# Patient Record
Sex: FEMALE | Race: WHITE | NOT HISPANIC OR LATINO | Employment: OTHER | URBAN - METROPOLITAN AREA
[De-identification: names, ages, dates, MRNs, and addresses within clinical notes are randomized per-mention and may not be internally consistent; named-entity substitution may affect disease eponyms.]

---

## 2019-01-17 ENCOUNTER — APPOINTMENT (OUTPATIENT)
Dept: RADIOLOGY | Facility: MEDICAL CENTER | Age: 49
DRG: 948 | End: 2019-01-17
Attending: EMERGENCY MEDICINE

## 2019-01-17 ENCOUNTER — HOSPITAL ENCOUNTER (INPATIENT)
Facility: MEDICAL CENTER | Age: 49
LOS: 2 days | DRG: 948 | End: 2019-01-19
Attending: EMERGENCY MEDICINE | Admitting: HOSPITALIST
Payer: OTHER MISCELLANEOUS

## 2019-01-17 ENCOUNTER — APPOINTMENT (OUTPATIENT)
Dept: RADIOLOGY | Facility: MEDICAL CENTER | Age: 49
DRG: 948 | End: 2019-01-17
Attending: HOSPITALIST

## 2019-01-17 DIAGNOSIS — K75.9 HEPATITIS: ICD-10-CM

## 2019-01-17 DIAGNOSIS — N30.00 ACUTE CYSTITIS WITHOUT HEMATURIA: ICD-10-CM

## 2019-01-17 DIAGNOSIS — R07.1 CHEST PAIN ON BREATHING: ICD-10-CM

## 2019-01-17 PROBLEM — Z72.0 TOBACCO ABUSE: Status: ACTIVE | Noted: 2019-01-17

## 2019-01-17 PROBLEM — E87.1 HYPONATREMIA: Status: ACTIVE | Noted: 2019-01-17

## 2019-01-17 PROBLEM — R10.811 RUQ ABDOMINAL TENDERNESS: Status: ACTIVE | Noted: 2019-01-17

## 2019-01-17 PROBLEM — N39.0 UTI (URINARY TRACT INFECTION): Status: ACTIVE | Noted: 2019-01-17

## 2019-01-17 PROBLEM — E03.9 HYPOTHYROIDISM: Status: ACTIVE | Noted: 2019-01-17

## 2019-01-17 PROBLEM — F10.20 ALCOHOL DEPENDENCE (HCC): Status: ACTIVE | Noted: 2019-01-17

## 2019-01-17 PROBLEM — R74.8 ELEVATED LIVER ENZYMES: Status: ACTIVE | Noted: 2019-01-17

## 2019-01-17 LAB
ALBUMIN SERPL BCP-MCNC: 4.3 G/DL (ref 3.2–4.9)
ALBUMIN/GLOB SERPL: 1.3 G/DL
ALP SERPL-CCNC: 208 U/L (ref 30–99)
ALT SERPL-CCNC: 830 U/L (ref 2–50)
ANION GAP SERPL CALC-SCNC: 13 MMOL/L (ref 0–11.9)
APPEARANCE UR: CLEAR
APTT PPP: 29.2 SEC (ref 24.7–36)
AST SERPL-CCNC: 761 U/L (ref 12–45)
BACTERIA #/AREA URNS HPF: ABNORMAL /HPF
BASOPHILS # BLD AUTO: 0.6 % (ref 0–1.8)
BASOPHILS # BLD: 0.05 K/UL (ref 0–0.12)
BILIRUB SERPL-MCNC: 2.8 MG/DL (ref 0.1–1.5)
BILIRUB UR QL STRIP.AUTO: ABNORMAL
BNP SERPL-MCNC: 15 PG/ML (ref 0–100)
BUN SERPL-MCNC: 20 MG/DL (ref 8–22)
CALCIUM SERPL-MCNC: 8.7 MG/DL (ref 8.4–10.2)
CASTS URNS QL MICRO: ABNORMAL /LPF
CHLORIDE SERPL-SCNC: 94 MMOL/L (ref 96–112)
CO2 SERPL-SCNC: 23 MMOL/L (ref 20–33)
COLOR UR: YELLOW
CREAT SERPL-MCNC: 1.01 MG/DL (ref 0.5–1.4)
D DIMER PPP IA.FEU-MCNC: 1.59 UG/ML (FEU) (ref 0–0.5)
EKG IMPRESSION: NORMAL
EOSINOPHIL # BLD AUTO: 0.05 K/UL (ref 0–0.51)
EOSINOPHIL NFR BLD: 0.6 % (ref 0–6.9)
EPI CELLS #/AREA URNS HPF: ABNORMAL /HPF
ERYTHROCYTE [DISTWIDTH] IN BLOOD BY AUTOMATED COUNT: 48 FL (ref 35.9–50)
ERYTHROCYTE [DISTWIDTH] IN BLOOD BY AUTOMATED COUNT: 48.1 FL (ref 35.9–50)
GLOBULIN SER CALC-MCNC: 3.2 G/DL (ref 1.9–3.5)
GLUCOSE SERPL-MCNC: 109 MG/DL (ref 65–99)
GLUCOSE UR STRIP.AUTO-MCNC: NEGATIVE MG/DL
HCT VFR BLD AUTO: 46.5 % (ref 37–47)
HCT VFR BLD AUTO: 50.4 % (ref 37–47)
HGB BLD-MCNC: 16 G/DL (ref 12–16)
HGB BLD-MCNC: 17.4 G/DL (ref 12–16)
HGB RETIC QN AUTO: 37.6 PG/CELL (ref 29–35)
IMM GRANULOCYTES # BLD AUTO: 0.02 K/UL (ref 0–0.11)
IMM GRANULOCYTES NFR BLD AUTO: 0.2 % (ref 0–0.9)
IMM RETICS NFR: 6.5 % (ref 9.3–17.4)
INR PPP: 1.03 (ref 0.87–1.13)
IRON SATN MFR SERPL: 91 % (ref 15–55)
IRON SERPL-MCNC: 322 UG/DL (ref 40–170)
KETONES UR STRIP.AUTO-MCNC: 40 MG/DL
LEUKOCYTE ESTERASE UR QL STRIP.AUTO: NEGATIVE
LIPASE SERPL-CCNC: 24 U/L (ref 7–58)
LYMPHOCYTES # BLD AUTO: 2.71 K/UL (ref 1–4.8)
LYMPHOCYTES NFR BLD: 32.4 % (ref 22–41)
MAGNESIUM SERPL-MCNC: 1.8 MG/DL (ref 1.5–2.5)
MCH RBC QN AUTO: 32.4 PG (ref 27–33)
MCH RBC QN AUTO: 32.5 PG (ref 27–33)
MCHC RBC AUTO-ENTMCNC: 34.4 G/DL (ref 33.6–35)
MCHC RBC AUTO-ENTMCNC: 34.5 G/DL (ref 33.6–35)
MCV RBC AUTO: 93.9 FL (ref 81.4–97.8)
MCV RBC AUTO: 94.3 FL (ref 81.4–97.8)
MICRO URNS: ABNORMAL
MONOCYTES # BLD AUTO: 0.7 K/UL (ref 0–0.85)
MONOCYTES NFR BLD AUTO: 8.4 % (ref 0–13.4)
NEUTROPHILS # BLD AUTO: 4.83 K/UL (ref 2–7.15)
NEUTROPHILS NFR BLD: 57.8 % (ref 44–72)
NITRITE UR QL STRIP.AUTO: NEGATIVE
NRBC # BLD AUTO: 0 K/UL
NRBC BLD-RTO: 0 /100 WBC
PH UR STRIP.AUTO: 5 [PH]
PLATELET # BLD AUTO: 355 K/UL (ref 164–446)
PLATELET # BLD AUTO: 399 K/UL (ref 164–446)
PMV BLD AUTO: 8.7 FL (ref 9–12.9)
PMV BLD AUTO: 8.9 FL (ref 9–12.9)
POTASSIUM SERPL-SCNC: 3.8 MMOL/L (ref 3.6–5.5)
PROT SERPL-MCNC: 7.5 G/DL (ref 6–8.2)
PROT UR QL STRIP: NEGATIVE MG/DL
PROTHROMBIN TIME: 13.4 SEC (ref 12–14.6)
RBC # BLD AUTO: 4.93 M/UL (ref 4.2–5.4)
RBC # BLD AUTO: 5.37 M/UL (ref 4.2–5.4)
RBC # URNS HPF: ABNORMAL /HPF
RBC UR QL AUTO: ABNORMAL
RETICS # AUTO: 0.06 M/UL (ref 0.04–0.06)
RETICS/RBC NFR: 1.3 % (ref 0.8–2.1)
SODIUM SERPL-SCNC: 130 MMOL/L (ref 135–145)
SP GR UR REFRACTOMETRY: 1.02
TIBC SERPL-MCNC: 353 UG/DL (ref 250–450)
TROPONIN I SERPL-MCNC: <0.02 NG/ML (ref 0–0.04)
TSH SERPL DL<=0.005 MIU/L-ACNC: 2.36 UIU/ML (ref 0.38–5.33)
WBC # BLD AUTO: 8.4 K/UL (ref 4.8–10.8)
WBC # BLD AUTO: 8.5 K/UL (ref 4.8–10.8)
WBC #/AREA URNS HPF: ABNORMAL /HPF

## 2019-01-17 PROCEDURE — 83880 ASSAY OF NATRIURETIC PEPTIDE: CPT

## 2019-01-17 PROCEDURE — 36415 COLL VENOUS BLD VENIPUNCTURE: CPT

## 2019-01-17 PROCEDURE — 99407 BEHAV CHNG SMOKING > 10 MIN: CPT | Performed by: HOSPITALIST

## 2019-01-17 PROCEDURE — A9270 NON-COVERED ITEM OR SERVICE: HCPCS | Performed by: HOSPITALIST

## 2019-01-17 PROCEDURE — 700105 HCHG RX REV CODE 258: Performed by: HOSPITALIST

## 2019-01-17 PROCEDURE — 71045 X-RAY EXAM CHEST 1 VIEW: CPT

## 2019-01-17 PROCEDURE — 84443 ASSAY THYROID STIM HORMONE: CPT

## 2019-01-17 PROCEDURE — 93005 ELECTROCARDIOGRAM TRACING: CPT | Performed by: EMERGENCY MEDICINE

## 2019-01-17 PROCEDURE — 87086 URINE CULTURE/COLONY COUNT: CPT

## 2019-01-17 PROCEDURE — 85046 RETICYTE/HGB CONCENTRATE: CPT

## 2019-01-17 PROCEDURE — 96372 THER/PROPH/DIAG INJ SC/IM: CPT

## 2019-01-17 PROCEDURE — 700117 HCHG RX CONTRAST REV CODE 255: Performed by: EMERGENCY MEDICINE

## 2019-01-17 PROCEDURE — 83550 IRON BINDING TEST: CPT

## 2019-01-17 PROCEDURE — 85379 FIBRIN DEGRADATION QUANT: CPT

## 2019-01-17 PROCEDURE — 71275 CT ANGIOGRAPHY CHEST: CPT

## 2019-01-17 PROCEDURE — 700105 HCHG RX REV CODE 258: Performed by: EMERGENCY MEDICINE

## 2019-01-17 PROCEDURE — 87077 CULTURE AEROBIC IDENTIFY: CPT

## 2019-01-17 PROCEDURE — 85027 COMPLETE CBC AUTOMATED: CPT

## 2019-01-17 PROCEDURE — 99232 SBSQ HOSP IP/OBS MODERATE 35: CPT | Mod: 25 | Performed by: HOSPITALIST

## 2019-01-17 PROCEDURE — 85610 PROTHROMBIN TIME: CPT

## 2019-01-17 PROCEDURE — 74181 MRI ABDOMEN W/O CONTRAST: CPT

## 2019-01-17 PROCEDURE — 700111 HCHG RX REV CODE 636 W/ 250 OVERRIDE (IP)

## 2019-01-17 PROCEDURE — 81001 URINALYSIS AUTO W/SCOPE: CPT

## 2019-01-17 PROCEDURE — 700111 HCHG RX REV CODE 636 W/ 250 OVERRIDE (IP): Performed by: HOSPITALIST

## 2019-01-17 PROCEDURE — 85730 THROMBOPLASTIN TIME PARTIAL: CPT

## 2019-01-17 PROCEDURE — 700102 HCHG RX REV CODE 250 W/ 637 OVERRIDE(OP): Performed by: HOSPITALIST

## 2019-01-17 PROCEDURE — 770006 HCHG ROOM/CARE - MED/SURG/GYN SEMI*

## 2019-01-17 PROCEDURE — 99285 EMERGENCY DEPT VISIT HI MDM: CPT

## 2019-01-17 PROCEDURE — 87186 SC STD MICRODIL/AGAR DIL: CPT

## 2019-01-17 PROCEDURE — 84484 ASSAY OF TROPONIN QUANT: CPT

## 2019-01-17 PROCEDURE — 85025 COMPLETE CBC W/AUTO DIFF WBC: CPT

## 2019-01-17 PROCEDURE — 83540 ASSAY OF IRON: CPT

## 2019-01-17 PROCEDURE — 83690 ASSAY OF LIPASE: CPT

## 2019-01-17 PROCEDURE — 80053 COMPREHEN METABOLIC PANEL: CPT

## 2019-01-17 PROCEDURE — 96374 THER/PROPH/DIAG INJ IV PUSH: CPT

## 2019-01-17 PROCEDURE — 80074 ACUTE HEPATITIS PANEL: CPT

## 2019-01-17 PROCEDURE — 83735 ASSAY OF MAGNESIUM: CPT

## 2019-01-17 RX ORDER — FAMOTIDINE 20 MG/1
20 TABLET, FILM COATED ORAL 2 TIMES DAILY
Status: DISCONTINUED | OUTPATIENT
Start: 2019-01-17 | End: 2019-01-19 | Stop reason: HOSPADM

## 2019-01-17 RX ORDER — DOXEPIN HYDROCHLORIDE 25 MG/1
25 CAPSULE ORAL NIGHTLY
COMMUNITY
End: 2019-01-23 | Stop reason: SDUPTHER

## 2019-01-17 RX ORDER — MORPHINE SULFATE 4 MG/ML
INJECTION, SOLUTION INTRAMUSCULAR; INTRAVENOUS
Status: COMPLETED
Start: 2019-01-17 | End: 2019-01-17

## 2019-01-17 RX ORDER — NICOTINE 21 MG/24HR
21 PATCH, TRANSDERMAL 24 HOURS TRANSDERMAL
Status: DISCONTINUED | OUTPATIENT
Start: 2019-01-17 | End: 2019-01-19 | Stop reason: HOSPADM

## 2019-01-17 RX ORDER — LEVOTHYROXINE SODIUM 88 UG/1
88 TABLET ORAL
COMMUNITY
End: 2019-01-23

## 2019-01-17 RX ORDER — FOLIC ACID 1 MG/1
1 TABLET ORAL DAILY
Status: DISCONTINUED | OUTPATIENT
Start: 2019-01-18 | End: 2019-01-19 | Stop reason: HOSPADM

## 2019-01-17 RX ORDER — ESTRADIOL 0.03 MG/D
1 FILM, EXTENDED RELEASE TRANSDERMAL
COMMUNITY
End: 2019-01-23 | Stop reason: SDUPTHER

## 2019-01-17 RX ORDER — SODIUM CHLORIDE 9 MG/ML
INJECTION, SOLUTION INTRAVENOUS CONTINUOUS
Status: DISCONTINUED | OUTPATIENT
Start: 2019-01-17 | End: 2019-01-17

## 2019-01-17 RX ORDER — CHLORAL HYDRATE 500 MG
1000 CAPSULE ORAL DAILY
Status: ON HOLD | COMMUNITY
End: 2022-07-19

## 2019-01-17 RX ORDER — LEVOTHYROXINE SODIUM 88 UG/1
88 TABLET ORAL
Status: DISCONTINUED | OUTPATIENT
Start: 2019-01-18 | End: 2019-01-19 | Stop reason: HOSPADM

## 2019-01-17 RX ORDER — FLUOXETINE HYDROCHLORIDE 20 MG/1
20 CAPSULE ORAL DAILY
COMMUNITY
End: 2019-01-23 | Stop reason: SDUPTHER

## 2019-01-17 RX ORDER — MORPHINE SULFATE 4 MG/ML
2-4 INJECTION, SOLUTION INTRAMUSCULAR; INTRAVENOUS EVERY 4 HOURS PRN
Status: DISCONTINUED | OUTPATIENT
Start: 2019-01-17 | End: 2019-01-18

## 2019-01-17 RX ORDER — ONDANSETRON 4 MG/1
4 TABLET, ORALLY DISINTEGRATING ORAL EVERY 4 HOURS PRN
Status: DISCONTINUED | OUTPATIENT
Start: 2019-01-17 | End: 2019-01-17

## 2019-01-17 RX ORDER — DOXEPIN HYDROCHLORIDE 25 MG/1
25 CAPSULE ORAL NIGHTLY
Status: DISCONTINUED | OUTPATIENT
Start: 2019-01-17 | End: 2019-01-19 | Stop reason: HOSPADM

## 2019-01-17 RX ORDER — THIAMINE MONONITRATE (VIT B1) 100 MG
100 TABLET ORAL DAILY
Status: DISCONTINUED | OUTPATIENT
Start: 2019-01-18 | End: 2019-01-19 | Stop reason: HOSPADM

## 2019-01-17 RX ORDER — PROMETHAZINE HYDROCHLORIDE 25 MG/1
12.5-25 TABLET ORAL EVERY 4 HOURS PRN
Status: DISCONTINUED | OUTPATIENT
Start: 2019-01-17 | End: 2019-01-19 | Stop reason: HOSPADM

## 2019-01-17 RX ORDER — BISACODYL 10 MG
10 SUPPOSITORY, RECTAL RECTAL
Status: DISCONTINUED | OUTPATIENT
Start: 2019-01-17 | End: 2019-01-19 | Stop reason: HOSPADM

## 2019-01-17 RX ORDER — PROMETHAZINE HYDROCHLORIDE 25 MG/1
12.5-25 SUPPOSITORY RECTAL EVERY 4 HOURS PRN
Status: DISCONTINUED | OUTPATIENT
Start: 2019-01-17 | End: 2019-01-19 | Stop reason: HOSPADM

## 2019-01-17 RX ORDER — ESTRADIOL 0.03 MG/D
1 FILM, EXTENDED RELEASE TRANSDERMAL
Status: DISCONTINUED | OUTPATIENT
Start: 2019-01-18 | End: 2019-01-18

## 2019-01-17 RX ORDER — ALPRAZOLAM 0.25 MG/1
0.25 TABLET ORAL 2 TIMES DAILY
COMMUNITY
End: 2019-01-23 | Stop reason: SDUPTHER

## 2019-01-17 RX ORDER — POLYETHYLENE GLYCOL 3350 17 G/17G
1 POWDER, FOR SOLUTION ORAL
Status: DISCONTINUED | OUTPATIENT
Start: 2019-01-17 | End: 2019-01-19 | Stop reason: HOSPADM

## 2019-01-17 RX ORDER — MORPHINE SULFATE 4 MG/ML
1 INJECTION, SOLUTION INTRAMUSCULAR; INTRAVENOUS EVERY 4 HOURS PRN
Status: DISCONTINUED | OUTPATIENT
Start: 2019-01-17 | End: 2019-01-17

## 2019-01-17 RX ORDER — SODIUM CHLORIDE 9 MG/ML
INJECTION, SOLUTION INTRAVENOUS CONTINUOUS
Status: DISCONTINUED | OUTPATIENT
Start: 2019-01-17 | End: 2019-01-19 | Stop reason: HOSPADM

## 2019-01-17 RX ORDER — VITS A,C,E/LUTEIN/MINERALS 300MCG-200
1 TABLET ORAL DAILY
COMMUNITY

## 2019-01-17 RX ORDER — FLUOXETINE HYDROCHLORIDE 20 MG/1
20 CAPSULE ORAL DAILY
Status: DISCONTINUED | OUTPATIENT
Start: 2019-01-18 | End: 2019-01-19 | Stop reason: HOSPADM

## 2019-01-17 RX ORDER — ONDANSETRON 2 MG/ML
4 INJECTION INTRAMUSCULAR; INTRAVENOUS EVERY 4 HOURS PRN
Status: DISCONTINUED | OUTPATIENT
Start: 2019-01-17 | End: 2019-01-17

## 2019-01-17 RX ORDER — CHLORAL HYDRATE 500 MG
1000 CAPSULE ORAL DAILY
Status: DISCONTINUED | OUTPATIENT
Start: 2019-01-18 | End: 2019-01-19 | Stop reason: HOSPADM

## 2019-01-17 RX ORDER — AMOXICILLIN 250 MG
2 CAPSULE ORAL 2 TIMES DAILY
Status: DISCONTINUED | OUTPATIENT
Start: 2019-01-17 | End: 2019-01-19 | Stop reason: HOSPADM

## 2019-01-17 RX ADMIN — IOHEXOL 55 ML: 350 INJECTION, SOLUTION INTRAVENOUS at 11:50

## 2019-01-17 RX ADMIN — SODIUM CHLORIDE: 9 INJECTION, SOLUTION INTRAVENOUS at 14:16

## 2019-01-17 RX ADMIN — PROCHLORPERAZINE EDISYLATE 10 MG: 5 INJECTION INTRAMUSCULAR; INTRAVENOUS at 21:49

## 2019-01-17 RX ADMIN — NICOTINE 21 MG: 21 PATCH, EXTENDED RELEASE TRANSDERMAL at 19:56

## 2019-01-17 RX ADMIN — DOXEPIN HYDROCHLORIDE 25 MG: 25 CAPSULE ORAL at 21:07

## 2019-01-17 RX ADMIN — MORPHINE SULFATE 4 MG: 4 INJECTION INTRAVENOUS at 19:53

## 2019-01-17 RX ADMIN — MORPHINE SULFATE 1 MG: 4 INJECTION, SOLUTION INTRAMUSCULAR; INTRAVENOUS at 14:15

## 2019-01-17 RX ADMIN — MORPHINE SULFATE 1 MG: 4 INJECTION INTRAVENOUS at 14:15

## 2019-01-17 RX ADMIN — SODIUM CHLORIDE 1000 ML: 9 INJECTION, SOLUTION INTRAVENOUS at 12:09

## 2019-01-17 RX ADMIN — MORPHINE SULFATE 3 MG: 4 INJECTION INTRAVENOUS at 15:26

## 2019-01-17 RX ADMIN — CEFTRIAXONE SODIUM 2 G: 2 INJECTION, POWDER, FOR SOLUTION INTRAMUSCULAR; INTRAVENOUS at 19:55

## 2019-01-17 RX ADMIN — FAMOTIDINE 20 MG: 20 TABLET, FILM COATED ORAL at 23:16

## 2019-01-17 RX ADMIN — ENOXAPARIN SODIUM 40 MG: 100 INJECTION SUBCUTANEOUS at 14:29

## 2019-01-17 ASSESSMENT — COGNITIVE AND FUNCTIONAL STATUS - GENERAL
SUGGESTED CMS G CODE MODIFIER DAILY ACTIVITY: CH
DAILY ACTIVITIY SCORE: 24
MOBILITY SCORE: 24
SUGGESTED CMS G CODE MODIFIER MOBILITY: CH

## 2019-01-17 ASSESSMENT — LIFESTYLE VARIABLES
EVER_SMOKED: YES
TOTAL SCORE: 0
HAVE YOU EVER FELT YOU SHOULD CUT DOWN ON YOUR DRINKING: NO
ALCOHOL_USE: YES
ON A TYPICAL DAY WHEN YOU DRINK ALCOHOL HOW MANY DRINKS DO YOU HAVE: 12
TOTAL SCORE: 0
EVER FELT BAD OR GUILTY ABOUT YOUR DRINKING: NO
CONSUMPTION TOTAL: POSITIVE
TOTAL SCORE: 0
AVERAGE NUMBER OF DAYS PER WEEK YOU HAVE A DRINK CONTAINING ALCOHOL: 4
HOW MANY TIMES IN THE PAST YEAR HAVE YOU HAD 5 OR MORE DRINKS IN A DAY: 100
HAVE PEOPLE ANNOYED YOU BY CRITICIZING YOUR DRINKING: NO
EVER HAD A DRINK FIRST THING IN THE MORNING TO STEADY YOUR NERVES TO GET RID OF A HANGOVER: NO

## 2019-01-17 ASSESSMENT — PAIN SCALES - GENERAL
PAINLEVEL_OUTOF10: 3
PAINLEVEL_OUTOF10: 8
PAINLEVEL_OUTOF10: 8
PAINLEVEL_OUTOF10: 6
PAINLEVEL_OUTOF10: 8

## 2019-01-17 ASSESSMENT — PATIENT HEALTH QUESTIONNAIRE - PHQ9
1. LITTLE INTEREST OR PLEASURE IN DOING THINGS: NOT AT ALL
SUM OF ALL RESPONSES TO PHQ9 QUESTIONS 1 AND 2: 0
2. FEELING DOWN, DEPRESSED, IRRITABLE, OR HOPELESS: NOT AT ALL

## 2019-01-17 ASSESSMENT — PAIN DESCRIPTION - DESCRIPTORS: DESCRIPTORS: ACHING

## 2019-01-17 NOTE — ED NOTES
Pt assessed, c/o right sided pain under ribs, mild right side flank pain and dysuria that started yesterday. States she may have had fever last night as well. Reports increased pain with coughing and deep breathing. Urine and labs sent  Will cont to monitor

## 2019-01-17 NOTE — H&P
Hospital Medicine History & Physical Note    Date of Service  1/17/2019    Primary Care Physician  Pcp Pt States None    Consultants  None    Code Status  Full    Chief Complaint  Right upper quadrant pain    History of Presenting Illness   is a very pleasant 48 y.o. female with a past medical history of hypothyroidism, history of hemochromatosis who presented 1/17/2019 to the ER for evaluation of right-sided flank discomfort/right upper quadrant pain which essentially started last night, describes it as a dull, constant 5-7 out of 10 in severity, positional, otherwise no exacerbating relieving factors.  She denies having any nausea or vomiting.  She also did complain to the ER physician of nonspecific chest discomfort, however she denied that to me.  Patient is on an estrogen patch and also uses tobacco frequently.  She also endorsed drinking 6 shots of fire balls daily, last drink was 3 days ago but usually does drink on a regular basis.  Initial examination in the ER including a CTA pulmonary embolism study was negative for a pulmonary embolism.  However with elevated liver enzymes at this point patient will be admitted for further evaluation.    Review of Systems  ROS    Past Medical History   has a past medical history of Disorder of thyroid; Hemochromatosis; Menopause; and Psychiatric problem.    Surgical History   has a past surgical history that includes other orthopedic surgery (Right, 2017); robotic laparoscopic cholecystectomy (2017); umbilical hernia repair (2017); appendectomy; tonsillectomy and adenoidectomy (1976); mammoplasty augmentation (Bilateral); and inguinal hernia repair bilateral (1999).     Family History  family history includes Diabetes in her son; Heart Disease in her brother and mother.     Social History   reports that she has been smoking Cigarettes.  She has been smoking about 1.00 pack per day. She has never used smokeless tobacco. She reports that she drinks alcohol. She  reports that she does not use drugs. She reports drinking 6 shots of fireball daily, last drink 3 days ago.     Allergies  Allergies   Allergen Reactions   • Norco [Apap-Fd&C Yellow #10 Al Cervantes-Hydrocodone]      itching   • Sulfa Drugs Anaphylaxis       Medications  Prior to Admission Medications   Prescriptions Last Dose Informant Patient Reported? Taking?   ALPRAZolam (XANAX) 0.25 MG Tab 2019 at 2100  Yes Yes   Sig: Take 0.25 mg by mouth at bedtime as needed for Sleep or Anxiety.   Cyanocobalamin (VITAMIN B-12 INJ) 1/15/2019 at am  Yes Yes   Si,000 mcg by Injection route every 48 hours.   FLUoxetine (PROZAC) 20 MG Cap 2019 at AM Patient Yes Yes   Sig: Take 20 mg by mouth every day.   Multiple Vitamins-Minerals (OCUVITE-LUTEIN) Tab 2019 at am  Yes Yes   Sig: Take 1 tablet by mouth every day.   Omega-3 Fatty Acids (FISH OIL) 1000 MG Cap capsule 2019 at AM Patient Yes Yes   Sig: Take 1,000 mg by mouth every day.   doxepin (SINEQUAN) 25 MG Cap 2019 at PM Patient Yes Yes   Sig: Take 25 mg by mouth every evening.   estradiol (CLIMARA) 0.025 MG/24HR PATCH WEEKLY 2019 at AM Patient Yes Yes   Sig: Apply 1 Patch to skin as directed 2X A WEEK. Monday and Friday   levothyroxine (SYNTHROID) 88 MCG Tab 2019 at AM Patient Yes Yes   Sig: Take 88 mcg by mouth Every morning on an empty stomach.   progesterone (PROMETRIUM) 100 MG Cap 2019 at AM Patient Yes Yes   Sig: Take 100 mg by mouth every day.      Facility-Administered Medications: None       Physical Exam  Temp:  [36.1 °C (97 °F)-36.9 °C (98.5 °F)] 36.9 °C (98.5 °F)  Pulse:  [] 63  Resp:  [16-18] 16  BP: (125-134)/(78-99) 127/78  SpO2:  [95 %-96 %] 96 %    Physical Exam    Laboratory:  Recent Labs      19   1130  19   1505   WBC  8.4  8.5   RBC  5.37  4.93   HEMOGLOBIN  17.4*  16.0   HEMATOCRIT  50.4*  46.5   MCV  93.9  94.3   MCH  32.4  32.5   MCHC  34.5  34.4   RDW  48.0  48.1   PLATELETCT  399  355   MPV  8.7*   8.9*     Recent Labs      01/17/19   1130   SODIUM  130*   POTASSIUM  3.8   CHLORIDE  94*   CO2  23   GLUCOSE  109*   BUN  20   CREATININE  1.01   CALCIUM  8.7     Recent Labs      01/17/19   1130   ALTSGPT  830*   ASTSGOT  761*   ALKPHOSPHAT  208*   TBILIRUBIN  2.8*   LIPASE  24   GLUCOSE  109*     Recent Labs      01/17/19   1130   APTT  29.2   INR  1.03     Recent Labs      01/17/19   1130   BNPBTYPENAT  15         Recent Labs      01/17/19   1130   TROPONINI  <0.02       Urinalysis:    Recent Labs      01/17/19   1130   SPECGRAVITY  1.025   GLUCOSEUR  Negative   KETONES  40*   NITRITE  Negative   LEUKESTERAS  Negative   WBCURINE  *   RBCURINE  2-5*   BACTERIA  Many*   EPITHELCELL  Rare        Imaging:  CT-CTA CHEST PULMONARY ARTERY W/ RECONS   Final Result      1.  No evidence of pulmonary embolism.      2.  No focal pulmonary consolidation.            DX-CHEST-PORTABLE (1 VIEW)   Final Result      No evidence of acute cardiopulmonary process.      [id number]    (Results Pending)         Assessment/Plan:  I anticipate this patient will require at least two midnights for appropriate medical management, necessitating inpatient admission.    * Elevated liver enzymes   Assessment & Plan    Unclear, possibly 2/2 to heavy alcohol consumption vs CBD sludge/obstruction, altough CT A/P neg for acute findings.  Hepatitis panel ordered  MRCP ordered and pending  Check TSH and Lipid panel   Repeat cmp in the am.       RUQ abdominal tenderness   Assessment & Plan    MRCP ordered, pending.  Hx of cholecystectomy.      Hypothyroidism   Assessment & Plan    Resume home dose of synthroid       Hyponatremia   Assessment & Plan    2/2 to dehydration  Provide IV fluids  Recheck bmp in the am     Tobacco abuse   Assessment & Plan    Tobacco cessation counseling and education provided for more than 10 minutes. Nicotine replacement options provided including patch, and further medical treatments including Wellbutrin and  chantix.      Alcohol dependence (HCC)   Assessment & Plan    Drinks about 6 shots of fireballs daily, last drink was 3 days ago, usually drinks heavily on and off   No signs of withdrawal  MVI/Thiamine/folate ordered  CTM     UTI (urinary tract infection)   Assessment & Plan    UA+ WBCs and many bacteria  Cultures ordered  IV Ceftriaxone x 3 doses.         VTE prophylaxis: Lovenox.

## 2019-01-17 NOTE — CARE PLAN
Problem: Knowledge Deficit  Goal: Knowledge of disease process/condition, treatment plan, diagnostic tests, and medications will improve    Intervention: Assess knowledge level of disease process/condition, treatment plan, diagnostic tests, and medications  Educated about poc and length of stay.       Problem: Pain Management  Goal: Pain level will decrease to patient's comfort goal    Intervention: Follow pain managment plan developed in collaboration with patient and Interdisciplinary Team  Educated about pain management and testing for elevated Liver enzymes

## 2019-01-17 NOTE — ED PROVIDER NOTES
"CHIEF COMPLAINT  Chief Complaint   Patient presents with   • Chest Pain       HPI  Carie Santiago is a 48 y.o. female who presents to the Emergency Department with a chief complaint of right flank pain.   The pain is right sided and started  Last night.  The pain does no migrate to lower abdomen.   The patient describes the pain as constant..  There is possible associated fever.   There is no associated vomiting .   There is no history of kidney stones.  There is some associated dsypnea at rest.   The patient complaints of mild dysuria.  She denies any fever or vomiting.  Patient is on estrogen patch, smokes and drove from [place] 3 months ago.  She is short of breath going up the stairs.         REVIEW OF SYSTEMS  Positive for chest wall pain, dyspnea shortness of breath fever dysuria, Negative for diarrhea chills.  As above all other systems are negative.    PAST MEDICAL HISTORY  No past medical history on file.    FAMILY HISTORY  History reviewed. No pertinent family history.     SOCIAL HISTORY  Social History     Social History Main Topics   • Smoking status: Current Every Day Smoker     Packs/day: 1.00     Types: Cigarettes   • Smokeless tobacco: Never Used   • Alcohol use Yes      Comment: Weekly x 2   • Drug use: No   • Sexual activity: Not on file       SURGICAL HISTORY  patient denies any surgical history      CURRENT MEDICATIONS  Reviewed.  See Encounter Summary.  Include   No current facility-administered medications on file prior to encounter.      No current outpatient prescriptions on file prior to encounter.       ALLERGIES  Allergies   Allergen Reactions   • Norco [Apap-Fd&C Yellow #10 Al Cervantes-Hydrocodone]      itching   • Sulfa Drugs Anaphylaxis       PHYSICAL EXAM  VITAL SIGNS: /99   Pulse 74   Temp 36.1 °C (97 °F) (Temporal)   Resp 18   Ht 1.778 m (5' 10\")   Wt 91 kg (200 lb 9.9 oz)   SpO2 95%   BMI 28.79 kg/m²   Constitutional: Alert, able to answer questions  HENT: Nose is normal " in appearance, external ears are normal,  moist mucous membranes  Eyes: Anicteric,  pupils are equal round and reactive, there is no conjunctival drainage or pallor   Neck: The trachea is midline, there is no obvious mass or meningeal signs  Cardiovascular: Good perfusion, regular rate and rhythm without murmurs gallops or rubs  Thorax & Lungs: Respiratory rate and effort are normal. There is normal chest excursion with respiration.  No wheezes rhonchi or rales noted.  Abdomen: Abdomen is normal in appearance, TTP RUQ  Musculoskeletal: No deformities noted in all 4 extremities. Actively moves all 4 extremities  Skin: Visualized skin is warm, no erythema, no rash.  Neurologic:  Cranial nerves II through XII are intact there is no focal abnormality noted.  Psychiatric: Normal mood and mentation    RADIOLOGY/PROCEDURES  Imaging Studies:    CT-CTA CHEST PULMONARY ARTERY W/ RECONS   Final Result      1.  No evidence of pulmonary embolism.      2.  No focal pulmonary consolidation.            DX-CHEST-PORTABLE (1 VIEW)   Final Result      No evidence of acute cardiopulmonary process.      [id number]-L/O    (Results Pending)         Pertinent Labs   Results for orders placed or performed during the hospital encounter of 01/17/19   CBC with Differential   Result Value Ref Range    WBC 8.4 4.8 - 10.8 K/uL    RBC 5.37 4.20 - 5.40 M/uL    Hemoglobin 17.4 (H) 12.0 - 16.0 g/dL    Hematocrit 50.4 (H) 37.0 - 47.0 %    MCV 93.9 81.4 - 97.8 fL    MCH 32.4 27.0 - 33.0 pg    MCHC 34.5 33.6 - 35.0 g/dL    RDW 48.0 35.9 - 50.0 fL    Platelet Count 399 164 - 446 K/uL    MPV 8.7 (L) 9.0 - 12.9 fL    Neutrophils-Polys 57.80 44.00 - 72.00 %    Lymphocytes 32.40 22.00 - 41.00 %    Monocytes 8.40 0.00 - 13.40 %    Eosinophils 0.60 0.00 - 6.90 %    Basophils 0.60 0.00 - 1.80 %    Immature Granulocytes 0.20 0.00 - 0.90 %    Nucleated RBC 0.00 /100 WBC    Neutrophils (Absolute) 4.83 2.00 - 7.15 K/uL    Lymphs (Absolute) 2.71 1.00 - 4.80 K/uL     Monos (Absolute) 0.70 0.00 - 0.85 K/uL    Eos (Absolute) 0.05 0.00 - 0.51 K/uL    Baso (Absolute) 0.05 0.00 - 0.12 K/uL    Immature Granulocytes (abs) 0.02 0.00 - 0.11 K/uL    NRBC (Absolute) 0.00 K/uL   Complete Metabolic Panel (CMP)   Result Value Ref Range    Sodium 130 (L) 135 - 145 mmol/L    Potassium 3.8 3.6 - 5.5 mmol/L    Chloride 94 (L) 96 - 112 mmol/L    Co2 23 20 - 33 mmol/L    Anion Gap 13.0 (H) 0.0 - 11.9    Glucose 109 (H) 65 - 99 mg/dL    Bun 20 8 - 22 mg/dL    Creatinine 1.01 0.50 - 1.40 mg/dL    Calcium 8.7 8.4 - 10.2 mg/dL    AST(SGOT) 761 (H) 12 - 45 U/L    ALT(SGPT) 830 (H) 2 - 50 U/L    Alkaline Phosphatase 208 (H) 30 - 99 U/L    Total Bilirubin 2.8 (H) 0.1 - 1.5 mg/dL    Albumin 4.3 3.2 - 4.9 g/dL    Total Protein 7.5 6.0 - 8.2 g/dL    Globulin 3.2 1.9 - 3.5 g/dL    A-G Ratio 1.3 g/dL   Btype Natriuretic Peptide (BNP)   Result Value Ref Range    B Natriuretic Peptide 15 0 - 100 pg/mL   Prothrombin Time (PT/INR)   Result Value Ref Range    PT 13.4 12.0 - 14.6 sec    INR 1.03 0.87 - 1.13   APTT   Result Value Ref Range    APTT 29.2 24.7 - 36.0 sec   Lipase   Result Value Ref Range    Lipase 24 7 - 58 U/L   Troponin STAT   Result Value Ref Range    Troponin I <0.02 0.00 - 0.04 ng/mL   D-DIMER   Result Value Ref Range    D-Dimer Screen 1.59 (H) 0.00 - 0.50 ug/mL (FEU)   URINALYSIS,CULTURE IF INDICATED   Result Value Ref Range    Micro Urine Req Microscopic     Color Yellow     Character Clear     Ph 5.0 5.0 - 8.0    Glucose Negative Negative mg/dL    Ketones 40 (A) Negative mg/dL    Protein Negative Negative mg/dL    Bilirubin Small (A) Negative    Nitrite Negative Negative    Leukocyte Esterase Negative Negative    Occult Blood Small (A) Negative   REFRACTOMETER SG   Result Value Ref Range    Specific Gravity 1.025    URINE MICROSCOPIC (W/UA)   Result Value Ref Range    WBC  (A) /hpf    RBC 2-5 (A) /hpf    Bacteria Many (A) None /hpf    Epithelial Cells Rare Few /hpf    Urine Casts 3-5  Hyaline (A) /lpf   ESTIMATED GFR   Result Value Ref Range    GFR If African American >60 >60 mL/min/1.73 m 2    GFR If Non African American 58 (A) >60 mL/min/1.73 m 2   EKG   Result Value Ref Range    Report       [facility] Emergency Dept.    Test Date:  2019  Pt Name:    KEATON ROBLEDO              Department: [facility]  MRN:        [id number]                      Room:  Gender:     Female                       Technician: [id number]  :        1970                   Requested By:ER TRIAGE PROTOCOL  Order #:    [id number]                    Reading MD:    Measurements  Intervals                                Axis  Rate:       95                           P:          74  RI:         140                          QRS:        15  QRSD:       64                           T:          47  QT:         336  QTc:        423    Interpretive Statements  SINUS RHYTHM  INFERIOR INFARCT, OLD  CONSIDER ANTERIOR INFARCT  No previous ECG available for comparison               COURSE & MEDICAL DECISION MAKING  Nursing notes and vital signs were reviewed. (See chart for details)  The patients prior  records were reviewed, history was obtained from the patient    The patient presents with flank pain, and the differential diagnosis includes but is not limited to ureteral stone, AAA, pyelonephritis, or associated musculoskeletal back pain .       Initial orders in the Emergency Department included CBC CMP urinalysis and CT renal colic and initial treatment in the Emergency Department included [unfilled]   and the patient received IV normal saline hydration secondary to concentrated urine on evaluation and need for IV contrast    ED testing reveals elevated bilirubin and AST ALT, concern for acute hepatitis, retained stones, or alcohol induced liver disease.  She also has  white blood cells and sent symptoms of UTI and I suspect she has acute cystitis as well.  Patient additionally has chest pain, it is  right-sided troponin was negative her EKG does show Q waves in her anterior lead as well as inferior lead possibility for old infarcts present.,  Do not think this is active cardiac disease or ST elevation MI.    Patient will be admitted to observation for evaluation with MR imaging, serial liver enzymes and further inpatient management and care    FINAL IMPRESSION  1. Hepatitis      2. Acute cystitis without hematuria      3. Chest pain on breathing      .        DISPOSITION  Admit    Electronically signed by: Farzaneh Harman, 1/17/2019 11:23 AM

## 2019-01-17 NOTE — PROGRESS NOTES
· 2 RN skin check complete with MARA Ochoa.  · Devices in place NA.  · Skin assessed under devices NA.  · Confirmed pressure ulcers found on NA.  · New potential pressure ulcers noted on NA. Wound consult placed and wound reported.  · The following interventions in place NA

## 2019-01-17 NOTE — ASSESSMENT & PLAN NOTE
Is elevation more consistent with hepatocellular dysfunction likely a combination of hemochromatosis in addition to alcohol abuse, already trending down  We will proceed with MRCP but doubt there is obstruction-her bilirubin is normal  Madd is Mathieu score is 7.7.  If alcohol related should continue to trend down with abstinence-  Continue to monitor  Check ammonia  Check hepatitis panel

## 2019-01-17 NOTE — ED NOTES
1300: Pt requesting pain medication, was not ordered by Dr. ALEXANDER. Will notify Dr. ALEXANDER, he is currently with another patient in the ED.     1310: Dr. ALEXANDER stated he will put in order for pain medication, pt on her way to the floor

## 2019-01-17 NOTE — ED TRIAGE NOTES
"Pt C/O acute onset of right chest pain presenting since last night, with associated dyspnea at rest. She denies any similar past occurrences.     /99   Pulse 100   Temp 36.1 °C (97 °F) (Temporal)   Resp 18   Ht 1.778 m (5' 10\")   Wt 91 kg (200 lb 9.9 oz)   SpO2 96%   BMI 28.79 kg/m²     "

## 2019-01-18 PROBLEM — E83.119 HEMOCHROMATOSIS: Status: ACTIVE | Noted: 2019-01-18

## 2019-01-18 PROBLEM — R82.90 ABNORMAL URINALYSIS: Status: ACTIVE | Noted: 2019-01-17

## 2019-01-18 LAB
ALBUMIN SERPL BCP-MCNC: 3.1 G/DL (ref 3.2–4.9)
ALBUMIN/GLOB SERPL: 1.3 G/DL
ALP SERPL-CCNC: 142 U/L (ref 30–99)
ALT SERPL-CCNC: 473 U/L (ref 2–50)
AMMONIA PLAS-SCNC: 34 UMOL/L (ref 11–45)
ANION GAP SERPL CALC-SCNC: 6 MMOL/L (ref 0–11.9)
AST SERPL-CCNC: 304 U/L (ref 12–45)
BASOPHILS # BLD AUTO: 0.7 % (ref 0–1.8)
BASOPHILS # BLD: 0.05 K/UL (ref 0–0.12)
BILIRUB SERPL-MCNC: 1.3 MG/DL (ref 0.1–1.5)
BUN SERPL-MCNC: 18 MG/DL (ref 8–22)
CALCIUM SERPL-MCNC: 8.1 MG/DL (ref 8.4–10.2)
CHLORIDE SERPL-SCNC: 104 MMOL/L (ref 96–112)
CHOLEST SERPL-MCNC: 133 MG/DL (ref 100–199)
CO2 SERPL-SCNC: 24 MMOL/L (ref 20–33)
CREAT SERPL-MCNC: 0.69 MG/DL (ref 0.5–1.4)
EOSINOPHIL # BLD AUTO: 0.12 K/UL (ref 0–0.51)
EOSINOPHIL NFR BLD: 1.7 % (ref 0–6.9)
ERYTHROCYTE [DISTWIDTH] IN BLOOD BY AUTOMATED COUNT: 49.2 FL (ref 35.9–50)
FERRITIN SERPL-MCNC: 1176.6 NG/ML (ref 10–291)
GLOBULIN SER CALC-MCNC: 2.3 G/DL (ref 1.9–3.5)
GLUCOSE SERPL-MCNC: 117 MG/DL (ref 65–99)
HAV IGM SERPL QL IA: NEGATIVE
HBV CORE IGM SER QL: NEGATIVE
HBV SURFACE AG SER QL: NEGATIVE
HCT VFR BLD AUTO: 40.1 % (ref 37–47)
HCV AB SER QL: NEGATIVE
HDLC SERPL-MCNC: 30 MG/DL
HGB BLD-MCNC: 13.4 G/DL (ref 12–16)
IMM GRANULOCYTES # BLD AUTO: 0.02 K/UL (ref 0–0.11)
IMM GRANULOCYTES NFR BLD AUTO: 0.3 % (ref 0–0.9)
LDLC SERPL CALC-MCNC: 38 MG/DL
LYMPHOCYTES # BLD AUTO: 3.83 K/UL (ref 1–4.8)
LYMPHOCYTES NFR BLD: 53.4 % (ref 22–41)
MCH RBC QN AUTO: 32.4 PG (ref 27–33)
MCHC RBC AUTO-ENTMCNC: 33.4 G/DL (ref 33.6–35)
MCV RBC AUTO: 96.9 FL (ref 81.4–97.8)
MONOCYTES # BLD AUTO: 0.7 K/UL (ref 0–0.85)
MONOCYTES NFR BLD AUTO: 9.8 % (ref 0–13.4)
NEUTROPHILS # BLD AUTO: 2.45 K/UL (ref 2–7.15)
NEUTROPHILS NFR BLD: 34.1 % (ref 44–72)
NRBC # BLD AUTO: 0 K/UL
NRBC BLD-RTO: 0 /100 WBC
PLATELET # BLD AUTO: 295 K/UL (ref 164–446)
PMV BLD AUTO: 9.2 FL (ref 9–12.9)
POTASSIUM SERPL-SCNC: 4.1 MMOL/L (ref 3.6–5.5)
PROT SERPL-MCNC: 5.4 G/DL (ref 6–8.2)
RBC # BLD AUTO: 4.14 M/UL (ref 4.2–5.4)
SODIUM SERPL-SCNC: 134 MMOL/L (ref 135–145)
TRIGL SERPL-MCNC: 325 MG/DL (ref 0–149)
WBC # BLD AUTO: 7.2 K/UL (ref 4.8–10.8)

## 2019-01-18 PROCEDURE — 80061 LIPID PANEL: CPT

## 2019-01-18 PROCEDURE — 99232 SBSQ HOSP IP/OBS MODERATE 35: CPT | Performed by: INTERNAL MEDICINE

## 2019-01-18 PROCEDURE — 700102 HCHG RX REV CODE 250 W/ 637 OVERRIDE(OP): Performed by: HOSPITALIST

## 2019-01-18 PROCEDURE — 700102 HCHG RX REV CODE 250 W/ 637 OVERRIDE(OP): Performed by: INTERNAL MEDICINE

## 2019-01-18 PROCEDURE — A9270 NON-COVERED ITEM OR SERVICE: HCPCS | Performed by: HOSPITALIST

## 2019-01-18 PROCEDURE — 700111 HCHG RX REV CODE 636 W/ 250 OVERRIDE (IP): Performed by: HOSPITALIST

## 2019-01-18 PROCEDURE — 700105 HCHG RX REV CODE 258: Performed by: INTERNAL MEDICINE

## 2019-01-18 PROCEDURE — 82728 ASSAY OF FERRITIN: CPT

## 2019-01-18 PROCEDURE — 85025 COMPLETE CBC W/AUTO DIFF WBC: CPT

## 2019-01-18 PROCEDURE — 770006 HCHG ROOM/CARE - MED/SURG/GYN SEMI*

## 2019-01-18 PROCEDURE — A9270 NON-COVERED ITEM OR SERVICE: HCPCS | Performed by: INTERNAL MEDICINE

## 2019-01-18 PROCEDURE — 80053 COMPREHEN METABOLIC PANEL: CPT

## 2019-01-18 PROCEDURE — 82140 ASSAY OF AMMONIA: CPT

## 2019-01-18 RX ORDER — LORAZEPAM 2 MG/ML
2 INJECTION INTRAMUSCULAR
Status: DISCONTINUED | OUTPATIENT
Start: 2019-01-18 | End: 2019-01-19 | Stop reason: HOSPADM

## 2019-01-18 RX ORDER — LORAZEPAM 1 MG/1
3 TABLET ORAL
Status: DISCONTINUED | OUTPATIENT
Start: 2019-01-18 | End: 2019-01-19 | Stop reason: HOSPADM

## 2019-01-18 RX ORDER — LORAZEPAM 2 MG/ML
0.5 INJECTION INTRAMUSCULAR EVERY 4 HOURS PRN
Status: DISCONTINUED | OUTPATIENT
Start: 2019-01-18 | End: 2019-01-19 | Stop reason: HOSPADM

## 2019-01-18 RX ORDER — LORAZEPAM 2 MG/ML
1.5 INJECTION INTRAMUSCULAR
Status: DISCONTINUED | OUTPATIENT
Start: 2019-01-18 | End: 2019-01-19 | Stop reason: HOSPADM

## 2019-01-18 RX ORDER — LORAZEPAM 2 MG/ML
1 INJECTION INTRAMUSCULAR
Status: DISCONTINUED | OUTPATIENT
Start: 2019-01-18 | End: 2019-01-19 | Stop reason: HOSPADM

## 2019-01-18 RX ORDER — LORAZEPAM 1 MG/1
2 TABLET ORAL
Status: DISCONTINUED | OUTPATIENT
Start: 2019-01-18 | End: 2019-01-19 | Stop reason: HOSPADM

## 2019-01-18 RX ORDER — OXYCODONE HYDROCHLORIDE 10 MG/1
10 TABLET ORAL EVERY 4 HOURS PRN
Status: DISCONTINUED | OUTPATIENT
Start: 2019-01-18 | End: 2019-01-19 | Stop reason: HOSPADM

## 2019-01-18 RX ORDER — LORAZEPAM 0.5 MG/1
0.5 TABLET ORAL EVERY 4 HOURS PRN
Status: DISCONTINUED | OUTPATIENT
Start: 2019-01-18 | End: 2019-01-19 | Stop reason: HOSPADM

## 2019-01-18 RX ORDER — LORAZEPAM 1 MG/1
4 TABLET ORAL
Status: DISCONTINUED | OUTPATIENT
Start: 2019-01-18 | End: 2019-01-19 | Stop reason: HOSPADM

## 2019-01-18 RX ORDER — LORAZEPAM 1 MG/1
1 TABLET ORAL EVERY 4 HOURS PRN
Status: DISCONTINUED | OUTPATIENT
Start: 2019-01-18 | End: 2019-01-19 | Stop reason: HOSPADM

## 2019-01-18 RX ORDER — DIPHENHYDRAMINE HCL 25 MG
25 TABLET ORAL ONCE
Status: COMPLETED | OUTPATIENT
Start: 2019-01-18 | End: 2019-01-18

## 2019-01-18 RX ADMIN — LEVOTHYROXINE SODIUM 88 MCG: 88 TABLET ORAL at 05:53

## 2019-01-18 RX ADMIN — FAMOTIDINE 20 MG: 20 TABLET, FILM COATED ORAL at 05:53

## 2019-01-18 RX ADMIN — DOCUSATE SODIUM AND SENNOSIDES 2 TABLET: 8.6; 5 TABLET, FILM COATED ORAL at 05:52

## 2019-01-18 RX ADMIN — OXYCODONE HYDROCHLORIDE 10 MG: 10 TABLET ORAL at 12:20

## 2019-01-18 RX ADMIN — DIPHENHYDRAMINE HCL 25 MG: 25 TABLET ORAL at 21:26

## 2019-01-18 RX ADMIN — Medication 100 MG: at 05:53

## 2019-01-18 RX ADMIN — MORPHINE SULFATE 4 MG: 4 INJECTION INTRAVENOUS at 08:27

## 2019-01-18 RX ADMIN — ENOXAPARIN SODIUM 40 MG: 100 INJECTION SUBCUTANEOUS at 05:52

## 2019-01-18 RX ADMIN — OXYCODONE HYDROCHLORIDE 10 MG: 10 TABLET ORAL at 17:04

## 2019-01-18 RX ADMIN — FLUOXETINE 20 MG: 20 CAPSULE ORAL at 05:53

## 2019-01-18 RX ADMIN — FAMOTIDINE 20 MG: 20 TABLET, FILM COATED ORAL at 17:04

## 2019-01-18 RX ADMIN — DOCUSATE SODIUM AND SENNOSIDES 2 TABLET: 8.6; 5 TABLET, FILM COATED ORAL at 17:04

## 2019-01-18 RX ADMIN — SODIUM CHLORIDE: 9 INJECTION, SOLUTION INTRAVENOUS at 12:43

## 2019-01-18 RX ADMIN — DOXEPIN HYDROCHLORIDE 25 MG: 25 CAPSULE ORAL at 20:51

## 2019-01-18 RX ADMIN — MORPHINE SULFATE 4 MG: 4 INJECTION INTRAVENOUS at 00:05

## 2019-01-18 RX ADMIN — FOLIC ACID 1 MG: 1 TABLET ORAL at 05:53

## 2019-01-18 RX ADMIN — OMEGA-3 FATTY ACIDS CAP 1000 MG 1000 MG: 1000 CAP at 05:52

## 2019-01-18 RX ADMIN — MORPHINE SULFATE 4 MG: 4 INJECTION INTRAVENOUS at 04:03

## 2019-01-18 RX ADMIN — NICOTINE 21 MG: 21 PATCH, EXTENDED RELEASE TRANSDERMAL at 05:54

## 2019-01-18 RX ADMIN — PROGESTERONE 100 MG: 100 CAPSULE ORAL at 05:53

## 2019-01-18 ASSESSMENT — ENCOUNTER SYMPTOMS
SORE THROAT: 0
NAUSEA: 0
ABDOMINAL PAIN: 1
SHORTNESS OF BREATH: 0
BACK PAIN: 0
NERVOUS/ANXIOUS: 1
HEADACHES: 0
EYE REDNESS: 0
COUGH: 0
VOMITING: 0
WEAKNESS: 0
DIZZINESS: 0
EYE DISCHARGE: 0

## 2019-01-18 ASSESSMENT — PAIN SCALES - GENERAL
PAINLEVEL_OUTOF10: 4
PAINLEVEL_OUTOF10: 9
PAINLEVEL_OUTOF10: 7
PAINLEVEL_OUTOF10: 9

## 2019-01-18 ASSESSMENT — LIFESTYLE VARIABLES
VISUAL DISTURBANCES: NOT PRESENT
AUDITORY DISTURBANCES: NOT PRESENT
ORIENTATION AND CLOUDING OF SENSORIUM: ORIENTED AND CAN DO SERIAL ADDITIONS
NAUSEA AND VOMITING: NO NAUSEA AND NO VOMITING
HEADACHE, FULLNESS IN HEAD: NOT PRESENT
PAROXYSMAL SWEATS: NO SWEAT VISIBLE
PAROXYSMAL SWEATS: NO SWEAT VISIBLE
ORIENTATION AND CLOUDING OF SENSORIUM: ORIENTED AND CAN DO SERIAL ADDITIONS
ORIENTATION AND CLOUDING OF SENSORIUM: ORIENTED AND CAN DO SERIAL ADDITIONS
TREMOR: NO TREMOR
AGITATION: NORMAL ACTIVITY
PAROXYSMAL SWEATS: NO SWEAT VISIBLE
TREMOR: NO TREMOR
HEADACHE, FULLNESS IN HEAD: VERY MILD
PAROXYSMAL SWEATS: NO SWEAT VISIBLE
ANXIETY: NO ANXIETY (AT EASE)
HEADACHE, FULLNESS IN HEAD: NOT PRESENT
NAUSEA AND VOMITING: NO NAUSEA AND NO VOMITING
AGITATION: NORMAL ACTIVITY
SUBSTANCE_ABUSE: 1
VISUAL DISTURBANCES: NOT PRESENT
TOTAL SCORE: 2
AGITATION: NORMAL ACTIVITY
AUDITORY DISTURBANCES: NOT PRESENT
NAUSEA AND VOMITING: MILD NAUSEA WITH NO VOMITING
VISUAL DISTURBANCES: NOT PRESENT
TOTAL SCORE: 0
NAUSEA AND VOMITING: MILD NAUSEA WITH NO VOMITING
AUDITORY DISTURBANCES: NOT PRESENT
TOTAL SCORE: 0
HEADACHE, FULLNESS IN HEAD: NOT PRESENT
ANXIETY: NO ANXIETY (AT EASE)
AGITATION: NORMAL ACTIVITY
VISUAL DISTURBANCES: NOT PRESENT
TOTAL SCORE: 1
ORIENTATION AND CLOUDING OF SENSORIUM: ORIENTED AND CAN DO SERIAL ADDITIONS
TREMOR: NO TREMOR
AUDITORY DISTURBANCES: NOT PRESENT
TREMOR: NO TREMOR
ANXIETY: NO ANXIETY (AT EASE)
ANXIETY: NO ANXIETY (AT EASE)

## 2019-01-18 NOTE — CARE PLAN
Problem: Communication  Goal: The ability to communicate needs accurately and effectively will improve  Outcome: PROGRESSING AS EXPECTED      Problem: Safety  Goal: Will remain free from injury  Outcome: PROGRESSING AS EXPECTED      Problem: Knowledge Deficit  Goal: Knowledge of disease process/condition, treatment plan, diagnostic tests, and medications will improve  Outcome: PROGRESSING AS EXPECTED      Problem: Pain Management  Goal: Pain level will decrease to patient's comfort goal  Outcome: PROGRESSING AS EXPECTED

## 2019-01-18 NOTE — PROGRESS NOTES
Bedside report received. Pt medical status/no tele. Pt sitting up eating dinner at this time with no complaints or s/s of distress. Call light within reach. Bed locked low position

## 2019-01-18 NOTE — PROGRESS NOTES
Received bedside report from Courtney OSORIO RN. Assumed care of pt who is resting in bed, RR even/unlabored. Fall protocol in place. CLIP. Will continue to monitor.

## 2019-01-18 NOTE — CARE PLAN
Problem: Safety  Goal: Will remain free from falls    Intervention: Implement fall precautions   01/18/19 0925   OTHER   Environmental Precautions Treaded Slipper Socks on Patient;Report Given to Other Health Care Providers Regarding Fall Risk;Mobility Assessed & Appropriate Sign Placed;Bed in Low Position;Personal Belongings, Wastebasket, Call Bell etc. in Easy Reach;Communication Sign for Patients & Families;Transferred to Stronger Side   IV Pole on Same Side of Bed as Bathroom Yes   Bedrails Bedrails Closest to Bathroom Down   Chair/Bed Strip Alarm Patient Educated Regarding Fall Risk and Need for Bed Alarm, Understands and Continues to Refuse     Pt up self, steady gait. Educated patient on fall protocol, verbalized understanding.       Problem: Pain Management  Goal: Pain level will decrease to patient's comfort goal   01/18/19 0100 01/18/19 0400   OTHER   Pain Scale 0 - 10  --  9   Non Verbal Scale  Calm;Unlabored Breathing;Sleeping --    Comfort Goal --  Comfort with Movement

## 2019-01-18 NOTE — PROGRESS NOTES
Report received from Lois in ED about 1240 and pt up to the floor about 1310.  Pt ambulated from the rney to the bed as a SBA with IVF.  Admission profile completed, medicated with 1 mg Morphine for pain of 8/10.  Medication list completed and spoke to poc.  Called MRI and they were going to try and get her in at 1600; screening sheet completed and had pt remove her jewelry.  Have kept her to sips of water.  Pt not getting any relief from the morphine. Paged Dr. ALEXANDER for increase in pain medications and pt wanted to have iron levels checked.  Medicated with 3 mg of morphine for total of 4 mg with some relief to where she can lay comfortably in bed.  Pt able to void for the first time at 1645 of 250ml of orange brown urine; pt asking when they were going to start antibiotics as she is having burning and hesitancy with voiding.  Called Mri at 1630 and she is being pushed back until 1900; updated pt about this change.  Holding off on nicotine patch as it has foil and not compatible with MRI.  VSS.

## 2019-01-19 VITALS
HEART RATE: 46 BPM | TEMPERATURE: 98.5 F | SYSTOLIC BLOOD PRESSURE: 108 MMHG | RESPIRATION RATE: 18 BRPM | OXYGEN SATURATION: 92 % | BODY MASS INDEX: 31.25 KG/M2 | HEIGHT: 70 IN | WEIGHT: 218.26 LBS | DIASTOLIC BLOOD PRESSURE: 55 MMHG

## 2019-01-19 LAB
ALBUMIN SERPL BCP-MCNC: 2.8 G/DL (ref 3.2–4.9)
ALBUMIN/GLOB SERPL: 1.2 G/DL
ALP SERPL-CCNC: 111 U/L (ref 30–99)
ALT SERPL-CCNC: 254 U/L (ref 2–50)
ANION GAP SERPL CALC-SCNC: 7 MMOL/L (ref 0–11.9)
AST SERPL-CCNC: 113 U/L (ref 12–45)
BACTERIA UR CULT: ABNORMAL
BACTERIA UR CULT: ABNORMAL
BILIRUB SERPL-MCNC: 0.6 MG/DL (ref 0.1–1.5)
BUN SERPL-MCNC: 15 MG/DL (ref 8–22)
CALCIUM SERPL-MCNC: 8.7 MG/DL (ref 8.4–10.2)
CHLORIDE SERPL-SCNC: 102 MMOL/L (ref 96–112)
CO2 SERPL-SCNC: 26 MMOL/L (ref 20–33)
CREAT SERPL-MCNC: 0.67 MG/DL (ref 0.5–1.4)
GLOBULIN SER CALC-MCNC: 2.3 G/DL (ref 1.9–3.5)
GLUCOSE SERPL-MCNC: 93 MG/DL (ref 65–99)
MAGNESIUM SERPL-MCNC: 1.5 MG/DL (ref 1.5–2.5)
PHOSPHATE SERPL-MCNC: 4.1 MG/DL (ref 2.5–4.5)
POTASSIUM SERPL-SCNC: 4.3 MMOL/L (ref 3.6–5.5)
PROT SERPL-MCNC: 5.1 G/DL (ref 6–8.2)
SIGNIFICANT IND 70042: ABNORMAL
SITE SITE: ABNORMAL
SODIUM SERPL-SCNC: 135 MMOL/L (ref 135–145)
SOURCE SOURCE: ABNORMAL

## 2019-01-19 PROCEDURE — 84100 ASSAY OF PHOSPHORUS: CPT

## 2019-01-19 PROCEDURE — 83735 ASSAY OF MAGNESIUM: CPT

## 2019-01-19 PROCEDURE — 700102 HCHG RX REV CODE 250 W/ 637 OVERRIDE(OP): Performed by: HOSPITALIST

## 2019-01-19 PROCEDURE — 80053 COMPREHEN METABOLIC PANEL: CPT

## 2019-01-19 PROCEDURE — A9270 NON-COVERED ITEM OR SERVICE: HCPCS | Performed by: HOSPITALIST

## 2019-01-19 PROCEDURE — 99239 HOSP IP/OBS DSCHRG MGMT >30: CPT | Performed by: INTERNAL MEDICINE

## 2019-01-19 RX ADMIN — NICOTINE 21 MG: 21 PATCH, EXTENDED RELEASE TRANSDERMAL at 05:38

## 2019-01-19 RX ADMIN — DOCUSATE SODIUM AND SENNOSIDES 1 TABLET: 8.6; 5 TABLET, FILM COATED ORAL at 05:39

## 2019-01-19 RX ADMIN — FOLIC ACID 1 MG: 1 TABLET ORAL at 05:38

## 2019-01-19 RX ADMIN — FLUOXETINE 20 MG: 20 CAPSULE ORAL at 05:38

## 2019-01-19 RX ADMIN — Medication 100 MG: at 05:37

## 2019-01-19 RX ADMIN — OMEGA-3 FATTY ACIDS CAP 1000 MG 1000 MG: 1000 CAP at 05:38

## 2019-01-19 RX ADMIN — LEVOTHYROXINE SODIUM 88 MCG: 88 TABLET ORAL at 05:38

## 2019-01-19 ASSESSMENT — LIFESTYLE VARIABLES
ORIENTATION AND CLOUDING OF SENSORIUM: ORIENTED AND CAN DO SERIAL ADDITIONS
AGITATION: NORMAL ACTIVITY
TREMOR: NO TREMOR
ANXIETY: NO ANXIETY (AT EASE)
PAROXYSMAL SWEATS: NO SWEAT VISIBLE
HEADACHE, FULLNESS IN HEAD: NOT PRESENT
AUDITORY DISTURBANCES: NOT PRESENT
NAUSEA AND VOMITING: NO NAUSEA AND NO VOMITING
VISUAL DISTURBANCES: NOT PRESENT
TOTAL SCORE: 0

## 2019-01-19 ASSESSMENT — PAIN SCALES - GENERAL
PAINLEVEL_OUTOF10: 4
PAINLEVEL_OUTOF10: 2

## 2019-01-19 NOTE — PROGRESS NOTES
Pt discharged to home. Discharge instructions provided to pt. Pt verbalizes understanding. Pt states all questions have been answered. Signed copy in chart. Prescriptions sent to N/A. Pt states that all personal belongings are in possession. Pt off unit via walking, escorted by RN. Patient refused wheealchair. Cab waiting downstairs for patient. Pt refusing final vitals.

## 2019-01-19 NOTE — DISCHARGE INSTRUCTIONS
Discharge Instructions    Discharged to home by car with relative. Discharged via wheelchair, hospital escort: Yes.  Special equipment needed: Not Applicable    Be sure to schedule a follow-up appointment with your primary care doctor or any specialists as instructed.     Discharge Plan: No alcohol, follow up with primary care and hematology.    Influenza Vaccine Indication: Patient Refuses    I understand that a diet low in cholesterol, fat, and sodium is recommended for good health. Unless I have been given specific instructions below for another diet, I accept this instruction as my diet prescription.   Other diet: None    Special Instructions: None    · Is patient discharged on Warfarin / Coumadin?   No     Depression / Suicide Risk    As you are discharged from this [facility] facility, it is important to learn how to keep safe from harming yourself.    Recognize the warning signs:  · Abrupt changes in personality, positive or negative- including increase in energy   · Giving away possessions  · Change in eating patterns- significant weight changes-  positive or negative  · Change in sleeping patterns- unable to sleep or sleeping all the time   · Unwillingness or inability to communicate  · Depression  · Unusual sadness, discouragement and loneliness  · Talk of wanting to die  · Neglect of personal appearance   · Rebelliousness- reckless behavior  · Withdrawal from people/activities they love  · Confusion- inability to concentrate     If you or a loved one observes any of these behaviors or has concerns about self-harm, here's what you can do:  · Talk about it- your feelings and reasons for harming yourself  · Remove any means that you might use to hurt yourself (examples: pills, rope, extension cords, firearm)  · Get professional help from the community (Mental Health, Substance Abuse, psychological counseling)  · Do not be alone:Call your Safe Contact- someone whom you trust who will be there for  you.  · Call your local CRISIS HOTLINE 123-7183 or 005-492-6776  · Call your local Children's Mobile Crisis Response Team [place] (896) 685-0357 or www.Localyte.com  · Call the toll free National Suicide Prevention Hotlines   · National Suicide Prevention Lifeline 583-361-FMUG (2750)  · [employer] Line Network 800-SUICIDE (549-1445)    Hemochromatosis  Introduction  Hemochromatosis, also called iron storage disease, is a condition in which the body stores too much iron. The excess iron builds up in your joints, heart, liver, pancreas, and other organs and damages them.  What are the causes?  Hemochromatosis may be caused by:  · Abnormal genes. These are passed down (inherited) from both of your parents.  · Having blood transfusions.  · Having too much iron in your diet.  What increases the risk?  · Being .  · Inheriting abnormal genes from both your parents.  · Having a severe or long-term loss of red blood cells (anemia).  What are the signs or symptoms?  Signs and symptoms can start at any age, but usually start in middle age. They may include:  · Fatigue.  · Weakness.  · Joint pain.  · Abdominal pain.  · Weight loss.  · Gray or bronze skin coloring.  · Loss of interest in sex.  · Loss of menstrual periods in women.  · Loss of body hair.  · Shortness of breath.  Late signs of hemochromatosis include damage to the liver, heart, or pancreas. This may lead to:  · Liver cancer.  · Abnormal heart rhythms.  · Heart failure.  · Diabetes.  How is this diagnosed?  Your health care provider will perform a physical exam and ask about your symptoms and family history. Tests may be done to confirm the diagnosis. Blood tests may include:  · Transferrin saturation. This test measures how much iron is bound to hemoglobin in your blood. Hemoglobin is a substance in red blood cells that carries oxygen to the tissues of the body.  · Serum ferritin. This test measures a protein that stores iron in your  blood.  · A test to check for the abnormal genes that cause the condition.  You may have a tissue sample taken from your liver with a needle (biopsy) for testing. The results will show if iron is building up in your liver.  How is this treated?  Hemochromatosis is treated by removing iron by taking blood (phlebotomy). Having a phlebotomy is similar to having blood taken for donation. The procedure is simple and effective as long as it is started before organ damage develops.  When you begin treatment, you may have a pint of blood removed once or twice a week. You may have blood tests done to determine when your iron levels return to normal. Once your iron levels are normal, you may only need to have a phlebotomy every few months.  Follow these instructions at home:  · Do not eat a lot of foods that are high in iron. Iron-rich foods include red meats and organ meats.  · Do not take dietary supplements that contain iron.  · Do not take vitamin C supplements. Vitamin C increases iron absorption.  · Do not eat raw shellfish or raw fish. Hemochromatosis may increase your chance of infection from these foods.  · If you have liver damage, do not drink any alcohol.  · If you do not have liver damage, limit alcohol intake to no more than 1 drink per day for nonpregnant women and 2 drinks per day for men. One drink equals 12 ounces of beer, 5 ounces of wine, or 1½ ounces of hard liquor.  · Try to exercise for at least 30 minutes on most days of the week.  · Keep all follow-up visits as directed by your health care provider. This is important.  Contact a health care provider if:  · You have fatigue.  · You have weakness.  · You have joint pain.  · You have abdominal pain.  · You experience weight loss.  · You have shortness of breath.  Get help right away if:  · You have chest pain.  · You have trouble breathing.  This information is not intended to replace advice given to you by your health care provider. Make sure you  discuss any questions you have with your health care provider.  Document Released: 12/15/2001 Document Revised: 05/25/2017 Document Reviewed: 02/11/2015  © 2017 Elsevier    Infection Control in the Home  If you have an infection or you are taking care of someone who has an infection, it is important to know how to keep the infection from spreading. Follow these guidelines to help stop the spread of infection, and talk to your health care provider.  HOW ARE INFECTIONS SPREAD?  In order for an infection to spread, the following must be present:  · A germ. This may be a virus, bacteria, fungus, or parasite.  · A place for the germ to live. This may be:  ¨ On or in a person, animal, plant, or food.  ¨ In soil or water.  ¨ On surfaces, such as a door handle.  · A susceptible host. This is a person or animal who does not have resistance (immunity) to the germ.  · A way for the germ to enter the host. This may occur by:  ¨ Direct contact. This may happen by making contact--such as shaking hands or hugging--with an infected person or animal. Some germs can also travel through the air and spread to you if an infected person coughs or sneezes on you or near you.  ¨ Indirect contact. This is when the germ enters the host through contact with an infected object. Examples include eating contaminated food, drinking contaminated water, or touching a contaminated surface with your hands and then touching your face, nose, or mouth soon after that.  HOW CAN I HELP TO PREVENT INFECTION FROM SPREADING?  There are several things that you can do to help prevent infection from spreading.  Hand Washing  It is very important to wash your hands correctly, following these steps:  2. Wet your hands with clean, running water.  3. Apply soap to your hands. Liquid soap is better than bar soap.  4. Rub your hands together quickly to create lather.  5. Keep rubbing your hands together for at least 20 seconds. Thoroughly scrub all parts of your  hands, including under your fingernails and between your fingers.  6. Rinse your hands with clean, running water until all of the soap is gone.  7. Dry your hands with an air dryer or a clean paper or cloth towel, or let your hands air-dry. Do not use your clothing or a soiled towel to dry your hands.  8. If you are in a public restroom, use your towel to turn off the water faucet and to open the bathroom door.  Make sure to wash your hands:  · Before:  ¨ Visiting a baby or anyone with a weakened or lowered defense (immune) system.  ¨ Putting in and taking out any contact lenses.  · After:  ¨ Working or playing outside.  ¨ Touching an animal or its toys or leash.  ¨ Handling livestock.  ¨ Using the bathroom or helping a child or adult to use the bathroom.  ¨ Using household  or toxic chemicals.  ¨ Touching or taking out the garbage.  ¨ Touching anything dirty around your home.  ¨ Handling soiled clothes or rags.  ¨ Taking care of a sick child. This includes touching used tissues, toys, and clothes.  ¨ Sneezing, coughing, or blowing your nose.  ¨ Using public transportation.  ¨ Shaking hands.  ¨ Using a phone, including your mobile phone.  ¨ Touching money.  · Before and after:  ¨ Preparing food.  ¨ Preparing a bottle for a baby.  ¨ Feeding a baby or a young child.  ¨ Eating.  ¨ Visiting or taking care of someone who is sick.  ¨ Changing a diaper.  ¨ Changing a bandage (dressing) or taking care of an injury or wound.  ¨ Giving or taking medicine.  Taking Care of Your Home  · Make sure that you have enough cleaning supplies at all times. These include:  ¨ Disinfectants.  ¨ Reusable cleaning cloths. Wash these after each use.  ¨ Paper towels.  ¨ Utility gloves. Replace your gloves if they are cracked or torn or if they start to peel.  · Use bleach safely. Never mix it with other cleaning products, especially those that contain ammonia. This mixture can create a dangerous gas that may be deadly.  · Take care of  your cleaning supplies. Toilet brushes, mops, and sponges can breed germs. Soak them in bleach and water for 5 minutes after each use.  · Do not pour used mop water down the sink. Pour it down the toilet instead.  · Maintain proper ventilation in your home.  · If you have a pet, ensure that your pet stays clean. Do not let people with weak immune systems touch bird droppings, fish tank water, or a litter box.  ¨ If you have a cat, be sure to change the litter every day.  · In the bathroom, make sure you:  ¨ Provide liquid soap.  ¨ Change towels and washcloths frequently. Avoid sharing towels and washcloths.  ¨ Change toothbrushes often and store them separately in a clean, dry place.  ¨ Disinfect the toilet.  ¨ Clean the tub, shower, and sink with standard cleaning products.    ¨ Mop the floor with a standard .  ¨ Do not share personal items, such as razors, toothbrushes, drinking glasses, deodorant, yen, brushes, towels, and washcloths.    · In the kitchen, make sure you:  ¨ Store food carefully.  ¨ Refrigerate leftovers promptly in covered containers.  ¨ Throw out stale or spoiled food.  ¨ Clean the inside of your refrigerator each week.  ¨ Keep your refrigerator set at 40°F (4°C) or less, and set your freezer at 0°F (-18°C) or less.  ¨ Thaw foods in the refrigerator or microwave, not at room temperature.  ¨ Serve foods at the proper temperature. Do not eat raw meat. Make sure it is cooked to the appropriate temperature. Cook eggs until they are firm.  ¨ Wash fruits and vegetables under running water.  ¨ Use separate cutting boards, plates, and utensils for raw foods and cooked foods.  ¨ Keep work surfaces clean.  ¨ Use a clean spoon each time you sample food while cooking.  ¨ Wash your dishes in hot, soapy water. Air-dry your dishes or use a .  ¨ Do not share forks, cups, or spoons during meals.  · Wear gloves if laundry is visibly soiled.  · Change linens each week or whenever they are  soiled.  · Do not shake soiled linens. Doing that may send germs into the air. Put dressings, sanitary or incontinence pads, diapers, and gloves in plastic garbage bags for disposal.     This information is not intended to replace advice given to you by your health care provider. Make sure you discuss any questions you have with your health care provider.     Document Released: 09/26/2009 Document Revised: 01/08/2016 Document Reviewed: 08/20/2015  [employer] Interactive Patient Education ©2016 [employer]

## 2019-01-19 NOTE — CARE PLAN
Problem: Safety  Goal: Will remain free from injury  Outcome: PROGRESSING AS EXPECTED  Oriented to room and equipment. Call light instructions given, in reach at all times. Slipper socks in place. Floor dry and uncluttered. Bed locked and in lowest position.     Problem: Knowledge Deficit  Goal: Knowledge of the prescribed therapeutic regimen will improve  Outcome: PROGRESSING AS EXPECTED  Plan of care discussed with patient. Opportunity given for questions. States understanding.

## 2019-01-19 NOTE — PROGRESS NOTES
Spoke with Dr Ashford. Dr Torres will be in to see patient tomorrow. Dr Ashford is aware of [place] request.

## 2019-01-19 NOTE — PROGRESS NOTES
Pt requesting to go home at this time. Educated patient on rounding with the MD. Verbalized understanding.

## 2019-01-19 NOTE — PROGRESS NOTES
Due medications given without issue. No c/o. Snack provided per request. IV site red and swollen, catheter removed, tip intact. Refused IV restart, MD aware. Call light in reach.

## 2019-01-19 NOTE — PROGRESS NOTES
Due medications given per MAR. C/o RUQ pain, cold pack applied with good relief. No other c/o. Ambulate to bathroom to void, gait steady. Call light in reach.

## 2019-01-19 NOTE — PROGRESS NOTES
Resting on and off. Respirations even and unlabored. No c/o. Repositions self. Call light in reach.

## 2019-01-19 NOTE — PROGRESS NOTES
Received bedside report from Sissy TERRY. Assumed care of pt who is resting in bed, RR even/unlabored. Fall protocol in place. CLIP. Will continue to monitor.

## 2019-01-19 NOTE — PROGRESS NOTES
Hospital Medicine Daily Progress Note    Date of Service  1/18/2019    Chief Complaint  RUQ pain    Hospital Course    *48-year-old female with known hemochromatosis who has not had phlebotomy in over 6 months that she recently relocated to the [place] area, was shoveling snow, also drinking more heavily than usual.  Presented with severe right upper quadrant pain.  LFTs significantly abnormal in the emergency room.*      Interval Problem Update  Pain is somewhat better controlled on oral meds  LFTs are improving  Discussed need for outpatient follow-up with hematology to arrange outpatient phlebotomy  Discussed contribution of drinking as well as hemochromatosis to liver inflammation and pain.    Consultants/Specialty  None    Code Status  Full    Disposition  Home    Review of Systems  Review of Systems   Constitutional: Negative for malaise/fatigue.   HENT: Negative for sore throat.    Eyes: Negative for discharge and redness.   Respiratory: Negative for cough and shortness of breath.    Cardiovascular: Positive for chest pain (Right lower ribs).   Gastrointestinal: Positive for abdominal pain. Negative for nausea and vomiting.   Genitourinary: Negative for dysuria.   Musculoskeletal: Negative for back pain.   Skin: Negative for itching and rash.   Neurological: Negative for dizziness, weakness and headaches.   Psychiatric/Behavioral: Positive for substance abuse. The patient is nervous/anxious.         Physical Exam  Temp:  [36.7 °C (98.1 °F)-36.9 °C (98.4 °F)] 36.8 °C (98.3 °F)  Pulse:  [53-78] 57  Resp:  [16-18] 18  BP: (101-122)/(62-77) 105/69  SpO2:  [91 %-97 %] 92 %    Physical Exam   Constitutional: She is oriented to person, place, and time. She appears well-developed and well-nourished. No distress.   HENT:   Head: Normocephalic and atraumatic.   Right Ear: External ear normal.   Left Ear: External ear normal.   Mouth/Throat: Oropharynx is clear and moist.   Eyes: Pupils are equal, round, and reactive to  light. Conjunctivae and EOM are normal. Right eye exhibits no discharge. Left eye exhibits no discharge. No scleral icterus.   Mild scleral injection   Neck: Neck supple.   Cardiovascular: Normal rate and regular rhythm.    Pulmonary/Chest: Effort normal and breath sounds normal.   Abdominal: Soft. Bowel sounds are normal. She exhibits distension (Right upper quadrant fullness and tenderness). She exhibits no mass. There is tenderness. There is no rebound and no guarding.   Musculoskeletal: She exhibits no edema or tenderness.   Neurological: She is alert and oriented to person, place, and time. No cranial nerve deficit.   Skin: Skin is warm and dry. She is not diaphoretic.   Psychiatric: She has a normal mood and affect.   Nursing note and vitals reviewed.      Fluids    Intake/Output Summary (Last 24 hours) at 01/18/19 1648  Last data filed at 01/18/19 0900   Gross per 24 hour   Intake          2641.67 ml   Output             1375 ml   Net          1266.67 ml       Laboratory  Recent Labs      01/17/19   1130  01/17/19   1505  01/18/19   0421   WBC  8.4  8.5  7.2   RBC  5.37  4.93  4.14*   HEMOGLOBIN  17.4*  16.0  13.4   HEMATOCRIT  50.4*  46.5  40.1   MCV  93.9  94.3  96.9   MCH  32.4  32.5  32.4   MCHC  34.5  34.4  33.4*   RDW  48.0  48.1  49.2   PLATELETCT  399  355  295   MPV  8.7*  8.9*  9.2     Recent Labs      01/17/19   1130  01/18/19   0421   SODIUM  130*  134*   POTASSIUM  3.8  4.1   CHLORIDE  94*  104   CO2  23  24   GLUCOSE  109*  117*   BUN  20  18   CREATININE  1.01  0.69   CALCIUM  8.7  8.1*     Recent Labs      01/17/19   1130   APTT  29.2   INR  1.03     Recent Labs      01/17/19   1130   BNPBTYPENAT  15     Recent Labs      01/18/19   0421   TRIGLYCERIDE  325*   HDL  30*   LDL  38       Imaging  [id number]   Final Result         1. Normal CBD size. No choledocholithiasis.   2. Cholecystectomy. No acute abnormality in the abdomen.   3. Colonic diverticulosis.            CT-CTA CHEST PULMONARY  ARTERY W/ RECONS   Final Result      1.  No evidence of pulmonary embolism.      2.  No focal pulmonary consolidation.            DX-CHEST-PORTABLE (1 VIEW)   Final Result      No evidence of acute cardiopulmonary process.           Assessment/Plan  * Elevated liver enzymes   Assessment & Plan    Is elevation more consistent with hepatocellular dysfunction likely a combination of hemochromatosis in addition to alcohol abuse, already trending down  We will proceed with MRCP but doubt there is obstruction-her bilirubin is normal  Madd is Mathieu score is 7.7.  If alcohol related should continue to trend down with abstinence-  Continue to monitor  Check ammonia  Check hepatitis panel       Hemochromatosis   Assessment & Plan    Iron, percent saturation elevated-check ferritin  Outpatient follow-up with hematology     RUQ abdominal tenderness   Assessment & Plan    No CBD issues on MRI  Hx of cholecystectomy.   Avoid IV narcotics-medications changed     Hypothyroidism   Assessment & Plan    TSH okay, continue current dose       Hyponatremia   Assessment & Plan    Improved     Tobacco abuse   Assessment & Plan    Counseled on admission     Alcohol dependence (HCC)   Assessment & Plan    Initiate CIWA protocol  QTC is 423     Abnormal urinalysis   Assessment & Plan    UA+ WBCs and many bacteria  Leukoesterase negative  Culture pending  Discontinue ceftriaxone          VTE prophylaxis: lovenox

## 2019-01-19 NOTE — PROGRESS NOTES
Pt aware of pending discharge. No IV in place at this time. Patient dressed. Patient aware no discharge orders at this time.

## 2019-01-20 NOTE — DISCHARGE SUMMARY
Discharge Summary    CHIEF COMPLAINT ON ADMISSION  Chief Complaint   Patient presents with   • Chest Pain       Reason for Admission  Chest Pain     Admission Date  1/17/2019    CODE STATUS  Prior    HPI & HOSPITAL COURSE    *48-year-old female with known hemochromatosis who has not had phlebotomy in over 6 months that she recently relocated to the [facility], was shoveling snow, also drinking more heavily than usual.  Presented with severe right upper quadrant pain.  LFTs significantly abnormal in the emergency room.*      We discussed alcohol cessation daily during her stay especially in light of her history of hemochromatosis, imaging did not reveal any evidence of gallstones or biliary obstruction.  Her LFTs came down steadily each day but did not completely normalize.  Hemoglobin on admission was 17.4 at the time of discharge delusionally it was down to 13.4 iron was 322 TIBC 353 and she was 91% saturated serum ferritin was markedly elevated at 1176.6.  Hepatitis serologies were negative.  TSH was normal.  She had no evidence of alcohol withdrawal.    I discussed the importance of following up with primary care as well as with hematology so she may begin her regimen of therapeutic phlebotomy ASAP.  She understands that continued alcohol use will enhance her risk of cirrhosis.    Therefore, she is discharged in good and stable condition to home with close outpatient follow-up.    The patient met 2-midnight criteria for an inpatient stay at the time of discharge.    Discharge Date  1/19/2019    FOLLOW UP ITEMS POST DISCHARGE  PCP  Hematology discharge summaries    DISCHARGE DIAGNOSES  Principal Problem:    Elevated liver enzymes POA: Unknown  Active Problems:    Abnormal urinalysis POA: Unknown    Alcohol dependence (HCC) POA: Unknown    Tobacco abuse POA: Unknown    Hyponatremia POA: Unknown    Hypothyroidism POA: Unknown    RUQ abdominal tenderness POA: Unknown    Hemochromatosis POA: Unknown  Resolved  Problems:    * No resolved hospital problems. *      FOLLOW UP  Future Appointments  Date Time Provider Department Center   1/23/2019 2:00 PM Herbie Joshi M.D. [facility] None     Pcp Pt States None            MEDICATIONS ON DISCHARGE     Medication List      CONTINUE taking these medications      Instructions   ALPRAZolam 0.25 MG Tabs  Commonly known as:  XANAX   Take 0.25 mg by mouth at bedtime as needed for Sleep or Anxiety.  Dose:  0.25 mg     doxepin 25 MG Caps  Commonly known as:  SINEQUAN   Take 25 mg by mouth every evening.  Dose:  25 mg     Estradiol 0.025 MG/24HR Pttw   Apply 1 Patch to skin as directed 2X A WEEK. Monday and Friday  Dose:  1 Patch     fish oil 1000 MG Caps capsule   Take 1,000 mg by mouth every day.  Dose:  1000 mg     FLUoxetine 20 MG Caps  Commonly known as:  PROZAC   Take 20 mg by mouth every day.  Dose:  20 mg     levothyroxine 88 MCG Tabs  Commonly known as:  SYNTHROID   Take 88 mcg by mouth Every morning on an empty stomach.  Dose:  88 mcg     OCUVITE-LUTEIN Tabs   Take 1 tablet by mouth every day.  Dose:  1 tablet     progesterone 100 MG Caps  Commonly known as:  PROMETRIUM   Take 100 mg by mouth every day.  Dose:  100 mg     VITAMIN B-12 INJ   1,000 mcg by Injection route every 48 hours.  Dose:  1000 mcg            Allergies  Allergies   Allergen Reactions   • Norco [Apap-Fd&C Yellow #10 [place]-Hydrocodone]      itching   • Sulfa Drugs Anaphylaxis   • Zofran [Dextrose-Ondansetron]        DIET  NO ALCOHOL    ACTIVITY  Regular    CONSULTATIONS  None    PROCEDURES  None    LABORATORY  Lab Results   Component Value Date    SODIUM 135 01/19/2019    POTASSIUM 4.3 01/19/2019    CHLORIDE 102 01/19/2019    CO2 26 01/19/2019    GLUCOSE 93 01/19/2019    BUN 15 01/19/2019    CREATININE 0.67 01/19/2019        Lab Results   Component Value Date    WBC 7.2 01/18/2019    HEMOGLOBIN 13.4 01/18/2019    HEMATOCRIT 40.1 01/18/2019    PLATELETCT 295 01/18/2019        Total time of the discharge process  exceeds 33 minutes.

## 2019-01-23 ENCOUNTER — HOSPITAL ENCOUNTER (OUTPATIENT)
Facility: MEDICAL CENTER | Age: 49
End: 2019-01-23
Attending: INTERNAL MEDICINE
Payer: OTHER MISCELLANEOUS

## 2019-01-23 ENCOUNTER — OFFICE VISIT (OUTPATIENT)
Dept: INTERNAL MEDICINE | Facility: IMAGING CENTER | Age: 49
End: 2019-01-23

## 2019-01-23 VITALS
HEART RATE: 77 BPM | SYSTOLIC BLOOD PRESSURE: 100 MMHG | OXYGEN SATURATION: 97 % | WEIGHT: 203 LBS | RESPIRATION RATE: 14 BRPM | TEMPERATURE: 99 F | BODY MASS INDEX: 30.07 KG/M2 | HEIGHT: 69 IN | DIASTOLIC BLOOD PRESSURE: 74 MMHG

## 2019-01-23 DIAGNOSIS — Z12.39 SCREENING FOR MALIGNANT NEOPLASM OF BREAST: ICD-10-CM

## 2019-01-23 DIAGNOSIS — R74.8 ELEVATED LIVER ENZYMES: ICD-10-CM

## 2019-01-23 DIAGNOSIS — E83.19 IRON EXCESS: ICD-10-CM

## 2019-01-23 DIAGNOSIS — M94.0 COSTOCHONDRITIS: ICD-10-CM

## 2019-01-23 DIAGNOSIS — E03.9 HYPOTHYROIDISM, UNSPECIFIED TYPE: ICD-10-CM

## 2019-01-23 DIAGNOSIS — E53.8 VITAMIN B12 DEFICIENCY: ICD-10-CM

## 2019-01-23 DIAGNOSIS — R74.8 LOW SERUM HDL: ICD-10-CM

## 2019-01-23 DIAGNOSIS — K57.30 DIVERTICULOSIS OF COLON: ICD-10-CM

## 2019-01-23 DIAGNOSIS — F41.9 ANXIETY AND DEPRESSION: ICD-10-CM

## 2019-01-23 DIAGNOSIS — E78.1 HYPERTRIGLYCERIDEMIA: ICD-10-CM

## 2019-01-23 DIAGNOSIS — R05.9 COUGH: ICD-10-CM

## 2019-01-23 DIAGNOSIS — Z12.11 ENCOUNTER FOR SCREENING COLONOSCOPY: ICD-10-CM

## 2019-01-23 DIAGNOSIS — F32.A ANXIETY AND DEPRESSION: ICD-10-CM

## 2019-01-23 DIAGNOSIS — Z83.518 FAMILY HISTORY OF MACULAR DEGENERATION: ICD-10-CM

## 2019-01-23 DIAGNOSIS — Z78.0 MENOPAUSE: ICD-10-CM

## 2019-01-23 DIAGNOSIS — Z12.2 ENCOUNTER FOR SCREENING FOR MALIGNANT NEOPLASM OF RESPIRATORY ORGANS: ICD-10-CM

## 2019-01-23 DIAGNOSIS — Z87.440 HISTORY OF UTI: ICD-10-CM

## 2019-01-23 DIAGNOSIS — Z72.0 TOBACCO USE: ICD-10-CM

## 2019-01-23 PROBLEM — R82.90 ABNORMAL URINALYSIS: Status: RESOLVED | Noted: 2019-01-17 | Resolved: 2019-01-23

## 2019-01-23 LAB
ALBUMIN SERPL BCP-MCNC: 4.5 G/DL (ref 3.2–4.9)
ALBUMIN/GLOB SERPL: 1.5 G/DL
ALP SERPL-CCNC: 97 U/L (ref 30–99)
ALT SERPL-CCNC: 76 U/L (ref 2–50)
ANION GAP SERPL CALC-SCNC: 9 MMOL/L (ref 0–11.9)
APPEARANCE UR: CLEAR
AST SERPL-CCNC: 25 U/L (ref 12–45)
BILIRUB SERPL-MCNC: 0.3 MG/DL (ref 0.1–1.5)
BILIRUB UR STRIP-MCNC: NEGATIVE MG/DL
BUN SERPL-MCNC: 17 MG/DL (ref 8–22)
CALCIUM SERPL-MCNC: 10.1 MG/DL (ref 8.5–10.5)
CHLORIDE SERPL-SCNC: 103 MMOL/L (ref 96–112)
CO2 SERPL-SCNC: 24 MMOL/L (ref 20–33)
COLOR UR AUTO: YELLOW
CREAT SERPL-MCNC: 0.73 MG/DL (ref 0.5–1.4)
FERRITIN SERPL-MCNC: 605 NG/ML (ref 10–291)
GLOBULIN SER CALC-MCNC: 3.1 G/DL (ref 1.9–3.5)
GLUCOSE SERPL-MCNC: 91 MG/DL (ref 65–99)
GLUCOSE UR STRIP.AUTO-MCNC: NEGATIVE MG/DL
IRON SATN MFR SERPL: 21 % (ref 15–55)
IRON SERPL-MCNC: 95 UG/DL (ref 40–170)
KETONES UR STRIP.AUTO-MCNC: NEGATIVE MG/DL
LEUKOCYTE ESTERASE UR QL STRIP.AUTO: NEGATIVE
NITRITE UR QL STRIP.AUTO: NEGATIVE
PH UR STRIP.AUTO: 5.5 [PH] (ref 5–8)
POTASSIUM SERPL-SCNC: 4 MMOL/L (ref 3.6–5.5)
PROT SERPL-MCNC: 7.6 G/DL (ref 6–8.2)
PROT UR QL STRIP: NEGATIVE MG/DL
RBC UR QL AUTO: NEGATIVE
SODIUM SERPL-SCNC: 136 MMOL/L (ref 135–145)
SP GR UR STRIP.AUTO: 1.01
TIBC SERPL-MCNC: 454 UG/DL (ref 250–450)
UROBILINOGEN UR STRIP-MCNC: 0.2 MG/DL
VIT B12 SERPL-MCNC: >1500 PG/ML (ref 211–911)

## 2019-01-23 PROCEDURE — 81002 URINALYSIS NONAUTO W/O SCOPE: CPT | Performed by: INTERNAL MEDICINE

## 2019-01-23 PROCEDURE — 82728 ASSAY OF FERRITIN: CPT

## 2019-01-23 PROCEDURE — 83516 IMMUNOASSAY NONANTIBODY: CPT

## 2019-01-23 PROCEDURE — 80053 COMPREHEN METABOLIC PANEL: CPT

## 2019-01-23 PROCEDURE — 86038 ANTINUCLEAR ANTIBODIES: CPT

## 2019-01-23 PROCEDURE — 99204 OFFICE O/P NEW MOD 45 MIN: CPT | Performed by: INTERNAL MEDICINE

## 2019-01-23 PROCEDURE — 82607 VITAMIN B-12: CPT

## 2019-01-23 PROCEDURE — 83550 IRON BINDING TEST: CPT

## 2019-01-23 PROCEDURE — 81256 HFE GENE: CPT

## 2019-01-23 PROCEDURE — 83540 ASSAY OF IRON: CPT

## 2019-01-23 RX ORDER — ALPRAZOLAM 0.25 MG/1
0.25 TABLET ORAL 2 TIMES DAILY
Qty: 60 TAB | Refills: 2 | Status: CANCELLED
Start: 2019-01-23 | End: 2019-02-22

## 2019-01-23 RX ORDER — DOXEPIN HYDROCHLORIDE 25 MG/1
25 CAPSULE ORAL
Qty: 90 CAP | Refills: 3 | Status: ON HOLD | OUTPATIENT
Start: 2019-01-23 | End: 2019-09-21

## 2019-01-23 RX ORDER — ALPRAZOLAM 0.25 MG/1
0.25 TABLET ORAL 2 TIMES DAILY PRN
Qty: 60 TAB | Refills: 2 | Status: SHIPPED | OUTPATIENT
Start: 2019-01-23 | End: 2019-04-23

## 2019-01-23 RX ORDER — ESTRADIOL 0.03 MG/D
1 FILM, EXTENDED RELEASE TRANSDERMAL
Qty: 26 PATCH | Refills: 3 | Status: SHIPPED | OUTPATIENT
Start: 2019-01-23 | End: 2019-12-04

## 2019-01-23 RX ORDER — ALBUTEROL SULFATE 90 UG/1
2 AEROSOL, METERED RESPIRATORY (INHALATION) EVERY 6 HOURS PRN
Qty: 8.5 G | Refills: 11 | Status: SHIPPED | OUTPATIENT
Start: 2019-01-23 | End: 2019-12-04 | Stop reason: SDUPTHER

## 2019-01-23 RX ORDER — LEVOTHYROXINE SODIUM 88 MCG
88 TABLET ORAL
Qty: 90 TAB | Refills: 3 | Status: SHIPPED | OUTPATIENT
Start: 2019-01-23 | End: 2020-02-03

## 2019-01-23 RX ORDER — FLUOXETINE HYDROCHLORIDE 20 MG/1
20 CAPSULE ORAL DAILY
Qty: 90 CAP | Refills: 3 | Status: SHIPPED | OUTPATIENT
Start: 2019-01-23 | End: 2020-02-18

## 2019-01-23 ASSESSMENT — ENCOUNTER SYMPTOMS
CONSTIPATION: 0
COUGH: 1
PALPITATIONS: 0
ORTHOPNEA: 0
DIARRHEA: 1
SPUTUM PRODUCTION: 1
HEADACHES: 0
TREMORS: 0
DEPRESSION: 1
WHEEZING: 1
NERVOUS/ANXIOUS: 1
SHORTNESS OF BREATH: 1
HEARTBURN: 1
DIZZINESS: 0

## 2019-01-23 NOTE — PROGRESS NOTES
New Patient Note      HPI:        Carie is here to establish. She was hospitalized last week for elevated liver enzymes and abdominal pain. She has been diagnosed with hemachromatosis in past by a naturopath in [place] but has not had official work up including liver biopsy. She has had problems with alcohol use; she has not had any since her recent hospitalization. She remembers having pain in right side of chest after shoveling snow. She will use albuterol inhaler when she has reaction with cough to cats, down and some other inhaled allergens.    Past Medical History:   Diagnosis Date   • Anxiety and depression 1/23/2019   • Disorder of thyroid    • Diverticulosis of colon 1/23/2019    MRI abdomen Jan. 2019   • Hemochromatosis    • Hypertriglyceridemia 1/23/2019   • Low serum HDL 1/23/2019   • Menopause    • Psychiatric problem     Anxiety   • Vitamin B12 deficiency 1/23/2019       Family History   Problem Relation Age of Onset   • Heart Disease Mother         Atrial fibrillation   • Thyroid Mother    • Cancer Mother         Melanoma   • Heart Disease Brother    • Diabetes Son    • Dementia Neg Hx    • Colon Cancer Neg Hx    • Breast Cancer Neg Hx        Social History   Substance Use Topics   • Smoking status: Current Every Day Smoker     Packs/day: 1.00     Years: 30.00     Types: Cigarettes   • Smokeless tobacco: Never Used   • Alcohol use Yes      Comment: Weekly x 2       History   Alcohol Use   • Yes     Comment: Weekly x 2       History   Drug Use No       Current Outpatient Prescriptions   Medication Sig Dispense Refill   • progesterone (PROMETRIUM) 100 MG Cap Take 1 Cap by mouth every day. 90 Cap 3   • SYNTHROID 88 MCG Tab Take 1 Tab by mouth Every morning on an empty stomach. 90 Tab 3   • FLUoxetine (PROZAC) 20 MG Cap Take 1 Cap by mouth every day. 90 Cap 3   • Estradiol 0.025 MG/24HR PATCH BIWEEKLY Apply 1 Patch to skin as directed 2X A WEEK. Monday and Friday 26 Patch 3   • doxepin (SINEQUAN) 25 MG  Cap Take 1 Cap by mouth every bedtime. 90 Cap 3   • ALPRAZolam (XANAX) 0.25 MG Tab Take 1 Tab by mouth 2 times a day as needed for Sleep or Anxiety for up to 90 days. 60 Tab 2   • albuterol 108 (90 Base) MCG/ACT Aero Soln inhalation aerosol Inhale 2 Puffs by mouth every 6 hours as needed for Shortness of Breath. 8.5 g 11   • Omega-3 Fatty Acids (FISH OIL) 1000 MG Cap capsule Take 1,000 mg by mouth every day.     • Multiple Vitamins-Minerals (OCUVITE-LUTEIN) Tab Take 1 tablet by mouth every day.     • Cyanocobalamin (VITAMIN B-12 INJ) 1,000 mcg by Injection route every 48 hours.       No current facility-administered medications for this visit.        Allergies   Allergen Reactions   • Norco [Apap-Fd&C Yellow #10 [place] Cervantes-Hydrocodone]      itching   • Sulfa Drugs Anaphylaxis   • Zofran [Dextrose-Ondansetron] Unspecified     Migraine       Review of Systems   Constitutional: Positive for malaise/fatigue.   Respiratory: Positive for cough, sputum production, shortness of breath and wheezing.         States she will get mildly short of breath with exertion at higher altitude. No documented history of asthma.   Cardiovascular: Negative for chest pain, palpitations and orthopnea.   Gastrointestinal: Positive for diarrhea and heartburn. Negative for constipation.        Heartburn episodically especially if drinking alcohol.   Musculoskeletal: Positive for joint pain.        Hips, knees and shoulder joints tend to hurt. No swelling of joints.   Neurological: Negative for dizziness, tremors and headaches.   Psychiatric/Behavioral: Positive for depression. The patient is nervous/anxious.         Mild depression for which she takes prozac.       Physical Exam   Constitutional: She is well-developed, well-nourished, and in no distress. No distress.   Neck: Neck supple. No JVD present. No thyromegaly present.   Cardiovascular: Normal rate, regular rhythm and normal heart sounds.  Exam reveals no gallop and no friction rub.    No  "murmur heard.  Pulses:       Dorsalis pedis pulses are 1+ on the right side, and 1+ on the left side.        Posterior tibial pulses are 1+ on the right side, and 1+ on the left side.   No carotid bruits   Pulmonary/Chest: Effort normal and breath sounds normal. She has no wheezes. She has no rales.   Abdominal: Soft. She exhibits no distension and no mass. There is tenderness. There is no guarding.   Mild tenderness RUQ   Genitourinary:   Genitourinary Comments: No flank tenderness   Musculoskeletal: She exhibits no edema.   Significant tenderness over anterior lower ribs right side   Lymphadenopathy:     She has no cervical adenopathy.   Neurological: She is alert. No cranial nerve deficit. Gait normal. Coordination normal.   Skin: She is not diaphoretic.   Psychiatric: Mood and affect normal.       Ambulatory Vitals  /74   Pulse 77   Temp 37.2 °C (99 °F) (Temporal)   Resp 14   Ht 1.74 m (5' 8.5\")   Wt 92.1 kg (203 lb)   SpO2 97%   BMI 30.42 kg/m²     Assessment and Plan:    1. Costochondritis     2. Iron excess  FERRITIN    IRON/TOTAL IRON BIND    COMP METABOLIC PANEL    Hemochromatosis Genotype   3. Hypothyroidism, unspecified type  SYNTHROID 88 MCG Tab   4. Elevated liver enzymes  ANTI-NUCLEAR ANTIBODY SERUM    ACTIN (SMOOTH MUSCLE) ANTIBODY    MITOCHONDRIAL (M2) AB   5. Diverticulosis of colon     6. Screening for malignant neoplasm of breast  [id number]   7. Encounter for screening colonoscopy  REFERRAL TO GASTROENTEROLOGY   8. Family history of macular degeneration  REFERRAL TO OPHTHALMOLOGY   9. Hypertriglyceridemia     10. Low serum HDL     11. BMI 30.0-30.9,adult     12. Tobacco use  CT-CHEST W/O LOW DOSE   13. History of UTI  POCT Urinalysis   14. Anxiety and depression  FLUoxetine (PROZAC) 20 MG Cap    doxepin (SINEQUAN) 25 MG Cap    ALPRAZolam (XANAX) 0.25 MG Tab   15. Menopause  progesterone (PROMETRIUM) 100 MG Cap    Estradiol 0.025 MG/24HR PATCH BIWEEKLY   16. Vitamin B12 " deficiency  VITAMIN B12   17. Encounter for screening for malignant neoplasm of respiratory organs  CT-CHEST W/O LOW DOSE   18. Cough  albuterol 108 (90 Base) MCG/ACT Aero Soln inhalation aerosol      Advised she take ibuprofen 400-800 mg bid-tid for costochondritis. Recommend screening lung CT. Advised she quit smoking; she is willing to use nicotine patches to help her quit. Advised she remain off alcohol especially since this could be cause of liver issue including excess iron. Will do work up for hemachromatosis but may also need liver biopsy. Will change her generic thyroid dose to synthroid. Recommend vascular screening. Recommend office spirometry due to history of smoking and cough but not today due to rib pain.    Herbie Joshi M.D.

## 2019-01-25 LAB
MITOCHONDRIA M2 IGG SER-ACNC: 7.7 UNITS (ref 0–20)
NUCLEAR IGG SER QL IA: NORMAL
SMA IGG SER-ACNC: 7 UNITS (ref 0–19)

## 2019-01-27 LAB
HFE GENE MUT ANL BLD/T: NORMAL
HFE P.C282Y BLD/T QL: NEGATIVE
HFE P.H63D BLD/T QL: NEGATIVE
HFE P.S65C BLD/T QL: NEGATIVE

## 2019-03-22 ENCOUNTER — APPOINTMENT (OUTPATIENT)
Dept: RADIOLOGY | Facility: MEDICAL CENTER | Age: 49
End: 2019-03-22
Attending: INTERNAL MEDICINE
Payer: OTHER MISCELLANEOUS

## 2019-03-29 PROBLEM — K63.5 COLON POLYP: Status: ACTIVE | Noted: 2019-03-29

## 2019-04-01 ENCOUNTER — TELEPHONE (OUTPATIENT)
Dept: INTERNAL MEDICINE | Facility: IMAGING CENTER | Age: 49
End: 2019-04-01

## 2019-04-01 DIAGNOSIS — Z80.8 FAMILY HISTORY OF MELANOMA: ICD-10-CM

## 2019-04-01 DIAGNOSIS — Z12.83 SCREENING FOR SKIN CANCER: ICD-10-CM

## 2019-04-01 RX ORDER — CEFDINIR 300 MG/1
300 CAPSULE ORAL 2 TIMES DAILY
Qty: 20 CAP | Refills: 0 | Status: SHIPPED | OUTPATIENT
Start: 2019-04-01 | End: 2019-04-11

## 2019-04-03 ENCOUNTER — HOSPITAL ENCOUNTER (OUTPATIENT)
Dept: RADIOLOGY | Facility: MEDICAL CENTER | Age: 49
End: 2019-04-03
Attending: INTERNAL MEDICINE
Payer: OTHER MISCELLANEOUS

## 2019-04-03 DIAGNOSIS — Z12.39 SCREENING FOR MALIGNANT NEOPLASM OF BREAST: ICD-10-CM

## 2019-04-03 PROCEDURE — 77063 BREAST TOMOSYNTHESIS BI: CPT

## 2019-04-15 ENCOUNTER — HOSPITAL ENCOUNTER (OUTPATIENT)
Dept: RADIOLOGY | Facility: MEDICAL CENTER | Age: 49
End: 2019-04-15

## 2019-04-18 ENCOUNTER — TELEPHONE (OUTPATIENT)
Dept: INTERNAL MEDICINE | Facility: IMAGING CENTER | Age: 49
End: 2019-04-18

## 2019-04-18 DIAGNOSIS — H10.30 ACUTE CONJUNCTIVITIS, UNSPECIFIED ACUTE CONJUNCTIVITIS TYPE, UNSPECIFIED LATERALITY: ICD-10-CM

## 2019-04-18 RX ORDER — CIPROFLOXACIN HYDROCHLORIDE 3.5 MG/ML
1 SOLUTION/ DROPS TOPICAL 4 TIMES DAILY
Qty: 1 BOTTLE | Refills: 1 | Status: SHIPPED | OUTPATIENT
Start: 2019-04-18 | End: 2019-04-25

## 2019-04-19 ENCOUNTER — HOSPITAL ENCOUNTER (OUTPATIENT)
Dept: RADIOLOGY | Facility: MEDICAL CENTER | Age: 49
End: 2019-04-19
Attending: INTERNAL MEDICINE

## 2019-04-19 DIAGNOSIS — Z12.2 SCREENING FOR MALIGNANT NEOPLASM OF RESPIRATORY ORGAN: ICD-10-CM

## 2019-04-19 DIAGNOSIS — Z72.0 TOBACCO ABUSE: ICD-10-CM

## 2019-04-19 PROBLEM — Z98.82 HISTORY OF BILATERAL BREAST IMPLANTS: Status: ACTIVE | Noted: 2019-04-19

## 2019-04-19 PROCEDURE — 71250 CT THORAX DX C-: CPT

## 2019-05-28 DIAGNOSIS — F32.A ANXIETY AND DEPRESSION: ICD-10-CM

## 2019-05-28 DIAGNOSIS — F41.9 ANXIETY AND DEPRESSION: ICD-10-CM

## 2019-05-28 RX ORDER — ALPRAZOLAM 0.25 MG/1
0.25 TABLET ORAL 2 TIMES DAILY PRN
Qty: 60 TAB | Refills: 1 | Status: SHIPPED
Start: 2019-05-28 | End: 2019-06-27

## 2019-07-25 DIAGNOSIS — F41.9 ANXIETY: ICD-10-CM

## 2019-07-25 RX ORDER — ALPRAZOLAM 0.25 MG/1
0.25 TABLET ORAL 2 TIMES DAILY PRN
Qty: 60 TAB | Refills: 1 | Status: SHIPPED
Start: 2019-07-25 | End: 2019-08-24

## 2019-09-20 RX ORDER — SODIUM CHLORIDE, SODIUM LACTATE, POTASSIUM CHLORIDE, CALCIUM CHLORIDE 600; 310; 30; 20 MG/100ML; MG/100ML; MG/100ML; MG/100ML
INJECTION, SOLUTION INTRAVENOUS CONTINUOUS
Status: CANCELLED | OUTPATIENT
Start: 2019-09-20 | End: 2019-09-21

## 2019-09-21 ENCOUNTER — ANESTHESIA (OUTPATIENT)
Dept: SURGERY | Facility: MEDICAL CENTER | Age: 49
DRG: 983 | End: 2019-09-21
Payer: COMMERCIAL

## 2019-09-21 ENCOUNTER — HOSPITAL ENCOUNTER (INPATIENT)
Facility: MEDICAL CENTER | Age: 49
LOS: 3 days | DRG: 983 | End: 2019-09-24
Attending: ORTHOPAEDIC SURGERY | Admitting: ORTHOPAEDIC SURGERY
Payer: COMMERCIAL

## 2019-09-21 ENCOUNTER — ANESTHESIA EVENT (OUTPATIENT)
Dept: SURGERY | Facility: MEDICAL CENTER | Age: 49
DRG: 983 | End: 2019-09-21
Payer: COMMERCIAL

## 2019-09-21 DIAGNOSIS — G89.18 POSTOPERATIVE PAIN: ICD-10-CM

## 2019-09-21 PROCEDURE — 700101 HCHG RX REV CODE 250: Performed by: ORTHOPAEDIC SURGERY

## 2019-09-21 PROCEDURE — 500881 HCHG PACK, EXTREMITY: Performed by: ORTHOPAEDIC SURGERY

## 2019-09-21 PROCEDURE — 700111 HCHG RX REV CODE 636 W/ 250 OVERRIDE (IP): Performed by: ORTHOPAEDIC SURGERY

## 2019-09-21 PROCEDURE — 160009 HCHG ANES TIME/MIN: Performed by: ORTHOPAEDIC SURGERY

## 2019-09-21 PROCEDURE — 160048 HCHG OR STATISTICAL LEVEL 1-5: Performed by: ORTHOPAEDIC SURGERY

## 2019-09-21 PROCEDURE — 700111 HCHG RX REV CODE 636 W/ 250 OVERRIDE (IP)

## 2019-09-21 PROCEDURE — 700105 HCHG RX REV CODE 258: Performed by: ORTHOPAEDIC SURGERY

## 2019-09-21 PROCEDURE — 770001 HCHG ROOM/CARE - MED/SURG/GYN PRIV*

## 2019-09-21 PROCEDURE — 700105 HCHG RX REV CODE 258: Performed by: ANESTHESIOLOGY

## 2019-09-21 PROCEDURE — 700101 HCHG RX REV CODE 250: Performed by: ANESTHESIOLOGY

## 2019-09-21 PROCEDURE — 160035 HCHG PACU - 1ST 60 MINS PHASE I: Performed by: ORTHOPAEDIC SURGERY

## 2019-09-21 PROCEDURE — 700102 HCHG RX REV CODE 250 W/ 637 OVERRIDE(OP): Performed by: ANESTHESIOLOGY

## 2019-09-21 PROCEDURE — A9270 NON-COVERED ITEM OR SERVICE: HCPCS | Performed by: ANESTHESIOLOGY

## 2019-09-21 PROCEDURE — 700111 HCHG RX REV CODE 636 W/ 250 OVERRIDE (IP): Performed by: ANESTHESIOLOGY

## 2019-09-21 PROCEDURE — 501445 HCHG STAPLER, SKIN DISP: Performed by: ORTHOPAEDIC SURGERY

## 2019-09-21 PROCEDURE — A6550 NEG PRES WOUND THER DRSG SET: HCPCS | Performed by: ORTHOPAEDIC SURGERY

## 2019-09-21 PROCEDURE — 0JCP0ZZ EXTIRPATION OF MATTER FROM LEFT LOWER LEG SUBCUTANEOUS TISSUE AND FASCIA, OPEN APPROACH: ICD-10-PCS | Performed by: ORTHOPAEDIC SURGERY

## 2019-09-21 PROCEDURE — 700102 HCHG RX REV CODE 250 W/ 637 OVERRIDE(OP): Performed by: ORTHOPAEDIC SURGERY

## 2019-09-21 PROCEDURE — 160038 HCHG SURGERY MINUTES - EA ADDL 1 MIN LEVEL 2: Performed by: ORTHOPAEDIC SURGERY

## 2019-09-21 PROCEDURE — 160027 HCHG SURGERY MINUTES - 1ST 30 MINS LEVEL 2: Performed by: ORTHOPAEDIC SURGERY

## 2019-09-21 PROCEDURE — A9270 NON-COVERED ITEM OR SERVICE: HCPCS | Performed by: ORTHOPAEDIC SURGERY

## 2019-09-21 PROCEDURE — 160002 HCHG RECOVERY MINUTES (STAT): Performed by: ORTHOPAEDIC SURGERY

## 2019-09-21 PROCEDURE — 160036 HCHG PACU - EA ADDL 30 MINS PHASE I: Performed by: ORTHOPAEDIC SURGERY

## 2019-09-21 RX ORDER — LIDOCAINE HYDROCHLORIDE 20 MG/ML
INJECTION, SOLUTION EPIDURAL; INFILTRATION; INTRACAUDAL; PERINEURAL PRN
Status: DISCONTINUED | OUTPATIENT
Start: 2019-09-21 | End: 2019-09-21 | Stop reason: SURG

## 2019-09-21 RX ORDER — FLUOXETINE 10 MG/1
20 CAPSULE ORAL DAILY
Status: DISCONTINUED | OUTPATIENT
Start: 2019-09-21 | End: 2019-09-24 | Stop reason: HOSPADM

## 2019-09-21 RX ORDER — HALOPERIDOL 5 MG/ML
1 INJECTION INTRAMUSCULAR
Status: DISCONTINUED | OUTPATIENT
Start: 2019-09-21 | End: 2019-09-21 | Stop reason: HOSPADM

## 2019-09-21 RX ORDER — SCOLOPAMINE TRANSDERMAL SYSTEM 1 MG/1
1 PATCH, EXTENDED RELEASE TRANSDERMAL
Status: DISCONTINUED | OUTPATIENT
Start: 2019-09-21 | End: 2019-09-24 | Stop reason: HOSPADM

## 2019-09-21 RX ORDER — MAGNESIUM HYDROXIDE 1200 MG/15ML
LIQUID ORAL
Status: COMPLETED | OUTPATIENT
Start: 2019-09-21 | End: 2019-09-21

## 2019-09-21 RX ORDER — HYDROMORPHONE HYDROCHLORIDE 1 MG/ML
0.4 INJECTION, SOLUTION INTRAMUSCULAR; INTRAVENOUS; SUBCUTANEOUS
Status: DISCONTINUED | OUTPATIENT
Start: 2019-09-21 | End: 2019-09-21 | Stop reason: HOSPADM

## 2019-09-21 RX ORDER — AMOXICILLIN 250 MG
1 CAPSULE ORAL
Status: DISCONTINUED | OUTPATIENT
Start: 2019-09-21 | End: 2019-09-24 | Stop reason: HOSPADM

## 2019-09-21 RX ORDER — DOCUSATE SODIUM 100 MG/1
100 CAPSULE, LIQUID FILLED ORAL 2 TIMES DAILY
Status: DISCONTINUED | OUTPATIENT
Start: 2019-09-21 | End: 2019-09-24 | Stop reason: HOSPADM

## 2019-09-21 RX ORDER — AMOXICILLIN 250 MG
1 CAPSULE ORAL NIGHTLY
Status: DISCONTINUED | OUTPATIENT
Start: 2019-09-21 | End: 2019-09-24 | Stop reason: HOSPADM

## 2019-09-21 RX ORDER — POLYETHYLENE GLYCOL 3350 17 G/17G
1 POWDER, FOR SOLUTION ORAL 2 TIMES DAILY PRN
Status: DISCONTINUED | OUTPATIENT
Start: 2019-09-21 | End: 2019-09-24 | Stop reason: HOSPADM

## 2019-09-21 RX ORDER — ENEMA 19; 7 G/133ML; G/133ML
1 ENEMA RECTAL
Status: DISCONTINUED | OUTPATIENT
Start: 2019-09-21 | End: 2019-09-24 | Stop reason: HOSPADM

## 2019-09-21 RX ORDER — OXYCODONE HYDROCHLORIDE 10 MG/1
10 TABLET ORAL
Status: DISCONTINUED | OUTPATIENT
Start: 2019-09-21 | End: 2019-09-24 | Stop reason: HOSPADM

## 2019-09-21 RX ORDER — HALOPERIDOL 5 MG/ML
1 INJECTION INTRAMUSCULAR EVERY 6 HOURS PRN
Status: DISCONTINUED | OUTPATIENT
Start: 2019-09-21 | End: 2019-09-24 | Stop reason: HOSPADM

## 2019-09-21 RX ORDER — DEXAMETHASONE SODIUM PHOSPHATE 4 MG/ML
INJECTION, SOLUTION INTRA-ARTICULAR; INTRALESIONAL; INTRAMUSCULAR; INTRAVENOUS; SOFT TISSUE PRN
Status: DISCONTINUED | OUTPATIENT
Start: 2019-09-21 | End: 2019-09-21 | Stop reason: SURG

## 2019-09-21 RX ORDER — MIDAZOLAM HYDROCHLORIDE 1 MG/ML
INJECTION INTRAMUSCULAR; INTRAVENOUS PRN
Status: DISCONTINUED | OUTPATIENT
Start: 2019-09-21 | End: 2019-09-21 | Stop reason: SURG

## 2019-09-21 RX ORDER — LEVOTHYROXINE SODIUM 88 UG/1
88 TABLET ORAL
Status: DISCONTINUED | OUTPATIENT
Start: 2019-09-21 | End: 2019-09-24 | Stop reason: HOSPADM

## 2019-09-21 RX ORDER — ALPRAZOLAM 0.25 MG/1
0.25 TABLET ORAL NIGHTLY PRN
Status: DISCONTINUED | OUTPATIENT
Start: 2019-09-21 | End: 2019-09-24 | Stop reason: HOSPADM

## 2019-09-21 RX ORDER — HYDROMORPHONE HYDROCHLORIDE 1 MG/ML
0.2 INJECTION, SOLUTION INTRAMUSCULAR; INTRAVENOUS; SUBCUTANEOUS
Status: DISCONTINUED | OUTPATIENT
Start: 2019-09-21 | End: 2019-09-21 | Stop reason: HOSPADM

## 2019-09-21 RX ORDER — CALCIUM CARBONATE 500 MG/1
500 TABLET, CHEWABLE ORAL PRN
Status: COMPLETED | OUTPATIENT
Start: 2019-09-21 | End: 2019-09-22

## 2019-09-21 RX ORDER — SCOLOPAMINE TRANSDERMAL SYSTEM 1 MG/1
1 PATCH, EXTENDED RELEASE TRANSDERMAL ONCE
Status: COMPLETED | OUTPATIENT
Start: 2019-09-21 | End: 2019-09-24

## 2019-09-21 RX ORDER — OXYCODONE HCL 5 MG/5 ML
5 SOLUTION, ORAL ORAL
Status: COMPLETED | OUTPATIENT
Start: 2019-09-21 | End: 2019-09-21

## 2019-09-21 RX ORDER — GABAPENTIN 300 MG/1
300 CAPSULE ORAL ONCE
Status: COMPLETED | OUTPATIENT
Start: 2019-09-21 | End: 2019-09-21

## 2019-09-21 RX ORDER — ALBUTEROL SULFATE 90 UG/1
2 AEROSOL, METERED RESPIRATORY (INHALATION) EVERY 6 HOURS PRN
Status: DISCONTINUED | OUTPATIENT
Start: 2019-09-21 | End: 2019-09-24 | Stop reason: HOSPADM

## 2019-09-21 RX ORDER — LIDOCAINE HYDROCHLORIDE 10 MG/ML
INJECTION, SOLUTION EPIDURAL; INFILTRATION; INTRACAUDAL; PERINEURAL
Status: COMPLETED
Start: 2019-09-21 | End: 2019-09-21

## 2019-09-21 RX ORDER — BISACODYL 10 MG
10 SUPPOSITORY, RECTAL RECTAL
Status: DISCONTINUED | OUTPATIENT
Start: 2019-09-21 | End: 2019-09-24 | Stop reason: HOSPADM

## 2019-09-21 RX ORDER — LABETALOL HYDROCHLORIDE 5 MG/ML
5 INJECTION, SOLUTION INTRAVENOUS
Status: DISCONTINUED | OUTPATIENT
Start: 2019-09-21 | End: 2019-09-21 | Stop reason: HOSPADM

## 2019-09-21 RX ORDER — MEPERIDINE HYDROCHLORIDE 25 MG/ML
6.25 INJECTION INTRAMUSCULAR; INTRAVENOUS; SUBCUTANEOUS
Status: DISCONTINUED | OUTPATIENT
Start: 2019-09-21 | End: 2019-09-21 | Stop reason: HOSPADM

## 2019-09-21 RX ORDER — DEXAMETHASONE SODIUM PHOSPHATE 4 MG/ML
4 INJECTION, SOLUTION INTRA-ARTICULAR; INTRALESIONAL; INTRAMUSCULAR; INTRAVENOUS; SOFT TISSUE
Status: COMPLETED | OUTPATIENT
Start: 2019-09-21 | End: 2019-09-24

## 2019-09-21 RX ORDER — NICOTINE 21 MG/24HR
14 PATCH, TRANSDERMAL 24 HOURS TRANSDERMAL
Status: DISCONTINUED | OUTPATIENT
Start: 2019-09-21 | End: 2019-09-21

## 2019-09-21 RX ORDER — HYDROMORPHONE HYDROCHLORIDE 1 MG/ML
0.1 INJECTION, SOLUTION INTRAMUSCULAR; INTRAVENOUS; SUBCUTANEOUS
Status: DISCONTINUED | OUTPATIENT
Start: 2019-09-21 | End: 2019-09-21 | Stop reason: HOSPADM

## 2019-09-21 RX ORDER — IBUPROFEN 800 MG/1
800 TABLET ORAL 3 TIMES DAILY
Status: DISCONTINUED | OUTPATIENT
Start: 2019-09-21 | End: 2019-09-24 | Stop reason: HOSPADM

## 2019-09-21 RX ORDER — ACETAMINOPHEN 500 MG
1000 TABLET ORAL ONCE
Status: COMPLETED | OUTPATIENT
Start: 2019-09-21 | End: 2019-09-21

## 2019-09-21 RX ORDER — ACETAMINOPHEN 500 MG
1000 TABLET ORAL EVERY 6 HOURS
Status: DISCONTINUED | OUTPATIENT
Start: 2019-09-21 | End: 2019-09-24 | Stop reason: HOSPADM

## 2019-09-21 RX ORDER — HYDRALAZINE HYDROCHLORIDE 20 MG/ML
5 INJECTION INTRAMUSCULAR; INTRAVENOUS
Status: DISCONTINUED | OUTPATIENT
Start: 2019-09-21 | End: 2019-09-21 | Stop reason: HOSPADM

## 2019-09-21 RX ORDER — ALPRAZOLAM 0.25 MG/1
0.25 TABLET ORAL NIGHTLY PRN
COMMUNITY
End: 2019-09-27 | Stop reason: SDUPTHER

## 2019-09-21 RX ORDER — ONDANSETRON 2 MG/ML
4 INJECTION INTRAMUSCULAR; INTRAVENOUS EVERY 4 HOURS PRN
Status: DISCONTINUED | OUTPATIENT
Start: 2019-09-21 | End: 2019-09-24 | Stop reason: HOSPADM

## 2019-09-21 RX ORDER — CEFAZOLIN SODIUM 1 G/3ML
INJECTION, POWDER, FOR SOLUTION INTRAMUSCULAR; INTRAVENOUS PRN
Status: DISCONTINUED | OUTPATIENT
Start: 2019-09-21 | End: 2019-09-21 | Stop reason: SURG

## 2019-09-21 RX ORDER — DIPHENHYDRAMINE HYDROCHLORIDE 50 MG/ML
12.5 INJECTION INTRAMUSCULAR; INTRAVENOUS
Status: DISCONTINUED | OUTPATIENT
Start: 2019-09-21 | End: 2019-09-21 | Stop reason: HOSPADM

## 2019-09-21 RX ORDER — SODIUM CHLORIDE, SODIUM LACTATE, POTASSIUM CHLORIDE, CALCIUM CHLORIDE 600; 310; 30; 20 MG/100ML; MG/100ML; MG/100ML; MG/100ML
INJECTION, SOLUTION INTRAVENOUS CONTINUOUS
Status: DISCONTINUED | OUTPATIENT
Start: 2019-09-21 | End: 2019-09-21 | Stop reason: HOSPADM

## 2019-09-21 RX ORDER — OXYCODONE HCL 5 MG/5 ML
10 SOLUTION, ORAL ORAL
Status: COMPLETED | OUTPATIENT
Start: 2019-09-21 | End: 2019-09-21

## 2019-09-21 RX ORDER — SODIUM CHLORIDE, SODIUM LACTATE, POTASSIUM CHLORIDE, CALCIUM CHLORIDE 600; 310; 30; 20 MG/100ML; MG/100ML; MG/100ML; MG/100ML
INJECTION, SOLUTION INTRAVENOUS CONTINUOUS
Status: ACTIVE | OUTPATIENT
Start: 2019-09-21 | End: 2019-09-21

## 2019-09-21 RX ORDER — DIPHENHYDRAMINE HYDROCHLORIDE 50 MG/ML
25 INJECTION INTRAMUSCULAR; INTRAVENOUS EVERY 6 HOURS PRN
Status: DISCONTINUED | OUTPATIENT
Start: 2019-09-21 | End: 2019-09-24 | Stop reason: HOSPADM

## 2019-09-21 RX ORDER — CEFAZOLIN SODIUM 2 G/100ML
2 INJECTION, SOLUTION INTRAVENOUS EVERY 8 HOURS
Status: COMPLETED | OUTPATIENT
Start: 2019-09-21 | End: 2019-09-22

## 2019-09-21 RX ADMIN — CEFAZOLIN SODIUM 2 G: 2 INJECTION, SOLUTION INTRAVENOUS at 17:54

## 2019-09-21 RX ADMIN — ALPRAZOLAM 0.25 MG: 0.25 TABLET ORAL at 22:09

## 2019-09-21 RX ADMIN — LEVOTHYROXINE SODIUM 88 MCG: 88 TABLET ORAL at 13:14

## 2019-09-21 RX ADMIN — FENTANYL CITRATE 50 MCG: 50 INJECTION, SOLUTION INTRAMUSCULAR; INTRAVENOUS at 12:12

## 2019-09-21 RX ADMIN — OXYCODONE HYDROCHLORIDE 10 MG: 5 SOLUTION ORAL at 12:49

## 2019-09-21 RX ADMIN — PROPOFOL 200 MG: 10 INJECTION, EMULSION INTRAVENOUS at 11:55

## 2019-09-21 RX ADMIN — FLUOXETINE HYDROCHLORIDE 20 MG: 20 CAPSULE ORAL at 13:14

## 2019-09-21 RX ADMIN — FENTANYL CITRATE 50 MCG: 50 INJECTION, SOLUTION INTRAMUSCULAR; INTRAVENOUS at 11:55

## 2019-09-21 RX ADMIN — CEFAZOLIN 2000 MG: 330 INJECTION, POWDER, FOR SOLUTION INTRAMUSCULAR; INTRAVENOUS at 11:55

## 2019-09-21 RX ADMIN — PROPOFOL 50 MG: 10 INJECTION, EMULSION INTRAVENOUS at 12:03

## 2019-09-21 RX ADMIN — SCOPALAMINE 1 PATCH: 1 PATCH, EXTENDED RELEASE TRANSDERMAL at 10:32

## 2019-09-21 RX ADMIN — MIDAZOLAM 2 MG: 1 INJECTION INTRAMUSCULAR; INTRAVENOUS at 11:51

## 2019-09-21 RX ADMIN — FENTANYL CITRATE 50 MCG: 50 INJECTION, SOLUTION INTRAMUSCULAR; INTRAVENOUS at 11:59

## 2019-09-21 RX ADMIN — SODIUM CHLORIDE, POTASSIUM CHLORIDE, SODIUM LACTATE AND CALCIUM CHLORIDE 1000 ML: 600; 310; 30; 20 INJECTION, SOLUTION INTRAVENOUS at 12:45

## 2019-09-21 RX ADMIN — DEXAMETHASONE SODIUM PHOSPHATE 8 MG: 4 INJECTION, SOLUTION INTRA-ARTICULAR; INTRALESIONAL; INTRAMUSCULAR; INTRAVENOUS; SOFT TISSUE at 12:01

## 2019-09-21 RX ADMIN — FENTANYL CITRATE 25 MCG: 50 INJECTION INTRAMUSCULAR; INTRAVENOUS at 12:58

## 2019-09-21 RX ADMIN — ACETAMINOPHEN 1000 MG: 500 TABLET ORAL at 17:53

## 2019-09-21 RX ADMIN — OXYCODONE HYDROCHLORIDE 10 MG: 10 TABLET ORAL at 22:09

## 2019-09-21 RX ADMIN — SENNOSIDES, DOCUSATE SODIUM 1 TABLET: 50; 8.6 TABLET, FILM COATED ORAL at 22:09

## 2019-09-21 RX ADMIN — FENTANYL CITRATE 25 MCG: 50 INJECTION INTRAMUSCULAR; INTRAVENOUS at 12:50

## 2019-09-21 RX ADMIN — IBUPROFEN 800 MG: 800 TABLET, FILM COATED ORAL at 17:53

## 2019-09-21 RX ADMIN — FENTANYL CITRATE 50 MCG: 50 INJECTION, SOLUTION INTRAMUSCULAR; INTRAVENOUS at 12:19

## 2019-09-21 RX ADMIN — OXYCODONE HYDROCHLORIDE 10 MG: 10 TABLET ORAL at 15:33

## 2019-09-21 RX ADMIN — FENTANYL CITRATE 50 MCG: 50 INJECTION INTRAMUSCULAR; INTRAVENOUS at 13:04

## 2019-09-21 RX ADMIN — GABAPENTIN 300 MG: 300 CAPSULE ORAL at 10:33

## 2019-09-21 RX ADMIN — LIDOCAINE HYDROCHLORIDE 50 MG: 20 INJECTION, SOLUTION EPIDURAL; INFILTRATION; INTRACAUDAL at 11:55

## 2019-09-21 RX ADMIN — SODIUM CHLORIDE, POTASSIUM CHLORIDE, SODIUM LACTATE AND CALCIUM CHLORIDE: 600; 310; 30; 20 INJECTION, SOLUTION INTRAVENOUS at 09:59

## 2019-09-21 RX ADMIN — FENTANYL CITRATE 50 MCG: 50 INJECTION, SOLUTION INTRAMUSCULAR; INTRAVENOUS at 12:05

## 2019-09-21 RX ADMIN — LIDOCAINE HYDROCHLORIDE 0.5 ML: 10 INJECTION, SOLUTION EPIDURAL; INFILTRATION; INTRACAUDAL at 09:59

## 2019-09-21 RX ADMIN — ACETAMINOPHEN 1000 MG: 500 TABLET ORAL at 10:33

## 2019-09-21 RX ADMIN — OXYCODONE HYDROCHLORIDE 10 MG: 10 TABLET ORAL at 19:03

## 2019-09-21 ASSESSMENT — LIFESTYLE VARIABLES
HOW MANY TIMES IN THE PAST YEAR HAVE YOU HAD 5 OR MORE DRINKS IN A DAY: 0
DOES PATIENT WANT TO STOP DRINKING: NO
ALCOHOL_USE: YES
AVERAGE NUMBER OF DAYS PER WEEK YOU HAVE A DRINK CONTAINING ALCOHOL: 7
CONSUMPTION TOTAL: NEGATIVE
TOTAL SCORE: 1
TOTAL SCORE: 1
ON A TYPICAL DAY WHEN YOU DRINK ALCOHOL HOW MANY DRINKS DO YOU HAVE: 1
TOTAL SCORE: 1
EVER_SMOKED: YES
EVER FELT BAD OR GUILTY ABOUT YOUR DRINKING: NO
HAVE YOU EVER FELT YOU SHOULD CUT DOWN ON YOUR DRINKING: YES
EVER HAD A DRINK FIRST THING IN THE MORNING TO STEADY YOUR NERVES TO GET RID OF A HANGOVER: NO
HAVE PEOPLE ANNOYED YOU BY CRITICIZING YOUR DRINKING: NO
EVER_SMOKED: YES

## 2019-09-21 ASSESSMENT — COGNITIVE AND FUNCTIONAL STATUS - GENERAL
DAILY ACTIVITIY SCORE: 22
STANDING UP FROM CHAIR USING ARMS: A LITTLE
TURNING FROM BACK TO SIDE WHILE IN FLAT BAD: A LITTLE
DRESSING REGULAR LOWER BODY CLOTHING: A LITTLE
MOVING FROM LYING ON BACK TO SITTING ON SIDE OF FLAT BED: A LITTLE
MOVING TO AND FROM BED TO CHAIR: A LITTLE
HELP NEEDED FOR BATHING: A LITTLE
SUGGESTED CMS G CODE MODIFIER DAILY ACTIVITY: CJ
WALKING IN HOSPITAL ROOM: A LITTLE
MOBILITY SCORE: 18
SUGGESTED CMS G CODE MODIFIER MOBILITY: CK
CLIMB 3 TO 5 STEPS WITH RAILING: A LITTLE

## 2019-09-21 ASSESSMENT — PAIN SCALES - GENERAL: PAIN_LEVEL: 2

## 2019-09-21 NOTE — CARE PLAN
Problem: Communication  Goal: The ability to communicate needs accurately and effectively will improve  Outcome: PROGRESSING AS EXPECTED   Pt instructed to use call light for assistance, verbalized understanding    Problem: Safety  Goal: Will remain free from injury  Outcome: PROGRESSING AS EXPECTED  Pt will call for ambulation, sit at bedside before progressing to ambulate, verbalized understanding     Problem: Infection  Goal: Will remain free from infection  Outcome: PROGRESSING AS EXPECTED  Monitor wound vac in place, meds as directed     Problem: Venous Thromboembolism (VTW)/Deep Vein Thrombosis (DVT) Prevention:  Goal: Patient will participate in Venous Thrombosis (VTE)/Deep Vein Thrombosis (DVT)Prevention Measures  Outcome: PROGRESSING AS EXPECTED    Medications as ordered for DVT prevention

## 2019-09-21 NOTE — OR NURSING
1230: [place] with oral airway. Respirations even and unlabored. Wound vac to left lower leg CDI. Patent. Elevated. Oral airway dc'd upon arrival to PACU without problem or difficulty.  1249: c/o left lower pain. Medicated. See MAR. Sips of water given. Tolerated well. Able to move left lower extremity.  1258: pain scale 7/10. Medicated. See MAR  1304: still c/o pain. Medicated. See MAR  1325: report called to Pricilla TERRY.  1341: transported via gurney to [facility] by transport with O2 at 10 L / Oxymask. Pain scale 6/10. Tolerable.Stable.

## 2019-09-21 NOTE — ANESTHESIA PREPROCEDURE EVALUATION
Relevant Problems   ANESTHESIA (within normal limits)      PULMONARY (within normal limits)      NEURO (within normal limits)      CARDIAC (within normal limits)      GI (within normal limits)       (within normal limits)      ENDO   (+) Hypothyroidism       Physical Exam    Airway   Mallampati: II  TM distance: >3 FB  Neck ROM: full       Cardiovascular - normal exam  Rhythm: regular  Rate: normal  (-) murmur     Dental - normal exam         Pulmonary - normal exam  Breath sounds clear to auscultation     Abdominal    Neurological - normal exam                 Anesthesia Plan    ASA 2       Plan - general       Airway plan will be LMA        Induction: intravenous    Postoperative Plan: Postoperative administration of opioids is intended.    Pertinent diagnostic labs and testing reviewed    Informed Consent:    Anesthetic plan and risks discussed with patient.    Use of blood products discussed with: patient whom consented to blood products.

## 2019-09-21 NOTE — ANESTHESIA POSTPROCEDURE EVALUATION
Patient: Carie Santiago    Procedure Summary     Date:  09/21/19 Room / Location:  [place] [address] / SURGERY [facility]    Anesthesia Start:  1151 Anesthesia Stop:  1231    Procedure:  IRRIGATION AND DEBRIDEMENT, WOUND - FOR PROXIMAL TIBIA HEMATOMA EVACUATION AND WOUND VAC APPLICATION (Left ) Diagnosis:  (PAIN IN LEFT LOWER LEG, CONTUSUION OF LEFT LOWER LEG)    Surgeon:  Paolo Odell M.D. Responsible Provider:  Akin Persaud M.D.    Anesthesia Type:  general ASA Status:  2          Final Anesthesia Type: general  Last vitals  BP   Blood Pressure: 135/87    Temp   37.2 °C (99 °F)    Pulse   Pulse: 79   Resp   18    SpO2   93 %      Anesthesia Post Evaluation    Patient location during evaluation: PACU  Patient participation: complete - patient participated  Level of consciousness: awake and alert  Pain score: 2    Airway patency: patent  Anesthetic complications: no  Cardiovascular status: hemodynamically stable  Respiratory status: acceptable  Hydration status: euvolemic    PONV: none

## 2019-09-21 NOTE — OR NURSING
Multimodals given with sip of water per orders, scop patch behind right ear. All needs met at this time. Pt is anxious about going home after surgery, she stated she would prefer to stay a day or two to learn how to use the wound vac if one is placed. Answered patients questions, pt resting comfortably.

## 2019-09-21 NOTE — ANESTHESIA PROCEDURE NOTES
Airway  Date/Time: 9/21/2019 11:55 AM  Performed by: Akin Persaud M.D.  Authorized by: Akin Persaud M.D.     Location:  OR  Urgency:  Elective  Indications for Airway Management:  Anesthesia  Spontaneous Ventilation: absent    Sedation Level:  Deep  Preoxygenated: Yes    Final Airway Type:  Supraglottic airway  Final Supraglottic Airway:  Standard LMA  SGA Size:  4  Number of Attempts at Approach:  1

## 2019-09-21 NOTE — ANESTHESIA QCDR
2019 [facility] Clinical Data Registry (for Quality Improvement)     Postoperative nausea/vomiting risk protocol (Adult = 18 yrs and Pediatric 3-17 yrs)- (430 and 463)  General inhalation anesthetic (NOT TIVA) with PONV risk factors: Yes  Provision of anti-emetic therapy with at least 2 different classes of agents: Yes   Patient DID NOT receive anti-emetic therapy and reason is documented in Medical Record:  N/A    Multimodal Pain Management- (AQI59)  Patient undergoing Elective Surgery (i.e. Outpatient, or ASC, or Prescheduled Surgery prior to Hospital Admission): Yes  Use of Multimodal Pain Management, two or more drugs and/or interventions, NOT including systemic opioids: Yes   Exception: Documented allergy to multiple classes of analgesics:  N/A    PACU assessment of acute postoperative pain prior to Anesthesia Care End- Applies to Patients Age = 18- (ABG7)  Initial PACU pain score is which of the following: < 7/10  Patient unable to report pain score: N/A    Post-anesthetic transfer of care checklist/protocol to PACU/ICU- (426 and 427)  Upon conclusion of case, patient transferred to which of the following locations: PACU/Non-ICU  Use of transfer checklist/protocol: Yes  Exclusion: Service Performed in Patient Hospital Room (and thus did not require transfer): N/A    PACU Reintubation- (AQI31)  General anesthesia requiring endotracheal intubation (ETT) along with subsequent extubation in OR or PACU: No  Required reintubation in the PACU: N/A  Extubation was a planned trial documented in the medical record prior to removal of the original airway device: N/A    Unplanned admission to ICU related to anesthesia service up through end of PACU care- (MD51)  Unplanned admission to ICU (not initially anticipated at anesthesia start time): No

## 2019-09-21 NOTE — PROGRESS NOTES
Ortho  Patient seen and examined.. hematoma evacuation  All explained to family and patient  Answered all questions  Jose R

## 2019-09-21 NOTE — ANESTHESIA TIME REPORT
Anesthesia Start and Stop Event Times     Date Time Event    9/21/2019 1149 Ready for Procedure     1151 Anesthesia Start     1231 Anesthesia Stop        Responsible Staff  09/21/19    Name Role Begin End    Akin Persaud M.D. Anesth 1151 1231        Preop Diagnosis (Free Text):  Pre-op Diagnosis     PAIN IN LEFT LOWER LEG, CONTUSUION OF LEFT LOWER LEG        Preop Diagnosis (Codes):    Post op Diagnosis  Traumatic hematoma of left lower leg      Premium Reason  B. 1st Call    Comments:

## 2019-09-21 NOTE — OR SURGEON
Immediate Post OP Note    PreOp Diagnosis: tibial crest hematoma- traumatic  PostOp Diagnosis: same    Procedure(s):  IRRIGATION AND DEBRIDEMENT, WOUND - FOR PROXIMAL TIBIA HEMATOMA EVACUATION AND WOUND VAC APPLICATION    Surgeon(s):  Paolo Odell M.D.    Anesthesiologist/Type of Anesthesia:  Anesthesiologist: Akin Persaud M.D./* No anesthesia type entered *    Surgical Staff:  Circulator: Cuba Lindsey R.N.; Emerald Olmstead R.N.  Scrub Person: Roberth Ty    Specimens removed if any:  * No specimens in log *    Estimated Blood Loss: hematoma 50ccs    Findings: asdescribed    Complications: none        9/21/2019 12:26 PM Paolo Odell M.D.

## 2019-09-22 PROCEDURE — 700112 HCHG RX REV CODE 229: Performed by: ORTHOPAEDIC SURGERY

## 2019-09-22 PROCEDURE — 700102 HCHG RX REV CODE 250 W/ 637 OVERRIDE(OP): Performed by: PHYSICIAN ASSISTANT

## 2019-09-22 PROCEDURE — A9270 NON-COVERED ITEM OR SERVICE: HCPCS | Performed by: ORTHOPAEDIC SURGERY

## 2019-09-22 PROCEDURE — 700111 HCHG RX REV CODE 636 W/ 250 OVERRIDE (IP): Performed by: ORTHOPAEDIC SURGERY

## 2019-09-22 PROCEDURE — 700102 HCHG RX REV CODE 250 W/ 637 OVERRIDE(OP): Performed by: ORTHOPAEDIC SURGERY

## 2019-09-22 PROCEDURE — A9270 NON-COVERED ITEM OR SERVICE: HCPCS | Performed by: PHYSICIAN ASSISTANT

## 2019-09-22 PROCEDURE — 770001 HCHG ROOM/CARE - MED/SURG/GYN PRIV*

## 2019-09-22 PROCEDURE — 97161 PT EVAL LOW COMPLEX 20 MIN: CPT

## 2019-09-22 RX ORDER — FAMOTIDINE 20 MG/1
20 TABLET, FILM COATED ORAL DAILY
Status: DISCONTINUED | OUTPATIENT
Start: 2019-09-22 | End: 2019-09-24 | Stop reason: HOSPADM

## 2019-09-22 RX ORDER — CHLORAL HYDRATE 500 MG
1000 CAPSULE ORAL DAILY
Status: DISCONTINUED | OUTPATIENT
Start: 2019-09-23 | End: 2019-09-24 | Stop reason: HOSPADM

## 2019-09-22 RX ORDER — RANITIDINE 15 MG/ML
150 SOLUTION ORAL DAILY
Status: DISCONTINUED | OUTPATIENT
Start: 2019-09-22 | End: 2019-09-22

## 2019-09-22 RX ADMIN — OXYCODONE HYDROCHLORIDE 10 MG: 10 TABLET ORAL at 22:09

## 2019-09-22 RX ADMIN — OXYCODONE HYDROCHLORIDE 10 MG: 10 TABLET ORAL at 04:51

## 2019-09-22 RX ADMIN — ASPIRIN 81 MG: 81 TABLET, COATED ORAL at 00:35

## 2019-09-22 RX ADMIN — ACETAMINOPHEN 1000 MG: 500 TABLET ORAL at 04:45

## 2019-09-22 RX ADMIN — DOCUSATE SODIUM 100 MG: 100 CAPSULE, LIQUID FILLED ORAL at 17:34

## 2019-09-22 RX ADMIN — IBUPROFEN 800 MG: 800 TABLET, FILM COATED ORAL at 04:46

## 2019-09-22 RX ADMIN — CEFAZOLIN SODIUM 2 G: 2 INJECTION, SOLUTION INTRAVENOUS at 01:51

## 2019-09-22 RX ADMIN — FAMOTIDINE 20 MG: 20 TABLET ORAL at 09:57

## 2019-09-22 RX ADMIN — SENNOSIDES, DOCUSATE SODIUM 1 TABLET: 50; 8.6 TABLET, FILM COATED ORAL at 22:09

## 2019-09-22 RX ADMIN — ACETAMINOPHEN 1000 MG: 500 TABLET ORAL at 11:44

## 2019-09-22 RX ADMIN — ASPIRIN 81 MG: 81 TABLET, COATED ORAL at 11:44

## 2019-09-22 RX ADMIN — IBUPROFEN 800 MG: 800 TABLET, FILM COATED ORAL at 11:44

## 2019-09-22 RX ADMIN — FLUOXETINE HYDROCHLORIDE 20 MG: 20 CAPSULE ORAL at 04:45

## 2019-09-22 RX ADMIN — IBUPROFEN 800 MG: 800 TABLET, FILM COATED ORAL at 17:30

## 2019-09-22 RX ADMIN — ANTACID TABLETS 500 MG: 500 TABLET, CHEWABLE ORAL at 00:35

## 2019-09-22 RX ADMIN — OXYCODONE HYDROCHLORIDE 10 MG: 10 TABLET ORAL at 01:37

## 2019-09-22 RX ADMIN — OXYCODONE HYDROCHLORIDE 10 MG: 10 TABLET ORAL at 09:23

## 2019-09-22 RX ADMIN — OXYCODONE HYDROCHLORIDE 10 MG: 10 TABLET ORAL at 17:34

## 2019-09-22 RX ADMIN — ACETAMINOPHEN 1000 MG: 500 TABLET ORAL at 17:30

## 2019-09-22 RX ADMIN — ACETAMINOPHEN 1000 MG: 500 TABLET ORAL at 00:31

## 2019-09-22 RX ADMIN — DOCUSATE SODIUM 100 MG: 100 CAPSULE, LIQUID FILLED ORAL at 04:45

## 2019-09-22 RX ADMIN — LEVOTHYROXINE SODIUM 88 MCG: 88 TABLET ORAL at 04:45

## 2019-09-22 ASSESSMENT — COGNITIVE AND FUNCTIONAL STATUS - GENERAL
STANDING UP FROM CHAIR USING ARMS: A LITTLE
MOVING FROM LYING ON BACK TO SITTING ON SIDE OF FLAT BED: A LITTLE
CLIMB 3 TO 5 STEPS WITH RAILING: A LITTLE
TURNING FROM BACK TO SIDE WHILE IN FLAT BAD: A LITTLE
WALKING IN HOSPITAL ROOM: A LITTLE
MOBILITY SCORE: 18
MOVING TO AND FROM BED TO CHAIR: A LITTLE
SUGGESTED CMS G CODE MODIFIER MOBILITY: CK

## 2019-09-22 ASSESSMENT — GAIT ASSESSMENTS
GAIT LEVEL OF ASSIST: SUPERVISED
DISTANCE (FEET): 500
ASSISTIVE DEVICE: HAND HELD ASSIST;OTHER (COMMENTS)
DEVIATION: BRADYKINETIC

## 2019-09-22 NOTE — THERAPY
"Physical Therapy Evaluation completed.   Bed Mobility:  Supine to Sit: Supervised  Transfers: Sit to Stand: Supervised  Gait: Level Of Assist: Supervised with No Equipment Needed       Plan of Care: Patient with no further skilled PT needs in the acute care setting and demonstrates safety with mobility in this environment at this time.   Discharge Recommendations: Equipment: No Equipment Needed. Anticipate that the patient will have no further physical therapy needs after discharge from the hospital.    See \"Rehab Therapy-Acute\" Patient Summary Report for complete documentation.     Pt is a 48yo female s/p LLE I&D and hematoma evacuation with wound vac placement on 9/21. LLE WBAT. Pt seen for PT evaluation, performed all mobility at SPV level without difficulties. Pt ambulated 500ft with HHA for comfort. Pt reports no concerns regarding return home, has been ambulating on her own while in acute. Patient will not be actively followed for physical therapy services at this time, however may be seen if requested by physician for 1 more visit within 30 days to address any discharge or equipment needs.  "

## 2019-09-22 NOTE — THERAPY
OT orders received.  Pt is up self and mobilized well with PT.  Pt has support at home and reports no concerns with self-care.  Pt has needed AE for shower.  Pt with no acute OT needs at this time.

## 2019-09-22 NOTE — PROGRESS NOTES
Ortho  Patient seen and examined  Swelling down vac working well  No issues, pain less up and ambulating  Plan-will keep vac in position through weekend and make a decision day to day about trepeat OR- vac removal and closure of the wound  Odell

## 2019-09-22 NOTE — PROGRESS NOTES
Report received. Assumed care. Pt in bed sitting up. A/O x4. VSS. Responds appropriately. Medicated for pain per MAR. No SOB. Assessment complete. Discussed POC, , pt verbalizes understanding. Explained importance of calling before getting OOB. Call light and belongings within reach.  Bed in the lowest position. Treaded socks in place. Hourly rounding in progress.

## 2019-09-22 NOTE — PROGRESS NOTES
Orthopedic IDA Shipley called back. States patient is weight bearing as tolerated. Modified order per telephone with readback

## 2019-09-22 NOTE — PROGRESS NOTES
Education provided about not putting weight on surgical limb per MD order. Patient verbalized understanding. Patient continues to place weight on LLE when ambulating. Reinforcement needed.

## 2019-09-22 NOTE — CARE PLAN
Problem: Discharge Barriers/Planning  Goal: Patient's continuum of care needs will be met  Outcome: PROGRESSING AS EXPECTED  Patient remains with wound vac.      Problem: Pain Management  Goal: Pain level will decrease to patient's comfort goal  Outcome: PROGRESSING AS EXPECTED   Pain controlled with prn medication.

## 2019-09-22 NOTE — PROGRESS NOTES
Patient found up with walker.  Patient educated to call for assistance before getting up with staff.  Patient listed as NWB to LE, patient was also found not following restriction, education was done.  Patient states her weight bearing status was updated, however I was unable to find any record of this.  Call placed to Orthopedic PA Gomez Shipley.

## 2019-09-22 NOTE — PROGRESS NOTES
· 2 RN skin check complete with MARA Yadav.  · Devices in place: SCD sleeve to RLE, wound vac w/ dressing to LLE.   · Skin assessed under devices: Yes.  · Bilateral ears intact. Bilateral elbows intact. Bruising present to right arm. Sacrum intact. Buttocks pink and blanching. Purpura noted on the patient's left leg and left foot. Left foot is swollen. Small abrasion and bruising noted to right lower extremity. Bilateral heels intact.    · The following interventions in place: pillows in use for support and positioning, educating and encouraging the patient to reposition while resting in bed at least every 2 hours.

## 2019-09-22 NOTE — PROGRESS NOTES
Patient reporting heartburn and requesting medication to help with the heartburn. Notified Dr. Odell and received order for a one time dose of Calcium Carbonate (TUMS), 500 mg PO, PRN for heartburn. Read back order and Dr. Odell verified.

## 2019-09-22 NOTE — OP REPORT
DATE OF SERVICE:  09/21/2019    PREOPERATIVE DIAGNOSIS:  Hematoma, left anterior tibia.    POSTOPERATIVE DIAGNOSIS:  Hematoma, left anterior tibia.    OPERATIONS PERFORMED:  1.  Irrigation and debridement, removal of hematoma.  2.  Application of a wound VAC.    SURGEON:  Paolo Odell MD    ASSISTANT:  Unassisted.    INDICATIONS FOR THE OPERATION:  A nonresolving hematoma, left tibia.    COMPLICATIONS:  None.    LEVEL OF DEBRIDEMENT:  Through skin, subcutaneous tissue, muscle fascia,   periosteum, and bone.    INSTRUMENTS UTILIZED:  Scissors, knife, rongeurs, curettes, both sharp and   dull blunt dissection.    DESCRIPTION OF OPERATION:  The patient was brought to the operating room,   awake, alert, and placed on the operating room table in supine position.  Left   lower extremity shaved, scrubbed, prepped, and draped in normal sterile   routine fashion.  A small 1-inch incision was made over the anterolateral   aspect of the proximal tibia directly to a large anterior hematoma.  After   entering the hematoma, we used a hemostat and spread and curettes, rongeurs,   and then irrigation to remove the hematoma.  After squeezing and cleaning the   hematoma and cleaning up the pocket, we placed a wound VAC in standard   fashion.    The patient was then taken to recovery room in stable condition.  No   intraoperative or immediate postoperative complications.  Patient tolerated   the procedure well.       ____________________________________     MD CASSIA SOLOMON / NTS    DD:  09/22/2019 10:12:17  DT:  09/22/2019 11:51:09    D#:  [id number]  Job#:  [id number]

## 2019-09-22 NOTE — CARE PLAN
Problem: Safety  Goal: Will remain free from falls  Outcome: PROGRESSING AS EXPECTED   Fall precautions in place. Patient encouraged to use call light to alert staff of needs and before getting out of bed. Patient verbalized understanding. Call light and personal belongings within reach. Hourly rounding in place.     Problem: Venous Thromboembolism (VTW)/Deep Vein Thrombosis (DVT) Prevention:  Goal: Patient will participate in Venous Thrombosis (VTE)/Deep Vein Thrombosis (DVT)Prevention Measures  Outcome: PROGRESSING AS EXPECTED   SCD to RLE in use when patient is resting in bed.

## 2019-09-23 PROCEDURE — 770001 HCHG ROOM/CARE - MED/SURG/GYN PRIV*

## 2019-09-23 PROCEDURE — 700102 HCHG RX REV CODE 250 W/ 637 OVERRIDE(OP): Performed by: ORTHOPAEDIC SURGERY

## 2019-09-23 PROCEDURE — 700102 HCHG RX REV CODE 250 W/ 637 OVERRIDE(OP): Performed by: PHYSICIAN ASSISTANT

## 2019-09-23 PROCEDURE — A9270 NON-COVERED ITEM OR SERVICE: HCPCS | Performed by: ORTHOPAEDIC SURGERY

## 2019-09-23 PROCEDURE — A9270 NON-COVERED ITEM OR SERVICE: HCPCS | Performed by: PHYSICIAN ASSISTANT

## 2019-09-23 PROCEDURE — 306481 GARMENT,FOOT IMPAD VASO: Performed by: ORTHOPAEDIC SURGERY

## 2019-09-23 PROCEDURE — 700112 HCHG RX REV CODE 229: Performed by: ORTHOPAEDIC SURGERY

## 2019-09-23 RX ADMIN — ASPIRIN 81 MG: 81 TABLET, COATED ORAL at 11:33

## 2019-09-23 RX ADMIN — LEVOTHYROXINE SODIUM 88 MCG: 88 TABLET ORAL at 03:05

## 2019-09-23 RX ADMIN — NICOTINE 7 MG: 7 PATCH TRANSDERMAL at 03:22

## 2019-09-23 RX ADMIN — OXYCODONE HYDROCHLORIDE 10 MG: 10 TABLET ORAL at 07:32

## 2019-09-23 RX ADMIN — OXYCODONE HYDROCHLORIDE 10 MG: 10 TABLET ORAL at 22:24

## 2019-09-23 RX ADMIN — IBUPROFEN 800 MG: 800 TABLET, FILM COATED ORAL at 04:28

## 2019-09-23 RX ADMIN — ALPRAZOLAM 0.25 MG: 0.25 TABLET ORAL at 03:02

## 2019-09-23 RX ADMIN — OXYCODONE HYDROCHLORIDE 10 MG: 10 TABLET ORAL at 17:00

## 2019-09-23 RX ADMIN — OMEGA-3 FATTY ACIDS CAP 1000 MG 1000 MG: 1000 CAP at 04:28

## 2019-09-23 RX ADMIN — IBUPROFEN 800 MG: 800 TABLET, FILM COATED ORAL at 11:33

## 2019-09-23 RX ADMIN — FLUOXETINE HYDROCHLORIDE 20 MG: 20 CAPSULE ORAL at 04:28

## 2019-09-23 RX ADMIN — DOCUSATE SODIUM 100 MG: 100 CAPSULE, LIQUID FILLED ORAL at 17:00

## 2019-09-23 RX ADMIN — DOCUSATE SODIUM 100 MG: 100 CAPSULE, LIQUID FILLED ORAL at 04:28

## 2019-09-23 RX ADMIN — OXYCODONE HYDROCHLORIDE 10 MG: 10 TABLET ORAL at 01:15

## 2019-09-23 RX ADMIN — ASPIRIN 81 MG: 81 TABLET, COATED ORAL at 01:15

## 2019-09-23 RX ADMIN — ACETAMINOPHEN 1000 MG: 500 TABLET ORAL at 01:15

## 2019-09-23 RX ADMIN — IBUPROFEN 800 MG: 800 TABLET, FILM COATED ORAL at 17:00

## 2019-09-23 RX ADMIN — OXYCODONE HYDROCHLORIDE 10 MG: 10 TABLET ORAL at 04:28

## 2019-09-23 RX ADMIN — OXYCODONE HYDROCHLORIDE 10 MG: 10 TABLET ORAL at 11:33

## 2019-09-23 RX ADMIN — FAMOTIDINE 20 MG: 20 TABLET ORAL at 04:28

## 2019-09-23 RX ADMIN — ACETAMINOPHEN 1000 MG: 500 TABLET ORAL at 17:00

## 2019-09-23 RX ADMIN — ACETAMINOPHEN 1000 MG: 500 TABLET ORAL at 11:33

## 2019-09-23 RX ADMIN — ACETAMINOPHEN 1000 MG: 500 TABLET ORAL at 07:32

## 2019-09-23 NOTE — CARE PLAN
Problem: Safety  Goal: Will remain free from falls  Outcome: PROGRESSING AS EXPECTED   Fall precautions in place. Patient encouraged to use call light to alert staff of needs. Patient verbalized understanding. Call light and personal belongings within reach. Hourly rounding in place.     Problem: Pain Management  Goal: Pain level will decrease to patient's comfort goal  Outcome: PROGRESSING AS EXPECTED   Pharmacological and non-pharmacological interventions in place to manage pain. Patient verbalized understanding of interventions available. Pillows and ice packs in use while patient is resting in bed.

## 2019-09-23 NOTE — DISCHARGE PLANNING
Care Transition Team Assessment  Met with patient, parents at bedside. Discussed home with Wound vac if needed, patient feels she may go back to surgery for closure of wound. Will follow    Information Source  Orientation : Oriented x 4  Information Given By: Patient  Informant's Name: Carie  Who is responsible for making decisions for patient? : Patient    Readmission Evaluation  Is this a readmission?: No    Elopement Risk  Legal Hold: No  Ambulatory or Self Mobile in Wheelchair: No-Not an Elopement Risk  Elopement Risk: Not at Risk for Elopement    Interdisciplinary Discharge Planning  Lives with - Patient's Self Care Capacity: Spouse  Patient or legal guardian wants to designate a caregiver (see row info): No  Housing / Facility: [facility]  Prior Services: None    Discharge Preparedness  What is your plan after discharge?: Uncertain - pending medical team collaboration  What are your discharge supports?: Parent, Partner  Prior Functional Level: Independent with Activities of Daily Living    Functional Assesment  Prior Functional Level: Independent with Activities of Daily Living    Finances  Financial Barriers to Discharge: No  Prescription Coverage: Yes    Vision / Hearing Impairment  Vision Impairment : No  Right Eye Vision: Impaired, Wears Glasses  Left Eye Vision: Impaired, Wears Glasses  Hearing Impairment : No              Domestic Abuse  Have you ever been the victim of abuse or violence?: No  Physical Abuse or Sexual Abuse: No  Verbal Abuse or Emotional Abuse: No  Possible Abuse Reported to:: Not Applicable    Psychological Assessment  History of Substance Abuse: None  History of Psychiatric Problems: No  Non-compliant with Treatment: No  Newly Diagnosed Illness: No         Anticipated Discharge Information  Anticipated discharge disposition: [facility]  Discharge Address: [address] [place] [address] Jonathan  Discharge Contact Phone Number: [phone]

## 2019-09-23 NOTE — PROGRESS NOTES
Patient's room noted to smell like cigarettes during hourly rounding. This RN walked into the bathroom and found a cigarette in the toilet bowl. Hospital policy regarding smoking was explained to patient. Patient verbalized understanding. This RN took the patient's cigarette pack and lighter and placed them in the patient's medication drawer. Patient reports this is the only cigarette pack and lighter she brought with her. Smoking cessation education provided and nicotine patch applied. Patient verbalized understanding of purpose of nicotine patch. Bed alarm placed because patient reports she got up to the bathroom earlier in the evening unassisted with her wound vac. Explained to patient that she needs to use her call light and be assisted by a staff member when she gets up to the bathroom. Patient verbalized understanding. Call light and personal belongings within reach.

## 2019-09-23 NOTE — PROGRESS NOTES
"   Orthopaedic PA Progress Note    Interval changes:Swelling down, ambulating in HW.     ROS - Patient denies any new issues. No chest pain, dyspnea, or fever.  Pain well controlled.    /76   Pulse 64   Temp 36.2 °C (97.1 °F) (Temporal)   Resp 17   Ht 1.778 m (5' 10\")   Wt 79.5 kg (175 lb 6 oz)   SpO2 92%     Patient seen and examined  No acute distress  Breathing non labored  RRR  Surgical dressing is clean, dry, and intact. Patient clearly fires tibialis anterior, EHL, and gastrocnemius/soleus. Sensation is intact to light touch throughout superficial peroneal, deep peroneal, tibial, saphenous, and sural nerve distributions. Strong and palpable 2+ dorsalis pedis and posterior tibial pulses with capillary refill less than 2 seconds. No lower leg tenderness or discomfort.    Assessment/Plan:  POD#1.  Irrigation and debridement, removal of hematoma.  2.  Application of a wound VAC. RLE  Wt bearing status - AT  PT/OT-initiated  Wound care:Vac dressing left in place  Drains - no  Liu-no  Sutures/Staples out- 10-14 days post operatively  Antibiotics: complete  DVT Prophylaxis- TEDS/SCDs/Foot pumps. Ambulating > 150'  Future Procedures - Vac removal and delayed primary closure later this week, possibly tomorrow  Case Coordination for Discharge Planning - Disposition pending. NPO p MN        "

## 2019-09-23 NOTE — CARE PLAN
Problem: Venous Thromboembolism (VTW)/Deep Vein Thrombosis (DVT) Prevention:  Goal: Patient will participate in Venous Thrombosis (VTE)/Deep Vein Thrombosis (DVT)Prevention Measures  Outcome: PROGRESSING AS EXPECTED  Compliant with scd.     Problem: Pain Management  Goal: Pain level will decrease to patient's comfort goal  Outcome: PROGRESSING AS EXPECTED  Pain relief with prn medication.

## 2019-09-23 NOTE — PROGRESS NOTES
Patient aware of plan for surgery, notified will need IV access prior to surgery, however is refusing insertion at this time.  Aware of NPO at midnight.  Agreeable to IV placement prior to.

## 2019-09-24 ENCOUNTER — ANESTHESIA EVENT (OUTPATIENT)
Dept: SURGERY | Facility: MEDICAL CENTER | Age: 49
DRG: 983 | End: 2019-09-24
Payer: COMMERCIAL

## 2019-09-24 ENCOUNTER — ANESTHESIA (OUTPATIENT)
Dept: SURGERY | Facility: MEDICAL CENTER | Age: 49
DRG: 983 | End: 2019-09-24
Payer: COMMERCIAL

## 2019-09-24 VITALS
RESPIRATION RATE: 16 BRPM | WEIGHT: 175.38 LBS | SYSTOLIC BLOOD PRESSURE: 121 MMHG | HEART RATE: 57 BPM | BODY MASS INDEX: 25.11 KG/M2 | TEMPERATURE: 98 F | DIASTOLIC BLOOD PRESSURE: 71 MMHG | OXYGEN SATURATION: 94 % | HEIGHT: 70 IN

## 2019-09-24 PROCEDURE — 160009 HCHG ANES TIME/MIN: Performed by: ORTHOPAEDIC SURGERY

## 2019-09-24 PROCEDURE — 160002 HCHG RECOVERY MINUTES (STAT): Performed by: ORTHOPAEDIC SURGERY

## 2019-09-24 PROCEDURE — 700101 HCHG RX REV CODE 250: Performed by: ANESTHESIOLOGY

## 2019-09-24 PROCEDURE — 500881 HCHG PACK, EXTREMITY: Performed by: ORTHOPAEDIC SURGERY

## 2019-09-24 PROCEDURE — 0Q9H0ZZ DRAINAGE OF LEFT TIBIA, OPEN APPROACH: ICD-10-PCS | Performed by: ORTHOPAEDIC SURGERY

## 2019-09-24 PROCEDURE — 700102 HCHG RX REV CODE 250 W/ 637 OVERRIDE(OP): Performed by: ORTHOPAEDIC SURGERY

## 2019-09-24 PROCEDURE — 700111 HCHG RX REV CODE 636 W/ 250 OVERRIDE (IP): Performed by: ANESTHESIOLOGY

## 2019-09-24 PROCEDURE — 700105 HCHG RX REV CODE 258: Performed by: ANESTHESIOLOGY

## 2019-09-24 PROCEDURE — 160035 HCHG PACU - 1ST 60 MINS PHASE I: Performed by: ORTHOPAEDIC SURGERY

## 2019-09-24 PROCEDURE — 160048 HCHG OR STATISTICAL LEVEL 1-5: Performed by: ORTHOPAEDIC SURGERY

## 2019-09-24 PROCEDURE — A6222 GAUZE <=16 IN NO W/SAL W/O B: HCPCS | Performed by: ORTHOPAEDIC SURGERY

## 2019-09-24 PROCEDURE — 700102 HCHG RX REV CODE 250 W/ 637 OVERRIDE(OP): Performed by: PHYSICIAN ASSISTANT

## 2019-09-24 PROCEDURE — A9270 NON-COVERED ITEM OR SERVICE: HCPCS | Performed by: PHYSICIAN ASSISTANT

## 2019-09-24 PROCEDURE — A9270 NON-COVERED ITEM OR SERVICE: HCPCS | Performed by: ORTHOPAEDIC SURGERY

## 2019-09-24 PROCEDURE — 160027 HCHG SURGERY MINUTES - 1ST 30 MINS LEVEL 2: Performed by: ORTHOPAEDIC SURGERY

## 2019-09-24 PROCEDURE — 160038 HCHG SURGERY MINUTES - EA ADDL 1 MIN LEVEL 2: Performed by: ORTHOPAEDIC SURGERY

## 2019-09-24 PROCEDURE — 160036 HCHG PACU - EA ADDL 30 MINS PHASE I: Performed by: ORTHOPAEDIC SURGERY

## 2019-09-24 PROCEDURE — 700111 HCHG RX REV CODE 636 W/ 250 OVERRIDE (IP): Performed by: ORTHOPAEDIC SURGERY

## 2019-09-24 RX ORDER — ONDANSETRON 2 MG/ML
4 INJECTION INTRAMUSCULAR; INTRAVENOUS
Status: DISCONTINUED | OUTPATIENT
Start: 2019-09-24 | End: 2019-09-24 | Stop reason: HOSPADM

## 2019-09-24 RX ORDER — OXYCODONE HCL 5 MG/5 ML
5 SOLUTION, ORAL ORAL EVERY 4 HOURS PRN
Status: DISCONTINUED | OUTPATIENT
Start: 2019-09-24 | End: 2019-09-24 | Stop reason: HOSPADM

## 2019-09-24 RX ORDER — LIDOCAINE HYDROCHLORIDE 20 MG/ML
INJECTION, SOLUTION EPIDURAL; INFILTRATION; INTRACAUDAL; PERINEURAL PRN
Status: DISCONTINUED | OUTPATIENT
Start: 2019-09-24 | End: 2019-09-24 | Stop reason: SURG

## 2019-09-24 RX ORDER — HYDROMORPHONE HYDROCHLORIDE 1 MG/ML
0.1 INJECTION, SOLUTION INTRAMUSCULAR; INTRAVENOUS; SUBCUTANEOUS
Status: DISCONTINUED | OUTPATIENT
Start: 2019-09-24 | End: 2019-09-24 | Stop reason: HOSPADM

## 2019-09-24 RX ORDER — OXYCODONE HCL 5 MG/5 ML
10 SOLUTION, ORAL ORAL
Status: DISCONTINUED | OUTPATIENT
Start: 2019-09-24 | End: 2019-09-24 | Stop reason: HOSPADM

## 2019-09-24 RX ORDER — IBUPROFEN 800 MG/1
800 TABLET ORAL 3 TIMES DAILY
Qty: 30 TAB | Refills: 0 | Status: SHIPPED | OUTPATIENT
Start: 2019-09-24 | End: 2020-08-26 | Stop reason: SDUPTHER

## 2019-09-24 RX ORDER — SODIUM CHLORIDE, SODIUM LACTATE, POTASSIUM CHLORIDE, AND CALCIUM CHLORIDE .6; .31; .03; .02 G/100ML; G/100ML; G/100ML; G/100ML
500 INJECTION, SOLUTION INTRAVENOUS
Status: DISCONTINUED | OUTPATIENT
Start: 2019-09-24 | End: 2019-09-24 | Stop reason: HOSPADM

## 2019-09-24 RX ORDER — DIPHENHYDRAMINE HYDROCHLORIDE 50 MG/ML
12.5 INJECTION INTRAMUSCULAR; INTRAVENOUS
Status: DISCONTINUED | OUTPATIENT
Start: 2019-09-24 | End: 2019-09-24 | Stop reason: HOSPADM

## 2019-09-24 RX ORDER — PROMETHAZINE HYDROCHLORIDE 25 MG/1
12.5 TABLET ORAL EVERY 6 HOURS PRN
Qty: 15 TAB | Refills: 0 | Status: SHIPPED | OUTPATIENT
Start: 2019-09-24 | End: 2020-11-25

## 2019-09-24 RX ORDER — MEPERIDINE HYDROCHLORIDE 25 MG/ML
6.25 INJECTION INTRAMUSCULAR; INTRAVENOUS; SUBCUTANEOUS
Status: DISCONTINUED | OUTPATIENT
Start: 2019-09-24 | End: 2019-09-24 | Stop reason: HOSPADM

## 2019-09-24 RX ORDER — HYDROMORPHONE HYDROCHLORIDE 1 MG/ML
0.4 INJECTION, SOLUTION INTRAMUSCULAR; INTRAVENOUS; SUBCUTANEOUS
Status: DISCONTINUED | OUTPATIENT
Start: 2019-09-24 | End: 2019-09-24 | Stop reason: HOSPADM

## 2019-09-24 RX ORDER — OXYCODONE HYDROCHLORIDE 10 MG/1
10 TABLET ORAL
Qty: 20 TAB | Refills: 0 | Status: SHIPPED | OUTPATIENT
Start: 2019-09-24 | End: 2019-09-26

## 2019-09-24 RX ORDER — HALOPERIDOL 5 MG/ML
1 INJECTION INTRAMUSCULAR
Status: DISCONTINUED | OUTPATIENT
Start: 2019-09-24 | End: 2019-09-24 | Stop reason: HOSPADM

## 2019-09-24 RX ORDER — BACITRACIN ZINC 500 [USP'U]/G
OINTMENT TOPICAL
Status: DISCONTINUED | OUTPATIENT
Start: 2019-09-24 | End: 2019-09-24 | Stop reason: HOSPADM

## 2019-09-24 RX ORDER — LABETALOL HYDROCHLORIDE 5 MG/ML
5 INJECTION, SOLUTION INTRAVENOUS
Status: DISCONTINUED | OUTPATIENT
Start: 2019-09-24 | End: 2019-09-24 | Stop reason: HOSPADM

## 2019-09-24 RX ORDER — MIDAZOLAM HYDROCHLORIDE 1 MG/ML
INJECTION INTRAMUSCULAR; INTRAVENOUS PRN
Status: DISCONTINUED | OUTPATIENT
Start: 2019-09-24 | End: 2019-09-24 | Stop reason: SURG

## 2019-09-24 RX ORDER — HYDROMORPHONE HYDROCHLORIDE 1 MG/ML
0.2 INJECTION, SOLUTION INTRAMUSCULAR; INTRAVENOUS; SUBCUTANEOUS
Status: DISCONTINUED | OUTPATIENT
Start: 2019-09-24 | End: 2019-09-24 | Stop reason: HOSPADM

## 2019-09-24 RX ORDER — SODIUM CHLORIDE, SODIUM LACTATE, POTASSIUM CHLORIDE, CALCIUM CHLORIDE 600; 310; 30; 20 MG/100ML; MG/100ML; MG/100ML; MG/100ML
INJECTION, SOLUTION INTRAVENOUS
Status: DISCONTINUED | OUTPATIENT
Start: 2019-09-24 | End: 2019-09-24 | Stop reason: SURG

## 2019-09-24 RX ORDER — SODIUM CHLORIDE, SODIUM LACTATE, POTASSIUM CHLORIDE, CALCIUM CHLORIDE 600; 310; 30; 20 MG/100ML; MG/100ML; MG/100ML; MG/100ML
INJECTION, SOLUTION INTRAVENOUS CONTINUOUS
Status: DISCONTINUED | OUTPATIENT
Start: 2019-09-24 | End: 2019-09-24 | Stop reason: HOSPADM

## 2019-09-24 RX ADMIN — MIDAZOLAM 2 MG: 1 INJECTION INTRAMUSCULAR; INTRAVENOUS at 12:20

## 2019-09-24 RX ADMIN — OXYCODONE HYDROCHLORIDE 10 MG: 10 TABLET ORAL at 04:58

## 2019-09-24 RX ADMIN — LIDOCAINE HYDROCHLORIDE 100 MG: 20 INJECTION, SOLUTION EPIDURAL; INFILTRATION; INTRACAUDAL at 12:20

## 2019-09-24 RX ADMIN — DEXAMETHASONE SODIUM PHOSPHATE 4 MG: 4 INJECTION, SOLUTION INTRA-ARTICULAR; INTRALESIONAL; INTRAMUSCULAR; INTRAVENOUS; SOFT TISSUE at 00:07

## 2019-09-24 RX ADMIN — ACETAMINOPHEN 1000 MG: 500 TABLET ORAL at 15:20

## 2019-09-24 RX ADMIN — NICOTINE 7 MG: 7 PATCH TRANSDERMAL at 06:20

## 2019-09-24 RX ADMIN — ACETAMINOPHEN 1000 MG: 500 TABLET ORAL at 06:20

## 2019-09-24 RX ADMIN — PROPOFOL 200 MG: 10 INJECTION, EMULSION INTRAVENOUS at 12:20

## 2019-09-24 RX ADMIN — DIPHENHYDRAMINE HYDROCHLORIDE 25 MG: 50 INJECTION INTRAMUSCULAR; INTRAVENOUS at 15:21

## 2019-09-24 RX ADMIN — SODIUM CHLORIDE, POTASSIUM CHLORIDE, SODIUM LACTATE AND CALCIUM CHLORIDE: 600; 310; 30; 20 INJECTION, SOLUTION INTRAVENOUS at 12:23

## 2019-09-24 RX ADMIN — LEVOTHYROXINE SODIUM 88 MCG: 88 TABLET ORAL at 04:58

## 2019-09-24 RX ADMIN — IBUPROFEN 800 MG: 800 TABLET, FILM COATED ORAL at 15:19

## 2019-09-24 RX ADMIN — OXYCODONE HYDROCHLORIDE 10 MG: 10 TABLET ORAL at 08:13

## 2019-09-24 RX ADMIN — ASPIRIN 81 MG: 81 TABLET, COATED ORAL at 15:20

## 2019-09-24 RX ADMIN — OMEGA-3 FATTY ACIDS CAP 1000 MG 1000 MG: 1000 CAP at 06:20

## 2019-09-24 RX ADMIN — FENTANYL CITRATE 50 MCG: 50 INJECTION, SOLUTION INTRAMUSCULAR; INTRAVENOUS at 12:32

## 2019-09-24 RX ADMIN — DIPHENHYDRAMINE HYDROCHLORIDE 25 MG: 50 INJECTION INTRAMUSCULAR; INTRAVENOUS at 06:20

## 2019-09-24 RX ADMIN — DIPHENHYDRAMINE HYDROCHLORIDE 25 MG: 50 INJECTION INTRAMUSCULAR; INTRAVENOUS at 00:12

## 2019-09-24 RX ADMIN — HALOPERIDOL LACTATE 1 MG: 5 INJECTION, SOLUTION INTRAMUSCULAR at 13:24

## 2019-09-24 RX ADMIN — FENTANYL CITRATE 50 MCG: 50 INJECTION INTRAMUSCULAR; INTRAVENOUS at 13:18

## 2019-09-24 RX ADMIN — HALOPERIDOL LACTATE 1 MG: 5 INJECTION, SOLUTION INTRAMUSCULAR at 00:39

## 2019-09-24 RX ADMIN — FAMOTIDINE 20 MG: 20 TABLET ORAL at 05:00

## 2019-09-24 RX ADMIN — HYDROMORPHONE HYDROCHLORIDE 0.2 MG: 1 INJECTION, SOLUTION INTRAMUSCULAR; INTRAVENOUS; SUBCUTANEOUS at 13:21

## 2019-09-24 RX ADMIN — HYDROMORPHONE HYDROCHLORIDE 0.4 MG: 1 INJECTION, SOLUTION INTRAMUSCULAR; INTRAVENOUS; SUBCUTANEOUS at 13:10

## 2019-09-24 RX ADMIN — FLUOXETINE HYDROCHLORIDE 20 MG: 20 CAPSULE ORAL at 06:20

## 2019-09-24 RX ADMIN — FENTANYL CITRATE 50 MCG: 50 INJECTION INTRAMUSCULAR; INTRAVENOUS at 13:54

## 2019-09-24 RX ADMIN — FENTANYL CITRATE 50 MCG: 50 INJECTION INTRAMUSCULAR; INTRAVENOUS at 13:03

## 2019-09-24 RX ADMIN — FENTANYL CITRATE 50 MCG: 50 INJECTION, SOLUTION INTRAMUSCULAR; INTRAVENOUS at 12:20

## 2019-09-24 RX ADMIN — HYDROMORPHONE HYDROCHLORIDE 0.4 MG: 1 INJECTION, SOLUTION INTRAMUSCULAR; INTRAVENOUS; SUBCUTANEOUS at 13:36

## 2019-09-24 ASSESSMENT — PAIN SCALES - GENERAL: PAIN_LEVEL: 0

## 2019-09-24 NOTE — ANESTHESIA QCDR
2019 Qualified Clinical Data Registry (for Quality Improvement)     Postoperative nausea/vomiting risk protocol (Adult = 18 yrs and Pediatric 3-17 yrs)- (430 and 463)  General inhalation anesthetic (NOT TIVA) with PONV risk factors: Yes  Provision of anti-emetic therapy with at least 2 different classes of agents: No   Patient DID NOT receive anti-emetic therapy and reason is documented in Medical Record: Yes       Multimodal Pain Management- (AQI59)  Patient undergoing Elective Surgery (i.e. Outpatient, or ASC, or Prescheduled Surgery prior to Hospital Admission): Yes  Use of Multimodal Pain Management, two or more drugs and/or interventions, NOT including systemic opioids: Yes   Exception: Documented allergy to multiple classes of analgesics:  N/A    PACU assessment of acute postoperative pain prior to Anesthesia Care End- Applies to Patients Age = 18- (ABG7)  Initial PACU pain score is which of the following: < 7/10  Patient unable to report pain score: N/A    Post-anesthetic transfer of care checklist/protocol to PACU/ICU- (426 and 427)  Upon conclusion of case, patient transferred to which of the following locations: PACU/Non-ICU  Use of transfer checklist/protocol: Yes  Exclusion: Service Performed in Patient Hospital Room (and thus did not require transfer): N/A    PACU Reintubation- (AQI31)  General anesthesia requiring endotracheal intubation (ETT) along with subsequent extubation in OR or PACU: No  Required reintubation in the PACU: N/A  Extubation was a planned trial documented in the medical record prior to removal of the original airway device: N/A    Unplanned admission to ICU related to anesthesia service up through end of PACU care- (MD51)  Unplanned admission to ICU (not initially anticipated at anesthesia start time): No

## 2019-09-24 NOTE — ANESTHESIA POSTPROCEDURE EVALUATION
Patient: Carie Santiago    Procedure Summary     Date:  09/24/19 Room / Location:  [place] [address] / SURGERY [facility]    Anesthesia Start:  1223 Anesthesia Stop:  1253    Procedure:  CLOSURE, WOUND, ORTHO - LEG W/WOUND VAC REMOVAL (Left ) Diagnosis:  (delayed primary closure left proximal tibia, removal of wound vac )    Surgeon:  Paolo Odell M.D. Responsible Provider:  Yifan Burgos M.D.    Anesthesia Type:  general ASA Status:  2          Final Anesthesia Type: general  Last vitals  BP   Blood Pressure: 111/70    Temp   36.6 °C (97.9 °F)    Pulse   Pulse: (!) 54   Resp   17    SpO2   97 %      Anesthesia Post Evaluation    Patient location during evaluation: PACU  Patient participation: complete - patient participated  Level of consciousness: awake and alert  Pain score: 0    Airway patency: patent  Anesthetic complications: no  Cardiovascular status: hemodynamically stable  Respiratory status: acceptable  Hydration status: euvolemic    PONV: none           Nurse Pain Score: 6 (NPRS)

## 2019-09-24 NOTE — PROGRESS NOTES
"/71   Pulse 60   Temp 36.5 °C (97.7 °F) (Temporal)   Resp 16   Ht 1.778 m (5' 10\")   Wt 79.5 kg (175 lb 6 oz)   SpO2 94%     Patient returned from PACU with transport without incident. VSS. A&Ox4. Ambulated to bathroom and voided 450 mL of urine. Call light and belongings within reach. Bed locked and in lowest position.  "

## 2019-09-24 NOTE — PROGRESS NOTES
IV Benadryl 25mg given for nausea per MAR. Patient is allergic to zofran and decadron order was discontinued.

## 2019-09-24 NOTE — ANESTHESIA PROCEDURE NOTES
Airway  Date/Time: 9/24/2019 12:24 PM  Performed by: Yifan Burgos M.D.  Authorized by: Yifan Burgos M.D.     Location:  OR  Urgency:  Elective  Difficult Airway: No    Indications for Airway Management:  Anesthesia  Spontaneous Ventilation: absent    Sedation Level:  Deep  Preoxygenated: Yes    Patient Position:  Sniffing  Mask Difficulty Assessment:  1 - vent by mask  Final Airway Type:  Supraglottic airway  Final Supraglottic Airway:  Standard LMA  SGA Size:  3  Number of Attempts at Approach:  1

## 2019-09-24 NOTE — CARE PLAN
Problem: Safety  Goal: Will remain free from falls  Outcome: PROGRESSING AS EXPECTED   Patient encouraged to use call light and call for assistance before getting out of bed alone. Patient verbalized understanding. Call light and personal belongings within reach.     Problem: Psychosocial Needs:  Goal: Level of anxiety will decrease  Outcome: PROGRESSING AS EXPECTED

## 2019-09-24 NOTE — PROGRESS NOTES
2 RN skin check complete.   Devices in place AV foot pumps.  Skin assessed under devices yes.  Confirmed pressure ulcers found on none.  New potential pressure ulcers noted on none. Wound consult placed N/A.  The following interventions in place pillows in place for support

## 2019-09-24 NOTE — PROGRESS NOTES
"/52   Pulse 65   Temp 36.7 °C (98 °F) (Temporal)   Resp 17   Ht 1.778 m (5' 10\")   Wt 79.5 kg (175 lb 6 oz)   SpO2 94%   Patient discharged home with friend.  Discharge instructions as well as informed consent for controlled substance reviewed and signed.  Patient has further care as an outpatient.  Surgical dressing is clean dry and intact, dressing instructions reviewed.  PIV removed patient tolerated well, patient refused influenza vaccine prior to discharge.  Further care as an outpatient.   "

## 2019-09-24 NOTE — ANESTHESIA TIME REPORT
Anesthesia Start and Stop Event Times     Date Time Event    9/24/2019 1137 Ready for Procedure     1223 Anesthesia Start     1253 Anesthesia Stop        Responsible Staff  09/24/19    Name Role Begin End    Yifan Burgos M.D. Anesth 1223 1253        Preop Diagnosis (Free Text):  Pre-op Diagnosis     delayed primary closure left proximal tibia, removal of wound vac         Preop Diagnosis (Codes):    Post op Diagnosis  Open wound of left lower leg      Premium Reason  Non-Premium    Comments:

## 2019-09-24 NOTE — CARE PLAN
Problem: Infection  Goal: Will remain free from infection  Outcome: PROGRESSING AS EXPECTED  Patient remains afebrile     Problem: Knowledge Deficit  Goal: Knowledge of disease process/condition, treatment plan, diagnostic tests, and medications will improve  Outcome: PROGRESSING AS EXPECTED   Patient states understanding of plan of care

## 2019-09-24 NOTE — OP REPORT
DATE OF SERVICE:  09/24/2019    PREOPERATIVE DIAGNOSIS:  Prior hematoma, right anterior compartment, left leg.    POSTOPERATIVE DIAGNOSIS:  Prior hematoma, right anterior compartment, left   leg.    OPERATIONS PERFORMED:  1.  Incision and drainage and debridement.  2.  Delayed primary closure.  3.  Removal of wound VAC suction drain.    SURGEON:  Paolo Odell MD    ASSISTANT:  Unassisted.    INDICATIONS FOR THE OPERATION:  Timely removal of a Hemovac from a soft tissue   hematoma.    PREOPERATIVE CONSENT AND FAMILY CONFERENCE:  The patient was seen today   preoperatively.  Risks, benefits, and alternatives were all explained.    Patient consented for full surgical undertaking.    DESCRIPTION OF OPERATION:  The patient was brought to the operating room,   awake, alert, placed on the operating room table in supine position, delivered   general endotracheal anesthetic.  Left lower extremity was shaved, scrubbed,   prepped, and draped in normal sterile routine fashion.  Tourniquet was   utilized.  The Hemovac had already been removed.  We simply irrigated out the   anterior compartment through the 2-inch incision.  The debridement was through   skin and subcutaneous tissue down to the bone.  We used curettes, rongeurs,   irrigation, and sharp and dull dissection.  After the debridement and closure   of a 1.5 inch complex wound with 3-0 nylon, sterile compression dressing, and   the patient was taken to recovery room in stable condition.  No intraoperative   or immediate postoperative complications.  Patient tolerated the procedure   well.       ____________________________________     MD CASSIA SOLOMON / EDGARDO    DD:  09/24/2019 12:43:58  DT:  09/24/2019 14:36:17    D#:  [id number]  Job#:  [id number]

## 2019-09-24 NOTE — DISCHARGE INSTRUCTIONS
Discharge Instructions    Discharged to home by car with friend. Discharged via wheelchair, hospital escort: Yes.  Special equipment needed: Not Applicable    Be sure to schedule a follow-up appointment with your primary care doctor or any specialists as instructed.     Discharge Plan: Follow up with Dr. Odell in 10 days & weightbearing as tolerated.   May removed current dressing 3 days convert to band aids and ace wraps.  Diet Plan: Regular  Activity Level: Weight bearing as tolerated.    Smoking Cessation Offered: Patient Refused  Confirmed Follow up Appointment: Patient to Call and Schedule Appointment  Confirmed Symptoms Management: Discussed  Medication Reconciliation Updated: Yes  Influenza Vaccine Indication: Patient Refuses    I understand that a diet low in cholesterol, fat, and sodium is recommended for good health. Unless I have been given specific instructions below for another diet, I accept this instruction as my diet prescription.    Special Instructions: Discharge instructions for the Orthopedic Patient    Follow up with Primary Care Physician within 2 weeks of discharge to home, regarding:  Review of medications and diagnostic testing.  Surveillance for medical complications.  Workup and treatment of osteoporosis, if appropriate.     -Is this a Joint Replacement patient? No    -Is this patient being discharged with medication to prevent blood clots?  No    · Is patient discharged on Warfarin / Coumadin?   No     Depression / Suicide Risk    As you are discharged from this [facility] facility, it is important to learn how to keep safe from harming yourself.    Recognize the warning signs:  · Abrupt changes in personality, positive or negative- including increase in energy   · Giving away possessions  · Change in eating patterns- significant weight changes-  positive or negative  · Change in sleeping patterns- unable to sleep or sleeping all the time   · Unwillingness or inability to  communicate  · Depression  · Unusual sadness, discouragement and loneliness  · Talk of wanting to die  · Neglect of personal appearance   · Rebelliousness- reckless behavior  · Withdrawal from people/activities they love  · Confusion- inability to concentrate     If you or a loved one observes any of these behaviors or has concerns about self-harm, here's what you can do:  · Talk about it- your feelings and reasons for harming yourself  · Remove any means that you might use to hurt yourself (examples: pills, rope, extension cords, firearm)  · Get professional help from the community (Mental Health, Substance Abuse, psychological counseling)  · Do not be alone:Call your Safe Contact- someone whom you trust who will be there for you.  · Call your local CRISIS HOTLINE 473-0004 or 510-527-5178  · Call your local Children's Mobile Crisis Response Team [place] (508) 886-6259 or www.[employer]  · Call the toll free National Suicide Prevention Hotlines   · National Suicide Prevention Lifeline 774-760-SOGL (1763)  · National Hope Line Network 800-SUICIDE (307-2816)      Hematoma  A hematoma is a collection of blood. The collection of blood can turn into a hard, painful lump under the skin. Your skin may turn blue or yellow if the hematoma is close to the surface of the skin. Most hematomas get better in a few days to weeks. Some hematomas are serious and need medical care. Hematomas can be very small or very big.  Follow these instructions at home:  · Apply ice to the injured area:  ¨ Put ice in a plastic bag.  ¨ Place a towel between your skin and the bag.  ¨ Leave the ice on for 20 minutes, 2-3 times a day for the first 1 to 2 days.  · After the first 2 days, switch to using warm packs on the injured area.  · Raise (elevate) the injured area to lessen pain and puffiness (swelling). You may also wrap the area with an elastic bandage. Make sure the bandage is not wrapped too tight.  · If you have a painful  hematoma on your leg or foot, you may use crutches for a couple days.  · Only take medicines as told by your doctor.  Get help right away if:  · Your pain gets worse.  · Your pain is not controlled with medicine.  · You have a fever.  · Your puffiness gets worse.  · Your skin turns more blue or yellow.  · Your skin over the hematoma breaks or starts bleeding.  · Your hematoma is in your chest or belly (abdomen) and you are short of breath, feel weak, or have a change in consciousness.  · Your hematoma is on your scalp and you have a headache that gets worse or a change in alertness or consciousness.  This information is not intended to replace advice given to you by your health care provider. Make sure you discuss any questions you have with your health care provider.  Document Released: 01/25/2006 Document Revised: 05/25/2017 Document Reviewed: 05/28/2014  [employer] Interactive Patient Education © 2017 [employer]      Incision and Drainage  Incision and drainage is a surgical procedure to open and drain a fluid-filled sac. The sac may be filled with pus, mucus, or blood. Examples of fluid-filled sacs that may need surgical drainage include cysts, skin infections (abscesses), and red lumps that develop from a ruptured cyst or a small abscess (boils).  You may need this procedure if the affected area is large, painful, infected, or not healing well.  Tell a health care provider about:  · Any allergies you have.  · All medicines you are taking, including vitamins, herbs, eye drops, creams, and over-the-counter medicines.  · Any problems you or family members have had with anesthetic medicines.  · Any blood disorders you have.  · Any surgeries you have had.  · Any medical conditions you have.  · Whether you are pregnant or may be pregnant.  What are the risks?  Generally, this is a safe procedure. However, problems may occur, including:  · Infection.  · Bleeding.  · Allergic reactions to medicines.  · Scarring.  What  happens before the procedure?  · You may need an ultrasound or other imaging tests to see how large or deep the fluid-filled sac is.  · You may have blood tests to check for infection.  · You may get a tetanus shot.  · You may be given antibiotic medicine to help prevent infection.  · Follow instructions from your health care provider about eating or drinking restrictions.  · Ask your health care provider about:  ¨ Changing or stopping your regular medicines. This is especially important if you are taking diabetes medicines or blood thinners.  ¨ Taking medicines such as aspirin and ibuprofen. These medicines can thin your blood. Do not take these medicines before your procedure if your health care provider instructs you not to.  · Plan to have someone take you home after the procedure.  · If you will be going home right after the procedure, plan to have someone stay with you for 24 hours.  What happens during the procedure?  · To reduce your risk of infection:  ¨ Your health care team will wash or sanitize their hands.  ¨ Your skin will be washed with soap.  · You will be given one or more of the following:  ¨ A medicine to help you relax (sedative).  ¨ A medicine to numb the area (local anesthetic).  ¨ A medicine to make you fall asleep (general anesthetic).  · An incision will be made in the top of the fluid-filled sac.  · The contents of the sac may be squeezed out, or a syringe or tube (catheter) may be used to empty the sac.  · The catheter may be left in place for several weeks to drain any fluid. Or, your health care provider may stitch open the edges of the incision to make a long-term opening for drainage (marsupialization).  · The inside of the sac may be washed out (irrigated) with a sterile solution and packed with gauze before it is covered with a bandage (dressing).  The procedure may vary among health care providers and hospitals.  What happens after the procedure?  · Your blood pressure, heart rate,  breathing rate, and blood oxygen level will be monitored often until the medicines you were given have worn off.  · Do not drive for 24 hours if you received a sedative.  This information is not intended to replace advice given to you by your health care provider. Make sure you discuss any questions you have with your health care provider.  Document Released: 06/13/2002 Document Revised: 05/25/2017 Document Reviewed: 10/07/2016  [employer] Interactive Patient Education © 2017 [employer]

## 2019-09-24 NOTE — OR SURGEON
Immediate Post OP Note    PreOp Diagnosis: delayed primary closure tibial wound, removal wound vac, incision and drainage    PostOp Diagnosis: same    Procedure(s):  CLOSURE, WOUND, ORTHO - LEG W/WOUND VAC REMOVAL    Surgeon(s):  Paolo Odell M.D.    Anesthesiologist/Type of Anesthesia:  Anesthesiologist: Yifan Burgos M.D./* No anesthesia type entered *    Surgical Staff:  Circulator: [place] Lindsey R.N.  Scrub Person: Evy Boudreaux    Specimens removed if any:  * No specimens in log *    Estimated Blood Loss: 10ccs    Findings: as described    Complications: none        9/24/2019 12:44 PM Paolo Odell M.D.

## 2019-09-27 DIAGNOSIS — F41.9 ANXIETY: ICD-10-CM

## 2019-09-27 RX ORDER — ALPRAZOLAM 0.25 MG/1
0.25 TABLET ORAL 2 TIMES DAILY PRN
Qty: 60 TAB | Refills: 1 | OUTPATIENT
Start: 2019-09-27 | End: 2019-10-27

## 2019-10-10 NOTE — DISCHARGE SUMMARY
ADDENDUM    DATE ON ADMISSION:  09/21/2019    DATE OF DISCHARGE:  09/24/2019    REASON FOR ADMISSION:  Hematoma, left anterior thigh.    HOSPITAL COURSE:  The patient admitted to the operating room, had irrigation   and debridement, removal of hematoma and application of a wound VAC.  She was   discharged on 09/24 in stable condition.  The wound was clean and dry.  She   was up and ambulating without complication.    FINAL DISCHARGE DIAGNOSIS:  Anterior tibia hematoma.    PLAN:  Seen in [facility] in a week to 10 days.  Call the office   for any interim period problems.  No change from the additional discharge   recommendations and orders.       ____________________________________     MD CASSIA SOLOMON / EDGARDO    DD:  10/09/2019 09:43:15  DT:  10/09/2019 22:34:02    D#:  [id number]  Job#:  [id number]

## 2019-12-04 ENCOUNTER — OFFICE VISIT (OUTPATIENT)
Dept: INTERNAL MEDICINE | Facility: IMAGING CENTER | Age: 49
End: 2019-12-04
Payer: COMMERCIAL

## 2019-12-04 VITALS
SYSTOLIC BLOOD PRESSURE: 110 MMHG | BODY MASS INDEX: 26.07 KG/M2 | HEART RATE: 90 BPM | DIASTOLIC BLOOD PRESSURE: 66 MMHG | WEIGHT: 176 LBS | HEIGHT: 69 IN | TEMPERATURE: 98.6 F | OXYGEN SATURATION: 97 % | RESPIRATION RATE: 14 BRPM

## 2019-12-04 DIAGNOSIS — R23.2 HOT FLASHES: ICD-10-CM

## 2019-12-04 DIAGNOSIS — F41.9 ANXIETY AND DEPRESSION: ICD-10-CM

## 2019-12-04 DIAGNOSIS — F32.A ANXIETY AND DEPRESSION: ICD-10-CM

## 2019-12-04 DIAGNOSIS — R05.9 COUGH: ICD-10-CM

## 2019-12-04 DIAGNOSIS — E53.8 VITAMIN B12 DEFICIENCY: ICD-10-CM

## 2019-12-04 DIAGNOSIS — E78.1 HYPERTRIGLYCERIDEMIA: ICD-10-CM

## 2019-12-04 DIAGNOSIS — Z01.89 ROUTINE LAB DRAW: ICD-10-CM

## 2019-12-04 DIAGNOSIS — E03.9 HYPOTHYROIDISM (ACQUIRED): ICD-10-CM

## 2019-12-04 PROBLEM — R10.811 RUQ ABDOMINAL TENDERNESS: Status: RESOLVED | Noted: 2019-01-17 | Resolved: 2019-12-04

## 2019-12-04 PROBLEM — E87.1 HYPONATREMIA: Status: RESOLVED | Noted: 2019-01-17 | Resolved: 2019-12-04

## 2019-12-04 PROCEDURE — 99214 OFFICE O/P EST MOD 30 MIN: CPT | Performed by: INTERNAL MEDICINE

## 2019-12-04 RX ORDER — ALBUTEROL SULFATE 90 UG/1
2 AEROSOL, METERED RESPIRATORY (INHALATION) EVERY 6 HOURS PRN
Qty: 8.5 G | Refills: 11 | Status: SHIPPED
Start: 2019-12-04 | End: 2020-01-08 | Stop reason: CLARIF

## 2019-12-04 RX ORDER — ESTRADIOL 0.04 MG/D
PATCH, EXTENDED RELEASE TRANSDERMAL
Qty: 24 TAB | Refills: 4 | Status: SHIPPED | OUTPATIENT
Start: 2019-12-04 | End: 2021-04-16 | Stop reason: SDUPTHER

## 2019-12-04 RX ORDER — ALPRAZOLAM 0.25 MG/1
0.25 TABLET ORAL 2 TIMES DAILY PRN
Qty: 60 TAB | Refills: 3 | Status: SHIPPED
Start: 2019-12-04 | End: 2020-01-03

## 2019-12-04 ASSESSMENT — PATIENT HEALTH QUESTIONNAIRE - PHQ9
3. TROUBLE FALLING OR STAYING ASLEEP OR SLEEPING TOO MUCH: SEVERAL DAYS
5. POOR APPETITE OR OVEREATING: NOT AT ALL
1. LITTLE INTEREST OR PLEASURE IN DOING THINGS: NOT AT ALL
9. THOUGHTS THAT YOU WOULD BE BETTER OFF DEAD, OR OF HURTING YOURSELF: NOT AT ALL
6. FEELING BAD ABOUT YOURSELF - OR THAT YOU ARE A FAILURE OR HAVE LET YOURSELF OR YOUR FAMILY DOWN: NOT AL ALL
4. FEELING TIRED OR HAVING LITTLE ENERGY: NOT AT ALL
8. MOVING OR SPEAKING SO SLOWLY THAT OTHER PEOPLE COULD HAVE NOTICED. OR THE OPPOSITE, BEING SO FIGETY OR RESTLESS THAT YOU HAVE BEEN MOVING AROUND A LOT MORE THAN USUAL: NOT AT ALL
SUM OF ALL RESPONSES TO PHQ9 QUESTIONS 1 AND 2: 0
7. TROUBLE CONCENTRATING ON THINGS, SUCH AS READING THE NEWSPAPER OR WATCHING TELEVISION: NOT AT ALL
2. FEELING DOWN, DEPRESSED, IRRITABLE, OR HOPELESS: NOT AT ALL
SUM OF ALL RESPONSES TO PHQ QUESTIONS 1-9: 1

## 2019-12-04 NOTE — PROGRESS NOTES
Established Patient Note   HPI:        Carie is here today to follow up DASIA stating she need refill on alprazolam. She wishes to try higher dose of estrogen patch due to hot flashes. Her last blood profile was done in January 2019.    Past Medical History:   Diagnosis Date   • Anxiety and depression 1/23/2019   • Colon polyp 3/29/2019    Colonoscopy March 2019: 3mm descending colon polyp   • Disorder of thyroid    • Diverticulosis of colon 1/23/2019    MRI abdomen Jan. 2019   • Generalized anxiety disorder    • Hemochromatosis    • Hypertriglyceridemia 1/23/2019   • Low serum HDL 1/23/2019   • Menopause    • Vitamin B12 deficiency 1/23/2019       Current Outpatient Medications   Medication Sig Dispense Refill   • NABOR 0.0375 MG/24HR patch Apply patch on skin twice a week 24 Tab 4   • ibuprofen (MOTRIN) 800 MG Tab Take 1 Tab by mouth 3 times a day. 30 Tab 0   • promethazine (PHENERGAN) 25 MG Tab Take 0.5 Tabs by mouth every 6 hours as needed for Nausea/Vomiting. 15 Tab 0   • progesterone (PROMETRIUM) 100 MG Cap Take 1 Cap by mouth every day. 90 Cap 3   • SYNTHROID 88 MCG Tab Take 1 Tab by mouth Every morning on an empty stomach. 90 Tab 3   • FLUoxetine (PROZAC) 20 MG Cap Take 1 Cap by mouth every day. 90 Cap 3   • albuterol 108 (90 Base) MCG/ACT Aero Soln inhalation aerosol Inhale 2 Puffs by mouth every 6 hours as needed for Shortness of Breath. 8.5 g 11   • Omega-3 Fatty Acids (FISH OIL) 1000 MG Cap capsule Take 1,000 mg by mouth every day.     • Multiple Vitamins-Minerals (OCUVITE-LUTEIN) Tab Take 1 tablet by mouth every day.     • Cyanocobalamin (VITAMIN B-12 INJ) 1,000 mcg by Injection route every 72 hours. At diet clinic       No current facility-administered medications for this visit.          Allergies   Allergen Reactions   • Norco [Apap-Fd&C Yellow #10 Al Cervantes-Hydrocodone]      itching   • Sulfa Drugs Anaphylaxis   • Zofran [Dextrose-Ondansetron] Unspecified     Migraine   • Lorazepam Anxiety and  "Unspecified     \"IT MAKES ME CRY\"         Social History     Tobacco Use   • Smoking status: Current Every Day Smoker     Packs/day: 0.50     Years: 30.00     Pack years: 15.00     Types: Cigarettes   • Smokeless tobacco: Never Used   Substance Use Topics   • Alcohol use: Yes     Comment: a glass of wine per day   • Drug use: No       Past Surgical History:   Procedure Laterality Date   • WOUND CLOSURE ORTHO Left 9/24/2019    Procedure: CLOSURE, WOUND, ORTHO - LEG W/WOUND VAC REMOVAL;  Surgeon: Paolo Odell M.D.;  Location: SURGERY [facility];  Service: Orthopedics   • IRRIGATION & DEBRIDEMENT ORTHO Left 9/21/2019    Procedure: IRRIGATION AND DEBRIDEMENT, WOUND - FOR PROXIMAL TIBIA HEMATOMA EVACUATION AND WOUND VAC APPLICATION;  Surgeon: Paolo Odell M.D.;  Location: SURGERY [facility];  Service: Orthopedics   • OTHER ORTHOPEDIC SURGERY Right 2017   • ROBOTIC LAPAROSCOPIC CHOLECYSTECTOMY  2017   • UMBILICAL HERNIA REPAIR  2017   • INGUINAL HERNIA REPAIR BILATERAL  1999    with Mesh   • TONSILLECTOMY AND ADENOIDECTOMY  1976   • APPENDECTOMY     • MAMMOPLASTY AUGMENTATION Bilateral         ROS    Ambulatory Vitals  /66 (BP Location: Left arm, Patient Position: Sitting, BP Cuff Size: Adult)   Pulse 90   Temp 37 °C (98.6 °F) (Temporal)   Resp 14   Ht 1.74 m (5' 8.5\")   Wt 79.8 kg (176 lb)   SpO2 97%   BMI 26.37 kg/m²     Physical Exam   Constitutional: She is well-developed, well-nourished, and in no distress. No distress.   Cardiovascular: Normal rate, regular rhythm and normal heart sounds. Exam reveals no gallop and no friction rub.   No murmur heard.  Pulmonary/Chest: Effort normal and breath sounds normal. She has no wheezes. She has no rales.   Musculoskeletal:         General: No edema.   Neurological: She is alert. No cranial nerve deficit. Gait normal. Coordination normal.   Skin: She is not diaphoretic.         Assessment and Plan:     1. Hot flashes  NABOR 0.0375 MG/24HR patch    " Comp Metabolic Panel   2. Anxiety and depression     3. Hypothyroidism (acquired)  TSH    TRIIDOTHYRONINE   4. Hypertriglyceridemia  LipoFit by NMR   5. Vitamin B12 deficiency  VITAMIN B12    CBC WITH DIFFERENTIAL   6. Routine lab draw  FERRITIN    IRON/TOTAL IRON BIND    HEMOGLOBIN A1C    Comp Metabolic Panel    VITAMIN D,25 HYDROXY    URINALYSIS     Will increase her estradiol patch since she is on low dose. She will get her blood work done in January and call for results.  Face to face time: 35 minutes with greater than 50% of time spent with direct patient contact and medical management.     Herbie Joshi M.D.

## 2019-12-17 ENCOUNTER — TELEPHONE (OUTPATIENT)
Dept: INTERNAL MEDICINE | Facility: IMAGING CENTER | Age: 49
End: 2019-12-17

## 2019-12-17 RX ORDER — CEFDINIR 300 MG/1
300 CAPSULE ORAL 2 TIMES DAILY
Qty: 20 CAP | Refills: 0 | Status: SHIPPED | OUTPATIENT
Start: 2019-12-17 | End: 2019-12-27

## 2019-12-24 ENCOUNTER — TELEPHONE (OUTPATIENT)
Dept: INTERNAL MEDICINE | Facility: IMAGING CENTER | Age: 49
End: 2019-12-24

## 2019-12-24 RX ORDER — CEFDINIR 300 MG/1
300 CAPSULE ORAL 2 TIMES DAILY
Qty: 10 CAP | Refills: 0 | Status: SHIPPED | OUTPATIENT
Start: 2019-12-24 | End: 2019-12-29

## 2019-12-24 RX ORDER — FLUCONAZOLE 150 MG/1
150 TABLET ORAL ONCE
Qty: 3 TAB | Refills: 0 | Status: SHIPPED | OUTPATIENT
Start: 2019-12-24 | End: 2021-09-17 | Stop reason: SDUPTHER

## 2020-01-08 RX ORDER — ALBUTEROL SULFATE 90 UG/1
2 AEROSOL, METERED RESPIRATORY (INHALATION) EVERY 6 HOURS PRN
Qty: 18.5 G | Refills: 3 | Status: SHIPPED | OUTPATIENT
Start: 2020-01-08 | End: 2020-11-23 | Stop reason: SDUPTHER

## 2020-01-25 ENCOUNTER — APPOINTMENT (OUTPATIENT)
Dept: RADIOLOGY | Facility: MEDICAL CENTER | Age: 50
End: 2020-01-25
Attending: EMERGENCY MEDICINE
Payer: COMMERCIAL

## 2020-01-25 ENCOUNTER — HOSPITAL ENCOUNTER (EMERGENCY)
Facility: MEDICAL CENTER | Age: 50
End: 2020-01-25
Attending: EMERGENCY MEDICINE
Payer: COMMERCIAL

## 2020-01-25 VITALS
BODY MASS INDEX: 24.84 KG/M2 | HEIGHT: 70 IN | SYSTOLIC BLOOD PRESSURE: 115 MMHG | WEIGHT: 173.5 LBS | DIASTOLIC BLOOD PRESSURE: 78 MMHG | TEMPERATURE: 97.6 F | RESPIRATION RATE: 18 BRPM | HEART RATE: 80 BPM | OXYGEN SATURATION: 98 %
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
## 2020-01-25 PROCEDURE — A9270 NON-COVERED ITEM OR SERVICE: HCPCS | Performed by: EMERGENCY MEDICINE

## 2020-01-25 PROCEDURE — 700111 HCHG RX REV CODE 636 W/ 250 OVERRIDE (IP)

## 2020-01-25 PROCEDURE — 99284 EMERGENCY DEPT VISIT MOD MDM: CPT

## 2020-01-25 PROCEDURE — 73110 X-RAY EXAM OF WRIST: CPT | Mod: RT

## 2020-01-25 PROCEDURE — 700102 HCHG RX REV CODE 250 W/ 637 OVERRIDE(OP): Performed by: EMERGENCY MEDICINE

## 2020-01-25 PROCEDURE — 90471 IMMUNIZATION ADMIN: CPT

## 2020-01-25 PROCEDURE — 90715 TDAP VACCINE 7 YRS/> IM: CPT

## 2020-01-25 RX ORDER — ACETAMINOPHEN 325 MG/1
975 TABLET ORAL ONCE
Status: COMPLETED | OUTPATIENT
Start: 2020-01-25 | End: 2020-01-25

## 2020-01-25 RX ORDER — AMOXICILLIN AND CLAVULANATE POTASSIUM 875; 125 MG/1; MG/1
1 TABLET, FILM COATED ORAL 2 TIMES DAILY
Qty: 10 TAB | Refills: 0 | Status: SHIPPED | OUTPATIENT
Start: 2020-01-25 | End: 2020-01-30

## 2020-01-25 RX ADMIN — CLOSTRIDIUM TETANI TOXOID ANTIGEN (FORMALDEHYDE INACTIVATED), CORYNEBACTERIUM DIPHTHERIAE TOXOID ANTIGEN (FORMALDEHYDE INACTIVATED), BORDETELLA PERTUSSIS TOXOID ANTIGEN (GLUTARALDEHYDE INACTIVATED), BORDETELLA PERTUSSIS FILAMENTOUS HEMAGGLUTININ ANTIGEN (FORMALDEHYDE INACTIVATED), BORDETELLA PERTUSSIS PERTACTIN ANTIGEN, AND BORDETELLA PERTUSSIS FIMBRIAE 2/3 ANTIGEN 1 ML: 5; 2; 2.5; 5; 3; 5 INJECTION, SUSPENSION INTRAMUSCULAR at 15:53

## 2020-01-25 RX ADMIN — ACETAMINOPHEN 975 MG: 325 TABLET, FILM COATED ORAL at 15:50

## 2020-01-25 ASSESSMENT — PAIN SCALES - WONG BAKER: WONGBAKER_NUMERICALRESPONSE: DOESN'T HURT AT ALL

## 2020-01-25 NOTE — ED TRIAGE NOTES
Pt comes in  Was bitten by boyfriends dog  R wrist was injured  Having pain to wrist w/ limited ROM and numbness to R pinky finger  Will need a dT

## 2020-01-26 NOTE — ED NOTES
Seen by ERP. Medicinal interventions carried out per ERP orders. X-ray completed. Discharge instructions provided.  Pt verbalized the understanding of discharge instructions to follow up with PCP and to return to ER if condition worsens.  Pt ambulated out of ER without difficulty. velcro wrist applied to R wrist.

## 2020-01-28 DIAGNOSIS — F41.9 ANXIETY AND DEPRESSION: ICD-10-CM

## 2020-01-28 DIAGNOSIS — F32.A ANXIETY AND DEPRESSION: ICD-10-CM

## 2020-01-28 RX ORDER — DOXEPIN HYDROCHLORIDE 25 MG/1
CAPSULE ORAL
Qty: 90 CAP | Refills: 3 | Status: SHIPPED
Start: 2020-01-28 | End: 2020-11-25

## 2020-02-02 DIAGNOSIS — Z78.0 MENOPAUSE: ICD-10-CM

## 2020-02-02 DIAGNOSIS — E03.9 HYPOTHYROIDISM, UNSPECIFIED TYPE: ICD-10-CM

## 2020-02-03 RX ORDER — LEVOTHYROXINE SODIUM 88 MCG
TABLET ORAL
Qty: 90 TAB | Refills: 3 | Status: SHIPPED | OUTPATIENT
Start: 2020-02-03 | End: 2021-01-25 | Stop reason: SDUPTHER

## 2020-02-04 ENCOUNTER — APPOINTMENT (OUTPATIENT)
Dept: RADIOLOGY | Facility: MEDICAL CENTER | Age: 50
End: 2020-02-04
Attending: EMERGENCY MEDICINE
Payer: COMMERCIAL

## 2020-02-04 ENCOUNTER — HOSPITAL ENCOUNTER (EMERGENCY)
Facility: MEDICAL CENTER | Age: 50
End: 2020-02-04
Attending: EMERGENCY MEDICINE
Payer: COMMERCIAL

## 2020-02-04 VITALS
DIASTOLIC BLOOD PRESSURE: 83 MMHG | BODY MASS INDEX: 25.25 KG/M2 | HEIGHT: 70 IN | TEMPERATURE: 97.6 F | RESPIRATION RATE: 16 BRPM | HEART RATE: 81 BPM | OXYGEN SATURATION: 96 % | WEIGHT: 176.37 LBS | SYSTOLIC BLOOD PRESSURE: 120 MMHG

## 2020-02-04 DIAGNOSIS — S09.90XA CLOSED HEAD INJURY, INITIAL ENCOUNTER: ICD-10-CM
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
## 2020-02-04 DIAGNOSIS — S01.01XA LACERATION OF SCALP, INITIAL ENCOUNTER: ICD-10-CM

## 2020-02-04 PROCEDURE — 700101 HCHG RX REV CODE 250: Performed by: EMERGENCY MEDICINE

## 2020-02-04 PROCEDURE — 304217 HCHG IRRIGATION SYSTEM

## 2020-02-04 PROCEDURE — 304999 HCHG REPAIR-SIMPLE/INTERMED LEVEL 1

## 2020-02-04 PROCEDURE — 70450 CT HEAD/BRAIN W/O DYE: CPT

## 2020-02-04 PROCEDURE — 305308 HCHG STAPLER,SKIN,DISP.

## 2020-02-04 PROCEDURE — 99284 EMERGENCY DEPT VISIT MOD MDM: CPT

## 2020-02-04 RX ORDER — LIDOCAINE HYDROCHLORIDE AND EPINEPHRINE 10; 10 MG/ML; UG/ML
10 INJECTION, SOLUTION INFILTRATION; PERINEURAL ONCE
Status: COMPLETED | OUTPATIENT
Start: 2020-02-04 | End: 2020-02-04

## 2020-02-04 RX ORDER — ALPRAZOLAM 0.5 MG/1
0.5 TABLET ORAL NIGHTLY PRN
Status: SHIPPED | COMMUNITY
End: 2020-11-25 | Stop reason: SDUPTHER

## 2020-02-04 RX ADMIN — TETRACAINE HCL 3 ML: 10 INJECTION SUBARACHNOID at 06:35

## 2020-02-04 RX ADMIN — LIDOCAINE HYDROCHLORIDE AND EPINEPHRINE 10 ML: 10; 10 INJECTION, SOLUTION INFILTRATION; PERINEURAL at 07:45

## 2020-02-04 ASSESSMENT — LIFESTYLE VARIABLES: DO YOU DRINK ALCOHOL: NO

## 2020-02-04 NOTE — ED NOTES
Pt given written and oral dc instructions. Pt verbalized understanding of all instructions given. All questions answered. VSS. Pt given fu instructions and educated on s/s of when to return to the ER. Pt amb independently upon time of dc in stable condition.

## 2020-02-04 NOTE — ED PROVIDER NOTES
ED Provider Note    CHIEF COMPLAINT  Chief Complaint   Patient presents with   • GLF     taking trash outside this am and slipped on ice. laceration to back of head. denies use of blood thinners.    • Head Laceration       HPI  Carie Santiago is a 49 y.o. female who presents to the emergency department through triage for head injury.  Patient states she slipped on ice while taking her trash to the end of her driveway at 9 PM last night.  States she fell backwards and struck her head on her driveway.  Denies loss of consciousness, was ambulatory thereafter.  Headache since that time.  Right sided scalp laceration.,  Patient states continues to bleed.  She is kept at moist since the fall.  Denies visual changes, focal weakness.  Denies vomiting.  Denies anticoagulation.    Tetanus up-to-date after a dog bite last month.    REVIEW OF SYSTEMS  See HPI for further details. All other systems are negative.     PAST MEDICAL HISTORY   has a past medical history of Anxiety and depression (1/23/2019), Colon polyp (3/29/2019), Disorder of thyroid, Diverticulosis of colon (1/23/2019), Generalized anxiety disorder, Hemochromatosis, Hypertriglyceridemia (1/23/2019), Low serum HDL (1/23/2019), Menopause, and Vitamin B12 deficiency (1/23/2019).    SOCIAL HISTORY  Social History     Tobacco Use   • Smoking status: Current Every Day Smoker     Packs/day: 0.50     Years: 30.00     Pack years: 15.00     Types: Cigarettes   • Smokeless tobacco: Never Used   Substance and Sexual Activity   • Alcohol use: Yes     Comment: a glass of wine per day   • Drug use: No   • Sexual activity: Not on file       SURGICAL HISTORY   has a past surgical history that includes other orthopedic surgery (Right, 2017); robotic laparoscopic cholecystectomy (2017); umbilical hernia repair (2017); appendectomy; tonsillectomy and adenoidectomy (1976); mammoplasty augmentation (Bilateral); inguinal hernia repair bilateral (1999); irrigation & debridement ortho  "(Left, 9/21/2019); and wound closure ortho (Left, 9/24/2019).    CURRENT MEDICATIONS  Home Medications     Reviewed by Fabrice Santa R.N. (Registered Nurse) on 02/04/20 at 0624  Med List Status: Complete   Medication Last Dose Status   albuterol 108 (90 Base) MCG/ACT Aero Soln inhalation aerosol  Active   ALPRAZolam (XANAX) 0.5 MG Tab 2/2/2020 Active   Cyanocobalamin (VITAMIN B-12 INJ) 1/30/2020 Active   NABOR 0.0375 MG/24HR patch 2/3/2020 Active   doxepin (SINEQUAN) 25 MG Cap  Active   FLUoxetine (PROZAC) 20 MG Cap 2/4/2020 Active   ibuprofen (MOTRIN) 800 MG Tab  Active   Multiple Vitamins-Minerals (OCUVITE-LUTEIN) Tab 2/4/2020 Active   Omega-3 Fatty Acids (FISH OIL) 1000 MG Cap capsule 2/4/2020 Active   progesterone (PROMETRIUM) 100 MG Cap 2/3/2020 Active   promethazine (PHENERGAN) 25 MG Tab  Active   SYNTHROID 88 MCG Tab 2/4/2020 Active                ALLERGIES  Allergies   Allergen Reactions   • Norco [Apap-Fd&C Yellow #10 Al Cervantes-Hydrocodone]      itching   • Sulfa Drugs Anaphylaxis   • Zofran [Dextrose-Ondansetron] Unspecified     Migraine   • Lorazepam Anxiety and Unspecified     \"IT MAKES ME CRY\"       PHYSICAL EXAM  VITAL SIGNS: /100   Pulse 72   Temp 36.4 °C (97.6 °F) (Oral)   Resp 16   Ht 1.778 m (5' 10\")   Wt 80 kg (176 lb 5.9 oz)   SpO2 99%   BMI 25.31 kg/m²   Pulse ox interpretation: I interpret this pulse ox as normal.  Constitutional: Alert in no apparent distress.  HENT: Normocephalic.  Small right parietal hematoma, scalp laceration.  No active bleeding.. Bilateral external ears normal, Nose normal. Moist mucous membranes.  No oral trauma.  Eyes: Pupils are equal and reactive, Conjunctiva normal.   Neck: No midline tenderness palpation, no step-offs.  Full range of motion without pain or resistance.  Cardiovascular: Normal peripheral perfusion.  Thorax & Lungs: Nonlabored respirations.  No chest wall tenderness, crepitus.  Skin: Warm, Dry  Musculoskeletal: Good range of motion in " all major joints. No major deformities noted.   Neurologic: Alert and oriented x4.  Speech clear and cohesive.  Cranial nerves II through XII intact bilaterally.  Was 4 extremity spontaneously.  Gait stable independently without ataxia.  Psychiatric: Affect normal, Judgment normal, Mood normal.       DIAGNOSTIC STUDIES / PROCEDURES    RADIOLOGY  CT-HEAD W/O   Final Result      No CT evidence of acute infarct, hemorrhage or mass.        PROCEDURE  LACERATION REPAIR PROCEDURE NOTE  The patient's identification was confirmed and consent was obtained.  This procedure was performed by   Site: Scalp  Sterile procedures observed  Anesthetic used (type and amt): Let, lidocaine with epinephrine  Suture type/size: Staples  Length: 4 cm  # of Sutures: 6 staples  Complexity: Simple  Antibx ointment applied   Tetanus UTD   Site anesthetized, irrigated with NS, explored without evidence of foreign body, wound well approximated, site covered with dry, sterile dressing. Patient tolerated procedure well without complications. Instructions for care discussed verbally and patient provided with additional written instructions for homecare and f/u.      COURSE & MEDICAL DECISION MAKING  ED evaluation for head injury after a fall was consistent with closed head injury, no evidence for concussion.  Patient is neurologically intact and nonfocal.  CT, for fall with progressive headache and history of alcohol use, negative for intracranial trauma or skull fracture.  Scalp laceration repaired as described above, after copious irrigation, with good approximation hemostasis.  Antibiotic ointment applied thereafter.  Pain controlled without medication in the emergency department.  Ambulates independently.    Patient is stable for discharge at this time, anticipatory guidance and wound care instructions provided, close follow-up is encouraged, and strict ED return instructions have been detailed. Patient is agreeable to the disposition  and plan.    Patient's blood pressure was elevated in the emergency department, and has been referred to primary care for close monitoring.    FINAL IMPRESSION  (S09.90XA) Closed head injury, initial encounter  (S01.01XA) Laceration of scalp, initial encounter  (W19.XXXA) Fall, initial encounter      Electronically signed by: Sally Prince D.O., 2/4/2020 7:26 AM      This dictation was created using voice recognition software. The accuracy of the dictation is limited to the abilities of the software. I expect there may be some errors of grammar and possibly content. The nursing notes were reviewed and certain aspects of this information were incorporated into this note.

## 2020-02-04 NOTE — DISCHARGE INSTRUCTIONS
Follow-up with primary care 2 days for reevaluation and wound check, as well as medication management and close blood pressure monitoring.    Continue any home educations as previously indicated.  Tylenol or ibuprofen as needed for headache or discomfort.    Activity as tolerated.    Keep wound clean, dry and intact.  Cleanse gently, warm water, shampoo or soap, rinse and pat dry daily.  Apply antibiotic ointment twice daily.  Avoid soaking wound in water.    Return to the emergency department for headache, altered mental status, focal weakness, vomiting, wound bleeding or infection or other new concerns.

## 2020-02-04 NOTE — ED TRIAGE NOTES
"Bib self for above complaints.     Chief Complaint   Patient presents with   • GLF     taking trash outside this am and slipped on ice. laceration to back of head. denies use of blood thinners.    • Head Laceration     Pulse 72   Temp 36.4 °C (97.6 °F) (Oral)   Resp 16   Ht 1.778 m (5' 10\")   Wt 80 kg (176 lb 5.9 oz)   SpO2 99%   BMI 25.31 kg/m²     "

## 2020-02-12 ENCOUNTER — HOSPITAL ENCOUNTER (EMERGENCY)
Facility: MEDICAL CENTER | Age: 50
End: 2020-02-12
Payer: COMMERCIAL

## 2020-02-12 PROCEDURE — 99281 EMR DPT VST MAYX REQ PHY/QHP: CPT

## 2020-02-17 DIAGNOSIS — F41.9 ANXIETY AND DEPRESSION: ICD-10-CM

## 2020-02-17 DIAGNOSIS — F32.A ANXIETY AND DEPRESSION: ICD-10-CM

## 2020-02-18 RX ORDER — FLUOXETINE HYDROCHLORIDE 20 MG/1
CAPSULE ORAL
Qty: 90 CAP | Refills: 3 | Status: SHIPPED | OUTPATIENT
Start: 2020-02-18 | End: 2021-05-05

## 2020-03-13 NOTE — DISCHARGE SUMMARY
DATE OF ADMISSION:  09/21/2019    DATE OF DISCHARGE:  09/24/2019    REASON FOR ADMISSION:  Hematoma, anterior tibia.    HOSPITAL COURSE:  The patient was taken to the operating room, had irrigation,   debridement, and application of a wound VAC for nonresolving hematoma.  The   patient's hospitalization was completely benign and eventually the patient was   discharged in stable condition on 09/24.    FINAL DISCHARGE DIAGNOSIS:  Leg wound hematoma treated with irrigation,   debridement, and VAC.    DISPOSITION:  The patient will be seen in [facility] in a week to   10 days, call for any interim period problems.       ____________________________________     MD CASSIA SOLOMON / EDGARDO    DD:  03/12/2020 10:47:38  DT:  03/13/2020 02:09:38    D#:  [id number]  Job#:  [id number]

## 2020-03-29 ENCOUNTER — TELEPHONE (OUTPATIENT)
Dept: INTERNAL MEDICINE | Facility: IMAGING CENTER | Age: 50
End: 2020-03-29

## 2020-03-29 NOTE — TELEPHONE ENCOUNTER
Carie states she has green nasal discharge and feels her sinus infection has not completely cleared from last course of treatment. She states she has been having fever up to 100 degrees. No shortness of breath. She has been having some nonproductive cough. She has a Rx for doxycycline that was given to her from MD in [place]. I advised she may start bid and to take for next 10-14 days since sinuses are somewhat tender over her cheeks (maxillary). If she does not improve discussed she will need to see ENT.

## 2020-04-08 DIAGNOSIS — F41.9 ANXIETY AND DEPRESSION: ICD-10-CM

## 2020-04-08 DIAGNOSIS — F32.A ANXIETY AND DEPRESSION: ICD-10-CM

## 2020-04-08 RX ORDER — ALPRAZOLAM 0.25 MG/1
0.25 TABLET ORAL 2 TIMES DAILY PRN
Qty: 60 TAB | Refills: 3 | Status: SHIPPED
Start: 2020-04-08 | End: 2020-05-08

## 2020-04-09 ENCOUNTER — APPOINTMENT (OUTPATIENT)
Dept: RADIOLOGY | Facility: MEDICAL CENTER | Age: 50
End: 2020-04-09
Attending: EMERGENCY MEDICINE
Payer: COMMERCIAL

## 2020-04-09 ENCOUNTER — HOSPITAL ENCOUNTER (EMERGENCY)
Facility: MEDICAL CENTER | Age: 50
End: 2020-04-09
Attending: EMERGENCY MEDICINE
Payer: COMMERCIAL

## 2020-04-09 VITALS
DIASTOLIC BLOOD PRESSURE: 95 MMHG | SYSTOLIC BLOOD PRESSURE: 143 MMHG | WEIGHT: 179.23 LBS | HEART RATE: 78 BPM | BODY MASS INDEX: 26.55 KG/M2 | HEIGHT: 69 IN | TEMPERATURE: 97.6 F | RESPIRATION RATE: 16 BRPM | OXYGEN SATURATION: 98 %

## 2020-04-09 DIAGNOSIS — J06.9 VIRAL UPPER RESPIRATORY TRACT INFECTION: ICD-10-CM

## 2020-04-09 LAB
ALBUMIN SERPL BCP-MCNC: 4.1 G/DL (ref 3.2–4.9)
ALBUMIN/GLOB SERPL: 1.4 G/DL
ALP SERPL-CCNC: 124 U/L (ref 30–99)
ALT SERPL-CCNC: 28 U/L (ref 2–50)
ANION GAP SERPL CALC-SCNC: 12 MMOL/L (ref 7–16)
AST SERPL-CCNC: 63 U/L (ref 12–45)
BASOPHILS # BLD AUTO: 0.7 % (ref 0–1.8)
BASOPHILS # BLD: 0.05 K/UL (ref 0–0.12)
BILIRUB SERPL-MCNC: 0.3 MG/DL (ref 0.1–1.5)
BUN SERPL-MCNC: 15 MG/DL (ref 8–22)
CALCIUM SERPL-MCNC: 9.5 MG/DL (ref 8.4–10.2)
CHLORIDE SERPL-SCNC: 99 MMOL/L (ref 96–112)
CO2 SERPL-SCNC: 20 MMOL/L (ref 20–33)
COVID ORDER STATUS COVID19: NORMAL
CREAT SERPL-MCNC: 0.49 MG/DL (ref 0.5–1.4)
EKG IMPRESSION: NORMAL
EOSINOPHIL # BLD AUTO: 0.12 K/UL (ref 0–0.51)
EOSINOPHIL NFR BLD: 1.7 % (ref 0–6.9)
ERYTHROCYTE [DISTWIDTH] IN BLOOD BY AUTOMATED COUNT: 57.1 FL (ref 35.9–50)
FLUAV RNA SPEC QL NAA+PROBE: NEGATIVE
FLUBV RNA SPEC QL NAA+PROBE: NEGATIVE
GLOBULIN SER CALC-MCNC: 3 G/DL (ref 1.9–3.5)
GLUCOSE SERPL-MCNC: 85 MG/DL (ref 65–99)
HCT VFR BLD AUTO: 34.5 % (ref 37–47)
HGB BLD-MCNC: 11 G/DL (ref 12–16)
IMM GRANULOCYTES # BLD AUTO: 0.02 K/UL (ref 0–0.11)
IMM GRANULOCYTES NFR BLD AUTO: 0.3 % (ref 0–0.9)
LYMPHOCYTES # BLD AUTO: 2.76 K/UL (ref 1–4.8)
LYMPHOCYTES NFR BLD: 39.6 % (ref 22–41)
MCH RBC QN AUTO: 26.8 PG (ref 27–33)
MCHC RBC AUTO-ENTMCNC: 31.9 G/DL (ref 33.6–35)
MCV RBC AUTO: 84.1 FL (ref 81.4–97.8)
MONOCYTES # BLD AUTO: 0.68 K/UL (ref 0–0.85)
MONOCYTES NFR BLD AUTO: 9.8 % (ref 0–13.4)
NEUTROPHILS # BLD AUTO: 3.34 K/UL (ref 2–7.15)
NEUTROPHILS NFR BLD: 47.9 % (ref 44–72)
NRBC # BLD AUTO: 0 K/UL
NRBC BLD-RTO: 0 /100 WBC
PLATELET # BLD AUTO: 332 K/UL (ref 164–446)
PMV BLD AUTO: 9 FL (ref 9–12.9)
POTASSIUM SERPL-SCNC: 4.3 MMOL/L (ref 3.6–5.5)
PROT SERPL-MCNC: 7.1 G/DL (ref 6–8.2)
RBC # BLD AUTO: 4.1 M/UL (ref 4.2–5.4)
SODIUM SERPL-SCNC: 131 MMOL/L (ref 135–145)
TROPONIN T SERPL-MCNC: 7 NG/L (ref 6–19)
WBC # BLD AUTO: 7 K/UL (ref 4.8–10.8)

## 2020-04-09 PROCEDURE — 99284 EMERGENCY DEPT VISIT MOD MDM: CPT

## 2020-04-09 PROCEDURE — 94760 N-INVAS EAR/PLS OXIMETRY 1: CPT

## 2020-04-09 PROCEDURE — 80053 COMPREHEN METABOLIC PANEL: CPT

## 2020-04-09 PROCEDURE — 36415 COLL VENOUS BLD VENIPUNCTURE: CPT

## 2020-04-09 PROCEDURE — 93005 ELECTROCARDIOGRAM TRACING: CPT

## 2020-04-09 PROCEDURE — 84484 ASSAY OF TROPONIN QUANT: CPT

## 2020-04-09 PROCEDURE — 93005 ELECTROCARDIOGRAM TRACING: CPT | Performed by: EMERGENCY MEDICINE

## 2020-04-09 PROCEDURE — 71045 X-RAY EXAM CHEST 1 VIEW: CPT

## 2020-04-09 PROCEDURE — 87502 INFLUENZA DNA AMP PROBE: CPT

## 2020-04-09 PROCEDURE — 85025 COMPLETE CBC W/AUTO DIFF WBC: CPT

## 2020-04-09 ASSESSMENT — FIBROSIS 4 INDEX: FIB4 SCORE: 0.51

## 2020-04-09 ASSESSMENT — PAIN DESCRIPTION - DESCRIPTORS: DESCRIPTORS: PRESSURE

## 2020-04-09 NOTE — ED TRIAGE NOTES
"Chief Complaint   Patient presents with   • Chest Pain   • Cough     \" started like a sinus infection\"     Mask applied.   Pt has not traveled   within 30 days internationally or domestic, has not had direct contact with known COVID-19 positive patient.   Pt complains of cough and subjective fever 2 days ago.   "

## 2020-04-09 NOTE — ED PROVIDER NOTES
"ED Provider Note    CHIEF COMPLAINT  Chief Complaint   Patient presents with   • Chest Pain   • Cough     \" started like a sinus infection\"       HPI  Carie Santiago is a 50 y.o. female who presents complaining of chest pain, cough and congestion.  Started about 2 weeks ago.  She is been on doxycycline for a \"sinus infection.\"  That sinuses feel better but discomfort is still in her chest.  She has a dry, nonproductive cough.  Scratchy throat.  She had off-and-on fever along the way.  She denies any clear exposures, however she did go to blood bank, and 1 of the staff members there was coughing.  She denies any leg pain or swelling.  The other day she did get lightheaded upon standing and fell.  Denies any exertional symptoms otherwise.  Before the social distancing/day at home order, she was vigorously active, and denied any exertional symptoms.    PAST MEDICAL HISTORY  Past Medical History:   Diagnosis Date   • Anxiety and depression 1/23/2019   • Colon polyp 3/29/2019    Colonoscopy March 2019: 3mm descending colon polyp   • Disorder of thyroid    • Diverticulosis of colon 1/23/2019    MRI abdomen Jan. 2019   • Generalized anxiety disorder    • Hemochromatosis    • Hypertriglyceridemia 1/23/2019   • Low serum HDL 1/23/2019   • Menopause    • Vitamin B12 deficiency 1/23/2019       FAMILY HISTORY  Family History   Problem Relation Age of Onset   • Heart Disease Mother         Atrial fibrillation   • Thyroid Mother    • Cancer Mother         Melanoma   • Heart Disease Brother    • Diabetes Son    • Dementia Neg Hx    • Colon Cancer Neg Hx    • Breast Cancer Neg Hx        SOCIAL HISTORY  Social History     Tobacco Use   • Smoking status: Current Every Day Smoker     Packs/day: 0.50     Years: 30.00     Pack years: 15.00     Types: Cigarettes   • Smokeless tobacco: Never Used   Substance Use Topics   • Alcohol use: Yes     Comment: a glass of wine per day   • Drug use: No         SURGICAL HISTORY  Past " "Surgical History:   Procedure Laterality Date   • WOUND CLOSURE ORTHO Left 9/24/2019    Procedure: CLOSURE, WOUND, ORTHO - LEG W/WOUND VAC REMOVAL;  Surgeon: Paolo Odell M.D.;  Location: SURGERY [facility];  Service: Orthopedics   • IRRIGATION & DEBRIDEMENT ORTHO Left 9/21/2019    Procedure: IRRIGATION AND DEBRIDEMENT, WOUND - FOR PROXIMAL TIBIA HEMATOMA EVACUATION AND WOUND VAC APPLICATION;  Surgeon: Paolo Odell M.D.;  Location: SURGERY [facility];  Service: Orthopedics   • OTHER ORTHOPEDIC SURGERY Right 2017   • ROBOTIC LAPAROSCOPIC CHOLECYSTECTOMY  2017   • UMBILICAL HERNIA REPAIR  2017   • INGUINAL HERNIA REPAIR BILATERAL  1999    with Mesh   • TONSILLECTOMY AND ADENOIDECTOMY  1976   • APPENDECTOMY     • MAMMOPLASTY AUGMENTATION Bilateral        CURRENT MEDICATIONS    I have reviewed the nurses notes and/or the list brought with the patient.    She is on doxycycline presently.  Last dose this morning.    ALLERGIES  Allergies   Allergen Reactions   • Norco [Apap-Fd&C Yellow #10 Al Cervantes-Hydrocodone]      itching   • Sulfa Drugs Anaphylaxis   • Zofran [Dextrose-Ondansetron] Unspecified     Migraine   • Lorazepam Anxiety and Unspecified     \"IT MAKES ME CRY\"       REVIEW OF SYSTEMS  See HPI for further details. Review of systems as above, otherwise all other systems are negative.     PHYSICAL EXAM  VITAL SIGNS: /95   Pulse 78   Temp 36.4 °C (97.6 °F) (Temporal)   Resp 16   Ht 1.753 m (5' 9\")   Wt 81.3 kg (179 lb 3.7 oz)   SpO2 98%   BMI 26.47 kg/m²     Constitutional: Well appearing patient in no acute distress.  Not toxic, nor ill in appearance.  HENT: Mucus membranes moist.  Oropharynx is clear.  Eyes: Pupils equally round.  No scleral icterus.   Neck: Full nontender range of motion.  Lymphatic: No cervical lymphadenopathy noted.   Cardiovascular: Regular heart rate and rhythm.  No murmurs, rubs, nor gallop appreciated.   Thorax & Lungs: Chest is nontender.  Lungs are clear to " auscultation with good air movement bilaterally.  No wheeze, rhonchi, nor rales.   Abdomen: Soft, with no tenderness, rebound nor guarding.  No mass, pulsatile mass, nor hepatosplenomegaly appreciated.  Skin: No petechia noted.  There is an older appearing bruise over her right mid forearm.  Extremities/Musculoskeletal: No sign of bony trauma.  Calves are nontender with no cords nor edema.  No Daniel's sign.  Pulses are intact all around.   Neurologic: Alert & oriented.  Strength and sensation is intact all around.  Gait is normal.  Psychiatric: Normal affect appropriate for the clinical situation.    EKG  I interpreted this EKG myself.  This is a 12-lead study.  The rhythm is sinus with a rate of 63.  There are no ST segment nor T wave abnormalities.  Interpretation: No ST segment elevation myocardial infarction.    LABS  Labs Reviewed   CBC WITH DIFFERENTIAL - Abnormal; Notable for the following components:       Result Value    RBC 4.10 (*)     Hemoglobin 11.0 (*)     Hematocrit 34.5 (*)     MCH 26.8 (*)     MCHC 31.9 (*)     RDW 57.1 (*)     All other components within normal limits    Narrative:     Droplet, Contact, and Eye Protection   COMP METABOLIC PANEL - Abnormal; Notable for the following components:    Sodium 131 (*)     Creatinine 0.49 (*)     AST(SGOT) 63 (*)     Alkaline Phosphatase 124 (*)     All other components within normal limits    Narrative:     Droplet, Contact, and Eye Protection   INFLUENZA A/B BY PCR    Narrative:     Droplet, Contact, and Eye Protection  Rule out COVID-19 Panel.   TROPONIN    Narrative:     Droplet, Contact, and Eye Protection   ESTIMATED GFR    Narrative:     Droplet, Contact, and Eye Protection         RADIOLOGY/PROCEDURES  I have reviewed the patient's film interpretations myself, and they are read out by the radiologist as:   DX-CHEST-PORTABLE (1 VIEW)   Final Result      No evidence of acute cardiopulmonary process.        .    MEDICAL RECORD  I have reviewed  patient's medical record and pertinent results are listed above.    COURSE & MEDICAL DECISION MAKING  I have reviewed any medical record information, laboratory studies and radiographic results as noted above.  Patient presents with persistent cough.  She is already on doxycycline.  No evidence of infiltrate.  Obviously consideration for COVID-19, however again, no evidence of this on her x-ray.  No known exposures at all.  She is already self isolating.    I have a low suspicion this is cardiac.  Her heart score would be low risk with 2 points, one for age, the other for dyslipidemia.  Troponin is negative with days of symptoms.  EKG is nondiagnostic.    Instructions on cough.    FINAL IMPRESSION  1. Viral upper respiratory tract infection           This dictation was created using voice recognition software.    Electronically signed by: Brad Iniguez M.D., 4/9/2020 9:16 AM

## 2020-04-10 LAB
SARS-COV-2 RNA RESP QL NAA+PROBE: NOT DETECTED
SPECIMEN SOURCE: NORMAL

## 2020-04-11 NOTE — ED NOTES
COVID-19 Test Follow Up  04/11/20 4/9/2020 09:33   COVID Order Status Received   Influenza virus A RNA Negative   Influenza virus B, PCR Negative   2019-nCoV RNA Interp Not detected   2019-nCoV Source NP Swab       Patient tested negative for COVID-19. I have informed the patient of the result by phone. Encouraged to stay at home until no fever for 72 hours without the use of fever reducing medications and symptoms improving. Informed there is no need for further self-isolate for 14 days for COVID-19 unless otherwise directed by the Health Dept.     They are advised to return to the ER for worsening symptoms including difficulty breathing, ongoing fever, weakness or chest pain.    Karla Garcia, PharmD

## 2020-08-26 ENCOUNTER — OFFICE VISIT (OUTPATIENT)
Dept: URGENT CARE | Facility: CLINIC | Age: 50
End: 2020-08-26
Payer: COMMERCIAL

## 2020-08-26 ENCOUNTER — HOSPITAL ENCOUNTER (OUTPATIENT)
Facility: MEDICAL CENTER | Age: 50
End: 2020-08-26
Attending: PHYSICIAN ASSISTANT
Payer: COMMERCIAL

## 2020-08-26 VITALS
HEIGHT: 69 IN | BODY MASS INDEX: 26.86 KG/M2 | HEART RATE: 84 BPM | TEMPERATURE: 99 F | RESPIRATION RATE: 20 BRPM | DIASTOLIC BLOOD PRESSURE: 78 MMHG | SYSTOLIC BLOOD PRESSURE: 116 MMHG | OXYGEN SATURATION: 95 %

## 2020-08-26 DIAGNOSIS — R05.9 COUGH: ICD-10-CM

## 2020-08-26 DIAGNOSIS — R53.83 FATIGUE, UNSPECIFIED TYPE: ICD-10-CM

## 2020-08-26 LAB
COVID ORDER STATUS COVID19: NORMAL
SARS-COV-2 RNA RESP QL NAA+PROBE: NOTDETECTED
SPECIMEN SOURCE: NORMAL

## 2020-08-26 PROCEDURE — U0003 INFECTIOUS AGENT DETECTION BY NUCLEIC ACID (DNA OR RNA); SEVERE ACUTE RESPIRATORY SYNDROME CORONAVIRUS 2 (SARS-COV-2) (CORONAVIRUS DISEASE [COVID-19]), AMPLIFIED PROBE TECHNIQUE, MAKING USE OF HIGH THROUGHPUT TECHNOLOGIES AS DESCRIBED BY CMS-2020-01-R: HCPCS

## 2020-08-26 PROCEDURE — 99204 OFFICE O/P NEW MOD 45 MIN: CPT | Performed by: PHYSICIAN ASSISTANT

## 2020-08-26 RX ORDER — DIAPER,BRIEF,INFANT-TODD,DISP
EACH MISCELLANEOUS 2 TIMES DAILY
COMMUNITY
End: 2020-11-25

## 2020-08-26 RX ORDER — IBUPROFEN 800 MG/1
800 TABLET ORAL EVERY 8 HOURS PRN
Qty: 30 TAB | Refills: 0 | Status: ON HOLD
Start: 2020-08-26 | End: 2021-02-14

## 2020-08-26 ASSESSMENT — ENCOUNTER SYMPTOMS
DIARRHEA: 1
WEAKNESS: 0
CARDIOVASCULAR NEGATIVE: 1
SPUTUM PRODUCTION: 0
BLOOD IN STOOL: 0
VISUAL CHANGE: 0
WHEEZING: 0
VERTIGO: 0
COUGH: 1
BODY ACHES: 1
ABDOMINAL PAIN: 0
HEADACHES: 1
NECK PAIN: 0
MYALGIAS: 1
SWOLLEN GLANDS: 1
FEVER: 0
FATIGUE: 1
VOMITING: 0
NAUSEA: 0
SHORTNESS OF BREATH: 0
PALPITATIONS: 0
SINUS PAIN: 0
DIZZINESS: 0
SORE THROAT: 0
CHILLS: 1

## 2020-08-26 NOTE — PROGRESS NOTES
Subjective:      Carie Santiago is a 50 y.o. female who presents with Diarrhea (diarrhea, had a headache, loss of smell, body aches x 1 day)            Headache, body aches, cough, loss of smell.  Denies shortness of breath, chest pain, leg swelling.    Generalized Body Aches  This is a new problem. The current episode started in the past 7 days. The problem occurs constantly. The problem has been unchanged. Associated symptoms include chills, coughing, fatigue, headaches, myalgias and swollen glands. Pertinent negatives include no abdominal pain, chest pain, congestion, fever, nausea, neck pain, rash, sore throat, urinary symptoms, vertigo, visual change, vomiting or weakness. Nothing aggravates the symptoms. She has tried acetaminophen for the symptoms. The treatment provided mild relief.       PMH:  has a past medical history of Anxiety and depression (1/23/2019), Colon polyp (3/29/2019), Disorder of thyroid, Diverticulosis of colon (1/23/2019), Generalized anxiety disorder, Hemochromatosis, Hypertriglyceridemia (1/23/2019), Low serum HDL (1/23/2019), Menopause, and Vitamin B12 deficiency (1/23/2019).  MEDS:   Current Outpatient Medications:   •  ibuprofen (MOTRIN) 800 MG Tab, Take 1 Tab by mouth every 8 hours as needed for Moderate Pain, Headache or Inflammation., Disp: 30 Tab, Rfl: 0  •  Fish Oil-Cholecalciferol (FISH OIL + D3 PO), Take  by mouth., Disp: , Rfl:   •  hydrocortisone 1 % Ointment, Apply  to affected area(s) 2 times a day., Disp: , Rfl:   •  FLUoxetine (PROZAC) 20 MG Cap, TAKE ONE CAPSULE BY MOUTH EVERY DAY, Disp: 90 Cap, Rfl: 3  •  ALPRAZolam (XANAX) 0.5 MG Tab, Take 0.5 mg by mouth at bedtime as needed for Sleep., Disp: , Rfl:   •  progesterone (PROMETRIUM) 100 MG Cap, TAKE ONE CAPSULE BY MOUTH EVERY DAY, Disp: 90 Cap, Rfl: 3  •  SYNTHROID 88 MCG Tab, TAKE ONE TABLET BY MOUTH EVERY DAY IN THE MORNING ON AN EMPTY STOMACH, Disp: 90 Tab, Rfl: 3  •  doxepin (SINEQUAN) 25 MG Cap, TAKE ONE  "CAPSULE BY MOUTH EVERY DAY AT BEDTIME, Disp: 90 Cap, Rfl: 3  •  albuterol 108 (90 Base) MCG/ACT Aero Soln inhalation aerosol, Inhale 2 Puffs by mouth every 6 hours as needed for Shortness of Breath., Disp: 18.5 g, Rfl: 3  •  NABOR 0.0375 MG/24HR patch, Apply patch on skin twice a week, Disp: 24 Tab, Rfl: 4  •  Omega-3 Fatty Acids (FISH OIL) 1000 MG Cap capsule, Take 1,000 mg by mouth every day., Disp: , Rfl:   •  Multiple Vitamins-Minerals (OCUVITE-LUTEIN) Tab, Take 1 tablet by mouth every day., Disp: , Rfl:   •  Cyanocobalamin (VITAMIN B-12 INJ), 1,000 mcg by Injection route every 72 hours. At diet clinic, Disp: , Rfl:   •  promethazine (PHENERGAN) 25 MG Tab, Take 0.5 Tabs by mouth every 6 hours as needed for Nausea/Vomiting. (Patient not taking: Reported on 8/26/2020), Disp: 15 Tab, Rfl: 0  ALLERGIES:   Allergies   Allergen Reactions   • Norco [Apap-Fd&C Yellow #10 Al Cervantes-Hydrocodone]      itching   • Sulfa Drugs Anaphylaxis   • Zofran [Dextrose-Ondansetron] Unspecified     Migraine   • Lorazepam Anxiety and Unspecified     \"IT MAKES ME CRY\"     SURGHX:   Past Surgical History:   Procedure Laterality Date   • WOUND CLOSURE ORTHO Left 9/24/2019    Procedure: CLOSURE, WOUND, ORTHO - LEG W/WOUND VAC REMOVAL;  Surgeon: Paolo Odell M.D.;  Location: SURGERY [facility];  Service: Orthopedics   • IRRIGATION & DEBRIDEMENT ORTHO Left 9/21/2019    Procedure: IRRIGATION AND DEBRIDEMENT, WOUND - FOR PROXIMAL TIBIA HEMATOMA EVACUATION AND WOUND VAC APPLICATION;  Surgeon: Paolo Odell M.D.;  Location: SURGERY [facility];  Service: Orthopedics   • OTHER ORTHOPEDIC SURGERY Right 2017   • ROBOTIC LAPAROSCOPIC CHOLECYSTECTOMY  2017   • UMBILICAL HERNIA REPAIR  2017   • INGUINAL HERNIA REPAIR BILATERAL  1999    with Mesh   • TONSILLECTOMY AND ADENOIDECTOMY  1976   • APPENDECTOMY     • MAMMOPLASTY AUGMENTATION Bilateral      SOCHX:  reports that she has been smoking cigarettes. She has a 15.00 pack-year smoking " "history. She has never used smokeless tobacco. She reports current alcohol use. She reports that she does not use drugs.  FH: family history includes Cancer in her mother; Diabetes in her son; Heart Disease in her brother and mother; Thyroid in her mother.    Review of Systems   Constitutional: Positive for chills and fatigue. Negative for fever.   HENT: Negative for congestion, ear pain, sinus pain and sore throat.    Respiratory: Positive for cough. Negative for sputum production, shortness of breath and wheezing.    Cardiovascular: Negative.  Negative for chest pain, palpitations and leg swelling.   Gastrointestinal: Positive for diarrhea. Negative for abdominal pain, blood in stool, melena, nausea and vomiting.   Musculoskeletal: Positive for myalgias. Negative for joint pain and neck pain.   Skin: Negative for rash.   Neurological: Positive for headaches. Negative for dizziness, vertigo and weakness.   All other systems reviewed and are negative.      Medications, Allergies, and current problem list reviewed today in Epic  Family history reviewed with patient and is not pertinent for today's visit     Objective:     /78 (BP Location: Left arm, Patient Position: Sitting, BP Cuff Size: Adult)   Pulse 84   Temp 37.2 °C (99 °F) (Temporal)   Resp 20   Ht 1.74 m (5' 8.5\")   SpO2 95%   BMI 26.86 kg/m²      Physical Exam  Vitals signs and nursing note reviewed.   Constitutional:       General: She is not in acute distress.     Appearance: She is well-developed. She is not diaphoretic.   HENT:      Head: Normocephalic and atraumatic.      Right Ear: Tympanic membrane and external ear normal.      Left Ear: Tympanic membrane and external ear normal.      Nose: Nose normal. No congestion or rhinorrhea.      Mouth/Throat:      Pharynx: No oropharyngeal exudate or posterior oropharyngeal erythema.   Eyes:      General:         Right eye: No discharge.         Left eye: No discharge.      Conjunctiva/sclera: " Conjunctivae normal.   Neck:      Musculoskeletal: Normal range of motion and neck supple.   Cardiovascular:      Rate and Rhythm: Normal rate and regular rhythm.      Heart sounds: Normal heart sounds.   Pulmonary:      Effort: Pulmonary effort is normal. No respiratory distress.      Breath sounds: Normal breath sounds. No wheezing, rhonchi or rales.   Abdominal:      General: Abdomen is flat. There is no distension.      Tenderness: There is no abdominal tenderness. There is no right CVA tenderness, left CVA tenderness, guarding or rebound.   Musculoskeletal: Normal range of motion.      Right lower leg: No edema.      Left lower leg: No edema.   Lymphadenopathy:      Cervical: No cervical adenopathy.   Skin:     General: Skin is warm and dry.   Neurological:      Mental Status: She is alert and oriented to person, place, and time.   Psychiatric:         Behavior: Behavior normal.         Thought Content: Thought content normal.         Judgment: Judgment normal.                 Assessment/Plan:         1. Cough  COVID/SARS COV-2 PCR   2. Fatigue, unspecified type  COVID/SARS COV-2 PCR     COVID symptoms for a few days.  Denies chest pain, shortness of breath, wheezing, lower leg swelling.  Exam shows lungs clear bilateral without wheezes rhonchi or rales.  Vascular exam unremarkable without lower leg swelling or tenderness.  Vitals normal and PO2 adequate.  OTC meds and conservative measures as discussed    Return to clinic or go to ED if symptoms worsen or persist. Indications for ED discussed at length. Patient voices understanding. Follow-up with your primary care provider in 3-5 days. Red flags discussed. All side effects of medication discussed including allergic response, GI upset, tendon injury, etc.    Please note that this dictation was created using voice recognition software. I have made every reasonable attempt to correct obvious errors, but I expect that there are errors of grammar and possibly  content that I did not discover before finalizing the note.

## 2020-08-26 NOTE — PATIENT INSTRUCTIONS
INSTRUCTIONS FOR COVID-19 OR ANY OTHER INFECTIOUS RESPIRATORY ILLNESSES    The Centers for Disease Control and Prevention (CDC) states that early indications for COVID-19 include cough, shortness of breath, difficulty breathing, or at least two of the following symptoms: chills, shaking with chills, muscle pain, headache, sore throat, and loss of taste or smell. Symptoms can range from mild to severe and may appear up to two weeks after exposure to the virus.    The practice of self-isolation and quarantine helps protect the public and your family by  preventing exposure to people who have or may have a contagious disease. Please follow the prevention steps below as based on CDC guidelines:    WHEN TO STOP ISOLATION: Persons with COVID-19 or any other infectious respiratory illness who have symptoms and were advised to care for themselves at home may discontinue home isolation under the following conditions:  · At least 24 hours have passed since recovery defined as resolution of fever without the use of fever-reducing medications; AND,  · Improvement in respiratory symptoms (e.g., cough, shortness of breath); AND,  · At least 10 days have passed since symptoms first appeared and have had no subsequent illness.    MONITOR YOUR SYMPTOMS: If your illness is worsening, seek prompt medical attention. If you have a medical emergency and need to call 911, notify the dispatch personnel that you have, or are being evaluated for confirmed or suspected COVID-19 or another infectious respiratory illness. Wear a facemask if possible.    ACTIVITY RESTRICTION: restrict activities outside your home, except for getting medical care. Do not go to work, school, or public areas. Avoid using public transportation, ride-sharing, or taxis.    SCHEDULED MEDICAL APPOINTMENTS: Notify your provider that you have, or are being evaluated for, confirmed or suspected COVID-19 or another infectious respiratory. This will help the healthcare  provider’s office safely take care of you and keep other people from getting exposed or infected.    FACEMASKS, when to wear: Anytime you are away from your home or around other people or pets. If you are unable to wear one, maintain a minimum of 6 feet distancing from others.    LIVING ENVIRONMENT: Stay in a separate room from other people and pets. If possible, use a separate bathroom, have someone else care for your pets and avoid sharing household items. Any items used should be washed thoroughly with soap and water. Clean all “high-touch” surfaces every day. Use a household cleaning spray or wipe, according to the label instructions. High touch surfaces include (but are not limited to) counters, tabletops, doorknobs, bathroom fixtures, toilets, phones, keyboards, tablets, and bedside tables.     HAND WASHING: Frequently wash hands with soap and water for at least 20 seconds,  especially after blowing your nose, coughing, or sneezing; going to the bathroom; before and after interacting with pets; and before and after eating or preparing food. If hands are visibly dirty use soap and water. If soap and water are not available, use an alcohol-based hand  with at least 60% alcohol. Avoid touching your eyes, nose, and mouth with unwashed hands. Cover your coughs and sneezes with a tissue. Throw used tissues in a lined trash can. Immediately wash your hands.    ACTIVE/FACILITATED SELF-MONITORING: Follow instructions provided by your local health department or health professionals, as appropriate. When working with your local health department check their available hours.    [facility]   Phone Number   [facility] [phone]   [facility] [place] [phone]   [place] Call 211   [place] [phone]     IF YOU HAVE CONFIRMED POSITIVE COVID-19:    Those who have completely recovered from COVID-19 may have immune-boosting antibodies in their plasma--called “convalescent plasma”--that could be  used to treat critically ill COVID19 patients.    [facility] is excited to begin working with [facility] on collecting convalescent plasma from  people who have recovered from COVID-19 as part of a program to treat patients infected with the virus. This FDA-approved “emergency investigational new drug” is a special blood product containing antibodies that may give patients an extra boost to fight the virus.    To be eligible to donate convalescent plasma, you must have a prior COVID-19 diagnosis documented by a laboratory test (or a positive test result for SARS-CoV-2 antibodies) and meet additional eligibility requirements.    If you are interested in donating convalescent plasma or have any additional questions, please contact the [facility] Convalescent Plasma  at [phone] or via e-mail at [email or web address].

## 2020-08-27 DIAGNOSIS — J01.00 ACUTE NON-RECURRENT MAXILLARY SINUSITIS: ICD-10-CM

## 2020-08-27 RX ORDER — AMOXICILLIN AND CLAVULANATE POTASSIUM 875; 125 MG/1; MG/1
1 TABLET, FILM COATED ORAL 2 TIMES DAILY
Qty: 14 TAB | Refills: 0 | Status: SHIPPED | OUTPATIENT
Start: 2020-08-27 | End: 2020-09-03

## 2020-08-27 NOTE — PROGRESS NOTES
Spoke to patient on the phone.  Her COVID test was negative.  Today she is stating she has more of a frontal headache with facial pain.  She is having dark-colored nasal discharge and congestion.  She is concern for a sinus infection.    1. Acute non-recurrent maxillary sinusitis  amoxicillin-clavulanate (AUGMENTIN) 875-125 MG Tab       Orlando Orantes P.A.-C.

## 2020-11-23 ENCOUNTER — TELEPHONE (OUTPATIENT)
Dept: INTERNAL MEDICINE | Facility: IMAGING CENTER | Age: 50
End: 2020-11-23

## 2020-11-23 RX ORDER — ALBUTEROL SULFATE 90 UG/1
2 AEROSOL, METERED RESPIRATORY (INHALATION) EVERY 4 HOURS PRN
Qty: 1 EACH | Refills: 2 | Status: SHIPPED | OUTPATIENT
Start: 2020-11-23 | End: 2021-04-21

## 2020-11-23 RX ORDER — AMOXICILLIN AND CLAVULANATE POTASSIUM 875; 125 MG/1; MG/1
1 TABLET, FILM COATED ORAL 2 TIMES DAILY
Qty: 20 TAB | Refills: 0 | Status: SHIPPED | OUTPATIENT
Start: 2020-11-23 | End: 2023-09-08 | Stop reason: SDUPTHER

## 2020-11-24 NOTE — TELEPHONE ENCOUNTER
Carie reports 3 week history of copious yellow post nasal drip & cough.  She occasionally coughs to the point of vomiting.  Last night she noticed that she was wheezing.  She has frontal & occipital headaches.  No known fever.  No diarrhea.  No known covid contacts.  Antibiotic prescription & Albuterol to preferred pharmacy per Dr Ballard.  Follow up with virtual appointment in 48 hours.

## 2020-11-25 ENCOUNTER — TELEMEDICINE (OUTPATIENT)
Dept: INTERNAL MEDICINE | Facility: IMAGING CENTER | Age: 50
End: 2020-11-25
Payer: COMMERCIAL

## 2020-11-25 DIAGNOSIS — E03.9 HYPOTHYROIDISM (ACQUIRED): ICD-10-CM

## 2020-11-25 DIAGNOSIS — Z12.31 ENCOUNTER FOR SCREENING MAMMOGRAM FOR MALIGNANT NEOPLASM OF BREAST: ICD-10-CM

## 2020-11-25 DIAGNOSIS — Z86.39 HISTORY OF NON ANEMIC VITAMIN B12 DEFICIENCY: ICD-10-CM

## 2020-11-25 DIAGNOSIS — F32.A ANXIETY AND DEPRESSION: ICD-10-CM

## 2020-11-25 DIAGNOSIS — E78.1 HYPERTRIGLYCERIDEMIA: ICD-10-CM

## 2020-11-25 DIAGNOSIS — F41.9 ANXIETY AND DEPRESSION: ICD-10-CM

## 2020-11-25 PROBLEM — E83.119 HEMOCHROMATOSIS: Status: RESOLVED | Noted: 2019-01-18 | Resolved: 2020-11-25

## 2020-11-25 PROCEDURE — 99213 OFFICE O/P EST LOW 20 MIN: CPT | Mod: 95,CR | Performed by: INTERNAL MEDICINE

## 2020-11-25 RX ORDER — ALPRAZOLAM 0.5 MG/1
0.5 TABLET ORAL 2 TIMES DAILY PRN
Qty: 60 TAB | Refills: 0 | Status: SHIPPED
Start: 2020-11-25 | End: 2021-01-13 | Stop reason: SDUPTHER

## 2020-11-25 NOTE — PROGRESS NOTES
Established Patient Note   HPI:   Patient was presented for a telehealth consultation via secure and encrypted videoconferencing technology with face time due to current pandemic with covid 19. She was diagnosed in past to have hemochromatosis but work up with blood did not reveal a positive genotype. She has been taking augmentin for sinus infection for past 2 days.    Past Medical History:   Diagnosis Date   • Anxiety and depression 1/23/2019   • Colon polyp 3/29/2019    Colonoscopy March 2019: 3mm descending colon polyp   • Disorder of thyroid    • Diverticulosis of colon 1/23/2019    MRI abdomen Jan. 2019   • Generalized anxiety disorder    • Hypertriglyceridemia 1/23/2019   • Low serum HDL 1/23/2019   • Menopause    • Vitamin B12 deficiency 1/23/2019       Current Outpatient Medications   Medication Sig Dispense Refill   • amoxicillin-clavulanate (AUGMENTIN) 875-125 MG Tab Take 1 Tab by mouth 2 times a day for 10 days. 20 Tab 0   • albuterol 108 (90 Base) MCG/ACT Aero Soln inhalation aerosol Inhale 2 Puffs every four hours as needed for Shortness of Breath. 1 Each 2   • FLUoxetine (PROZAC) 20 MG Cap TAKE ONE CAPSULE BY MOUTH EVERY DAY 90 Cap 3   • ALPRAZolam (XANAX) 0.5 MG Tab Take 0.5 mg by mouth at bedtime as needed for Sleep.     • progesterone (PROMETRIUM) 100 MG Cap TAKE ONE CAPSULE BY MOUTH EVERY DAY 90 Cap 3   • SYNTHROID 88 MCG Tab TAKE ONE TABLET BY MOUTH EVERY DAY IN THE MORNING ON AN EMPTY STOMACH 90 Tab 3   • NABOR 0.0375 MG/24HR patch Apply patch on skin twice a week 24 Tab 4   • Omega-3 Fatty Acids (FISH OIL) 1000 MG Cap capsule Take 1,000 mg by mouth every day.     • Multiple Vitamins-Minerals (OCUVITE-LUTEIN) Tab Take 1 tablet by mouth every day.     • ibuprofen (MOTRIN) 800 MG Tab Take 1 Tab by mouth every 8 hours as needed for Moderate Pain, Headache or Inflammation. 30 Tab 0     No current facility-administered medications for this visit.          Allergies   Allergen Reactions   • Norco  "[Apap-Fd&C Yellow #10 Al Cervantes-Hydrocodone]      itching   • Sulfa Drugs Anaphylaxis   • Zofran [Dextrose-Ondansetron] Unspecified     Migraine   • Lorazepam Anxiety and Unspecified     \"IT MAKES ME CRY\"         Social History     Tobacco Use   • Smoking status: Current Every Day Smoker     Packs/day: 0.50     Years: 30.00     Pack years: 15.00     Types: Cigarettes   • Smokeless tobacco: Never Used   Substance Use Topics   • Alcohol use: Yes     Comment: a glass of wine per day   • Drug use: No       Past Surgical History:   Procedure Laterality Date   • WOUND CLOSURE ORTHO Left 9/24/2019    Procedure: CLOSURE, WOUND, ORTHO - LEG W/WOUND VAC REMOVAL;  Surgeon: Paolo Odell M.D.;  Location: SURGERY [facility];  Service: Orthopedics   • IRRIGATION & DEBRIDEMENT ORTHO Left 9/21/2019    Procedure: IRRIGATION AND DEBRIDEMENT, WOUND - FOR PROXIMAL TIBIA HEMATOMA EVACUATION AND WOUND VAC APPLICATION;  Surgeon: Paolo Odell M.D.;  Location: SURGERY [facility];  Service: Orthopedics   • OTHER ORTHOPEDIC SURGERY Right 2017   • ROBOTIC LAPAROSCOPIC CHOLECYSTECTOMY  2017   • UMBILICAL HERNIA REPAIR  2017   • INGUINAL HERNIA REPAIR BILATERAL  1999    with Mesh   • TONSILLECTOMY AND ADENOIDECTOMY  1976   • APPENDECTOMY     • MAMMOPLASTY AUGMENTATION Bilateral         ROS    Ambulatory Vitals  There were no vitals taken for this visit.    Physical Exam      Assessment and Plan:     1. Hypothyroidism (acquired)     2. History of non anemic vitamin B12 deficiency     3. Hypertriglyceridemia     4. Anxiety and depression       Will provide one month of her alprazolam; advised she get her blood work done within the next month.  Face to face time: 20 minutes with greater than 50% of time spent with direct patient contact and medical management.     Herbie Joshi M.D.  "

## 2020-12-04 ENCOUNTER — HOSPITAL ENCOUNTER (OUTPATIENT)
Dept: LAB | Facility: MEDICAL CENTER | Age: 50
End: 2020-12-04
Attending: INTERNAL MEDICINE
Payer: COMMERCIAL

## 2020-12-04 ENCOUNTER — TELEPHONE (OUTPATIENT)
Dept: INTERNAL MEDICINE | Facility: IMAGING CENTER | Age: 50
End: 2020-12-04

## 2020-12-04 DIAGNOSIS — Z20.822 CLOSE EXPOSURE TO COVID-19 VIRUS: ICD-10-CM

## 2020-12-04 PROCEDURE — C9803 HOPD COVID-19 SPEC COLLECT: HCPCS

## 2020-12-04 PROCEDURE — U0003 INFECTIOUS AGENT DETECTION BY NUCLEIC ACID (DNA OR RNA); SEVERE ACUTE RESPIRATORY SYNDROME CORONAVIRUS 2 (SARS-COV-2) (CORONAVIRUS DISEASE [COVID-19]), AMPLIFIED PROBE TECHNIQUE, MAKING USE OF HIGH THROUGHPUT TECHNOLOGIES AS DESCRIBED BY CMS-2020-01-R: HCPCS

## 2020-12-04 RX ORDER — FLUCONAZOLE 150 MG/1
TABLET ORAL
Qty: 2 TAB | Refills: 0 | Status: ON HOLD | OUTPATIENT
Start: 2020-12-04 | End: 2021-02-14

## 2020-12-04 RX ORDER — NITROFURANTOIN 25; 75 MG/1; MG/1
100 CAPSULE ORAL 2 TIMES DAILY
Qty: 10 CAP | Refills: 0 | Status: ON HOLD | OUTPATIENT
Start: 2020-12-04 | End: 2021-02-14

## 2020-12-05 NOTE — TELEPHONE ENCOUNTER
Patient just finished a 10 day course of Augmentin for sinusitis.  She battled significant diarrhea the entire course of Augmentin.  She now reports dysuria as well as vaginal itching.  She has had close contact with a positive COVID family member so she cannot come in to the office for evaluation.  She was also swabbed for COVID today.  Prescriptions to preferred pharmacy per Dr Ballard

## 2021-01-04 ENCOUNTER — HOSPITAL ENCOUNTER (OUTPATIENT)
Dept: LAB | Facility: MEDICAL CENTER | Age: 51
End: 2021-01-04
Attending: INTERNAL MEDICINE
Payer: COMMERCIAL

## 2021-01-04 DIAGNOSIS — Z20.822 EXPOSURE TO COVID-19 VIRUS: ICD-10-CM

## 2021-01-04 DIAGNOSIS — J32.9 RECURRENT SINUSITIS: ICD-10-CM

## 2021-01-04 PROCEDURE — U0003 INFECTIOUS AGENT DETECTION BY NUCLEIC ACID (DNA OR RNA); SEVERE ACUTE RESPIRATORY SYNDROME CORONAVIRUS 2 (SARS-COV-2) (CORONAVIRUS DISEASE [COVID-19]), AMPLIFIED PROBE TECHNIQUE, MAKING USE OF HIGH THROUGHPUT TECHNOLOGIES AS DESCRIBED BY CMS-2020-01-R: HCPCS

## 2021-01-04 PROCEDURE — U0005 INFEC AGEN DETEC AMPLI PROBE: HCPCS

## 2021-01-04 PROCEDURE — C9803 HOPD COVID-19 SPEC COLLECT: HCPCS

## 2021-01-06 ENCOUNTER — HOSPITAL ENCOUNTER (OUTPATIENT)
Facility: MEDICAL CENTER | Age: 51
End: 2021-01-06
Attending: INTERNAL MEDICINE
Payer: COMMERCIAL

## 2021-01-06 ENCOUNTER — NON-PROVIDER VISIT (OUTPATIENT)
Dept: INTERNAL MEDICINE | Facility: IMAGING CENTER | Age: 51
End: 2021-01-06
Payer: COMMERCIAL

## 2021-01-06 DIAGNOSIS — Z01.89 ENCOUNTER FOR ROUTINE LABORATORY TESTING: ICD-10-CM

## 2021-01-06 DIAGNOSIS — R23.2 HOT FLASHES: ICD-10-CM

## 2021-01-06 DIAGNOSIS — E53.8 VITAMIN B12 DEFICIENCY: ICD-10-CM

## 2021-01-06 DIAGNOSIS — E78.1 HYPERTRIGLYCERIDEMIA: ICD-10-CM

## 2021-01-06 DIAGNOSIS — E03.9 HYPOTHYROIDISM (ACQUIRED): ICD-10-CM

## 2021-01-06 DIAGNOSIS — Z01.89 ROUTINE LAB DRAW: ICD-10-CM

## 2021-01-06 LAB
25(OH)D3 SERPL-MCNC: 16 NG/ML (ref 30–100)
ALBUMIN SERPL BCP-MCNC: 4.6 G/DL (ref 3.2–4.9)
ALBUMIN/GLOB SERPL: 1.8 G/DL
ALP SERPL-CCNC: 163 U/L (ref 30–99)
ALT SERPL-CCNC: 121 U/L (ref 2–50)
ANION GAP SERPL CALC-SCNC: 15 MMOL/L (ref 7–16)
AST SERPL-CCNC: 284 U/L (ref 12–45)
BASOPHILS # BLD AUTO: 0.6 % (ref 0–1.8)
BASOPHILS # BLD: 0.05 K/UL (ref 0–0.12)
BILIRUB SERPL-MCNC: 0.6 MG/DL (ref 0.1–1.5)
BUN SERPL-MCNC: 6 MG/DL (ref 8–22)
CALCIUM SERPL-MCNC: 10.3 MG/DL (ref 8.5–10.5)
CHLORIDE SERPL-SCNC: 97 MMOL/L (ref 96–112)
CO2 SERPL-SCNC: 25 MMOL/L (ref 20–33)
CREAT SERPL-MCNC: 0.52 MG/DL (ref 0.5–1.4)
EOSINOPHIL # BLD AUTO: 0.05 K/UL (ref 0–0.51)
EOSINOPHIL NFR BLD: 0.6 % (ref 0–6.9)
ERYTHROCYTE [DISTWIDTH] IN BLOOD BY AUTOMATED COUNT: 63.5 FL (ref 35.9–50)
EST. AVERAGE GLUCOSE BLD GHB EST-MCNC: 80 MG/DL
FERRITIN SERPL-MCNC: 45 NG/ML (ref 10–291)
GLOBULIN SER CALC-MCNC: 2.6 G/DL (ref 1.9–3.5)
GLUCOSE SERPL-MCNC: 90 MG/DL (ref 65–99)
HBA1C MFR BLD: 4.4 % (ref 0–5.6)
HCT VFR BLD AUTO: 40.9 % (ref 37–47)
HGB BLD-MCNC: 13 G/DL (ref 12–16)
IMM GRANULOCYTES # BLD AUTO: 0.03 K/UL (ref 0–0.11)
IMM GRANULOCYTES NFR BLD AUTO: 0.4 % (ref 0–0.9)
IRON SATN MFR SERPL: 7 % (ref 15–55)
IRON SERPL-MCNC: 30 UG/DL (ref 40–170)
LYMPHOCYTES # BLD AUTO: 2.29 K/UL (ref 1–4.8)
LYMPHOCYTES NFR BLD: 28.7 % (ref 22–41)
MCH RBC QN AUTO: 31.3 PG (ref 27–33)
MCHC RBC AUTO-ENTMCNC: 31.8 G/DL (ref 33.6–35)
MCV RBC AUTO: 98.6 FL (ref 81.4–97.8)
MONOCYTES # BLD AUTO: 0.69 K/UL (ref 0–0.85)
MONOCYTES NFR BLD AUTO: 8.6 % (ref 0–13.4)
NEUTROPHILS # BLD AUTO: 4.88 K/UL (ref 2–7.15)
NEUTROPHILS NFR BLD: 61.1 % (ref 44–72)
NRBC # BLD AUTO: 0 K/UL
NRBC BLD-RTO: 0 /100 WBC
PLATELET # BLD AUTO: 298 K/UL (ref 164–446)
PMV BLD AUTO: 10.9 FL (ref 9–12.9)
POTASSIUM SERPL-SCNC: 3.1 MMOL/L (ref 3.6–5.5)
PROT SERPL-MCNC: 7.2 G/DL (ref 6–8.2)
RBC # BLD AUTO: 4.15 M/UL (ref 4.2–5.4)
SODIUM SERPL-SCNC: 137 MMOL/L (ref 135–145)
T3 SERPL-MCNC: 86.6 NG/DL (ref 60–181)
TIBC SERPL-MCNC: 461 UG/DL (ref 250–450)
TSH SERPL DL<=0.005 MIU/L-ACNC: 0.79 UIU/ML (ref 0.38–5.33)
UIBC SERPL-MCNC: 431 UG/DL (ref 110–370)
VIT B12 SERPL-MCNC: 1168 PG/ML (ref 211–911)
WBC # BLD AUTO: 8 K/UL (ref 4.8–10.8)

## 2021-01-06 PROCEDURE — 82728 ASSAY OF FERRITIN: CPT

## 2021-01-06 PROCEDURE — 82607 VITAMIN B-12: CPT

## 2021-01-06 PROCEDURE — 85025 COMPLETE CBC W/AUTO DIFF WBC: CPT

## 2021-01-06 PROCEDURE — 80061 LIPID PANEL: CPT

## 2021-01-06 PROCEDURE — 83550 IRON BINDING TEST: CPT

## 2021-01-06 PROCEDURE — 83036 HEMOGLOBIN GLYCOSYLATED A1C: CPT

## 2021-01-06 PROCEDURE — 80053 COMPREHEN METABOLIC PANEL: CPT

## 2021-01-06 PROCEDURE — 84443 ASSAY THYROID STIM HORMONE: CPT

## 2021-01-06 PROCEDURE — 83704 LIPOPROTEIN BLD QUAN PART: CPT

## 2021-01-06 PROCEDURE — 84480 ASSAY TRIIODOTHYRONINE (T3): CPT

## 2021-01-06 PROCEDURE — 83540 ASSAY OF IRON: CPT

## 2021-01-06 PROCEDURE — 82306 VITAMIN D 25 HYDROXY: CPT

## 2021-01-09 LAB
CHOLEST SERPL-MCNC: 211 MG/DL
HDL PARTICAL NO Q4363: 34.3 UMOL/L
HDL SIZE Q4361: >11 NM
HDLC SERPL-MCNC: 133 MG/DL (ref 40–59)
HLD.LARGE SERPL-SCNC: >17 UMOL/L
L VLDL PART NO Q4357: 2.9 NMOL/L
LDL SERPL QN: 20.9 NM
LDL SERPL-SCNC: 1458 NMOL/L
LDL SMALL SERPL-SCNC: 477 NMOL/L
LDLC SERPL CALC-MCNC: 65 MG/DL
PATHOLOGY STUDY: ABNORMAL
TRIGL SERPL-MCNC: 64 MG/DL (ref 30–149)
VLDL SIZE Q4362: 54 NM

## 2021-01-13 DIAGNOSIS — E55.9 VITAMIN D DEFICIENCY: ICD-10-CM

## 2021-01-13 DIAGNOSIS — F32.A ANXIETY AND DEPRESSION: ICD-10-CM

## 2021-01-13 DIAGNOSIS — F10.20 UNCOMPLICATED ALCOHOL DEPENDENCE (HCC): ICD-10-CM

## 2021-01-13 DIAGNOSIS — E61.1 IRON DEFICIENCY: ICD-10-CM

## 2021-01-13 DIAGNOSIS — R74.8 ELEVATED LIVER ENZYMES: ICD-10-CM

## 2021-01-13 DIAGNOSIS — F41.9 ANXIETY AND DEPRESSION: ICD-10-CM

## 2021-01-13 DIAGNOSIS — E87.6 HYPOKALEMIA: ICD-10-CM

## 2021-01-13 RX ORDER — ALPRAZOLAM 0.5 MG/1
0.5 TABLET ORAL 2 TIMES DAILY PRN
Qty: 60 TAB | Refills: 0 | Status: ON HOLD
Start: 2021-01-13 | End: 2021-02-14

## 2021-01-13 NOTE — PROGRESS NOTES
Reviewed lab results with Carie. LFTs elevated with AST greater than ALT. She admits she has started drinking alcohol again. Her iron level is on low side. Discussed stopping alcohol intake. Will check CMP in 3 weeks to follow up elevated LFTs. Check iron/TIBC/sat and vitamin D in 3 months. Advised she start taking vitamin D 2000 IU qd.

## 2021-01-25 DIAGNOSIS — E03.9 HYPOTHYROIDISM, UNSPECIFIED TYPE: ICD-10-CM

## 2021-01-25 DIAGNOSIS — Z78.0 MENOPAUSE: ICD-10-CM

## 2021-01-25 RX ORDER — LEVOTHYROXINE SODIUM 88 MCG
TABLET ORAL
Qty: 90 TAB | Refills: 3 | OUTPATIENT
Start: 2021-01-25

## 2021-01-25 RX ORDER — LEVOTHYROXINE SODIUM 88 MCG
TABLET ORAL
Qty: 90 TAB | Refills: 3 | Status: SHIPPED | OUTPATIENT
Start: 2021-01-25 | End: 2022-01-25

## 2021-02-07 ENCOUNTER — APPOINTMENT (OUTPATIENT)
Dept: RADIOLOGY | Facility: MEDICAL CENTER | Age: 51
End: 2021-02-07
Attending: EMERGENCY MEDICINE
Payer: COMMERCIAL

## 2021-02-07 ENCOUNTER — HOSPITAL ENCOUNTER (EMERGENCY)
Facility: MEDICAL CENTER | Age: 51
End: 2021-02-07
Attending: EMERGENCY MEDICINE | Admitting: EMERGENCY MEDICINE
Payer: COMMERCIAL

## 2021-02-07 VITALS
OXYGEN SATURATION: 95 % | HEART RATE: 101 BPM | TEMPERATURE: 99 F | WEIGHT: 170 LBS | RESPIRATION RATE: 18 BRPM | BODY MASS INDEX: 25.47 KG/M2 | DIASTOLIC BLOOD PRESSURE: 99 MMHG | SYSTOLIC BLOOD PRESSURE: 125 MMHG
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
## 2021-02-07 DIAGNOSIS — S09.90XA CLOSED HEAD INJURY, INITIAL ENCOUNTER: ICD-10-CM

## 2021-02-07 PROCEDURE — 70450 CT HEAD/BRAIN W/O DYE: CPT

## 2021-02-07 PROCEDURE — 96374 THER/PROPH/DIAG INJ IV PUSH: CPT

## 2021-02-07 PROCEDURE — 96375 TX/PRO/DX INJ NEW DRUG ADDON: CPT

## 2021-02-07 PROCEDURE — 700111 HCHG RX REV CODE 636 W/ 250 OVERRIDE (IP): Performed by: EMERGENCY MEDICINE

## 2021-02-07 PROCEDURE — 99284 EMERGENCY DEPT VISIT MOD MDM: CPT

## 2021-02-07 RX ORDER — ONDANSETRON 2 MG/ML
4 INJECTION INTRAMUSCULAR; INTRAVENOUS ONCE
Status: COMPLETED | OUTPATIENT
Start: 2021-02-07 | End: 2021-02-07

## 2021-02-07 RX ORDER — KETOROLAC TROMETHAMINE 30 MG/ML
30 INJECTION, SOLUTION INTRAMUSCULAR; INTRAVENOUS ONCE
Status: COMPLETED | OUTPATIENT
Start: 2021-02-07 | End: 2021-02-07

## 2021-02-07 RX ADMIN — KETOROLAC TROMETHAMINE 30 MG: 30 INJECTION, SOLUTION INTRAMUSCULAR at 05:10

## 2021-02-07 RX ADMIN — ONDANSETRON 4 MG: 2 INJECTION INTRAMUSCULAR; INTRAVENOUS at 05:10

## 2021-02-07 ASSESSMENT — LIFESTYLE VARIABLES
ON A TYPICAL DAY WHEN YOU DRINK ALCOHOL HOW MANY DRINKS DO YOU HAVE: 2
HOW MANY TIMES IN THE PAST YEAR HAVE YOU HAD 5 OR MORE DRINKS IN A DAY: 5
HAVE PEOPLE ANNOYED YOU BY CRITICIZING YOUR DRINKING: NO
TOTAL SCORE: 0
CONSUMPTION TOTAL: POSITIVE
AVERAGE NUMBER OF DAYS PER WEEK YOU HAVE A DRINK CONTAINING ALCOHOL: 5
EVER HAD A DRINK FIRST THING IN THE MORNING TO STEADY YOUR NERVES TO GET RID OF A HANGOVER: NO
TOTAL SCORE: 0
DO YOU DRINK ALCOHOL: YES
TOTAL SCORE: 0
EVER FELT BAD OR GUILTY ABOUT YOUR DRINKING: NO
HAVE YOU EVER FELT YOU SHOULD CUT DOWN ON YOUR DRINKING: NO

## 2021-02-07 ASSESSMENT — ENCOUNTER SYMPTOMS
BLURRED VISION: 0
NECK PAIN: 0
NAUSEA: 1
ABDOMINAL PAIN: 0
FEVER: 0
VOMITING: 1
LOSS OF CONSCIOUSNESS: 0
HEADACHES: 1
SHORTNESS OF BREATH: 0
BACK PAIN: 0

## 2021-02-07 ASSESSMENT — PAIN DESCRIPTION - PAIN TYPE: TYPE: ACUTE PAIN

## 2021-02-07 ASSESSMENT — FIBROSIS 4 INDEX
FIB4 SCORE: 4.59
FIB4 SCORE: 4.59

## 2021-02-07 NOTE — ED TRIAGE NOTES
"Chief Complaint   Patient presents with   • Fall Less than 10 Feet     Pt presents to ED via EMS from home for C/O fall injury to back of head approx 2 hrs ago Pt states \" I was trying to turn on a light a I tripped over my dog and fell over hitting my head Pt states she has been vomiting approx 30 min after fall \"  Pt denies LOC , cervical pain or any other injury at this time  Pt smells of ETOH  Pt AOX$  NAD       "

## 2021-02-07 NOTE — ED NOTES
Patient seen by EDP immediately upon arrival. Direct to CT after initial EDP eval, RN and EMS accompanied patient.

## 2021-02-07 NOTE — ED PROVIDER NOTES
"ED Provider Note    Primary care provider: Herbie Joshi M.D.  Means of arrival: private vehicle  History obtained from: patient  History limited by: none    CHIEF COMPLAINT  Chief Complaint   Patient presents with   • Fall Less than 10 Feet     Pt presents to ED via EMS from home for C/O fall injury to back of head approx 2 hrs ago Pt states \" I was trying to turn on a light a I tripped over my dog and fell over hitting my head Pt states she has been vomiting approx 30 min after fall \"  Pt denies LOC , cervical pain or any other injury at this time  Pt smells of ETOH  Pt AOX$  NAD         HPI  Carie Santiago is a 51 y.o. female who presents to the Emergency Department for head injury.  Patient reports that she woke up from bed to go do something when she tripped on her dog and fell and hit the front of her head and then again tripped while getting up and hit the back of her head.  Denies loss of consciousness.  She does admit to drinking alcohol tonight prior to falling.  She hit the right temple region in the posterior hospital region and is having an associated mild throbbing headache.  Per EMS she did vomit once in route to the hospital.  Her blood sugar in route to the hospital was 103.  Patient denies any other injuries.    REVIEW OF SYSTEMS  Review of Systems   Constitutional: Negative for fever.   Eyes: Negative for blurred vision.   Respiratory: Negative for shortness of breath.    Gastrointestinal: Positive for nausea and vomiting. Negative for abdominal pain.   Musculoskeletal: Negative for back pain and neck pain.   Neurological: Positive for headaches. Negative for loss of consciousness.   All other systems reviewed and are negative.        PAST MEDICAL HISTORY   has a past medical history of Anxiety and depression (1/23/2019), Colon polyp (3/29/2019), Disorder of thyroid, Diverticulosis of colon (1/23/2019), Generalized anxiety disorder, Hypertriglyceridemia (1/23/2019), Low serum HDL " "(1/23/2019), Menopause, and Vitamin B12 deficiency (1/23/2019).    SURGICAL HISTORY   has a past surgical history that includes other orthopedic surgery (Right, 2017); robotic laparoscopic cholecystectomy (2017); umbilical hernia repair (2017); appendectomy; tonsillectomy and adenoidectomy (1976); mammoplasty augmentation (Bilateral); inguinal hernia repair bilateral (1999); irrigation & debridement ortho (Left, 9/21/2019); and wound closure ortho (Left, 9/24/2019).    SOCIAL HISTORY  Social History     Tobacco Use   • Smoking status: Current Every Day Smoker     Packs/day: 0.50     Years: 30.00     Pack years: 15.00     Types: Cigarettes   • Smokeless tobacco: Never Used   Substance Use Topics   • Alcohol use: Yes     Comment: a glass of wine per day   • Drug use: No      Social History     Substance and Sexual Activity   Drug Use No       FAMILY HISTORY  Family History   Problem Relation Age of Onset   • Heart Disease Mother         Atrial fibrillation   • Thyroid Mother    • Cancer Mother         Melanoma   • Heart Disease Brother    • Diabetes Son    • Dementia Neg Hx    • Colon Cancer Neg Hx    • Breast Cancer Neg Hx        CURRENT MEDICATIONS  Home Medications    See medication list         ALLERGIES  Allergies   Allergen Reactions   • Norco [Apap-Fd&C Yellow #10 Al Cervantes-Hydrocodone]      itching   • Sulfa Drugs Anaphylaxis   • Zofran [Dextrose-Ondansetron] Unspecified     Migraine   • Lorazepam Anxiety and Unspecified     \"IT MAKES ME CRY\"       PHYSICAL EXAM  VITAL SIGNS: /92   Pulse 96   Temp (S) 37.2 °C (99 °F) (Oral)   Resp 18   Wt 77.1 kg (170 lb)   SpO2 98%   BMI 25.47 kg/m²   Vitals reviewed by myself.  Physical Exam   Constitutional: She is oriented to person, place, and time and well-developed, well-nourished, and in no distress. No distress.   HENT:   Patient has occipital scalp and right temporal scalp hematoma   Eyes: Pupils are equal, round, and reactive to light. EOM are normal. "   Neck: Normal range of motion. Neck supple.   No midline C-spine tenderness   Cardiovascular: Normal rate, regular rhythm and normal heart sounds. Exam reveals no gallop and no friction rub.   No murmur heard.  Pulmonary/Chest: Effort normal and breath sounds normal. No respiratory distress. She has no wheezes. She has no rales.   Abdominal: Soft. She exhibits no distension. There is no abdominal tenderness. There is no rebound.   Musculoskeletal: Normal range of motion.      Comments: Strength is 5/5 in all extremities   Neurological: She is alert and oriented to person, place, and time. No cranial nerve deficit.   CN 2-12 intact. Sensation intact in all extremities. No focal neurologic deficits    Skin: Skin is warm and dry.       DIAGNOSTIC STUDIES /  LABS      RADIOLOGY  CT-HEAD W/O   Final Result      1.  No acute intracranial abnormality.   2.  Mild atrophy.               INTERPRETING LOCATION:  [address] [place] [address], [facility]        The radiologist's interpretation of all radiological studies have been reviewed by me.    COURSE & MEDICAL DECISION MAKING  Nursing notes, VS, PMSFHx reviewed in chart.    Patient is a 51 year old female who comes in after mechanical fall. Differential diagnosis includes CHI, intracranial hemorrhage, concussion. Diagnostic work-up includes CT Head.    On exam patient admits to drinking alcohol but is clinically sober. Has normal speech and steady gait. I reviewed CT scan and saw no acute intracranial abnormalities, therefore patient was treated with Zofran and Toradol. Radiology report agrees no acute intracranial process. She feels improved upon reassessment and is tolerating oral intake and ambulating with steady gait. Therefore she is reassured and advised on symptomatic management of closed head injury. She is then discharged in stable condition.     FINAL IMPRESSION  1. Fall, initial encounter    2. Closed head injury, initial encounter

## 2021-02-09 ENCOUNTER — APPOINTMENT (OUTPATIENT)
Dept: RADIOLOGY | Facility: MEDICAL CENTER | Age: 51
DRG: 896 | End: 2021-02-09
Attending: INTERNAL MEDICINE
Payer: COMMERCIAL

## 2021-02-09 ENCOUNTER — APPOINTMENT (OUTPATIENT)
Dept: RADIOLOGY | Facility: MEDICAL CENTER | Age: 51
DRG: 896 | End: 2021-02-09
Attending: EMERGENCY MEDICINE
Payer: COMMERCIAL

## 2021-02-09 ENCOUNTER — HOSPITAL ENCOUNTER (INPATIENT)
Facility: MEDICAL CENTER | Age: 51
LOS: 5 days | DRG: 896 | End: 2021-02-14
Attending: EMERGENCY MEDICINE | Admitting: STUDENT IN AN ORGANIZED HEALTH CARE EDUCATION/TRAINING PROGRAM
Payer: COMMERCIAL

## 2021-02-09 PROBLEM — R10.10 PAIN OF UPPER ABDOMEN: Status: ACTIVE | Noted: 2021-02-09

## 2021-02-09 PROBLEM — F10.939 ALCOHOL WITHDRAWAL SYNDROME WITH COMPLICATION (HCC): Status: ACTIVE | Noted: 2021-02-09

## 2021-02-09 PROBLEM — E83.42 HYPOMAGNESEMIA: Status: ACTIVE | Noted: 2021-02-09

## 2021-02-09 PROBLEM — E87.6 HYPOKALEMIA: Status: ACTIVE | Noted: 2021-02-09

## 2021-02-09 PROBLEM — N17.9 AKI (ACUTE KIDNEY INJURY) (HCC): Status: ACTIVE | Noted: 2021-02-09

## 2021-02-09 LAB
ALBUMIN SERPL BCP-MCNC: 4.4 G/DL (ref 3.2–4.9)
ALBUMIN/GLOB SERPL: 1.5 G/DL
ALP SERPL-CCNC: 304 U/L (ref 30–99)
ALT SERPL-CCNC: 60 U/L (ref 2–50)
AMMONIA PLAS-SCNC: 58 UMOL/L (ref 11–45)
ANION GAP SERPL CALC-SCNC: 45 MMOL/L (ref 7–16)
APTT PPP: 28.8 SEC (ref 24.7–36)
AST SERPL-CCNC: 85 U/L (ref 12–45)
BASOPHILS # BLD AUTO: 0.3 % (ref 0–1.8)
BASOPHILS # BLD: 0.03 K/UL (ref 0–0.12)
BILIRUB SERPL-MCNC: 1.9 MG/DL (ref 0.1–1.5)
BUN SERPL-MCNC: 30 MG/DL (ref 8–22)
CALCIUM SERPL-MCNC: 11.8 MG/DL (ref 8.5–10.5)
CHLORIDE SERPL-SCNC: 71 MMOL/L (ref 96–112)
CO2 SERPL-SCNC: 14 MMOL/L (ref 20–33)
CREAT SERPL-MCNC: 1.69 MG/DL (ref 0.5–1.4)
EKG IMPRESSION: NORMAL
EOSINOPHIL # BLD AUTO: 0 K/UL (ref 0–0.51)
EOSINOPHIL NFR BLD: 0 % (ref 0–6.9)
ERYTHROCYTE [DISTWIDTH] IN BLOOD BY AUTOMATED COUNT: 62.4 FL (ref 35.9–50)
ETHANOL BLD-MCNC: <10.1 MG/DL (ref 0–10)
GLOBULIN SER CALC-MCNC: 3 G/DL (ref 1.9–3.5)
GLUCOSE SERPL-MCNC: 173 MG/DL (ref 65–99)
HCT VFR BLD AUTO: 44.9 % (ref 37–47)
HCT VFR BLD AUTO: 48.1 % (ref 37–47)
HGB BLD-MCNC: 13.9 G/DL (ref 12–16)
HGB BLD-MCNC: 15.6 G/DL (ref 12–16)
IMM GRANULOCYTES # BLD AUTO: 0.04 K/UL (ref 0–0.11)
IMM GRANULOCYTES NFR BLD AUTO: 0.4 % (ref 0–0.9)
INR PPP: 1 (ref 0.87–1.13)
LACTATE BLD-SCNC: 16.4 MMOL/L (ref 0.5–2)
LACTATE BLD-SCNC: 2.5 MMOL/L (ref 0.5–2)
LACTATE BLD-SCNC: 7.3 MMOL/L (ref 0.5–2)
LIPASE SERPL-CCNC: 813 U/L (ref 11–82)
LYMPHOCYTES # BLD AUTO: 1.52 K/UL (ref 1–4.8)
LYMPHOCYTES NFR BLD: 13.6 % (ref 22–41)
MAGNESIUM SERPL-MCNC: 2.5 MG/DL (ref 1.5–2.5)
MCH RBC QN AUTO: 31.5 PG (ref 27–33)
MCHC RBC AUTO-ENTMCNC: 32.4 G/DL (ref 33.6–35)
MCV RBC AUTO: 97 FL (ref 81.4–97.8)
MONOCYTES # BLD AUTO: 0.71 K/UL (ref 0–0.85)
MONOCYTES NFR BLD AUTO: 6.4 % (ref 0–13.4)
NEUTROPHILS # BLD AUTO: 8.85 K/UL (ref 2–7.15)
NEUTROPHILS NFR BLD: 79.3 % (ref 44–72)
NRBC # BLD AUTO: 0 K/UL
NRBC BLD-RTO: 0 /100 WBC
PLATELET # BLD AUTO: 145 K/UL (ref 164–446)
PMV BLD AUTO: 11.8 FL (ref 9–12.9)
POTASSIUM SERPL-SCNC: 3 MMOL/L (ref 3.6–5.5)
PROCALCITONIN SERPL-MCNC: 0.9 NG/ML
PROT SERPL-MCNC: 7.4 G/DL (ref 6–8.2)
PROTHROMBIN TIME: 13.5 SEC (ref 12–14.6)
RBC # BLD AUTO: 4.96 M/UL (ref 4.2–5.4)
SARS-COV-2 RNA RESP QL NAA+PROBE: NOTDETECTED
SODIUM SERPL-SCNC: 130 MMOL/L (ref 135–145)
SPECIMEN SOURCE: NORMAL
TROPONIN T SERPL-MCNC: 64 NG/L (ref 6–19)
WBC # BLD AUTO: 11.2 K/UL (ref 4.8–10.8)

## 2021-02-09 PROCEDURE — 85014 HEMATOCRIT: CPT

## 2021-02-09 PROCEDURE — 700111 HCHG RX REV CODE 636 W/ 250 OVERRIDE (IP): Performed by: INTERNAL MEDICINE

## 2021-02-09 PROCEDURE — A9270 NON-COVERED ITEM OR SERVICE: HCPCS | Performed by: INTERNAL MEDICINE

## 2021-02-09 PROCEDURE — HZ2ZZZZ DETOXIFICATION SERVICES FOR SUBSTANCE ABUSE TREATMENT: ICD-10-PCS | Performed by: STUDENT IN AN ORGANIZED HEALTH CARE EDUCATION/TRAINING PROGRAM

## 2021-02-09 PROCEDURE — 700101 HCHG RX REV CODE 250: Performed by: STUDENT IN AN ORGANIZED HEALTH CARE EDUCATION/TRAINING PROGRAM

## 2021-02-09 PROCEDURE — 770022 HCHG ROOM/CARE - ICU (200)

## 2021-02-09 PROCEDURE — 84484 ASSAY OF TROPONIN QUANT: CPT

## 2021-02-09 PROCEDURE — 83690 ASSAY OF LIPASE: CPT

## 2021-02-09 PROCEDURE — 96368 THER/DIAG CONCURRENT INF: CPT

## 2021-02-09 PROCEDURE — 96367 TX/PROPH/DG ADDL SEQ IV INF: CPT

## 2021-02-09 PROCEDURE — 700105 HCHG RX REV CODE 258: Performed by: EMERGENCY MEDICINE

## 2021-02-09 PROCEDURE — 87077 CULTURE AEROBIC IDENTIFY: CPT

## 2021-02-09 PROCEDURE — 96366 THER/PROPH/DIAG IV INF ADDON: CPT

## 2021-02-09 PROCEDURE — 82077 ASSAY SPEC XCP UR&BREATH IA: CPT

## 2021-02-09 PROCEDURE — 93005 ELECTROCARDIOGRAM TRACING: CPT | Performed by: EMERGENCY MEDICINE

## 2021-02-09 PROCEDURE — 71045 X-RAY EXAM CHEST 1 VIEW: CPT

## 2021-02-09 PROCEDURE — 700105 HCHG RX REV CODE 258: Performed by: INTERNAL MEDICINE

## 2021-02-09 PROCEDURE — 80053 COMPREHEN METABOLIC PANEL: CPT

## 2021-02-09 PROCEDURE — 85025 COMPLETE CBC W/AUTO DIFF WBC: CPT

## 2021-02-09 PROCEDURE — U0003 INFECTIOUS AGENT DETECTION BY NUCLEIC ACID (DNA OR RNA); SEVERE ACUTE RESPIRATORY SYNDROME CORONAVIRUS 2 (SARS-COV-2) (CORONAVIRUS DISEASE [COVID-19]), AMPLIFIED PROBE TECHNIQUE, MAKING USE OF HIGH THROUGHPUT TECHNOLOGIES AS DESCRIBED BY CMS-2020-01-R: HCPCS

## 2021-02-09 PROCEDURE — U0005 INFEC AGEN DETEC AMPLI PROBE: HCPCS

## 2021-02-09 PROCEDURE — 83605 ASSAY OF LACTIC ACID: CPT

## 2021-02-09 PROCEDURE — 87040 BLOOD CULTURE FOR BACTERIA: CPT | Mod: 91

## 2021-02-09 PROCEDURE — 70450 CT HEAD/BRAIN W/O DYE: CPT

## 2021-02-09 PROCEDURE — 700102 HCHG RX REV CODE 250 W/ 637 OVERRIDE(OP): Performed by: INTERNAL MEDICINE

## 2021-02-09 PROCEDURE — 99291 CRITICAL CARE FIRST HOUR: CPT

## 2021-02-09 PROCEDURE — 99254 IP/OBS CNSLTJ NEW/EST MOD 60: CPT | Performed by: PSYCHIATRY & NEUROLOGY

## 2021-02-09 PROCEDURE — 700101 HCHG RX REV CODE 250: Performed by: EMERGENCY MEDICINE

## 2021-02-09 PROCEDURE — 96365 THER/PROPH/DIAG IV INF INIT: CPT

## 2021-02-09 PROCEDURE — 84145 PROCALCITONIN (PCT): CPT

## 2021-02-09 PROCEDURE — 85610 PROTHROMBIN TIME: CPT

## 2021-02-09 PROCEDURE — 85730 THROMBOPLASTIN TIME PARTIAL: CPT

## 2021-02-09 PROCEDURE — 700111 HCHG RX REV CODE 636 W/ 250 OVERRIDE (IP): Performed by: EMERGENCY MEDICINE

## 2021-02-09 PROCEDURE — 82140 ASSAY OF AMMONIA: CPT

## 2021-02-09 PROCEDURE — 83735 ASSAY OF MAGNESIUM: CPT

## 2021-02-09 PROCEDURE — 99223 1ST HOSP IP/OBS HIGH 75: CPT | Performed by: STUDENT IN AN ORGANIZED HEALTH CARE EDUCATION/TRAINING PROGRAM

## 2021-02-09 PROCEDURE — 700111 HCHG RX REV CODE 636 W/ 250 OVERRIDE (IP): Performed by: STUDENT IN AN ORGANIZED HEALTH CARE EDUCATION/TRAINING PROGRAM

## 2021-02-09 PROCEDURE — 96375 TX/PRO/DX INJ NEW DRUG ADDON: CPT

## 2021-02-09 PROCEDURE — 85018 HEMOGLOBIN: CPT

## 2021-02-09 PROCEDURE — 74176 CT ABD & PELVIS W/O CONTRAST: CPT

## 2021-02-09 RX ORDER — COVID-19 ANTIGEN TEST
440 KIT MISCELLANEOUS 2 TIMES DAILY PRN
Status: ON HOLD | COMMUNITY
End: 2021-02-14

## 2021-02-09 RX ORDER — MAGNESIUM SULFATE HEPTAHYDRATE 40 MG/ML
2 INJECTION, SOLUTION INTRAVENOUS ONCE
Status: COMPLETED | OUTPATIENT
Start: 2021-02-09 | End: 2021-02-09

## 2021-02-09 RX ORDER — PHENOBARBITAL SODIUM 130 MG/ML
260 INJECTION INTRAMUSCULAR; INTRAVENOUS
Status: DISCONTINUED | OUTPATIENT
Start: 2021-02-09 | End: 2021-02-10

## 2021-02-09 RX ORDER — ONDANSETRON 2 MG/ML
4 INJECTION INTRAMUSCULAR; INTRAVENOUS ONCE
Status: COMPLETED | OUTPATIENT
Start: 2021-02-09 | End: 2021-02-09

## 2021-02-09 RX ORDER — GUAIFENESIN 600 MG/1
600 TABLET, EXTENDED RELEASE ORAL 2 TIMES DAILY PRN
COMMUNITY
End: 2022-04-12

## 2021-02-09 RX ORDER — POLYETHYLENE GLYCOL 3350 17 G/17G
1 POWDER, FOR SOLUTION ORAL
Status: DISCONTINUED | OUTPATIENT
Start: 2021-02-09 | End: 2021-02-14 | Stop reason: HOSPADM

## 2021-02-09 RX ORDER — TRAMADOL HYDROCHLORIDE 50 MG/1
50-100 TABLET ORAL EVERY 4 HOURS PRN
Status: ON HOLD | COMMUNITY
End: 2022-07-19

## 2021-02-09 RX ORDER — OMEPRAZOLE 20 MG/1
20 CAPSULE, DELAYED RELEASE ORAL DAILY
Status: DISCONTINUED | OUTPATIENT
Start: 2021-02-10 | End: 2021-02-10

## 2021-02-09 RX ORDER — SODIUM CHLORIDE AND POTASSIUM CHLORIDE 300; 900 MG/100ML; MG/100ML
INJECTION, SOLUTION INTRAVENOUS CONTINUOUS
Status: DISCONTINUED | OUTPATIENT
Start: 2021-02-09 | End: 2021-02-12

## 2021-02-09 RX ORDER — BISACODYL 10 MG
10 SUPPOSITORY, RECTAL RECTAL
Status: DISCONTINUED | OUTPATIENT
Start: 2021-02-09 | End: 2021-02-14 | Stop reason: HOSPADM

## 2021-02-09 RX ORDER — SODIUM CHLORIDE, SODIUM LACTATE, POTASSIUM CHLORIDE, AND CALCIUM CHLORIDE .6; .31; .03; .02 G/100ML; G/100ML; G/100ML; G/100ML
30 INJECTION, SOLUTION INTRAVENOUS
Status: COMPLETED | OUTPATIENT
Start: 2021-02-09 | End: 2021-02-09

## 2021-02-09 RX ORDER — PHENOBARBITAL SODIUM 130 MG/ML
130 INJECTION INTRAMUSCULAR; INTRAVENOUS
Status: DISCONTINUED | OUTPATIENT
Start: 2021-02-09 | End: 2021-02-10

## 2021-02-09 RX ORDER — SODIUM CHLORIDE, SODIUM LACTATE, POTASSIUM CHLORIDE, AND CALCIUM CHLORIDE .6; .31; .03; .02 G/100ML; G/100ML; G/100ML; G/100ML
1000 INJECTION, SOLUTION INTRAVENOUS ONCE
Status: COMPLETED | OUTPATIENT
Start: 2021-02-09 | End: 2021-02-09

## 2021-02-09 RX ORDER — PANTOPRAZOLE SODIUM 40 MG/10ML
40 INJECTION, POWDER, LYOPHILIZED, FOR SOLUTION INTRAVENOUS 2 TIMES DAILY
Status: CANCELLED | OUTPATIENT
Start: 2021-02-09

## 2021-02-09 RX ORDER — DILTIAZEM HYDROCHLORIDE 5 MG/ML
10 INJECTION INTRAVENOUS ONCE
Status: ACTIVE | OUTPATIENT
Start: 2021-02-09 | End: 2021-02-10

## 2021-02-09 RX ORDER — AMOXICILLIN 250 MG
2 CAPSULE ORAL 2 TIMES DAILY
Status: DISCONTINUED | OUTPATIENT
Start: 2021-02-09 | End: 2021-02-14 | Stop reason: HOSPADM

## 2021-02-09 RX ORDER — POTASSIUM CHLORIDE 20 MEQ/1
20 TABLET, EXTENDED RELEASE ORAL 2 TIMES DAILY
Status: DISCONTINUED | OUTPATIENT
Start: 2021-02-09 | End: 2021-02-13

## 2021-02-09 RX ORDER — LABETALOL 200 MG/1
200 TABLET, FILM COATED ORAL EVERY 6 HOURS PRN
Status: DISCONTINUED | OUTPATIENT
Start: 2021-02-09 | End: 2021-02-14 | Stop reason: HOSPADM

## 2021-02-09 RX ORDER — POTASSIUM CHLORIDE 7.45 MG/ML
10 INJECTION INTRAVENOUS ONCE
Status: COMPLETED | OUTPATIENT
Start: 2021-02-09 | End: 2021-02-09

## 2021-02-09 RX ORDER — ACETAMINOPHEN 325 MG/1
650 TABLET ORAL EVERY 6 HOURS PRN
Status: DISCONTINUED | OUTPATIENT
Start: 2021-02-09 | End: 2021-02-14 | Stop reason: HOSPADM

## 2021-02-09 RX ORDER — SODIUM CHLORIDE, SODIUM LACTATE, POTASSIUM CHLORIDE, CALCIUM CHLORIDE 600; 310; 30; 20 MG/100ML; MG/100ML; MG/100ML; MG/100ML
1000 INJECTION, SOLUTION INTRAVENOUS ONCE
Status: COMPLETED | OUTPATIENT
Start: 2021-02-09 | End: 2021-02-09

## 2021-02-09 RX ORDER — HEPARIN SODIUM 5000 [USP'U]/ML
5000 INJECTION, SOLUTION INTRAVENOUS; SUBCUTANEOUS EVERY 8 HOURS
Status: DISCONTINUED | OUTPATIENT
Start: 2021-02-09 | End: 2021-02-11

## 2021-02-09 RX ORDER — LABETALOL HYDROCHLORIDE 5 MG/ML
10 INJECTION, SOLUTION INTRAVENOUS
Status: DISCONTINUED | OUTPATIENT
Start: 2021-02-09 | End: 2021-02-14 | Stop reason: HOSPADM

## 2021-02-09 RX ORDER — GAUZE BANDAGE 2" X 2"
100 BANDAGE TOPICAL DAILY
Status: COMPLETED | OUTPATIENT
Start: 2021-02-10 | End: 2021-02-13

## 2021-02-09 RX ORDER — LORAZEPAM 2 MG/ML
1 INJECTION INTRAMUSCULAR ONCE
Status: COMPLETED | OUTPATIENT
Start: 2021-02-09 | End: 2021-02-09

## 2021-02-09 RX ORDER — FOLIC ACID 1 MG/1
1 TABLET ORAL DAILY
Status: COMPLETED | OUTPATIENT
Start: 2021-02-10 | End: 2021-02-13

## 2021-02-09 RX ADMIN — THIAMINE HYDROCHLORIDE: 100 INJECTION, SOLUTION INTRAMUSCULAR; INTRAVENOUS at 15:34

## 2021-02-09 RX ADMIN — LORAZEPAM 1 MG: 2 INJECTION INTRAMUSCULAR; INTRAVENOUS at 14:25

## 2021-02-09 RX ADMIN — ONDANSETRON 4 MG: 2 INJECTION INTRAMUSCULAR; INTRAVENOUS at 18:21

## 2021-02-09 RX ADMIN — PHENOBARBITAL SODIUM 130 MG: 130 INJECTION INTRAMUSCULAR; INTRAVENOUS at 20:12

## 2021-02-09 RX ADMIN — POTASSIUM CHLORIDE 20 MEQ: 1500 TABLET, EXTENDED RELEASE ORAL at 20:12

## 2021-02-09 RX ADMIN — POTASSIUM CHLORIDE 10 MEQ: 10 INJECTION, SOLUTION INTRAVENOUS at 16:00

## 2021-02-09 RX ADMIN — CEFTRIAXONE SODIUM 2 G: 2 INJECTION, POWDER, FOR SOLUTION INTRAMUSCULAR; INTRAVENOUS at 14:26

## 2021-02-09 RX ADMIN — MAGNESIUM SULFATE IN WATER 2 G: 40 INJECTION, SOLUTION INTRAVENOUS at 20:13

## 2021-02-09 RX ADMIN — POTASSIUM CHLORIDE 10 MEQ: 7.46 INJECTION, SOLUTION INTRAVENOUS at 20:13

## 2021-02-09 RX ADMIN — POTASSIUM CHLORIDE AND SODIUM CHLORIDE 1000 ML: 900; 300 INJECTION, SOLUTION INTRAVENOUS at 18:21

## 2021-02-09 RX ADMIN — SODIUM CHLORIDE, POTASSIUM CHLORIDE, SODIUM LACTATE AND CALCIUM CHLORIDE 1000 ML: 600; 310; 30; 20 INJECTION, SOLUTION INTRAVENOUS at 13:20

## 2021-02-09 RX ADMIN — SODIUM CHLORIDE, POTASSIUM CHLORIDE, SODIUM LACTATE AND CALCIUM CHLORIDE 1313 ML: 600; 310; 30; 20 INJECTION, SOLUTION INTRAVENOUS at 13:20

## 2021-02-09 RX ADMIN — PHENOBARBITAL SODIUM 130 MG: 130 INJECTION INTRAMUSCULAR; INTRAVENOUS at 22:44

## 2021-02-09 RX ADMIN — SODIUM CHLORIDE, POTASSIUM CHLORIDE, SODIUM LACTATE AND CALCIUM CHLORIDE 1000 ML: 600; 310; 30; 20 INJECTION, SOLUTION INTRAVENOUS at 20:17

## 2021-02-09 ASSESSMENT — LIFESTYLE VARIABLES
TOTAL SCORE: 4
AVERAGE NUMBER OF DAYS PER WEEK YOU HAVE A DRINK CONTAINING ALCOHOL: 7
EVER HAD A DRINK FIRST THING IN THE MORNING TO STEADY YOUR NERVES TO GET RID OF A HANGOVER: YES
DOES PATIENT WANT TO STOP DRINKING: NO
HOW MANY TIMES IN THE PAST YEAR HAVE YOU HAD 5 OR MORE DRINKS IN A DAY: 365
TOTAL SCORE: 4
CONSUMPTION TOTAL: POSITIVE
HAVE YOU EVER FELT YOU SHOULD CUT DOWN ON YOUR DRINKING: YES
ON A TYPICAL DAY WHEN YOU DRINK ALCOHOL HOW MANY DRINKS DO YOU HAVE: 4
TOTAL SCORE: 4
EVER FELT BAD OR GUILTY ABOUT YOUR DRINKING: YES
HAVE PEOPLE ANNOYED YOU BY CRITICIZING YOUR DRINKING: YES
DO YOU DRINK ALCOHOL: YES

## 2021-02-09 ASSESSMENT — COGNITIVE AND FUNCTIONAL STATUS - GENERAL
SUGGESTED CMS G CODE MODIFIER MOBILITY: CK
CLIMB 3 TO 5 STEPS WITH RAILING: A LITTLE
HELP NEEDED FOR BATHING: A LITTLE
TOILETING: A LITTLE
WALKING IN HOSPITAL ROOM: A LITTLE
STANDING UP FROM CHAIR USING ARMS: A LITTLE
DRESSING REGULAR UPPER BODY CLOTHING: A LITTLE
MOBILITY SCORE: 19
MOVING FROM LYING ON BACK TO SITTING ON SIDE OF FLAT BED: A LITTLE
MOVING TO AND FROM BED TO CHAIR: A LITTLE

## 2021-02-09 ASSESSMENT — FIBROSIS 4 INDEX: FIB4 SCORE: 4.59

## 2021-02-09 NOTE — ED NOTES
Tech from Lab called with critical result of lactic acid 7.3 at 1527. Critical lab result read back to tech.   Dr. Taylor notified of critical lab result at 1527.  Critical lab result read back by Dr. Taylor.

## 2021-02-09 NOTE — CONSULTS
Neurology STROKE CODE H&P  Neurohospitalist Service, [facility]    Referring Physician: Jude Wade M.D.    STROKE CODE: Altered mental status with speech difficulty       To obtain the most accurate data regarding the time called, and time patient seen, refer to the stroke run-sheet and chart.  For time of CT, refer to the radiology report. See A&P below for TPA Decision and door to needle time if and when applicable.    HPI: Carie Santiago is a 51 y.o. female with history of anxiety, depression, hypothyroidism, hypertriglyceridemia, chronic alcohol abuse, and vitamin B12 deficiency presenting to the hospital for altered mental status and speech difficulty consulted for possible stroke. At approximately 12:00 today, patient was in her usual state of health, being assisted by her  in the bathroom when she was witnessed to drop her head and become temporarily unresponsive. Upon awakening, the caregiver noted she was having difficulty speaking and was confused. EMS was notified and upon arrival noted the symptoms had resolved. Initial BG 181mg/dL, hypotensive with SBP in the 60s, and elevated heart rate. En route, the patient again had a transient decreased LOC and had return of speech difficulty felt to be expressive aphasia. Neurology was consulted via Code Stroke pre-alert called at 12:54. Patient arrived at approximately 13:00 with non-focal neurological exam and NIHSS of 1 for slight dysarthria. CT head wo contrast revealed no acute intracranial abnormality.   Of note, patient presented to [facility] ER on 2/7/21 following an alcohol related fall with injury, hitting the back of her head and vomited then. She has a history of heavy alcohol abuse, and has independently weaned her intake from 12 mini-bottles of vodka daily, weaning down over the past month to 4 mini bottles last drank yesterday. Patient reports having black emesis yesterday and dark/black  "diarrhea intermittently the past couple days, feeling tremulous, and having a 8/10 headache. She denies vision changes, facial weakness, numbness, tingling, loss of consciousness, tongue biting, or bowel/bladder incontinence.     Review of systems: In addition to what is detailed in the HPI above, (and scanned into the chart if and when applicable), all other systems reviewed and are negative.    Past Medical History:    has a past medical history of Anxiety and depression (1/23/2019), Colon polyp (3/29/2019), Disorder of thyroid, Diverticulosis of colon (1/23/2019), Generalized anxiety disorder, Hypertriglyceridemia (1/23/2019), Low serum HDL (1/23/2019), Menopause, and Vitamin B12 deficiency (1/23/2019).    FHx:  family history includes Cancer in her mother; Diabetes in her son; Heart Disease in her brother and mother; Thyroid in her mother.    SHx:   reports that she has been smoking cigarettes. She has a 15.00 pack-year smoking history. She has never used smokeless tobacco. She reports current alcohol use. She reports that she does not use drugs.    Allergies:  Allergies   Allergen Reactions   • Norco [Apap-Fd&C Yellow #10 Al Cervantes-Hydrocodone]      itching   • Sulfa Drugs Anaphylaxis   • Zofran [Dextrose-Ondansetron] Unspecified     Migraine   • Lorazepam Anxiety and Unspecified     \"IT MAKES ME CRY\"       Medications:    Current Facility-Administered Medications:   •  lactated ringers infusion (BOLUS): BMI less than or equal to 30, 30 mL/kg, Intravenous, Once PRN, Jude Wade M.D.  •  cefTRIAXone (ROCEPHIN) 2 g in  mL IVPB, 2 g, Intravenous, Once, Jude Wade M.D., Last Rate: 200 mL/hr at 02/09/21 1426, 2 g at 02/09/21 1426  •  DILTIAZem (CARDIZEM) injection 10 mg, 10 mg, Intravenous, Once, Jude Wade M.D., Stopped at 02/09/21 1430    Current Outpatient Medications:   •  progesterone (PROMETRIUM) 100 MG Cap, Take 1 Cap by mouth every day., Disp: 90 Cap, Rfl: 3  •  SYNTHROID 88 MCG Tab, " "TAKE ONE TABLET BY MOUTH EVERY DAY IN THE MORNING ON AN EMPTY STOMACH, Disp: 90 Tab, Rfl: 3  •  Cholecalciferol (VITAMIN D) 50 MCG (2000 UT) Cap, Take 1 Cap by mouth every day., Disp: 100 Cap, Rfl: 3  •  ALPRAZolam (XANAX) 0.5 MG Tab, Take 1 Tab by mouth 2 times a day as needed for Sleep or Anxiety for up to 30 days., Disp: 60 Tab, Rfl: 0  •  nitrofurantoin (MACROBID) 100 MG Cap, Take 1 Cap by mouth 2 times a day., Disp: 10 Cap, Rfl: 0  •  fluconazole (DIFLUCAN) 150 MG tablet, Take 1 tablet by mouth today; repeat with second tablet in 3 days, Disp: 2 Tab, Rfl: 0  •  albuterol 108 (90 Base) MCG/ACT Aero Soln inhalation aerosol, Inhale 2 Puffs every four hours as needed for Shortness of Breath., Disp: 1 Each, Rfl: 2  •  ibuprofen (MOTRIN) 800 MG Tab, Take 1 Tab by mouth every 8 hours as needed for Moderate Pain, Headache or Inflammation., Disp: 30 Tab, Rfl: 0  •  FLUoxetine (PROZAC) 20 MG Cap, TAKE ONE CAPSULE BY MOUTH EVERY DAY, Disp: 90 Cap, Rfl: 3  •  NABOR 0.0375 MG/24HR patch, Apply patch on skin twice a week, Disp: 24 Tab, Rfl: 4  •  Omega-3 Fatty Acids (FISH OIL) 1000 MG Cap capsule, Take 1,000 mg by mouth every day., Disp: , Rfl:   •  Multiple Vitamins-Minerals (OCUVITE-LUTEIN) Tab, Take 1 tablet by mouth every day., Disp: , Rfl:     Physical Examination:    Vitals:    02/09/21 1314 02/09/21 1316 02/09/21 1318   BP:  (!) 63/46 (!) 63/46   Pulse:  (!) 164 (!) 158   Resp:  (!) 33 (!) 21   Temp:  35.9 °C (96.7 °F)    TempSrc:  Temporal    SpO2:  95%    Weight: 77.1 kg (170 lb)     Height: 1.778 m (5' 10\")         General: Patient is awake and in no acute distress  Eyes: Examination of optic disks not indicated at this time  CV: RRR  GI: Intermittent vomiting with expectorating bilious emesis.   Skin: Ecchymosis to right frontal head from recent fall    NEUROLOGICAL EXAM:     Mental status: Awake, alert, fully oriented, and follows commands  Speech and language: Speech is slightly dysarthric and fluent. The " patient is able to name and repeat.  Cranial nerve exam: Pupils are equal, round and reactive to light bilaterally. Visual fields are full. Extraocular muscles are intact. Sensation in the face is intact to light touch. Face is symmetric. Hearing to finger rub equal. Palate elevates symmetrically. Shoulder shrug is full. Tongue is midline.  Motor exam: Strength is 5/5 in BUE and LLE both distally and proximally. RLE limited by boot following recent fracture, but 4/5 strength without drift. Tone is normal. No abnormal movements were seen on exam.  Sensory exam: No sensory deficits identified   Deep tendon reflexes:  Toes down-going to right, inaccessible to left given boot.  Coordination: No ataxia   Gait: deferred given hypotension, tachycardia, and high fall/seizure risk.     NIH Stroke Scale:    1a. Level of Consciousness (Alert, drowsy, etc): 0= Alert    1b. LOC Questions (Month, age): 0= Answers both correctly    1c. LOC Commands (Open/close eyes make fist/let go): 0= Obeys both correctly    2.   Best Gaze (Eyes open - patient follows examiner's finger on face): 0= Normal    3.   Visual Fields (introduce visual stimulus/threat to patient's field quadrants): 0= No visual loss  4.   Facial Paresis (Show teeth, raise eyebrows and squeeze eyes shut): 0= Normal     5a. Motor Arm - Left (Elevate arm to 90 degrees if patient is sitting, 45 degrees if  supine): 0= No drift    5b. Motor Arm - Right (Elevate arm to 90 degrees if patient is sitting, 45 degrees if supine): 0= No drift    6a. Motor Leg - Left (Elevate leg 30 degrees with patient supine): 0= No drift    6b. Motor Leg - Right  (Elevate leg 30 degrees with patient supine): 0= No drift    7.   Limb Ataxia (Finger-nose, heel down shin): 0= No ataxia    8.   Sensory (Pin prick to face, arm, trunk and leg - compare side to side): 0= Normal    9.  Best Language (Name item, describe a picture and read sentences): 0= No aphasia    10. Dysarthria (Evaluate speech  clarity by patient repeating listed words): 1= Mild to moderate slurring    11. Extinction and Inattention (Use information from prior testing to identify neglect or  double simultaneous stimuli testing): 0= No neglect    Total NIH Score: 1      Objective Data:    Labs:  Lab Results   Component Value Date/Time    PROTHROMBTM 13.5 02/09/2021 01:20 PM    INR 1.00 02/09/2021 01:20 PM      Lab Results   Component Value Date/Time    WBC 11.2 (H) 02/09/2021 01:20 PM    RBC 4.96 02/09/2021 01:20 PM    HEMOGLOBIN 15.6 02/09/2021 01:20 PM    HEMATOCRIT 48.1 (H) 02/09/2021 01:20 PM    MCV 97.0 02/09/2021 01:20 PM    MCH 31.5 02/09/2021 01:20 PM    MCHC 32.4 (L) 02/09/2021 01:20 PM    MPV 11.8 02/09/2021 01:20 PM    NEUTSPOLYS 79.30 (H) 02/09/2021 01:20 PM    LYMPHOCYTES 13.60 (L) 02/09/2021 01:20 PM    MONOCYTES 6.40 02/09/2021 01:20 PM    EOSINOPHILS 0.00 02/09/2021 01:20 PM    BASOPHILS 0.30 02/09/2021 01:20 PM      Lab Results   Component Value Date/Time    SODIUM 137 01/06/2021 11:00 AM    POTASSIUM 3.1 (L) 01/06/2021 11:00 AM    CHLORIDE 97 01/06/2021 11:00 AM    CO2 25 01/06/2021 11:00 AM    GLUCOSE 90 01/06/2021 11:00 AM    BUN 6 (L) 01/06/2021 11:00 AM    CREATININE 0.52 01/06/2021 11:00 AM      Lab Results   Component Value Date/Time    CHOLSTRLTOT 211 (H) 01/06/2021 11:00 AM    CHOLSTRLTOT 133 01/18/2019 04:21 AM    LDL 38 01/18/2019 04:21 AM     (H) 01/06/2021 11:00 AM    HDL 30 (A) 01/18/2019 04:21 AM    TRIGLYCERIDE 64 01/06/2021 11:00 AM    TRIGLYCERIDE 325 (H) 01/18/2019 04:21 AM       Lab Results   Component Value Date/Time    ALKPHOSPHAT 163 (H) 01/06/2021 11:00 AM    ASTSGOT 284 (H) 01/06/2021 11:00 AM    ALTSGPT 121 (H) 01/06/2021 11:00 AM    TBILIRUBIN 0.6 01/06/2021 11:00 AM        Imaging/Testing:    I interpreted and/or reviewed the patient's neuroimaging    DX-CHEST-PORTABLE (1 VIEW)   Final Result      No acute cardiac or pulmonary abnormality is noted.      CT-HEAD W/O   Final Result      No  acute intracranial abnormality is identified.      Posterior left occipital scalp swelling/hematoma.             Assessment:    Carie Santiago is a 51 y.o. female with relevant history of anxiety, depression, hypothyroidism, hypertriglyceridemia, chronic alcohol abuse, and vitamin B12 deficiency who presents via EMS to [facility] ER for transient altered mental status and speech difficulty whom neurology was consulted to address possible stroke. Neurological exam was non-focal with NIHSS of 1 for slight dysarthria. CT head wo contrast revealed no acute intracranial abnormality. Patient was not a tPA candidate given concern for GI bleed given recent history of black emesis and diarrhea, and as there is an alternative explanation for her mild dysarthria. Given the patient has been attempting to self-detox independently by weaning her alcohol intake over the last month (last drink yesterday 2/8/21), having recent ground level falls, and with pre-syncope today with transient altered mental status and slight dysarthria, it is likely that she is or may soon have delirium tremens. Other alternative and/or contributing differential diagnosis includes toxic/metabolic encephalopathy.      Plan:    -[facility] protocol recommended  -Toxic/metabolic workup per ERP/primary team  -Telemetry; currently SR. Screen for tachyarrhythmia.   -No AEDs indicated at this time as likely alcohol withdrawal vs TME  -PT/OT/SLP eval and treat.   -Fall and seizure precautions  -All other medical management per ERP/primary team.   -DVT PPX: SCDs.     No further recommendations or further studies from a neurological standpoint at this time. Please re-consult if you have further questions or there is a change in status.     The evaluation of the patient, and recommended management, was discussed with Dr. Handley, Dr. Wade, and bedside RN.     Jaime Ramirez, MSN APRN [facility]  Nurse Practitioner, Neurohospitalist  [facility] Neurohospitalist  Services  Clinical  of Neurology, [employer]

## 2021-02-09 NOTE — ED TRIAGE NOTES
"BIB EMS from home with   Chief Complaint   Patient presents with   • Possible Stroke   • Detox   • Tachycardia     140-160's   Per EMS report, pt has been attempting to self detox from alcohol at home. Pt states down to 4 mini bottles from 12. Pt has had an increase in MGLF and generalized weakness. Fractured RLE 10 days ago. Fell on Saturday and seen for head injury. Apparently care-taker was giving pt a shower when she stated \" I don't feel well\" and slumped over. Per EMS report pt has had intermittent expressive aphasia.     -160's. BP 63/46. ERP called to room. 2nd PIV inserted. Blood sent to lab.      "

## 2021-02-09 NOTE — ED PROVIDER NOTES
ED Provider Note    CHIEF COMPLAINT  Chief Complaint   Patient presents with   • Possible Stroke   • Detox   • Tachycardia     140-160's       HPI  Carie Santiago is a 51 y.o. female who presents for evaluation of sluggishness slurred speech mild confusion.  The patient has a complex medical history as listed below.  This includes apparent alcoholism.  She has a right ankle fracture.  Apparently some home health nurse was attending to her and the patient had an episode of some altered mental status some slurred speech.  There is no focal weakness to the arms legs or face.  She was noted to be tachycardic and hypotensive in the field.  She was activated as a stroke.  Of note the patient was just seen here 2 days ago after an alcohol related ground-level fall and hit her head.  CT scan was normal.  No laboratory studies were apparently performed  REVIEW OF SYSTEMS  See HPI for further details. All other systems are negative.     PAST MEDICAL HISTORY  Past Medical History:   Diagnosis Date   • Colon polyp 3/29/2019    Colonoscopy March 2019: 3mm descending colon polyp   • Diverticulosis of colon 1/23/2019    MRI abdomen Jan. 2019   • Hypertriglyceridemia 1/23/2019   • Low serum HDL 1/23/2019   • Anxiety and depression 1/23/2019   • Vitamin B12 deficiency 1/23/2019   • Disorder of thyroid    • Generalized anxiety disorder    • Menopause        FAMILY HISTORY  Noncontributory    SOCIAL HISTORY  Social History     Socioeconomic History   • Marital status:      Spouse name: Not on file   • Number of children: Not on file   • Years of education: Not on file   • Highest education level: Not on file   Occupational History   • Not on file   Social Needs   • Financial resource strain: Not on file   • Food insecurity     Worry: Not on file     Inability: Not on file   • Transportation needs     Medical: Not on file     Non-medical: Not on file   Tobacco Use   • Smoking status: Current Every Day Smoker      Packs/day: 0.50     Years: 30.00     Pack years: 15.00     Types: Cigarettes   • Smokeless tobacco: Never Used   Substance and Sexual Activity   • Alcohol use: Yes     Comment: 4-12 mini bottles per day   • Drug use: No   • Sexual activity: Not on file   Lifestyle   • Physical activity     Days per week: Not on file     Minutes per session: Not on file   • Stress: Not on file   Relationships   • Social connections     Talks on phone: Not on file     Gets together: Not on file     Attends [employer] service: Not on file     Active member of club or organization: Not on file     Attends meetings of clubs or organizations: Not on file     Relationship status: Not on file   • Intimate partner violence     Fear of current or ex partner: Not on file     Emotionally abused: Not on file     Physically abused: Not on file     Forced sexual activity: Not on file   Other Topics Concern   • Not on file   Social History Narrative   • Not on file     History of alcoholism  SURGICAL HISTORY  Past Surgical History:   Procedure Laterality Date   • WOUND CLOSURE ORTHO Left 9/24/2019    Procedure: CLOSURE, WOUND, ORTHO - LEG W/WOUND VAC REMOVAL;  Surgeon: Paolo Odell M.D.;  Location: SURGERY [facility];  Service: Orthopedics   • IRRIGATION & DEBRIDEMENT ORTHO Left 9/21/2019    Procedure: IRRIGATION AND DEBRIDEMENT, WOUND - FOR PROXIMAL TIBIA HEMATOMA EVACUATION AND WOUND VAC APPLICATION;  Surgeon: Paolo Odell M.D.;  Location: SURGERY [facility];  Service: Orthopedics   • OTHER ORTHOPEDIC SURGERY Right 2017   • ROBOTIC LAPAROSCOPIC CHOLECYSTECTOMY  2017   • UMBILICAL HERNIA REPAIR  2017   • INGUINAL HERNIA REPAIR BILATERAL  1999    with Mesh   • TONSILLECTOMY AND ADENOIDECTOMY  1976   • APPENDECTOMY     • MAMMOPLASTY AUGMENTATION Bilateral        CURRENT MEDICATIONS    Current Facility-Administered Medications:   •  lactated ringers infusion (BOLUS): BMI less than or equal to 30, 30 mL/kg, Intravenous, Once PRN, Jude  "SYL Wade M.D.  •  DILTIAZem (CARDIZEM) injection 10 mg, 10 mg, Intravenous, Once, Jude Wade M.D., Stopped at 02/09/21 1430    Current Outpatient Medications:   •  progesterone (PROMETRIUM) 100 MG Cap, Take 1 Cap by mouth every day., Disp: 90 Cap, Rfl: 3  •  SYNTHROID 88 MCG Tab, TAKE ONE TABLET BY MOUTH EVERY DAY IN THE MORNING ON AN EMPTY STOMACH, Disp: 90 Tab, Rfl: 3  •  Cholecalciferol (VITAMIN D) 50 MCG (2000 UT) Cap, Take 1 Cap by mouth every day., Disp: 100 Cap, Rfl: 3  •  ALPRAZolam (XANAX) 0.5 MG Tab, Take 1 Tab by mouth 2 times a day as needed for Sleep or Anxiety for up to 30 days., Disp: 60 Tab, Rfl: 0  •  nitrofurantoin (MACROBID) 100 MG Cap, Take 1 Cap by mouth 2 times a day., Disp: 10 Cap, Rfl: 0  •  fluconazole (DIFLUCAN) 150 MG tablet, Take 1 tablet by mouth today; repeat with second tablet in 3 days, Disp: 2 Tab, Rfl: 0  •  albuterol 108 (90 Base) MCG/ACT Aero Soln inhalation aerosol, Inhale 2 Puffs every four hours as needed for Shortness of Breath., Disp: 1 Each, Rfl: 2  •  ibuprofen (MOTRIN) 800 MG Tab, Take 1 Tab by mouth every 8 hours as needed for Moderate Pain, Headache or Inflammation., Disp: 30 Tab, Rfl: 0  •  FLUoxetine (PROZAC) 20 MG Cap, TAKE ONE CAPSULE BY MOUTH EVERY DAY, Disp: 90 Cap, Rfl: 3  •  NABOR 0.0375 MG/24HR patch, Apply patch on skin twice a week, Disp: 24 Tab, Rfl: 4  •  Omega-3 Fatty Acids (FISH OIL) 1000 MG Cap capsule, Take 1,000 mg by mouth every day., Disp: , Rfl:   •  Multiple Vitamins-Minerals (OCUVITE-LUTEIN) Tab, Take 1 tablet by mouth every day., Disp: , Rfl:       ALLERGIES  Allergies   Allergen Reactions   • Norco [Apap-Fd&C Yellow #10 [place]-Hydrocodone]      itching   • Sulfa Drugs Anaphylaxis   • Zofran [Dextrose-Ondansetron] Unspecified     Migraine   • Lorazepam Anxiety and Unspecified     \"IT MAKES ME CRY\"       PHYSICAL EXAM  VITAL SIGNS: BP (!) 97/59   Pulse 98   Temp 35.9 °C (96.7 °F) (Temporal)   Resp (!) 22   Ht 1.778 m (5' 10\")   Wt " 77.1 kg (170 lb)   SpO2 97%   BMI 24.39 kg/m²       Constitutional: Ill-appearing disheveled  HENT: Normocephalic, Atraumatic, Bilateral external ears normal, dry mucous membranes, No oral exudates, Nose normal.   Eyes: PERRLA, EOMI, Conjunctiva normal, No discharge.   Neck: Normal range of motion, No tenderness, Supple, No stridor.   Cardiovascular: Tachycardic irregularly irregular no murmurs, No rubs, No gallops.   Thorax & Lungs: Normal breath sounds, No respiratory distress, No wheezing, No chest tenderness.   Abdomen: Bowel sounds normal, Soft, No tenderness, No masses, No pulsatile masses.   Skin: MottledDry, No erythema, No rash.   Back: No tenderness, No CVA tenderness.   Extremities: Intact distal pulses, No edema, No tenderness, No cyanosis, No clubbing.   Musculoskeletal: Right lower extremity has an orthopedic boot  Neurologic: Alert & oriented x 3, Normal motor function, Normal sensory function, No focal deficits noted.   Psychiatric: Sluggish    Results for orders placed or performed during the hospital encounter of 02/09/21   CBC WITH DIFFERENTIAL   Result Value Ref Range    WBC 11.2 (H) 4.8 - 10.8 K/uL    RBC 4.96 4.20 - 5.40 M/uL    Hemoglobin 15.6 12.0 - 16.0 g/dL    Hematocrit 48.1 (H) 37.0 - 47.0 %    MCV 97.0 81.4 - 97.8 fL    MCH 31.5 27.0 - 33.0 pg    MCHC 32.4 (L) 33.6 - 35.0 g/dL    RDW 62.4 (H) 35.9 - 50.0 fL    Platelet Count 145 (L) 164 - 446 K/uL    MPV 11.8 9.0 - 12.9 fL    Neutrophils-Polys 79.30 (H) 44.00 - 72.00 %    Lymphocytes 13.60 (L) 22.00 - 41.00 %    Monocytes 6.40 0.00 - 13.40 %    Eosinophils 0.00 0.00 - 6.90 %    Basophils 0.30 0.00 - 1.80 %    Immature Granulocytes 0.40 0.00 - 0.90 %    Nucleated RBC 0.00 /100 WBC    Neutrophils (Absolute) 8.85 (H) 2.00 - 7.15 K/uL    Lymphs (Absolute) 1.52 1.00 - 4.80 K/uL    Monos (Absolute) 0.71 0.00 - 0.85 K/uL    Eos (Absolute) 0.00 0.00 - 0.51 K/uL    Baso (Absolute) 0.03 0.00 - 0.12 K/uL    Immature Granulocytes (abs) 0.04 0.00 -  0.11 K/uL    NRBC (Absolute) 0.00 K/uL   COMP METABOLIC PANEL   Result Value Ref Range    Sodium 130 (L) 135 - 145 mmol/L    Potassium 3.0 (L) 3.6 - 5.5 mmol/L    Chloride 71 (L) 96 - 112 mmol/L    Co2 14 (L) 20 - 33 mmol/L    Anion Gap 45.0 (H) 7.0 - 16.0    Glucose 173 (H) 65 - 99 mg/dL    Bun 30 (H) 8 - 22 mg/dL    Creatinine 1.69 (H) 0.50 - 1.40 mg/dL    Calcium 11.8 (H) 8.5 - 10.5 mg/dL    AST(SGOT) 85 (H) 12 - 45 U/L    ALT(SGPT) 60 (H) 2 - 50 U/L    Alkaline Phosphatase 304 (H) 30 - 99 U/L    Total Bilirubin 1.9 (H) 0.1 - 1.5 mg/dL    Albumin 4.4 3.2 - 4.9 g/dL    Total Protein 7.4 6.0 - 8.2 g/dL    Globulin 3.0 1.9 - 3.5 g/dL    A-G Ratio 1.5 g/dL   LACTIC ACID   Result Value Ref Range    Lactic Acid 16.4 (HH) 0.5 - 2.0 mmol/L   LACTIC ACID   Result Value Ref Range    Lactic Acid 7.3 (HH) 0.5 - 2.0 mmol/L   PROTHROMBIN TIME   Result Value Ref Range    PT 13.5 12.0 - 14.6 sec    INR 1.00 0.87 - 1.13   APTT   Result Value Ref Range    APTT 28.8 24.7 - 36.0 sec   TROPONIN   Result Value Ref Range    Troponin T 64 (H) 6 - 19 ng/L   AMMONIA   Result Value Ref Range    Ammonia 58 (H) 11 - 45 umol/L   DIAGNOSTIC ALCOHOL   Result Value Ref Range    Diagnostic Alcohol <10.1 0.0 - 10.0 mg/dL   LIPASE   Result Value Ref Range    Lipase 813 (H) 11 - 82 U/L   MAGNESIUM   Result Value Ref Range    Magnesium 2.5 1.5 - 2.5 mg/dL   ESTIMATED GFR   Result Value Ref Range    GFR If  38 (A) >60 mL/min/1.73 m 2    GFR If Non  32 (A) >60 mL/min/1.73 m 2   EKG   Result Value Ref Range    Report       [facility] Emergency Dept.    Test Date:  2021  Pt Name:    KEATON ROBLEDO              Department: ER  MRN:        [id number]                      Room:       [facility]  Gender:     Female                       Technician: [id number]  :        1970                   Requested By:CIRILO RAINEY  Order #:    [id number]                    Reading MD:    Measurements  Intervals                                 Axis  Rate:       149                          P:  AZ:                                      QRS:        92  QRSD:       72                           T:          76  QT:         332  QTc:        523    Interpretive Statements  ATRIAL FIBRILLATION, V-RATE 107-183  ABERRANT COMPLEX, POSSIBLY SUPRAVENTRICULAR  BORDERLINE RIGHT AXIS DEVIATION  BORDERLINE LOW VOLTAGE IN FRONTAL LEADS  BORDERLINE INFERIOR Q WAVES  CONSIDER ANTERIOR INFARCT  BORDERLINE PROLONGED QT INTERVAL  ARTIFACT IN LEAD(S) I,III,aVL  Compared to ECG 04/09/2020 08:25:58  Aberrant conduction of supra ventricular beat(s) now present  Sinus rhythm no longer present  Ventricular premature complex(es) no longer present  Myocardial infarct finding still present        EKG interpretation by me rate 149 atrial fibrillation with rapid ventricular response with some early ischemic findings but no ST segment elevation  RADIOLOGY/PROCEDURES  DX-CHEST-PORTABLE (1 VIEW)   Final Result      No acute cardiac or pulmonary abnormality is noted.      CT-HEAD W/O   Final Result      No acute intracranial abnormality is identified.      Posterior left occipital scalp swelling/hematoma.               COURSE & MEDICAL DECISION MAKING  Pertinent Labs & Imaging studies reviewed. (See chart for details)  Patient was triaged as a possible stroke due to slurred speech.  Patient was evaluated by neurology with Dr. Handley as well as myself.  We felt that this was very unlikely to be an acute cerebrovascular accident but much more likely a toxic metabolic process.  The patient had hypotension with atrial fibrillation with rapid ventricular response.  CT scan of the head does not demonstrate any obvious hemorrhage or epidural or subdural process.  She had 2 large-bore peripheral IVs established.  She was given a lactated Ringer's 30 cc/kg bolus.  Lactic acid was profoundly elevated and she was covered with IV Rocephin.  [place] through her resuscitation she  spontaneously converted back to a sinus rhythm and her blood pressure improved.  I had ordered some diltiazem but this was canceled.  Patient was given IV Ativan as well as a detox bag.  Lactic acid is clearing but is still elevated at 7.  Patient is more likely sick from underlying alcoholism, pancreatitis rather than sepsis.  No clear source of infection.  COVID-19 is negative.  She has extensive metabolic abnormalities including alcohol ketoacidosis, pancreatitis, hypokalemia, acute on chronic alcoholic hepatitis.  The patient is in guarded condition.  Blood pressure improved after fluid bolus.  She will be admitted to the intensive care unit for further treatment of electrolyte abnormalities, acute alcohol hepatitis, lactic acidemia possible sepsis    CRITICAL CARE TIME:    The patient required approximately 50 minutes worth of critical care time. This excludes any procedures. This includes time spent directly at caring for the patient, making critical medical decisions, involving consultants and speaking with the family.    FINAL IMPRESSION  1.  Acute alcohol withdrawal  2.  Acute alcohol hepatitis  3.  Alcohol ketoacidosis  4.  Acute pancreatitis  5.  Atrial fibrillation with rapid ventricular response  6.  Lactic acidemia         Electronically signed by: Jude Wade M.D., 2/9/2021 1:13 PM

## 2021-02-09 NOTE — ED NOTES
Tech from Lab called with critical result of lactic acid 16.4 at 1344. Critical lab result read back to Tech.   Dr. Taylor notified of critical lab result at 1345.  Critical lab result read back by Dr. Taylor.

## 2021-02-10 ENCOUNTER — APPOINTMENT (OUTPATIENT)
Dept: RADIOLOGY | Facility: MEDICAL CENTER | Age: 51
DRG: 896 | End: 2021-02-10
Attending: HOSPITALIST
Payer: COMMERCIAL

## 2021-02-10 PROBLEM — R79.89 INCREASED AMMONIA LEVEL: Status: ACTIVE | Noted: 2021-02-10

## 2021-02-10 PROBLEM — D69.6 THROMBOCYTOPENIA (HCC): Status: ACTIVE | Noted: 2021-02-10

## 2021-02-10 PROBLEM — R79.0 LOW SERUM PHOSPHORUS FOR AGE: Status: ACTIVE | Noted: 2021-02-10

## 2021-02-10 PROBLEM — S82.891A CLOSED RIGHT ANKLE FRACTURE: Status: ACTIVE | Noted: 2021-02-10

## 2021-02-10 PROBLEM — E87.8 ELECTROLYTE ABNORMALITY: Status: ACTIVE | Noted: 2021-02-10

## 2021-02-10 PROBLEM — K85.90 ACUTE PANCREATITIS: Status: ACTIVE | Noted: 2021-02-10

## 2021-02-10 PROBLEM — E86.0 DEHYDRATION: Status: ACTIVE | Noted: 2021-02-10

## 2021-02-10 PROBLEM — R79.89 ELEVATED TROPONIN: Status: ACTIVE | Noted: 2021-02-10

## 2021-02-10 LAB
ALBUMIN SERPL BCP-MCNC: 3 G/DL (ref 3.2–4.9)
ALBUMIN/GLOB SERPL: 1.3 G/DL
ALP SERPL-CCNC: 200 U/L (ref 30–99)
ALT SERPL-CCNC: 34 U/L (ref 2–50)
ANION GAP SERPL CALC-SCNC: 13 MMOL/L (ref 7–16)
ANION GAP SERPL CALC-SCNC: 8 MMOL/L (ref 7–16)
APPEARANCE UR: CLEAR
AST SERPL-CCNC: 75 U/L (ref 12–45)
BACTERIA #/AREA URNS HPF: NEGATIVE /HPF
BASOPHILS # BLD AUTO: 0.3 % (ref 0–1.8)
BASOPHILS # BLD: 0.02 K/UL (ref 0–0.12)
BILIRUB SERPL-MCNC: 0.8 MG/DL (ref 0.1–1.5)
BILIRUB UR QL STRIP.AUTO: NEGATIVE
BUN SERPL-MCNC: 16 MG/DL (ref 8–22)
BUN SERPL-MCNC: 26 MG/DL (ref 8–22)
CALCIUM SERPL-MCNC: 8.1 MG/DL (ref 8.5–10.5)
CALCIUM SERPL-MCNC: 9.5 MG/DL (ref 8.5–10.5)
CHLORIDE SERPL-SCNC: 85 MMOL/L (ref 96–112)
CHLORIDE SERPL-SCNC: 89 MMOL/L (ref 96–112)
CO2 SERPL-SCNC: 29 MMOL/L (ref 20–33)
CO2 SERPL-SCNC: 31 MMOL/L (ref 20–33)
COLOR UR: YELLOW
CREAT SERPL-MCNC: 0.7 MG/DL (ref 0.5–1.4)
CREAT SERPL-MCNC: 0.85 MG/DL (ref 0.5–1.4)
EKG IMPRESSION: NORMAL
EOSINOPHIL # BLD AUTO: 0.07 K/UL (ref 0–0.51)
EOSINOPHIL NFR BLD: 1.1 % (ref 0–6.9)
EPI CELLS #/AREA URNS HPF: NEGATIVE /HPF
ERYTHROCYTE [DISTWIDTH] IN BLOOD BY AUTOMATED COUNT: 60.3 FL (ref 35.9–50)
GLOBULIN SER CALC-MCNC: 2.3 G/DL (ref 1.9–3.5)
GLUCOSE SERPL-MCNC: 120 MG/DL (ref 65–99)
GLUCOSE SERPL-MCNC: 85 MG/DL (ref 65–99)
GLUCOSE UR STRIP.AUTO-MCNC: NEGATIVE MG/DL
HCT VFR BLD AUTO: 32.2 % (ref 37–47)
HGB BLD-MCNC: 10.7 G/DL (ref 12–16)
HYALINE CASTS #/AREA URNS LPF: ABNORMAL /LPF
IMM GRANULOCYTES # BLD AUTO: 0.02 K/UL (ref 0–0.11)
IMM GRANULOCYTES NFR BLD AUTO: 0.3 % (ref 0–0.9)
KETONES UR STRIP.AUTO-MCNC: ABNORMAL MG/DL
LEUKOCYTE ESTERASE UR QL STRIP.AUTO: NEGATIVE
LIPASE SERPL-CCNC: 209 U/L (ref 11–82)
LIPASE SERPL-CCNC: 212 U/L (ref 11–82)
LYMPHOCYTES # BLD AUTO: 1.4 K/UL (ref 1–4.8)
LYMPHOCYTES NFR BLD: 22.8 % (ref 22–41)
MAGNESIUM SERPL-MCNC: 1.9 MG/DL (ref 1.5–2.5)
MCH RBC QN AUTO: 31.3 PG (ref 27–33)
MCHC RBC AUTO-ENTMCNC: 32.9 G/DL (ref 33.6–35)
MCV RBC AUTO: 95 FL (ref 81.4–97.8)
MICRO URNS: ABNORMAL
MONOCYTES # BLD AUTO: 0.39 K/UL (ref 0–0.85)
MONOCYTES NFR BLD AUTO: 6.3 % (ref 0–13.4)
NEUTROPHILS # BLD AUTO: 4.25 K/UL (ref 2–7.15)
NEUTROPHILS NFR BLD: 69.2 % (ref 44–72)
NITRITE UR QL STRIP.AUTO: NEGATIVE
NRBC # BLD AUTO: 0 K/UL
NRBC BLD-RTO: 0 /100 WBC
PH UR STRIP.AUTO: 6.5 [PH] (ref 5–8)
PHOSPHATE SERPL-MCNC: 0.9 MG/DL (ref 2.5–4.5)
PHOSPHATE SERPL-MCNC: 1.4 MG/DL (ref 2.5–4.5)
PHOSPHATE SERPL-MCNC: <0.3 MG/DL (ref 2.5–4.5)
PLATELET # BLD AUTO: 103 K/UL (ref 164–446)
PMV BLD AUTO: 11.9 FL (ref 9–12.9)
POTASSIUM SERPL-SCNC: 3.5 MMOL/L (ref 3.6–5.5)
POTASSIUM SERPL-SCNC: 4 MMOL/L (ref 3.6–5.5)
PROT SERPL-MCNC: 5.3 G/DL (ref 6–8.2)
PROT UR QL STRIP: NEGATIVE MG/DL
RBC # BLD AUTO: 3.39 M/UL (ref 4.2–5.4)
RBC # URNS HPF: ABNORMAL /HPF
RBC UR QL AUTO: ABNORMAL
SODIUM SERPL-SCNC: 127 MMOL/L (ref 135–145)
SODIUM SERPL-SCNC: 128 MMOL/L (ref 135–145)
SP GR UR STRIP.AUTO: 1.01
TRIGL SERPL-MCNC: 97 MG/DL (ref 0–149)
TROPONIN T SERPL-MCNC: 25 NG/L (ref 6–19)
UROBILINOGEN UR STRIP.AUTO-MCNC: 0.2 MG/DL
WBC # BLD AUTO: 6.2 K/UL (ref 4.8–10.8)
WBC #/AREA URNS HPF: ABNORMAL /HPF

## 2021-02-10 PROCEDURE — 99291 CRITICAL CARE FIRST HOUR: CPT | Performed by: INTERNAL MEDICINE

## 2021-02-10 PROCEDURE — 84478 ASSAY OF TRIGLYCERIDES: CPT

## 2021-02-10 PROCEDURE — 700102 HCHG RX REV CODE 250 W/ 637 OVERRIDE(OP): Performed by: STUDENT IN AN ORGANIZED HEALTH CARE EDUCATION/TRAINING PROGRAM

## 2021-02-10 PROCEDURE — 93005 ELECTROCARDIOGRAM TRACING: CPT | Performed by: INTERNAL MEDICINE

## 2021-02-10 PROCEDURE — 770020 HCHG ROOM/CARE - TELE (206)

## 2021-02-10 PROCEDURE — 700101 HCHG RX REV CODE 250: Performed by: INTERNAL MEDICINE

## 2021-02-10 PROCEDURE — 84484 ASSAY OF TROPONIN QUANT: CPT

## 2021-02-10 PROCEDURE — 700102 HCHG RX REV CODE 250 W/ 637 OVERRIDE(OP): Performed by: INTERNAL MEDICINE

## 2021-02-10 PROCEDURE — 80048 BASIC METABOLIC PNL TOTAL CA: CPT

## 2021-02-10 PROCEDURE — 36415 COLL VENOUS BLD VENIPUNCTURE: CPT

## 2021-02-10 PROCEDURE — 85025 COMPLETE CBC W/AUTO DIFF WBC: CPT

## 2021-02-10 PROCEDURE — A9270 NON-COVERED ITEM OR SERVICE: HCPCS | Performed by: STUDENT IN AN ORGANIZED HEALTH CARE EDUCATION/TRAINING PROGRAM

## 2021-02-10 PROCEDURE — 700111 HCHG RX REV CODE 636 W/ 250 OVERRIDE (IP): Performed by: INTERNAL MEDICINE

## 2021-02-10 PROCEDURE — 700111 HCHG RX REV CODE 636 W/ 250 OVERRIDE (IP): Performed by: STUDENT IN AN ORGANIZED HEALTH CARE EDUCATION/TRAINING PROGRAM

## 2021-02-10 PROCEDURE — 83690 ASSAY OF LIPASE: CPT

## 2021-02-10 PROCEDURE — A9270 NON-COVERED ITEM OR SERVICE: HCPCS | Performed by: HOSPITALIST

## 2021-02-10 PROCEDURE — A9270 NON-COVERED ITEM OR SERVICE: HCPCS | Performed by: INTERNAL MEDICINE

## 2021-02-10 PROCEDURE — 93010 ELECTROCARDIOGRAM REPORT: CPT | Performed by: INTERNAL MEDICINE

## 2021-02-10 PROCEDURE — 700102 HCHG RX REV CODE 250 W/ 637 OVERRIDE(OP): Performed by: HOSPITALIST

## 2021-02-10 PROCEDURE — 80053 COMPREHEN METABOLIC PANEL: CPT

## 2021-02-10 PROCEDURE — 99233 SBSQ HOSP IP/OBS HIGH 50: CPT | Performed by: HOSPITALIST

## 2021-02-10 PROCEDURE — 87086 URINE CULTURE/COLONY COUNT: CPT

## 2021-02-10 PROCEDURE — 84100 ASSAY OF PHOSPHORUS: CPT | Mod: 91

## 2021-02-10 PROCEDURE — 73600 X-RAY EXAM OF ANKLE: CPT | Mod: RT

## 2021-02-10 PROCEDURE — 700105 HCHG RX REV CODE 258: Performed by: INTERNAL MEDICINE

## 2021-02-10 PROCEDURE — 83735 ASSAY OF MAGNESIUM: CPT

## 2021-02-10 PROCEDURE — 81001 URINALYSIS AUTO W/SCOPE: CPT

## 2021-02-10 RX ORDER — OXYCODONE HYDROCHLORIDE 5 MG/1
5 TABLET ORAL EVERY 4 HOURS PRN
Status: DISCONTINUED | OUTPATIENT
Start: 2021-02-10 | End: 2021-02-14 | Stop reason: HOSPADM

## 2021-02-10 RX ORDER — LORAZEPAM 2 MG/ML
1 INJECTION INTRAMUSCULAR
Status: DISCONTINUED | OUTPATIENT
Start: 2021-02-10 | End: 2021-02-13

## 2021-02-10 RX ORDER — POTASSIUM CHLORIDE 20 MEQ/1
60 TABLET, EXTENDED RELEASE ORAL ONCE
Status: COMPLETED | OUTPATIENT
Start: 2021-02-10 | End: 2021-02-10

## 2021-02-10 RX ORDER — LORAZEPAM 2 MG/ML
2 INJECTION INTRAMUSCULAR
Status: DISCONTINUED | OUTPATIENT
Start: 2021-02-10 | End: 2021-02-13

## 2021-02-10 RX ORDER — FLUOXETINE HYDROCHLORIDE 20 MG/1
20 CAPSULE ORAL DAILY
Status: DISCONTINUED | OUTPATIENT
Start: 2021-02-10 | End: 2021-02-14 | Stop reason: HOSPADM

## 2021-02-10 RX ORDER — LORAZEPAM 2 MG/ML
0.5 INJECTION INTRAMUSCULAR EVERY 4 HOURS PRN
Status: DISCONTINUED | OUTPATIENT
Start: 2021-02-10 | End: 2021-02-13

## 2021-02-10 RX ORDER — ONDANSETRON 2 MG/ML
4 INJECTION INTRAMUSCULAR; INTRAVENOUS EVERY 4 HOURS PRN
Status: DISCONTINUED | OUTPATIENT
Start: 2021-02-10 | End: 2021-02-14 | Stop reason: HOSPADM

## 2021-02-10 RX ORDER — OMEPRAZOLE 20 MG/1
20 CAPSULE, DELAYED RELEASE ORAL 2 TIMES DAILY
Status: DISCONTINUED | OUTPATIENT
Start: 2021-02-10 | End: 2021-02-11

## 2021-02-10 RX ORDER — LIDOCAINE 50 MG/G
1 PATCH TOPICAL EVERY 24 HOURS
Status: DISCONTINUED | OUTPATIENT
Start: 2021-02-10 | End: 2021-02-14 | Stop reason: HOSPADM

## 2021-02-10 RX ORDER — MAGNESIUM SULFATE HEPTAHYDRATE 40 MG/ML
2 INJECTION, SOLUTION INTRAVENOUS ONCE
Status: COMPLETED | OUTPATIENT
Start: 2021-02-10 | End: 2021-02-10

## 2021-02-10 RX ORDER — LORAZEPAM 2 MG/1
4 TABLET ORAL
Status: DISCONTINUED | OUTPATIENT
Start: 2021-02-10 | End: 2021-02-13

## 2021-02-10 RX ORDER — LEVOTHYROXINE SODIUM 88 UG/1
88 TABLET ORAL
Status: DISCONTINUED | OUTPATIENT
Start: 2021-02-10 | End: 2021-02-14 | Stop reason: HOSPADM

## 2021-02-10 RX ORDER — LORAZEPAM 0.5 MG/1
0.5 TABLET ORAL EVERY 4 HOURS PRN
Status: DISCONTINUED | OUTPATIENT
Start: 2021-02-10 | End: 2021-02-14 | Stop reason: HOSPADM

## 2021-02-10 RX ORDER — LORAZEPAM 1 MG/1
1 TABLET ORAL EVERY 4 HOURS PRN
Status: DISCONTINUED | OUTPATIENT
Start: 2021-02-10 | End: 2021-02-13

## 2021-02-10 RX ORDER — LORAZEPAM 2 MG/1
2 TABLET ORAL
Status: DISCONTINUED | OUTPATIENT
Start: 2021-02-10 | End: 2021-02-13

## 2021-02-10 RX ORDER — OXYCODONE HYDROCHLORIDE 10 MG/1
10 TABLET ORAL EVERY 4 HOURS PRN
Status: DISCONTINUED | OUTPATIENT
Start: 2021-02-10 | End: 2021-02-14 | Stop reason: HOSPADM

## 2021-02-10 RX ORDER — LORAZEPAM 2 MG/ML
1.5 INJECTION INTRAMUSCULAR
Status: DISCONTINUED | OUTPATIENT
Start: 2021-02-10 | End: 2021-02-13

## 2021-02-10 RX ADMIN — MAGNESIUM SULFATE 2 G: 2 INJECTION INTRAVENOUS at 09:48

## 2021-02-10 RX ADMIN — SODIUM PHOSPHATE, MONOBASIC, MONOHYDRATE AND SODIUM PHOSPHATE, DIBASIC, ANHYDROUS 30 MMOL: 276; 142 INJECTION, SOLUTION INTRAVENOUS at 09:55

## 2021-02-10 RX ADMIN — ACETAMINOPHEN 650 MG: 325 TABLET ORAL at 20:45

## 2021-02-10 RX ADMIN — HEPARIN SODIUM 5000 UNITS: 5000 INJECTION, SOLUTION INTRAVENOUS; SUBCUTANEOUS at 21:45

## 2021-02-10 RX ADMIN — FLUOXETINE 20 MG: 20 CAPSULE ORAL at 09:58

## 2021-02-10 RX ADMIN — THIAMINE HCL TAB 100 MG 100 MG: 100 TAB at 06:16

## 2021-02-10 RX ADMIN — POTASSIUM CHLORIDE AND SODIUM CHLORIDE: 900; 300 INJECTION, SOLUTION INTRAVENOUS at 20:02

## 2021-02-10 RX ADMIN — THERA TABS 1 TABLET: TAB at 06:16

## 2021-02-10 RX ADMIN — DEXTROSE MONOHYDRATE 15 MMOL: 50 INJECTION, SOLUTION INTRAVENOUS at 14:53

## 2021-02-10 RX ADMIN — LEVOTHYROXINE SODIUM 88 MCG: 0.09 TABLET ORAL at 09:59

## 2021-02-10 RX ADMIN — Medication 400 MG: at 06:16

## 2021-02-10 RX ADMIN — Medication 400 MG: at 17:28

## 2021-02-10 RX ADMIN — ONDANSETRON 4 MG: 2 INJECTION INTRAMUSCULAR; INTRAVENOUS at 11:50

## 2021-02-10 RX ADMIN — LORAZEPAM 1 MG: 1 TABLET ORAL at 20:02

## 2021-02-10 RX ADMIN — OMEPRAZOLE 20 MG: 20 CAPSULE, DELAYED RELEASE ORAL at 06:16

## 2021-02-10 RX ADMIN — HEPARIN SODIUM 5000 UNITS: 5000 INJECTION, SOLUTION INTRAVENOUS; SUBCUTANEOUS at 06:16

## 2021-02-10 RX ADMIN — HEPARIN SODIUM 5000 UNITS: 5000 INJECTION, SOLUTION INTRAVENOUS; SUBCUTANEOUS at 14:52

## 2021-02-10 RX ADMIN — FOLIC ACID 1 MG: 1 TABLET ORAL at 06:16

## 2021-02-10 RX ADMIN — POTASSIUM CHLORIDE 20 MEQ: 1500 TABLET, EXTENDED RELEASE ORAL at 17:28

## 2021-02-10 RX ADMIN — OMEPRAZOLE 20 MG: 20 CAPSULE, DELAYED RELEASE ORAL at 17:28

## 2021-02-10 RX ADMIN — LIDOCAINE 1 PATCH: 50 PATCH TOPICAL at 09:46

## 2021-02-10 RX ADMIN — POTASSIUM CHLORIDE 60 MEQ: 1500 TABLET, EXTENDED RELEASE ORAL at 09:47

## 2021-02-10 RX ADMIN — POTASSIUM CHLORIDE 20 MEQ: 1500 TABLET, EXTENDED RELEASE ORAL at 06:16

## 2021-02-10 ASSESSMENT — LIFESTYLE VARIABLES
ORIENTATION AND CLOUDING OF SENSORIUM: ORIENTED AND CAN DO SERIAL ADDITIONS
VISUAL DISTURBANCES: NOT PRESENT
AGITATION: NORMAL ACTIVITY
TOTAL SCORE: 2
TOTAL SCORE: 4
ORIENTATION AND CLOUDING OF SENSORIUM: ORIENTED AND CAN DO SERIAL ADDITIONS
TOTAL SCORE: 2
ANXIETY: *
HEADACHE, FULLNESS IN HEAD: NOT PRESENT
PAROXYSMAL SWEATS: NO SWEAT VISIBLE
NAUSEA AND VOMITING: *
TOTAL SCORE: 10
AUDITORY DISTURBANCES: NOT PRESENT
ANXIETY: NO ANXIETY (AT EASE)
HEADACHE, FULLNESS IN HEAD: NOT PRESENT
AGITATION: NORMAL ACTIVITY
AUDITORY DISTURBANCES: NOT PRESENT
TREMOR: TREMOR NOT VISIBLE BUT CAN BE FELT, FINGERTIP TO FINGERTIP
HEADACHE, FULLNESS IN HEAD: NOT PRESENT
ORIENTATION AND CLOUDING OF SENSORIUM: ORIENTED AND CAN DO SERIAL ADDITIONS
PAROXYSMAL SWEATS: NO SWEAT VISIBLE
ANXIETY: *
AGITATION: NORMAL ACTIVITY
AUDITORY DISTURBANCES: NOT PRESENT
AGITATION: NORMAL ACTIVITY
VISUAL DISTURBANCES: NOT PRESENT
ANXIETY: *
AUDITORY DISTURBANCES: NOT PRESENT
VISUAL DISTURBANCES: NOT PRESENT
NAUSEA AND VOMITING: *
PAROXYSMAL SWEATS: NO SWEAT VISIBLE
NAUSEA AND VOMITING: MILD NAUSEA WITH NO VOMITING
HEADACHE, FULLNESS IN HEAD: MODERATE
TREMOR: *
TREMOR: NO TREMOR
NAUSEA AND VOMITING: NO NAUSEA AND NO VOMITING
ORIENTATION AND CLOUDING OF SENSORIUM: ORIENTED AND CAN DO SERIAL ADDITIONS
TREMOR: NO TREMOR
PAROXYSMAL SWEATS: BARELY PERCEPTIBLE SWEATING. PALMS MOIST
VISUAL DISTURBANCES: NOT PRESENT

## 2021-02-10 ASSESSMENT — ENCOUNTER SYMPTOMS
FEVER: 0
SPEECH CHANGE: 0
SHORTNESS OF BREATH: 0
VOMITING: 0
HALLUCINATIONS: 0
SORE THROAT: 0
BACK PAIN: 0
DIZZINESS: 0
NAUSEA: 1
HEARTBURN: 1
PALPITATIONS: 0
ABDOMINAL PAIN: 1
NERVOUS/ANXIOUS: 0

## 2021-02-10 ASSESSMENT — COGNITIVE AND FUNCTIONAL STATUS - GENERAL
DRESSING REGULAR LOWER BODY CLOTHING: A LITTLE
HELP NEEDED FOR BATHING: A LITTLE
MOVING TO AND FROM BED TO CHAIR: A LITTLE
CLIMB 3 TO 5 STEPS WITH RAILING: A LITTLE
MOVING FROM LYING ON BACK TO SITTING ON SIDE OF FLAT BED: A LITTLE
SUGGESTED CMS G CODE MODIFIER MOBILITY: CK
TOILETING: A LITTLE
DAILY ACTIVITIY SCORE: 20
DRESSING REGULAR UPPER BODY CLOTHING: A LITTLE
WALKING IN HOSPITAL ROOM: A LITTLE
STANDING UP FROM CHAIR USING ARMS: A LITTLE
SUGGESTED CMS G CODE MODIFIER DAILY ACTIVITY: CJ
MOBILITY SCORE: 19

## 2021-02-10 ASSESSMENT — PATIENT HEALTH QUESTIONNAIRE - PHQ9
2. FEELING DOWN, DEPRESSED, IRRITABLE, OR HOPELESS: NOT AT ALL
1. LITTLE INTEREST OR PLEASURE IN DOING THINGS: NOT AT ALL
SUM OF ALL RESPONSES TO PHQ9 QUESTIONS 1 AND 2: 0

## 2021-02-10 ASSESSMENT — PAIN DESCRIPTION - PAIN TYPE
TYPE: ACUTE PAIN

## 2021-02-10 ASSESSMENT — FIBROSIS 4 INDEX: FIB4 SCORE: 6.62

## 2021-02-10 NOTE — ASSESSMENT & PLAN NOTE
WA protocol  Thiamine, folate, MVI  Cessation of EtOH encouraged and discussed  Monitor LFT's  Harms of continued EtOH (pancreatitis complications, Liver failure) discussed

## 2021-02-10 NOTE — PROGRESS NOTES
"Critical Care Progress Note    Date of admission  2/9/2021    Chief Complaint  51 y.o. female admitted 2/9/2021 with slurred speech, confusion, presumed acute alcohol withdrawal    Hospital Course  51-year-old female with a history of chronic alcohol abuse, anxiety, depression, hypothyroidism and hypertriglyceridemia.  She presented to the ED 2/9 at around 1 PM with reported altered mentation.  Apparently she had been in the bathroom and her assistant noticed her suddenly drop her head and become unresponsive.  She did not fall but she appeared confused and had some slurred speech.  She was brought in the EMS and underwent stroke evaluation with negative CT head and neurology consultation.  Is felt this was likely with toxic metabolic related to alcohol withdrawal.  She will include alcohol-related tremor.  She was admitted to the intensive care unit and was given phenobarbital as she reportedly had a allergy to lorazepam that \"makes me cry.\"    Additionally, today she is more lucid and able to provide more history.  She reports frequent vomiting the past 2 days with some dark brown material and some black stools.  She has been started on oral PPI and increase this to twice daily.  She has not had any stools or emesis overnight.  She reports a recent right lateral ankle fracture for which she is being seen at the [facility] and has a boot.    She says she drinks daily but quit 2 days ago.  EtOH was undetectable.  Lipase was elevated and she did have some mild pancreatitis on CT.  Last time she stopped drinking was 6 months ago and did go through some alcohol withdrawal but did not require hospitalization.  She is very motivated to quit by her report today and says \"this is not working for me anymore.\"    Interval Problem Update  Reviewed last 24 hour events:  Received 260 mg of phenobarbital overnight  Currently mildly somnolent but A&O x4 and follows all commands, not tremulous  Phosphorus very low at 0.03.  Replacing " phosphorus, magnesium and potassium  Tm = 98.8  A/o x 4  Mild back pain, no abd pain  gianfranco clear diet  2 lpm oxygen  Sc heparin - change to lovenox  PPI for GIB  NS with K 125 - dec to 75  Transfer to Tele    Review of Systems  Review of Systems   Unable to perform ROS: Acuity of condition        Vital Signs for last 24 hours   Temp:  [35.9 °C (96.7 °F)-37.1 °C (98.8 °F)] 36.5 °C (97.7 °F)  Pulse:  [] 86  Resp:  [18-60] 22  BP: ()/(46-82) 85/58  SpO2:  [80 %-100 %] 97 %    Hemodynamic parameters for last 24 hours       Respiratory Information for the last 24 hours       Physical Exam   Physical Exam  Vitals and nursing note reviewed.   HENT:      Head: Normocephalic and atraumatic.      Mouth/Throat:      Mouth: Mucous membranes are moist.   Eyes:      Pupils: Pupils are equal, round, and reactive to light.   Cardiovascular:      Rate and Rhythm: Normal rate and regular rhythm.      Pulses: Normal pulses.   Pulmonary:      Effort: Pulmonary effort is normal. No respiratory distress.      Breath sounds: Normal breath sounds.   Abdominal:      General: There is no distension.      Palpations: Abdomen is soft.      Tenderness: There is no abdominal tenderness.   Musculoskeletal:         General: No swelling.      Cervical back: Neck supple.   Skin:     General: Skin is warm and dry.      Capillary Refill: Capillary refill takes less than 2 seconds.      Comments: Bruising over the right ankle circumferentially   Neurological:      General: No focal deficit present.      Mental Status: She is alert and oriented to person, place, and time.      Cranial Nerves: No cranial nerve deficit.         Medications  Current Facility-Administered Medications   Medication Dose Route Frequency Provider Last Rate Last Admin   • MD Alert...ICU Electrolyte Replacement per Pharmacy   Other PHARMACY TO DOSE Logan Chris M.D.       • DILTIAZem (CARDIZEM) injection 10 mg  10 mg Intravenous Once Jude Wade M.D.   Stopped  at 02/09/21 1430   • senna-docusate (PERICOLACE or SENOKOT S) 8.6-50 MG per tablet 2 Tab  2 tablet Oral BID Sheri Pitts M.D.   Stopped at 02/09/21 1901    And   • polyethylene glycol/lytes (MIRALAX) PACKET 1 Packet  1 Packet Oral QDAY PRN Sheri Pitts M.D.        And   • magnesium hydroxide (MILK OF MAGNESIA) suspension 30 mL  30 mL Oral QDAY PRN Sheri Pitts M.D.        And   • bisacodyl (DULCOLAX) suppository 10 mg  10 mg Rectal QDAY PRN Sheri Pitts M.D.       • Respiratory Therapy Consult   Nebulization Continuous RT Sheri Pitts M.D.       • 0.9 % NaCl with KCl 40 mEq 1,000 mL   Intravenous Continuous Sheri Pitts M.D. 125 mL/hr at 02/09/21 1900 Rate Verify at 02/09/21 1900   • heparin injection 5,000 Units  5,000 Units Subcutaneous Q8HRS Sheri Pitts M.D.   5,000 Units at 02/10/21 0616   • acetaminophen (Tylenol) tablet 650 mg  650 mg Oral Q6HRS PRN Sheri Pitts M.D.       • thiamine (Vitamin B-1) tablet 100 mg  100 mg Oral DAILY Venu Irwin, D.O.   100 mg at 02/10/21 0616    And   • multivitamin (THERAGRAN) tablet 1 Tab  1 tablet Oral DAILY Venu Gloverin, D.O.   1 tablet at 02/10/21 0616    And   • folic acid (FOLVITE) tablet 1 mg  1 mg Oral DAILY Venu Gloverin, D.O.   1 mg at 02/10/21 0616   • magnesium oxide (MAG-OX) tablet 400 mg  400 mg Oral BID Venu Gloverin, D.O.   400 mg at 02/10/21 0616   • potassium chloride SA (Kdur) tablet 20 mEq  20 mEq Oral BID Venu Irwin, D.O.   20 mEq at 02/10/21 0616   • labetalol (NORMODYNE/TRANDATE) injection 10 mg  10 mg Intravenous Q HOUR PRN SUYAPA GalavizO.        Or   • labetalol (NORMODYNE) tablet 200 mg  200 mg Oral Q6HRS PRN JESSE Galaviz.O.       • Pharmacy Consult Request - Benzodiazepine review   Other PHARMACY TO DOSE JESSE Galaviz.GUSTAVO.       • PHENObarbital injection 130 mg  130 mg Intravenous Q30 MIN PRN SUYAPA GalavizO.   130 mg at 02/09/21 4180    And   • PHENObarbital injection 260 mg   260 mg Intravenous Q30 MIN PRN Venu Irwin D.O.       • omeprazole (PRILOSEC) capsule 20 mg  20 mg Oral DAILY Venu Irwin D.O.   20 mg at 02/10/21 0616       Fluids    Intake/Output Summary (Last 24 hours) at 2/10/2021 0812  Last data filed at 2/9/2021 2017  Gross per 24 hour   Intake 2200 ml   Output --   Net 2200 ml       Laboratory          Recent Labs     02/09/21  1320 02/10/21  0624   SODIUM 130* 127*   POTASSIUM 3.0* 3.5*   CHLORIDE 71* 85*   CO2 14* 29   BUN 30* 26*   CREATININE 1.69* 0.85   MAGNESIUM 2.5 1.9   PHOSPHORUS  --  <0.3*   CALCIUM 11.8* 9.5     Recent Labs     02/09/21  1320 02/10/21  0624   ALTSGPT 60*  --    ASTSGOT 85*  --    ALKPHOSPHAT 304*  --    TBILIRUBIN 1.9*  --    LIPASE 813*  --    GLUCOSE 173* 85     Recent Labs     02/09/21  1320 02/10/21  0624   WBC 11.2* 6.2   NEUTSPOLYS 79.30* 69.20   LYMPHOCYTES 13.60* 22.80   MONOCYTES 6.40 6.30   EOSINOPHILS 0.00 1.10   BASOPHILS 0.30 0.30   ASTSGOT 85*  --    ALTSGPT 60*  --    ALKPHOSPHAT 304*  --    TBILIRUBIN 1.9*  --      Recent Labs     02/09/21  1320 02/10/21  0624   RBC 4.96 3.39*   HEMOGLOBIN 13.9  15.6 10.7*   HEMATOCRIT 44.9  48.1* 32.2*   PLATELETCT 145* 103*   PROTHROMBTM 13.5  --    APTT 28.8  --    INR 1.00  --        Imaging  X-Ray:  I have personally reviewed the images and compared with prior images.     CT abdomen/pelvis 2/9  1.  Hazy fat stranding adjacent to the tail of pancreas, appearance suggests pancreatitis.  2.  Right ovarian cyst, follow-up with pelvic sonogram for further characterization  3.  Left nephrolithiasis without visualized obstructive changes  4.  Segment 2 duodenal diverticula  5.  Hepatomegaly and diffuse hepatic steatosis    Assessment/Plan  * Alcohol withdrawal syndrome with complication (HCC)- (present on admission)  Assessment & Plan  Received phenobarbital due to questionable benzodiazepine allergy  She clarified that she does not really have an allergy and will change to CIWA  protocol  Marked hypophosphatemia, hypokalemia and hypomagnesemia, all replaced  No evidence of seizure activity and clinically improved from withdrawal standpoint    Pain of upper abdomen- (present on admission)  Assessment & Plan  CT consistent with pancreatitis, related to EtOH, triglycerides ordered  Report of coffee-ground emesis and melena  Continue to follow hemoglobin with conservative transfusion threshold  PPI  Received fluid resuscitation  Follow clinically    GARRETT (acute kidney injury) (HCC)- (present on admission)  Assessment & Plan  Resolved with hydration    Hypomagnesemia- (present on admission)  Assessment & Plan  Replete    Hypokalemia- (present on admission)  Assessment & Plan  Replete     Update:  Critical electrolyte abnormalities have been replaced, continue to follow phosphorus, magnesium, potassium  No significant ectopy, artifact on prior EKG, repeat    VTE:  Heparin  Ulcer: PPI  Lines: None    I have performed a physical exam and reviewed and updated ROS and Plan today (2/10/2021). In review of yesterday's note (2/9/2021), there are no changes except as documented above.     Discussed patient condition and risk of morbidity and/or mortality with Hospitalist, RN, Therapies and QA team  The patient remains critically ill.  Critical care time = 32 minutes in directly providing and coordinating critical care and extensive data review.  No time overlap and excludes procedures.

## 2021-02-10 NOTE — ASSESSMENT & PLAN NOTE
Occurred a few weeks prior to admit per patient.  Was followed outpatient by DEISY per patient  Has a brace in her room.  2/10 xray right ankle showing distal fibular displaced fracture.    Consulted Ortho 2/11.  Okay for weight bearing if wearing leg boot brace only  2/11 Oscal and Vit D ordered

## 2021-02-10 NOTE — ASSESSMENT & PLAN NOTE
Likely EtOH related gastritis along with acute pancreatitis  Continue BID PPI and advancing diet  Improving.

## 2021-02-10 NOTE — ASSESSMENT & PLAN NOTE
CT consistent with pancreatitis, related to EtOH, triglycerides ordered  Report of coffee-ground emesis and melena  Continue to follow hemoglobin with conservative transfusion threshold  PPI  Received fluid resuscitation  Follow clinically

## 2021-02-10 NOTE — ASSESSMENT & PLAN NOTE
Received phenobarbital due to questionable benzodiazepine allergy  She clarified that she does not really have an allergy and will change to [facility] protocol  Marked hypophosphatemia, hypokalemia and hypomagnesemia, all replaced  No evidence of seizure activity and clinically improved from withdrawal standpoint

## 2021-02-10 NOTE — PROGRESS NOTES
"Hospital Medicine Daily Progress Note    Date of Service  2/10/2021    Chief Complaint  Altered mentation    Hospital Course  Per initial H+P: \"51 y.o. female who presented 2/9/2021 with slurred speech, mild confusion.  Patient has a past medical history of chronic alcohol abuse, anxiety, depression, hypothyroidism, hypertriglyceridemia.  Around noon today patient was in the bathroom, reportedly assisted by her , who witnessed her drop her head and become momentarily unresponsive.  Upon waking, she reportedly had difficulty speaking and was confused.  EMS was called; it was noted her symptoms had resolved by their arrival.  Per chart review and the Neurology team, the patient reportedly had a transient decrease level of consciousness en route to the hospital.  Neurology was consulted via code stroke.  Neurology saw the patient, reported the patient had a nonfocal neurological exam and NIHSS of 1 with slight dysarthria.  CT head without contrast showed no acute intracranial abnormality.     The patient did also present to [facility] on 2/7/2021 secondary to alcohol related injury, hitting the back of her head.  The patient reports she drinks about 12 mini bottles of vodka daily, though yesterday she drank 3 mini bottles.       Reports mild headache.  Denies chest pain, abdominal pain, shortness of breath, fever/chills, vision changes, numbness/tingling, bowel/bladder incontinence.  She initially reported she may have had two episodes of possible black emesis 2 days ago and perhaps one dark stool last week, though later was unsure.\"     Interval Problem Update  Alert.  Mildly lethargic but speech clear and talking in sentences.  [place] to [place] [facility] [place],but initially thought it was 2010 but later stated 2021.  She knew the month.  Ongoing epigastric pain.    Consultants/Specialty  Intensivist    Code Status  Full Code    Disposition  Transfer to Medical floor.    Review of Systems  Review of " Systems   Constitutional: Positive for malaise/fatigue. Negative for fever.   HENT: Positive for congestion. Negative for sore throat.    Respiratory: Negative for shortness of breath.    Cardiovascular: Negative for chest pain, palpitations and leg swelling.   Gastrointestinal: Positive for abdominal pain, heartburn and nausea. Negative for vomiting.   Genitourinary: Negative for dysuria.   Musculoskeletal: Negative for back pain.   Neurological: Negative for dizziness and speech change.   Psychiatric/Behavioral: Negative for hallucinations. The patient is not nervous/anxious.         Physical Exam  Temp:  [35.9 °C (96.7 °F)-37.1 °C (98.8 °F)] 36.5 °C (97.7 °F)  Pulse:  [] 91  Resp:  [18-60] 28  BP: ()/(46-82) 113/62  SpO2:  [80 %-100 %] 98 %    Physical Exam  Vitals reviewed.   Constitutional:       Appearance: Normal appearance. She is not diaphoretic.   HENT:      Head: Normocephalic and atraumatic.      Nose: Nose normal. No congestion.      Mouth/Throat:      Mouth: Mucous membranes are moist.      Pharynx: No oropharyngeal exudate.   Eyes:      General: No scleral icterus.        Right eye: No discharge.         Left eye: No discharge.      Extraocular Movements: Extraocular movements intact.      Conjunctiva/sclera: Conjunctivae normal.   Cardiovascular:      Rate and Rhythm: Normal rate and regular rhythm.      Pulses:           Radial pulses are 2+ on the right side and 2+ on the left side.        Dorsalis pedis pulses are 2+ on the right side and 2+ on the left side.      Heart sounds: No murmur.   Pulmonary:      Effort: Pulmonary effort is normal. No respiratory distress.      Breath sounds: Normal breath sounds. No wheezing or rales.   Abdominal:      General: Bowel sounds are normal. There is no distension.      Palpations: Abdomen is soft.      Tenderness: There is no abdominal tenderness.   Musculoskeletal:         General: Tenderness (right lower ankle) present. No swelling.       Cervical back: No muscular tenderness.      Right lower leg: No edema.      Left lower leg: No edema.   Lymphadenopathy:      Cervical: No cervical adenopathy.   Skin:     Coloration: Skin is not jaundiced or pale.      Findings: Bruising (right temporal region) present.   Neurological:      General: No focal deficit present.      Mental Status: She is alert and oriented to person, place, and time. Mental status is at baseline.      Cranial Nerves: No cranial nerve deficit.   Psychiatric:         Mood and Affect: Mood normal.         Behavior: Behavior normal.         Fluids    Intake/Output Summary (Last 24 hours) at 2/10/2021 1044  Last data filed at 2/10/2021 0800  Gross per 24 hour   Intake 2525 ml   Output --   Net 2525 ml       Laboratory  Recent Labs     02/09/21  1320 02/10/21  0624   WBC 11.2* 6.2   RBC 4.96 3.39*   HEMOGLOBIN 13.9  15.6 10.7*   HEMATOCRIT 44.9  48.1* 32.2*   MCV 97.0 95.0   MCH 31.5 31.3   MCHC 32.4* 32.9*   RDW 62.4* 60.3*   PLATELETCT 145* 103*   MPV 11.8 11.9     Recent Labs     02/09/21  1320 02/10/21  0624   SODIUM 130* 127*   POTASSIUM 3.0* 3.5*   CHLORIDE 71* 85*   CO2 14* 29   GLUCOSE 173* 85   BUN 30* 26*   CREATININE 1.69* 0.85   CALCIUM 11.8* 9.5     Recent Labs     02/09/21  1320   APTT 28.8   INR 1.00               Imaging  CT-ABDOMEN-PELVIS W/O   Final Result         1.  Hazy fat stranding adjacent to the tail of pancreas, appearance suggests pancreatitis.   2.  Right ovarian cyst, follow-up with pelvic sonogram for further characterization   3.  Left nephrolithiasis without visualized obstructive changes   4.  Segment 2 duodenal diverticula   5.  Hepatomegaly and diffuse hepatic steatosis      DX-CHEST-PORTABLE (1 VIEW)   Final Result      No acute cardiac or pulmonary abnormality is noted.      CT-HEAD W/O   Final Result      No acute intracranial abnormality is identified.      Posterior left occipital scalp swelling/hematoma.              Assessment/Plan  * Alcohol  withdrawal syndrome with complication (HCC)- (present on admission)  Assessment & Plan  CIWA protocol  Thiamine, folate, MVI  Cessation of EtOH encouraged and discussed  Monitor LFT's  Harms of continued EtOH (pancreatitis complications, Liver failure) discussed    Acute pancreatitis  Assessment & Plan  Continue liquid diet for now as tolerates.  IV fluids  Pain management  EtOH cessation encouraged    Closed right ankle fracture  Assessment & Plan  Occurred a few weeks prior to admit per patient.  Was followed outpatient by [facility] per patient  Has a brace in her room.  Check xray    Elevated troponin  Assessment & Plan  Repeat troponin to varify no upward swing  Likely due to GARRETT.    Thrombocytopenia (HCC)  Assessment & Plan  EtOH related  Monitor cbc    Electrolyte abnormality  Assessment & Plan  NaCl w/ KCl  Monitor BMP  Worsening hyponatremia  Improved hypercalcemia 2/10  Improving hypokalemia but on KCl IV  Monitor Mg, Ca, Phos  Replete and monitor    Dehydration  Assessment & Plan  IV fluids.  Watch for volume overload.    Increased ammonia level  Assessment & Plan  Check follow up level.  Alert x2 had initial hard time with the year but got it second chance.  If worsens mentally will start lactulose    Low serum phosphorus for age  Assessment & Plan  Replace and monitor    Pain of upper abdomen- (present on admission)  Assessment & Plan  Likely EtOH related gastritis along with acute pancreatitis  Continue BID PPI and IV fluids  Continue liquid diet for now.  If Hgb drops or melena noted will consider GI consult.  Monitor CMP  Check UA    GARRETT (acute kidney injury) (HCC)- (present on admission)  Assessment & Plan  2/10: Cr:0.85  Improved from 2/9 Cr:1.69  NaCl IV   Monitor BMP and I/O's  Check UA    Hypomagnesemia- (present on admission)  Assessment & Plan  Replete to keep Mg > 2.   Monitor Mg level.     Hypokalemia- (present on admission)  Assessment & Plan  Goal K >4.  replete  Monitor BMP.       VTE  prophylaxis: lovenox

## 2021-02-10 NOTE — ED NOTES
Med rec completed and reviewed per patient    Allergies reviewed     No Antibiotics PO within 2 weeks

## 2021-02-10 NOTE — PROGRESS NOTES
Lab called with critical result of phos less than 0.3 at 0725. Critical lab result read back to lab.   Dr. Chris notified of critical lab result at 0730.  Critical lab result read back by Dr. Chris.

## 2021-02-10 NOTE — ASSESSMENT & PLAN NOTE
Resolved  2/11 BUN:11, Cr:0.5  2/10: Cr:0.85  Improved from 2/9 Cr:1.69  NaCl IV with cessation of fluids 2/11  Monitor BMP and I/O's

## 2021-02-10 NOTE — H&P
Hospital Medicine History & Physical Note    Date of Service  2/9/2021    Primary Care Physician  Herbie Joshi M.D.    Consultants  ICU - Dr. Venu Irwin.   Neurology - Dr. Handley.     Code Status  Full Code    Chief Complaint  Chief Complaint   Patient presents with   • Possible Stroke   • Detox   • Tachycardia     140-160's       History of Presenting Illness  51 y.o. female who presented 2/9/2021 with slurred speech, mild confusion.  Patient has a past medical history of chronic alcohol abuse, anxiety, depression, hypothyroidism, hypertriglyceridemia.  Around noon today patient was in the bathroom, reportedly assisted by her , who witnessed her drop her head and become momentarily unresponsive.  Upon waking, she reportedly had difficulty speaking and was confused.  EMS was called; it was noted her symptoms had resolved by their arrival.  Per chart review and the Neurology team, the patient reportedly had a transient decrease level of consciousness en route to the hospital.  Neurology was consulted via code stroke.  Neurology saw the patient, reported the patient had a nonfocal neurological exam and NIHSS of 1 with slight dysarthria.  CT head without contrast showed no acute intracranial abnormality.    The patient did also present to [facility] on 2/7/2021 secondary to alcohol related injury, hitting the back of her head.  The patient reports she drinks about 12 mini bottles of vodka daily, though yesterday she drank 3 mini bottles.      Reports mild headache.  Denies chest pain, abdominal pain, shortness of breath, fever/chills, vision changes, numbness/tingling, bowel/bladder incontinence.  She initially reported she may have had two episodes of possible black emesis 2 days ago and perhaps one dark stool last week, though later was unsure.     The patient's mother, Jose Manuel, was present at bedside during the exam.     Review of Systems  12 point ROS negative except as per HPI.       Past  "Medical History   has a past medical history of Anxiety and depression (1/23/2019), Colon polyp (3/29/2019), Disorder of thyroid, Diverticulosis of colon (1/23/2019), Generalized anxiety disorder, Hypertriglyceridemia (1/23/2019), Low serum HDL (1/23/2019), Menopause, and Vitamin B12 deficiency (1/23/2019).    Surgical History   has a past surgical history that includes other orthopedic surgery (Right, 2017); robotic laparoscopic cholecystectomy (2017); umbilical hernia repair (2017); appendectomy; tonsillectomy and adenoidectomy (1976); mammoplasty augmentation (Bilateral); inguinal hernia repair bilateral (1999); irrigation & debridement ortho (Left, 9/21/2019); and wound closure ortho (Left, 9/24/2019).     Family History  family history includes Cancer in her mother; Diabetes in her son; Heart Disease in her brother and mother; Thyroid in her mother.     Social History   reports that she has been smoking cigarettes. She has a 15.00 pack-year smoking history. She has never used smokeless tobacco. She reports current alcohol use. She reports that she does not use drugs.    Allergies  Allergies   Allergen Reactions   • Sulfa Drugs Anaphylaxis   • Norco [Apap-Fd&C Yellow #10 [place] Cervantes-Hydrocodone]      itching   • Zofran [Dextrose-Ondansetron] Unspecified     Migraine tolerates Injection/ IV   • Lorazepam Anxiety and Unspecified     \"IT MAKES ME CRY\"       Medications  Prior to Admission Medications   Prescriptions Last Dose Informant Patient Reported? Taking?   ALPRAZolam (XANAX) 0.5 MG Tab 2/8/2021 at pm Patient No No   Sig: Take 1 Tab by mouth 2 times a day as needed for Sleep or Anxiety for up to 30 days.   Patient taking differently: Take 0.5-1 mg by mouth at bedtime as needed for Sleep or Anxiety.   NABOR 0.0375 MG/24HR patch 2/8/2021 at patch is on Patient No No   Sig: Apply patch on skin twice a week   Patient taking differently: Place 1 Patch on the skin two times a week. Applys on mondays and thursdays "   FLUoxetine (PROZAC) 20 MG Cap > 5 days ago at [facility] Patient No No   Sig: TAKE ONE CAPSULE BY MOUTH EVERY DAY   Patient taking differently: Take 20 mg by mouth every day.   Ibuprofen (ADVIL LIQUI-GELS MINIS PO) > 5 days ago at [facility] Patient Yes Yes   Sig: Take 2 tablet by mouth every 6 hours as needed.   Multiple Vitamins-Minerals (OCUVITE-LUTEIN) Tab > 5 days ago at [facility] Patient Yes No   Sig: Take 1 tablet by mouth every day.   Naproxen Sodium (ALEVE) 220 MG Cap few days ago at [facility] Patient Yes Yes   Sig: Take 440 mg by mouth 2 times a day as needed.   Omega-3 Fatty Acids (FISH OIL) 1000 MG Cap capsule > 5 days ago at [facility] Patient Yes No   Sig: Take 1,000 mg by mouth every day.   SYNTHROID 88 MCG Tab 2/8/2021 at am Patient No No   Sig: TAKE ONE TABLET BY MOUTH EVERY DAY IN THE MORNING ON AN EMPTY STOMACH   Patient taking differently: Take 88 mcg by mouth every morning. TAKE ONE TABLET BY MOUTH EVERY DAY IN THE MORNING ON AN EMPTY STOMACH   VITAMIN D PO > 5days ago at [facility] Patient Yes No   Sig: Take 1 Cap by mouth every day.   albuterol 108 (90 Base) MCG/ACT Aero Soln inhalation aerosol 2/9/2021 at 0400 Patient No No   Sig: Inhale 2 Puffs every four hours as needed for Shortness of Breath.   fluconazole (DIFLUCAN) 150 MG tablet Not Taking at Not Taking Patient No No   Sig: Take 1 tablet by mouth today; repeat with second tablet in 3 days   Patient not taking: Reported on 2/9/2021   guaiFENesin ER (MUCINEX) 600 MG TABLET SR 12 HR 2/9/2021 at am Patient Yes Yes   Sig: Take 600 mg by mouth 2 times a day as needed.   ibuprofen (MOTRIN) 800 MG Tab > a week ago at [facility] Patient No No   Sig: Take 1 Tab by mouth every 8 hours as needed for Moderate Pain, Headache or Inflammation.   Patient taking differently: Take 400 mg by mouth every 8 hours as needed for Moderate Pain, Headache or Inflammation.   nitrofurantoin (MACROBID) 100 MG Cap Not Taking at Not Taking Patient No No   Sig: Take 1 Cap by mouth 2 times a day.   Patient not  taking: Reported on 2/9/2021   progesterone (PROMETRIUM) 100 MG Cap >3 days ago at [facility] Patient No No   Sig: Take 1 Cap by mouth every day.   traMADol (ULTRAM) 50 MG Tab 2/8/2021 at pm Patient Yes Yes   Sig: Take  mg by mouth every four hours as needed.      Facility-Administered Medications: None       Physical Exam  Temp:  [35.9 °C (96.7 °F)-36.1 °C (97 °F)] 36.1 °C (97 °F)  Pulse:  [] 98  Resp:  [18-60] 25  BP: ()/(46-82) 86/58  SpO2:  [87 %-100 %] 87 %    Physical Exam  Vitals signs and nursing note reviewed.   Constitutional:       General: She is not in acute distress.     Appearance: She is not ill-appearing, toxic-appearing or diaphoretic.      Comments: Appears inebriated.   HENT:      Head: Normocephalic and atraumatic.      Nose: No congestion.      Mouth/Throat:      Mouth: Mucous membranes are moist.      Pharynx: Oropharynx is clear.   Eyes:      General: No scleral icterus.     Conjunctiva/sclera: Conjunctivae normal.   Cardiovascular:      Rate and Rhythm: Normal rate and regular rhythm.      Pulses: Normal pulses.      Heart sounds: Normal heart sounds. No murmur.   Pulmonary:      Effort: Pulmonary effort is normal. No respiratory distress.      Breath sounds: Normal breath sounds. No stridor. No wheezing.   Abdominal:      General: Bowel sounds are normal. There is no distension.      Palpations: Abdomen is soft.      Tenderness: There is no abdominal tenderness. There is no guarding or rebound.   Musculoskeletal:         General: No swelling or tenderness.   Skin:     Capillary Refill: Capillary refill takes less than 2 seconds.      Coloration: Skin is not jaundiced.   Neurological:      Mental Status: She is alert and oriented to person, place, and time. Mental status is at baseline.      Comments: Strength b/l UE/LE 5/5.    Sensation grossly intact.    Cranial nerves 2-12 intact.    Psychiatric:         Mood and Affect: Mood normal.         Behavior: Behavior normal.          Laboratory:  Recent Labs     02/09/21  1320   WBC 11.2*   RBC 4.96   HEMOGLOBIN 13.9  15.6   HEMATOCRIT 44.9  48.1*   MCV 97.0   MCH 31.5   MCHC 32.4*   RDW 62.4*   PLATELETCT 145*   MPV 11.8     Recent Labs     02/09/21  1320   SODIUM 130*   POTASSIUM 3.0*   CHLORIDE 71*   CO2 14*   GLUCOSE 173*   BUN 30*   CREATININE 1.69*   CALCIUM 11.8*     Recent Labs     02/09/21  1320   ALTSGPT 60*   ASTSGOT 85*   ALKPHOSPHAT 304*   TBILIRUBIN 1.9*   LIPASE 813*   GLUCOSE 173*     Recent Labs     02/09/21  1320   APTT 28.8   INR 1.00     No results for input(s): NTPROBNP in the last 72 hours.      Recent Labs     02/09/21  1320   TROPONINT 64*       Imaging:  DX-CHEST-PORTABLE (1 VIEW)   Final Result      No acute cardiac or pulmonary abnormality is noted.      CT-HEAD W/O   Final Result      No acute intracranial abnormality is identified.      Posterior left occipital scalp swelling/hematoma.         CT-ABDOMEN-PELVIS W/O    (Results Pending)         Assessment/Plan:  I anticipate this patient will require at least two midnights for appropriate medical management, necessitating inpatient admission.    * Alcohol withdrawal syndrome with complication (HCC)- (present on admission)  Assessment & Plan  H/o chronic alcohol abuse/alcohol use disorder.    With metabolic encephalopathy, improving.   Admit to ICU.    IVF.  RASS/phenobarbital protocol.    Fall/aspiration/seizure precautions.   MV, thiamine, folate, vitamin B12.      Pain of upper abdomen- (present on admission)  Assessment & Plan  With questionable GI bleeding.  Hgb 15.6 on admission, SBPs initially 60s but improved to 100s.    Monitor H/H.   IVF.   PPI.   CT abd/pelvis without.  Stool occult.   Consider GI consult.      GARRETT (acute kidney injury) (HCC)- (present on admission)  Assessment & Plan  Secondary to dehydration.   IVF.   Monitor renal function.  Avoid nephrotoxins.     Hypomagnesemia- (present on admission)  Assessment & Plan  Replete to keep Mg >  2.   Monitor Mg level.     Hypokalemia- (present on admission)  Assessment & Plan  Replete to keep K >4.  Monitor BMP.

## 2021-02-10 NOTE — PROGRESS NOTES
Patient arrived to T629 via gurney accompanied by ICU RN & CCT. Vitals stable upon transfer. Ordered fluids running. Supplemental O2 at 2 L/min via nasal cannula. Report given to oncoming RN. Patient's son at bedside. Visitation policy was reviewed with patient and family.

## 2021-02-11 PROBLEM — K92.2 GI BLEED: Status: ACTIVE | Noted: 2021-02-11

## 2021-02-11 PROBLEM — I48.91 ATRIAL FIBRILLATION (HCC): Status: ACTIVE | Noted: 2021-02-11

## 2021-02-11 LAB
ALBUMIN SERPL BCP-MCNC: 2.9 G/DL (ref 3.2–4.9)
ALBUMIN/GLOB SERPL: 1.4 G/DL
ALP SERPL-CCNC: 200 U/L (ref 30–99)
ALT SERPL-CCNC: 29 U/L (ref 2–50)
ANION GAP SERPL CALC-SCNC: 12 MMOL/L (ref 7–16)
ANION GAP SERPL CALC-SCNC: 5 MMOL/L (ref 7–16)
AST SERPL-CCNC: 61 U/L (ref 12–45)
BASOPHILS # BLD AUTO: 0.5 % (ref 0–1.8)
BASOPHILS # BLD: 0.03 K/UL (ref 0–0.12)
BILIRUB SERPL-MCNC: 0.6 MG/DL (ref 0.1–1.5)
BUN SERPL-MCNC: 11 MG/DL (ref 8–22)
BUN SERPL-MCNC: 6 MG/DL (ref 8–22)
CALCIUM SERPL-MCNC: 7.3 MG/DL (ref 8.5–10.5)
CALCIUM SERPL-MCNC: 7.4 MG/DL (ref 8.5–10.5)
CHLORIDE SERPL-SCNC: 102 MMOL/L (ref 96–112)
CHLORIDE SERPL-SCNC: 92 MMOL/L (ref 96–112)
CO2 SERPL-SCNC: 27 MMOL/L (ref 20–33)
CO2 SERPL-SCNC: 29 MMOL/L (ref 20–33)
CORTIS SERPL-MCNC: 13 UG/DL (ref 0–23)
CREAT SERPL-MCNC: 0.42 MG/DL (ref 0.5–1.4)
CREAT SERPL-MCNC: 0.5 MG/DL (ref 0.5–1.4)
EOSINOPHIL # BLD AUTO: 0.09 K/UL (ref 0–0.51)
EOSINOPHIL NFR BLD: 1.4 % (ref 0–6.9)
ERYTHROCYTE [DISTWIDTH] IN BLOOD BY AUTOMATED COUNT: 61.9 FL (ref 35.9–50)
FERRITIN SERPL-MCNC: 303 NG/ML (ref 10–291)
FOLATE SERPL-MCNC: 13.7 NG/ML
GLOBULIN SER CALC-MCNC: 2.1 G/DL (ref 1.9–3.5)
GLUCOSE SERPL-MCNC: 101 MG/DL (ref 65–99)
GLUCOSE SERPL-MCNC: 96 MG/DL (ref 65–99)
HCT VFR BLD AUTO: 26.5 % (ref 37–47)
HEMOCCULT STL QL: POSITIVE
HGB BLD-MCNC: 8.5 G/DL (ref 12–16)
HGB RETIC QN AUTO: 40.6 PG/CELL (ref 29–35)
IMM GRANULOCYTES # BLD AUTO: 0.02 K/UL (ref 0–0.11)
IMM GRANULOCYTES NFR BLD AUTO: 0.3 % (ref 0–0.9)
IMM RETICS NFR: 29 % (ref 9.3–17.4)
IRON SATN MFR SERPL: 7 % (ref 15–55)
IRON SERPL-MCNC: 15 UG/DL (ref 40–170)
LYMPHOCYTES # BLD AUTO: 1.38 K/UL (ref 1–4.8)
LYMPHOCYTES NFR BLD: 22 % (ref 22–41)
MAGNESIUM SERPL-MCNC: 1.9 MG/DL (ref 1.5–2.5)
MCH RBC QN AUTO: 31.3 PG (ref 27–33)
MCHC RBC AUTO-ENTMCNC: 32.4 G/DL (ref 33.6–35)
MCV RBC AUTO: 96.3 FL (ref 81.4–97.8)
MONOCYTES # BLD AUTO: 0.73 K/UL (ref 0–0.85)
MONOCYTES NFR BLD AUTO: 11.7 % (ref 0–13.4)
NEUTROPHILS # BLD AUTO: 4.01 K/UL (ref 2–7.15)
NEUTROPHILS NFR BLD: 64.1 % (ref 44–72)
NRBC # BLD AUTO: 0 K/UL
NRBC BLD-RTO: 0 /100 WBC
PHOSPHATE SERPL-MCNC: 2.2 MG/DL (ref 2.5–4.5)
PLATELET # BLD AUTO: 121 K/UL (ref 164–446)
PMV BLD AUTO: 11.8 FL (ref 9–12.9)
POTASSIUM SERPL-SCNC: 2.8 MMOL/L (ref 3.6–5.5)
POTASSIUM SERPL-SCNC: 3.6 MMOL/L (ref 3.6–5.5)
PROT SERPL-MCNC: 5 G/DL (ref 6–8.2)
RBC # BLD AUTO: 2.72 M/UL (ref 4.2–5.4)
RETICS # AUTO: 0.05 M/UL (ref 0.04–0.06)
RETICS/RBC NFR: 1.6 % (ref 0.8–2.1)
SODIUM SERPL-SCNC: 126 MMOL/L (ref 135–145)
SODIUM SERPL-SCNC: 141 MMOL/L (ref 135–145)
TIBC SERPL-MCNC: 208 UG/DL (ref 250–450)
UIBC SERPL-MCNC: 193 UG/DL (ref 110–370)
WBC # BLD AUTO: 6.3 K/UL (ref 4.8–10.8)

## 2021-02-11 PROCEDURE — 700105 HCHG RX REV CODE 258: Performed by: NURSE PRACTITIONER

## 2021-02-11 PROCEDURE — 83735 ASSAY OF MAGNESIUM: CPT

## 2021-02-11 PROCEDURE — 700101 HCHG RX REV CODE 250: Performed by: STUDENT IN AN ORGANIZED HEALTH CARE EDUCATION/TRAINING PROGRAM

## 2021-02-11 PROCEDURE — 82533 TOTAL CORTISOL: CPT

## 2021-02-11 PROCEDURE — 82728 ASSAY OF FERRITIN: CPT

## 2021-02-11 PROCEDURE — 85025 COMPLETE CBC W/AUTO DIFF WBC: CPT

## 2021-02-11 PROCEDURE — 80053 COMPREHEN METABOLIC PANEL: CPT

## 2021-02-11 PROCEDURE — 700111 HCHG RX REV CODE 636 W/ 250 OVERRIDE (IP): Performed by: HOSPITALIST

## 2021-02-11 PROCEDURE — 700102 HCHG RX REV CODE 250 W/ 637 OVERRIDE(OP): Performed by: INTERNAL MEDICINE

## 2021-02-11 PROCEDURE — 700102 HCHG RX REV CODE 250 W/ 637 OVERRIDE(OP): Performed by: HOSPITALIST

## 2021-02-11 PROCEDURE — A9270 NON-COVERED ITEM OR SERVICE: HCPCS | Performed by: HOSPITALIST

## 2021-02-11 PROCEDURE — 36415 COLL VENOUS BLD VENIPUNCTURE: CPT

## 2021-02-11 PROCEDURE — 82272 OCCULT BLD FECES 1-3 TESTS: CPT

## 2021-02-11 PROCEDURE — 700111 HCHG RX REV CODE 636 W/ 250 OVERRIDE (IP): Performed by: STUDENT IN AN ORGANIZED HEALTH CARE EDUCATION/TRAINING PROGRAM

## 2021-02-11 PROCEDURE — 83550 IRON BINDING TEST: CPT

## 2021-02-11 PROCEDURE — 700101 HCHG RX REV CODE 250: Performed by: INTERNAL MEDICINE

## 2021-02-11 PROCEDURE — 80048 BASIC METABOLIC PNL TOTAL CA: CPT

## 2021-02-11 PROCEDURE — 700111 HCHG RX REV CODE 636 W/ 250 OVERRIDE (IP): Performed by: INTERNAL MEDICINE

## 2021-02-11 PROCEDURE — 700101 HCHG RX REV CODE 250: Performed by: HOSPITALIST

## 2021-02-11 PROCEDURE — A9270 NON-COVERED ITEM OR SERVICE: HCPCS | Performed by: STUDENT IN AN ORGANIZED HEALTH CARE EDUCATION/TRAINING PROGRAM

## 2021-02-11 PROCEDURE — 700105 HCHG RX REV CODE 258: Performed by: STUDENT IN AN ORGANIZED HEALTH CARE EDUCATION/TRAINING PROGRAM

## 2021-02-11 PROCEDURE — 84100 ASSAY OF PHOSPHORUS: CPT

## 2021-02-11 PROCEDURE — 700102 HCHG RX REV CODE 250 W/ 637 OVERRIDE(OP): Performed by: STUDENT IN AN ORGANIZED HEALTH CARE EDUCATION/TRAINING PROGRAM

## 2021-02-11 PROCEDURE — 83540 ASSAY OF IRON: CPT

## 2021-02-11 PROCEDURE — A9270 NON-COVERED ITEM OR SERVICE: HCPCS | Performed by: INTERNAL MEDICINE

## 2021-02-11 PROCEDURE — 700105 HCHG RX REV CODE 258: Performed by: HOSPITALIST

## 2021-02-11 PROCEDURE — 85046 RETICYTE/HGB CONCENTRATE: CPT

## 2021-02-11 PROCEDURE — 700111 HCHG RX REV CODE 636 W/ 250 OVERRIDE (IP): Performed by: NURSE PRACTITIONER

## 2021-02-11 PROCEDURE — 770020 HCHG ROOM/CARE - TELE (206)

## 2021-02-11 PROCEDURE — C9113 INJ PANTOPRAZOLE SODIUM, VIA: HCPCS | Performed by: NURSE PRACTITIONER

## 2021-02-11 PROCEDURE — 82746 ASSAY OF FOLIC ACID SERUM: CPT

## 2021-02-11 PROCEDURE — 99233 SBSQ HOSP IP/OBS HIGH 50: CPT | Performed by: HOSPITALIST

## 2021-02-11 RX ORDER — SODIUM CHLORIDE, SODIUM LACTATE, POTASSIUM CHLORIDE, AND CALCIUM CHLORIDE .6; .31; .03; .02 G/100ML; G/100ML; G/100ML; G/100ML
500 INJECTION, SOLUTION INTRAVENOUS ONCE
Status: COMPLETED | OUTPATIENT
Start: 2021-02-11 | End: 2021-02-12

## 2021-02-11 RX ORDER — POTASSIUM CHLORIDE 20 MEQ/1
40 TABLET, EXTENDED RELEASE ORAL ONCE
Status: COMPLETED | OUTPATIENT
Start: 2021-02-11 | End: 2021-02-11

## 2021-02-11 RX ORDER — MAGNESIUM SULFATE HEPTAHYDRATE 40 MG/ML
2 INJECTION, SOLUTION INTRAVENOUS ONCE
Status: COMPLETED | OUTPATIENT
Start: 2021-02-11 | End: 2021-02-11

## 2021-02-11 RX ORDER — PHYTONADIONE 2 MG/ML
1 INJECTION, EMULSION INTRAMUSCULAR; INTRAVENOUS; SUBCUTANEOUS ONCE
Status: COMPLETED | OUTPATIENT
Start: 2021-02-11 | End: 2021-02-11

## 2021-02-11 RX ORDER — PANTOPRAZOLE SODIUM 40 MG/10ML
40 INJECTION, POWDER, LYOPHILIZED, FOR SOLUTION INTRAVENOUS 2 TIMES DAILY
Status: DISCONTINUED | OUTPATIENT
Start: 2021-02-11 | End: 2021-02-12

## 2021-02-11 RX ORDER — SODIUM CHLORIDE, SODIUM LACTATE, POTASSIUM CHLORIDE, AND CALCIUM CHLORIDE .6; .31; .03; .02 G/100ML; G/100ML; G/100ML; G/100ML
500 INJECTION, SOLUTION INTRAVENOUS ONCE
Status: COMPLETED | OUTPATIENT
Start: 2021-02-11 | End: 2021-02-11

## 2021-02-11 RX ORDER — VITAMIN B COMPLEX
1000 TABLET ORAL DAILY
Status: DISCONTINUED | OUTPATIENT
Start: 2021-02-11 | End: 2021-02-14 | Stop reason: HOSPADM

## 2021-02-11 RX ADMIN — Medication 1 TABLET: at 18:31

## 2021-02-11 RX ADMIN — SODIUM CHLORIDE, POTASSIUM CHLORIDE, SODIUM LACTATE AND CALCIUM CHLORIDE 500 ML: 600; 310; 30; 20 INJECTION, SOLUTION INTRAVENOUS at 00:47

## 2021-02-11 RX ADMIN — THERA TABS 1 TABLET: TAB at 04:20

## 2021-02-11 RX ADMIN — HEPARIN SODIUM 5000 UNITS: 5000 INJECTION, SOLUTION INTRAVENOUS; SUBCUTANEOUS at 04:20

## 2021-02-11 RX ADMIN — POTASSIUM CHLORIDE AND SODIUM CHLORIDE: 900; 300 INJECTION, SOLUTION INTRAVENOUS at 08:32

## 2021-02-11 RX ADMIN — DIBASIC SODIUM PHOSPHATE, MONOBASIC POTASSIUM PHOSPHATE AND MONOBASIC SODIUM PHOSPHATE 500 MG: 852; 155; 130 TABLET ORAL at 12:48

## 2021-02-11 RX ADMIN — OMEPRAZOLE 20 MG: 20 CAPSULE, DELAYED RELEASE ORAL at 04:20

## 2021-02-11 RX ADMIN — LORAZEPAM 1 MG: 1 TABLET ORAL at 13:50

## 2021-02-11 RX ADMIN — FLUOXETINE 20 MG: 20 CAPSULE ORAL at 04:20

## 2021-02-11 RX ADMIN — FOLIC ACID 1 MG: 1 TABLET ORAL at 04:21

## 2021-02-11 RX ADMIN — THIAMINE HCL TAB 100 MG 100 MG: 100 TAB at 04:21

## 2021-02-11 RX ADMIN — POTASSIUM CHLORIDE 40 MEQ: 1500 TABLET, EXTENDED RELEASE ORAL at 08:37

## 2021-02-11 RX ADMIN — Medication 1 TABLET: at 12:48

## 2021-02-11 RX ADMIN — PHYTONADIONE 1 MG: 2 INJECTION, EMULSION INTRAMUSCULAR; INTRAVENOUS; SUBCUTANEOUS at 18:30

## 2021-02-11 RX ADMIN — LEVOTHYROXINE SODIUM 88 MCG: 0.09 TABLET ORAL at 04:21

## 2021-02-11 RX ADMIN — HEPARIN SODIUM 5000 UNITS: 5000 INJECTION, SOLUTION INTRAVENOUS; SUBCUTANEOUS at 13:48

## 2021-02-11 RX ADMIN — OCTREOTIDE ACETATE 50 MCG/HR: 200 INJECTION, SOLUTION INTRAVENOUS; SUBCUTANEOUS at 19:54

## 2021-02-11 RX ADMIN — POTASSIUM CHLORIDE 20 MEQ: 1500 TABLET, EXTENDED RELEASE ORAL at 18:32

## 2021-02-11 RX ADMIN — DIBASIC SODIUM PHOSPHATE, MONOBASIC POTASSIUM PHOSPHATE AND MONOBASIC SODIUM PHOSPHATE 500 MG: 852; 155; 130 TABLET ORAL at 18:31

## 2021-02-11 RX ADMIN — LORAZEPAM 1 MG: 1 TABLET ORAL at 03:09

## 2021-02-11 RX ADMIN — ONDANSETRON 4 MG: 2 INJECTION INTRAMUSCULAR; INTRAVENOUS at 03:09

## 2021-02-11 RX ADMIN — POTASSIUM CHLORIDE 20 MEQ: 1500 TABLET, EXTENDED RELEASE ORAL at 04:20

## 2021-02-11 RX ADMIN — LIDOCAINE 1 PATCH: 50 PATCH TOPICAL at 08:38

## 2021-02-11 RX ADMIN — Medication 1 TABLET: at 07:50

## 2021-02-11 RX ADMIN — Medication 400 MG: at 18:31

## 2021-02-11 RX ADMIN — Medication 1000 UNITS: at 07:50

## 2021-02-11 RX ADMIN — DIBASIC SODIUM PHOSPHATE, MONOBASIC POTASSIUM PHOSPHATE AND MONOBASIC SODIUM PHOSPHATE 500 MG: 852; 155; 130 TABLET ORAL at 04:21

## 2021-02-11 RX ADMIN — MAGNESIUM SULFATE IN WATER 2 G: 40 INJECTION, SOLUTION INTRAVENOUS at 08:38

## 2021-02-11 RX ADMIN — PANTOPRAZOLE SODIUM 40 MG: 40 INJECTION, POWDER, FOR SOLUTION INTRAVENOUS at 19:55

## 2021-02-11 RX ADMIN — DIBASIC SODIUM PHOSPHATE, MONOBASIC POTASSIUM PHOSPHATE AND MONOBASIC SODIUM PHOSPHATE 500 MG: 852; 155; 130 TABLET ORAL at 00:08

## 2021-02-11 RX ADMIN — SODIUM CHLORIDE, POTASSIUM CHLORIDE, SODIUM LACTATE AND CALCIUM CHLORIDE 500 ML: 600; 310; 30; 20 INJECTION, SOLUTION INTRAVENOUS at 02:17

## 2021-02-11 RX ADMIN — POTASSIUM CHLORIDE AND SODIUM CHLORIDE: 900; 300 INJECTION, SOLUTION INTRAVENOUS at 21:44

## 2021-02-11 RX ADMIN — SODIUM PHOSPHATE, MONOBASIC, MONOHYDRATE AND SODIUM PHOSPHATE, DIBASIC, ANHYDROUS 30 MMOL: 276; 142 INJECTION, SOLUTION INTRAVENOUS at 00:09

## 2021-02-11 RX ADMIN — Medication 400 MG: at 04:20

## 2021-02-11 RX ADMIN — CEFTRIAXONE SODIUM 2 G: 2 INJECTION, POWDER, FOR SOLUTION INTRAMUSCULAR; INTRAVENOUS at 13:47

## 2021-02-11 ASSESSMENT — ENCOUNTER SYMPTOMS
SEIZURES: 0
SPEECH CHANGE: 0
NERVOUS/ANXIOUS: 0
CHILLS: 0
POLYDIPSIA: 0
WEAKNESS: 1
HEADACHES: 1
HEARTBURN: 0
SORE THROAT: 0
HEMOPTYSIS: 0
NAUSEA: 1
EYE DISCHARGE: 0
DIZZINESS: 0
FEVER: 0
PALPITATIONS: 0
BLOOD IN STOOL: 0
ABDOMINAL PAIN: 1
SHORTNESS OF BREATH: 0
VOMITING: 0
BACK PAIN: 1
FALLS: 1
WHEEZING: 0
EYE REDNESS: 0
DIARRHEA: 1
HEARTBURN: 1
CONSTIPATION: 0
HALLUCINATIONS: 0

## 2021-02-11 ASSESSMENT — LIFESTYLE VARIABLES
ORIENTATION AND CLOUDING OF SENSORIUM: ORIENTED AND CAN DO SERIAL ADDITIONS
TREMOR: *
AUDITORY DISTURBANCES: VERY MILD HARSHNESS OR ABILITY TO FRIGHTEN
AUDITORY DISTURBANCES: NOT PRESENT
ANXIETY: MILDLY ANXIOUS
NAUSEA AND VOMITING: *
HEADACHE, FULLNESS IN HEAD: NOT PRESENT
HEADACHE, FULLNESS IN HEAD: NOT PRESENT
NAUSEA AND VOMITING: *
PAROXYSMAL SWEATS: NO SWEAT VISIBLE
NAUSEA AND VOMITING: *
PAROXYSMAL SWEATS: NO SWEAT VISIBLE
ORIENTATION AND CLOUDING OF SENSORIUM: CANNOT DO SERIAL ADDITIONS OR IS UNCERTAIN ABOUT DATE
VISUAL DISTURBANCES: NOT PRESENT
ANXIETY: MILDLY ANXIOUS
ANXIETY: MILDLY ANXIOUS
PAROXYSMAL SWEATS: NO SWEAT VISIBLE
AUDITORY DISTURBANCES: VERY MILD HARSHNESS OR ABILITY TO FRIGHTEN
NAUSEA AND VOMITING: NO NAUSEA AND NO VOMITING
AUDITORY DISTURBANCES: VERY MILD HARSHNESS OR ABILITY TO FRIGHTEN
TREMOR: NO TREMOR
TOTAL SCORE: 7
TREMOR: TREMOR NOT VISIBLE BUT CAN BE FELT, FINGERTIP TO FINGERTIP
AGITATION: SOMEWHAT MORE THAN NORMAL ACTIVITY
AGITATION: SOMEWHAT MORE THAN NORMAL ACTIVITY
NAUSEA AND VOMITING: NO NAUSEA AND NO VOMITING
AGITATION: SOMEWHAT MORE THAN NORMAL ACTIVITY
VISUAL DISTURBANCES: VERY MILD SENSITIVITY
AUDITORY DISTURBANCES: VERY MILD HARSHNESS OR ABILITY TO FRIGHTEN
PAROXYSMAL SWEATS: NO SWEAT VISIBLE
AGITATION: SOMEWHAT MORE THAN NORMAL ACTIVITY
PAROXYSMAL SWEATS: NO SWEAT VISIBLE
NAUSEA AND VOMITING: *
PAROXYSMAL SWEATS: BARELY PERCEPTIBLE SWEATING. PALMS MOIST
SUBSTANCE_ABUSE: 1
ANXIETY: MILDLY ANXIOUS
HEADACHE, FULLNESS IN HEAD: NOT PRESENT
VISUAL DISTURBANCES: VERY MILD SENSITIVITY
HEADACHE, FULLNESS IN HEAD: NOT PRESENT
TOTAL SCORE: 4
TREMOR: *
NAUSEA AND VOMITING: *
HEADACHE, FULLNESS IN HEAD: NOT PRESENT
VISUAL DISTURBANCES: VERY MILD SENSITIVITY
HEADACHE, FULLNESS IN HEAD: NOT PRESENT
ANXIETY: *
AUDITORY DISTURBANCES: NOT PRESENT
ORIENTATION AND CLOUDING OF SENSORIUM: ORIENTED AND CAN DO SERIAL ADDITIONS
PAROXYSMAL SWEATS: NO SWEAT VISIBLE
AGITATION: SOMEWHAT MORE THAN NORMAL ACTIVITY
TREMOR: NO TREMOR
TREMOR: NO TREMOR
ORIENTATION AND CLOUDING OF SENSORIUM: ORIENTED AND CAN DO SERIAL ADDITIONS
VISUAL DISTURBANCES: VERY MILD SENSITIVITY
TOTAL SCORE: 7
VISUAL DISTURBANCES: VERY MILD SENSITIVITY
HEADACHE, FULLNESS IN HEAD: NOT PRESENT
VISUAL DISTURBANCES: VERY MILD SENSITIVITY
ANXIETY: *
VISUAL DISTURBANCES: NOT PRESENT
ORIENTATION AND CLOUDING OF SENSORIUM: CANNOT DO SERIAL ADDITIONS OR IS UNCERTAIN ABOUT DATE
TOTAL SCORE: 7
HEADACHE, FULLNESS IN HEAD: NOT PRESENT
ANXIETY: MILDLY ANXIOUS
AGITATION: NORMAL ACTIVITY
NAUSEA AND VOMITING: *
TOTAL SCORE: 7
ANXIETY: MILDLY ANXIOUS
AUDITORY DISTURBANCES: NOT PRESENT
TOTAL SCORE: 10
TOTAL SCORE: 4
TOTAL SCORE: 10
AGITATION: NORMAL ACTIVITY
ORIENTATION AND CLOUDING OF SENSORIUM: ORIENTED AND CAN DO SERIAL ADDITIONS
AUDITORY DISTURBANCES: VERY MILD HARSHNESS OR ABILITY TO FRIGHTEN
PAROXYSMAL SWEATS: NO SWEAT VISIBLE
TREMOR: *
ORIENTATION AND CLOUDING OF SENSORIUM: ORIENTED AND CAN DO SERIAL ADDITIONS
TREMOR: *
AGITATION: NORMAL ACTIVITY
ORIENTATION AND CLOUDING OF SENSORIUM: ORIENTED AND CAN DO SERIAL ADDITIONS

## 2021-02-11 ASSESSMENT — FIBROSIS 4 INDEX: FIB4 SCORE: 4.96

## 2021-02-11 NOTE — DISCHARGE PLANNING
Anticipated Discharge Disposition: TBD    Action: Spoke to pt at bedside, pt states she lives alone in a single story home in [place], [place]. Pt states she has a PCP whom she saw 6 weeks ago. Pt denies HH or DME, pt states she has been to a SNF before, but it was in [place], [place].    Barriers to Discharge: medical clearacne    Plan: f/u with medical team, pt      Care Transition Team Assessment    Information Source  Orientation : Oriented x 4  Information Given By: Patient  Who is responsible for making decisions for patient? : Patient         Elopement Risk  Legal Hold: No  Ambulatory or Self Mobile in Wheelchair: Yes  Disoriented: No  Psychiatric Symptoms: None  History of Wandering: No  Elopement this Admit: No  Vocalizing Wanting to Leave: No  Displays Behaviors, Body Language Wanting to Leave: No-Not at Risk for Elopement  Elopement Risk: Not at Risk for Elopement    Interdisciplinary Discharge Planning  Patient or legal guardian wants to designate a caregiver: No    Discharge Preparedness  What is your plan after discharge?: Uncertain - pending medical team collaboration  What are your discharge supports?: Parent  Prior Functional Level: Drives Self, Independent with Activities of Daily Living    Functional Assesment  Prior Functional Level: Drives Self, Independent with Activities of Daily Living         Vision / Hearing Impairment  Vision Impairment : No  Right Eye Vision: Impaired, Wears Glasses(readers)  Left Eye Vision: Impaired, Wears Glasses(readers)  Hearing Impairment : No         Advance Directive  Advance Directive?: None  Advance Directive offered?: AD Booklet refused    Domestic Abuse  Have you ever been the victim of abuse or violence?: No    Psychological Assessment  History of Substance Abuse: Alcohol

## 2021-02-11 NOTE — PROGRESS NOTES
Bedside report received from day shift RN. Assumed care at 1900. Pt is A&Ox4. Pt is in bed. Pt was updated on plan of care. Pt has call light, personal belongings, and bedside table within reach. Bed is in the lowest position and bed alarm is on. Will continue to monitor

## 2021-02-11 NOTE — CONSULTS
Gastroenterology Consult Note:    MYLES Chacon  Date & Time note created:    2/11/2021   2:03 PM     Referring MD:  Dr. Yifan López  Patient ID:  Name:             Carie Santiago   YOB: 1970  Age:                 51 y.o.  female   MRN:               [id number]                                                             Reason for Consult:      1. Melena x 4 days  2. Coffee ground emesis x 4 days  3. Epigastric abdominal pain  4. Anemia  5. Alcohol abuse     History of Present Illness:    This is a 50 yo female, PMH: alcohol abuse, hypothyroidism, DASAI who presented to the ER 2/9/21 with slurred speech, mild confusion. Neurology consulted findings of nonfocal neurological exam. CT head showed no acute intracranial abnormalities. Admits to drinking less one day prior to admission. Usually, will have 12 mini bottles of vodka daily, however the day before admission she only had 3 minibottles.    Patient has been experiencing epigastric abdominal pain, coffee ground emesis, black tarry stools 3-4 days prior to admission. Denies daily NSAID's. No history of blood thinners. Yesterday, she had multiple episodes of black stools. Heme positive stool. Hgb trending down from 15.6 (2/9/21) to 8.5 (2/11/21). She had nonbloody mucousy emesis this morning. According to the nurse, patient has had several brown stools this morning. Patient is having alcohol withdrawals on [facility] protocol.    LABS 2/11/21: WBC: 6.3. hgb: 8.5. hct: 26.5. MCV: 96.3. Platelet count: 121,000.  Sodium: 126. Potassium: 2.8. Glucose: 101, BUN: 11. Crt: 0.50. AST: 61. ALT: 29. Alkaline phosphatase: 200. TB: 0.6. Albumin: 2.9.  Lipase 209.   Triglycerides: 97. INR: 1.00,    CT scan abdomen/pelvis: Hazy fat stranding adjacent to the tail of pancreas, appearance suggests pancreatitis.  Right ovarian cyst, follow-up with pelvic sonogram for further characterization.  Left nephrolithiasis without visualized obstructive  changes.  Segment 2 duodenal diverticula.  Hepatomegaly and diffuse hepatic steatosis    Colonoscopy 2019 performed by Dr. Stanton: diverticular disease. 1 polyp. Internal hemorrhoids.    Denied further modifying factors, associated symptoms or timing issues.     Review of Systems:      Review of Systems   Constitutional: Positive for malaise/fatigue. Negative for chills and fever.   HENT: Positive for congestion.    Eyes: Negative for discharge and redness.   Respiratory: Negative for hemoptysis and wheezing.    Cardiovascular: Negative for chest pain and leg swelling.   Gastrointestinal: Positive for abdominal pain and melena. Negative for constipation and heartburn.   Genitourinary: Negative for dysuria and urgency.   Musculoskeletal: Positive for falls.   Skin: Negative for rash.   Neurological: Positive for weakness. Negative for speech change and seizures.   Endo/Heme/Allergies: Negative for environmental allergies and polydipsia.   Psychiatric/Behavioral: Positive for substance abuse. Negative for hallucinations and suicidal ideas.   All other systems reviewed and are negative.            Physical Exam:  Vitals/ General Appearance:   Weight/BMI: Body mass index is 24.45 kg/m².    Vitals:    02/11/21 0256 02/11/21 0414 02/11/21 0724 02/11/21 1122   BP: 101/65 (!) 97/59 (!) 89/54 (!) 94/57   Pulse: 94 77 86 83   Resp:  17 18 18   Temp:  37 °C (98.6 °F) 37.5 °C (99.5 °F) 37.3 °C (99.2 °F)   TempSrc:  Temporal Temporal Temporal   SpO2:  91% 92% 90%   Weight:       Height:         Oxygen Therapy:  Pulse Oximetry: 90 %, O2 (LPM): 0, O2 Delivery Device: Room air w/o2 available    Physical Exam   Constitutional: She is oriented to person, place, and time and well-developed, well-nourished, and in no distress.   Bruising to right eye and right side of forehead.   HENT:   Head: Normocephalic and atraumatic.   Mouth/Throat: No oropharyngeal exudate.   Eyes: Pupils are equal, round, and reactive to light. EOM are  normal. No scleral icterus.   Neck: No JVD present. No tracheal deviation present.   Cardiovascular: Normal rate and regular rhythm.   No murmur heard.  Pulmonary/Chest: Effort normal and breath sounds normal. No stridor. No respiratory distress. She has no rales.   Abdominal: Soft. Bowel sounds are normal. There is abdominal tenderness.   Epigastric tenderness   Musculoskeletal:         General: Edema present. Normal range of motion.      Cervical back: Normal range of motion and neck supple.   Neurological: She is alert and oriented to person, place, and time. No cranial nerve deficit. Coordination normal.   Skin: Skin is warm and dry. Bruising (right eye and  right forehead) noted. There is erythema.   Extensive bruising   Psychiatric: Affect and judgment normal.   Nursing note and vitals reviewed.      Past Medical History:   Past Medical History:   Diagnosis Date   • Anxiety and depression 1/23/2019   • Colon polyp 3/29/2019    Colonoscopy March 2019: 3mm descending colon polyp   • Disorder of thyroid    • Diverticulosis of colon 1/23/2019    MRI abdomen Jan. 2019   • Generalized anxiety disorder    • Hypertriglyceridemia 1/23/2019   • Low serum HDL 1/23/2019   • Menopause    • Vitamin B12 deficiency 1/23/2019       Past Surgical History:  Past Surgical History:   Procedure Laterality Date   • WOUND CLOSURE ORTHO Left 9/24/2019    Procedure: CLOSURE, WOUND, ORTHO - LEG W/WOUND VAC REMOVAL;  Surgeon: Paolo Odell M.D.;  Location: SURGERY [facility];  Service: Orthopedics   • IRRIGATION & DEBRIDEMENT ORTHO Left 9/21/2019    Procedure: IRRIGATION AND DEBRIDEMENT, WOUND - FOR PROXIMAL TIBIA HEMATOMA EVACUATION AND WOUND VAC APPLICATION;  Surgeon: Paolo Odell M.D.;  Location: SURGERY [facility];  Service: Orthopedics   • OTHER ORTHOPEDIC SURGERY Right 2017   • ROBOTIC LAPAROSCOPIC CHOLECYSTECTOMY  2017   • UMBILICAL HERNIA REPAIR  2017   • INGUINAL HERNIA REPAIR BILATERAL  1999    with Mesh   •  TONSILLECTOMY AND ADENOIDECTOMY  1976   • APPENDECTOMY     • MAMMOPLASTY AUGMENTATION Bilateral        Hospital Medications:    Current Facility-Administered Medications:   •  oyster shell calcium/vitamin D 250-125 MG-UNIT tablet 1 tablet, 1 tablet, Oral, TID, Yifan López D.O., 1 tablet at 02/11/21 1248  •  vitamin D (cholecalciferol) tablet 1,000 Units, 1,000 Units, Oral, DAILY, Yifan López D.O., 1,000 Units at 02/11/21 0750  •  cefTRIAXone (ROCEPHIN) 2 g in  mL IVPB, 2 g, Intravenous, Q24HRS, Yifan López D.O., Last Rate: 200 mL/hr at 02/11/21 1347, 2 g at 02/11/21 1347  •  LORazepam (ATIVAN) tablet 0.5 mg, 0.5 mg, Oral, Q4HRS PRN, Logan Chris M.D.  •  LORazepam (ATIVAN) tablet 1 mg, 1 mg, Oral, Q4HRS PRN, 1 mg at 02/11/21 1350 **OR** LORazepam (ATIVAN) injection 0.5 mg, 0.5 mg, Intravenous, Q4HRS PRN, Logan Chris M.D.  •  LORazepam (ATIVAN) tablet 2 mg, 2 mg, Oral, Q2HRS PRN **OR** LORazepam (ATIVAN) injection 1 mg, 1 mg, Intravenous, Q2HRS PRN, Logan Chris M.D.  •  LORazepam (ATIVAN) tablet 3 mg, 3 mg, Oral, Q HOUR PRN **OR** LORazepam (ATIVAN) injection 1.5 mg, 1.5 mg, Intravenous, Q HOUR PRN, Logan Chris M.D.  •  LORazepam (ATIVAN) tablet 4 mg, 4 mg, Oral, Q15 MIN PRN **OR** LORazepam (ATIVAN) injection 2 mg, 2 mg, Intravenous, Q15 MIN PRN, Logan Chris M.D.  •  oxyCODONE immediate-release (ROXICODONE) tablet 5 mg, 5 mg, Oral, Q4HRS PRN **OR** oxyCODONE immediate release (ROXICODONE) tablet 10 mg, 10 mg, Oral, Q4HRS PRN, Logan Chris M.D.  •  lidocaine (LIDODERM) 5 % 1 Patch, 1 Patch, Transdermal, Q24HR, Logan Chris M.D., 1 Patch at 02/11/21 0838  •  omeprazole (PRILOSEC) capsule 20 mg, 20 mg, Oral, BID, Logan Chris M.D., 20 mg at 02/11/21 0420  •  levothyroxine (SYNTHROID) tablet 88 mcg, 88 mcg, Oral, AM ES, SUYAPA GeorgeO., 88 mcg at 02/11/21 0421  •  FLUoxetine (PROZAC) capsule 20 mg, 20 mg, Oral, DAILY, SUYAPA GeorgeO., 20 mg at 02/11/21 0420  •   ondansetron (ZOFRAN) syringe/vial injection 4 mg, 4 mg, Intravenous, Q4HRS PRN, Logan Chris M.D., 4 mg at 02/11/21 0309  •  phosphorus (K-Phos-Neutral) per tablet 500 mg, 500 mg, Oral, TID, Elpidio Sloan M.D., 500 mg at 02/11/21 1248  •  senna-docusate (PERICOLACE or SENOKOT S) 8.6-50 MG per tablet 2 Tab, 2 tablet, Oral, BID, Stopped at 02/09/21 1901 **AND** polyethylene glycol/lytes (MIRALAX) PACKET 1 Packet, 1 Packet, Oral, QDAY PRN **AND** magnesium hydroxide (MILK OF MAGNESIA) suspension 30 mL, 30 mL, Oral, QDAY PRN **AND** bisacodyl (DULCOLAX) suppository 10 mg, 10 mg, Rectal, QDAY PRN, Sheri Pitts M.D.  •  Respiratory Therapy Consult, , Nebulization, Continuous RT, Sheri Pitts M.D.  •  0.9 % NaCl with KCl 40 mEq 1,000 mL, , Intravenous, Continuous, Yifan López D.O., Last Rate: 125 mL/hr at 02/11/21 0832, New Bag at 02/11/21 0832  •  heparin injection 5,000 Units, 5,000 Units, Subcutaneous, Q8HRS, Sheri Pitts M.D., 5,000 Units at 02/11/21 1348  •  acetaminophen (Tylenol) tablet 650 mg, 650 mg, Oral, Q6HRS PRN, Sheri Pitts M.D., 650 mg at 02/10/21 2045  •  [DISCONTINUED] detox IV 1000 mL (D5LR + magnesium 1 g + thiamine 100 mg + folic acid 1 mg) infusion, , Intravenous, Once **AND** thiamine (Vitamin B-1) tablet 100 mg, 100 mg, Oral, DAILY, 100 mg at 02/11/21 0421 **AND** multivitamin (THERAGRAN) tablet 1 Tab, 1 tablet, Oral, DAILY, 1 tablet at 02/11/21 0420 **AND** folic acid (FOLVITE) tablet 1 mg, 1 mg, Oral, DAILY, Venu Gloverin, D.O., 1 mg at 02/11/21 0421  •  [COMPLETED] magnesium sulfate IVPB premix 2 g, 2 g, Intravenous, Once, Stopped at 02/09/21 2213 **AND** magnesium oxide (MAG-OX) tablet 400 mg, 400 mg, Oral, BID, Venu Irwin, D.O., 400 mg at 02/11/21 0420  •  potassium chloride SA (Kdur) tablet 20 mEq, 20 mEq, Oral, BID, Venu Gloverin, D.O., 20 mEq at 02/11/21 0420  •  labetalol (NORMODYNE/TRANDATE) injection 10 mg, 10 mg, Intravenous, Q HOUR PRN **OR**  labetalol (NORMODYNE) tablet 200 mg, 200 mg, Oral, Q6HRS PRN, Venu Irwin D.O.    Current Outpatient Medications:  Current Facility-Administered Medications   Medication Dose Route Frequency Provider Last Rate Last Admin   • oyster shell calcium/vitamin D 250-125 MG-UNIT tablet 1 tablet  1 tablet Oral TID Yifan López D.O.   1 tablet at 02/11/21 1248   • vitamin D (cholecalciferol) tablet 1,000 Units  1,000 Units Oral DAILY Yifan López D.O.   1,000 Units at 02/11/21 0750   • cefTRIAXone (ROCEPHIN) 2 g in  mL IVPB  2 g Intravenous Q24HRS Yifan López D.O. 200 mL/hr at 02/11/21 1347 2 g at 02/11/21 1347   • LORazepam (ATIVAN) tablet 0.5 mg  0.5 mg Oral Q4HRS PRN Logan Chris M.D.       • LORazepam (ATIVAN) tablet 1 mg  1 mg Oral Q4HRS PRN Logan Chris M.D.   1 mg at 02/11/21 1350    Or   • LORazepam (ATIVAN) injection 0.5 mg  0.5 mg Intravenous Q4HRS PRN Logan Chris M.D.       • LORazepam (ATIVAN) tablet 2 mg  2 mg Oral Q2HRS PRN Logan Chris M.D.        Or   • LORazepam (ATIVAN) injection 1 mg  1 mg Intravenous Q2HRS PRN Logan Chris M.D.       • LORazepam (ATIVAN) tablet 3 mg  3 mg Oral Q HOUR PRN Logan Chris M.D.        Or   • LORazepam (ATIVAN) injection 1.5 mg  1.5 mg Intravenous Q HOUR PRN Logan Chris M.D.       • LORazepam (ATIVAN) tablet 4 mg  4 mg Oral Q15 MIN PRN Logan Chris M.D.        Or   • LORazepam (ATIVAN) injection 2 mg  2 mg Intravenous Q15 MIN PRN Logan Chris M.D.       • oxyCODONE immediate-release (ROXICODONE) tablet 5 mg  5 mg Oral Q4HRS PRN Logan Chris M.D.        Or   • oxyCODONE immediate release (ROXICODONE) tablet 10 mg  10 mg Oral Q4HRS PRN Logan Chris M.D.       • lidocaine (LIDODERM) 5 % 1 Patch  1 Patch Transdermal Q24HR Logan Chris M.D.   1 Patch at 02/11/21 0838   • omeprazole (PRILOSEC) capsule 20 mg  20 mg Oral BID Logan Chris M.D.   20 mg at 02/11/21 0420   • levothyroxine (SYNTHROID) tablet 88 mcg  88 mcg Oral AM  ES Yifan López D.O.   88 mcg at 02/11/21 0421   • FLUoxetine (PROZAC) capsule 20 mg  20 mg Oral DAILY Yifan López D.O.   20 mg at 02/11/21 0420   • ondansetron (ZOFRAN) syringe/vial injection 4 mg  4 mg Intravenous Q4HRS PRN Logan Chris M.D.   4 mg at 02/11/21 0309   • phosphorus (K-Phos-Neutral) per tablet 500 mg  500 mg Oral TID Elpidio Sloan M.D.   500 mg at 02/11/21 1248   • senna-docusate (PERICOLACE or SENOKOT S) 8.6-50 MG per tablet 2 Tab  2 tablet Oral BID Sheri Pitts M.D.   Stopped at 02/09/21 1901    And   • polyethylene glycol/lytes (MIRALAX) PACKET 1 Packet  1 Packet Oral QDAY PRN Sheri Pitts M.D.        And   • magnesium hydroxide (MILK OF MAGNESIA) suspension 30 mL  30 mL Oral QDAY PRN Sheri Pitts M.D.        And   • bisacodyl (DULCOLAX) suppository 10 mg  10 mg Rectal QDAY PRN Sheri Pitts M.D.       • Respiratory Therapy Consult   Nebulization Continuous RT Sheri Pitts M.D.       • 0.9 % NaCl with KCl 40 mEq 1,000 mL   Intravenous Continuous Yifan López D.O. 125 mL/hr at 02/11/21 0832 New Bag at 02/11/21 0832   • heparin injection 5,000 Units  5,000 Units Subcutaneous Q8HRS Sheri Pitts M.D.   5,000 Units at 02/11/21 1348   • acetaminophen (Tylenol) tablet 650 mg  650 mg Oral Q6HRS PRN Sheri Pitts M.D.   650 mg at 02/10/21 2045   • thiamine (Vitamin B-1) tablet 100 mg  100 mg Oral DAILY Venu Irwin D.O.   100 mg at 02/11/21 0421    And   • multivitamin (THERAGRAN) tablet 1 Tab  1 tablet Oral DAILY Venu Irwin, D.O.   1 tablet at 02/11/21 0420    And   • folic acid (FOLVITE) tablet 1 mg  1 mg Oral DAILY Venu Irwin, D.O.   1 mg at 02/11/21 0421   • magnesium oxide (MAG-OX) tablet 400 mg  400 mg Oral BID Venu Irwin, D.O.   400 mg at 02/11/21 0420   • potassium chloride SA (Kdur) tablet 20 mEq  20 mEq Oral BID Venu Irwin, D.O.   20 mEq at 02/11/21 0420   • labetalol (NORMODYNE/TRANDATE) injection 10 mg  10 mg Intravenous Q  "HOUR PRN Venu Irwin D.O.        Or   • labetalol (NORMODYNE) tablet 200 mg  200 mg Oral Q6HRS PRN Venu Irwin D.O.           Medication Allergy:  Allergies   Allergen Reactions   • Sulfa Drugs Anaphylaxis   • Norco [Apap-Fd&C Yellow #10 [place]-Hydrocodone]      itching   • Zofran [Dextrose-Ondansetron] Unspecified     Migraine tolerates Injection/ IV   • Lorazepam Anxiety and Unspecified     \"IT MAKES ME CRY\"       Family History:  Family History   Problem Relation Age of Onset   • Heart Disease Mother         Atrial fibrillation   • Thyroid Mother    • Cancer Mother         Melanoma   • Heart Disease Brother    • Diabetes Son    • Dementia Neg Hx    • Colon Cancer Neg Hx    • Breast Cancer Neg Hx        Social History:  Social History     Socioeconomic History   • Marital status:      Spouse name: Not on file   • Number of children: Not on file   • Years of education: Not on file   • Highest education level: Not on file   Occupational History   • Not on file   Tobacco Use   • Smoking status: Current Every Day Smoker     Packs/day: 0.50     Years: 30.00     Pack years: 15.00     Types: Cigarettes   • Smokeless tobacco: Never Used   Substance and Sexual Activity   • Alcohol use: Yes     Comment: 4-12 mini bottles per day   • Drug use: No   • Sexual activity: Not on file   Other Topics Concern   • Not on file   Social History Narrative   • Not on file     Social Determinants of Health     Financial Resource Strain:    • Difficulty of Paying Living Expenses:    Food Insecurity:    • Worried About Running Out of Food in the Last Year:    • Ran Out of Food in the Last Year:    Transportation Needs:    • Lack of Transportation (Medical):    • Lack of Transportation (Non-Medical):    Physical Activity:    • Days of Exercise per Week:    • Minutes of Exercise per Session:    Stress:    • Feeling of Stress :    Social Connections:    • Frequency of Communication with Friends and Family:    • Frequency of " Social Gatherings with Friends and Family:    • Attends [employer] Services:    • Active Member of Clubs or Organizations:    • Attends Club or Organization Meetings:    • Marital Status:    Intimate Partner Violence:    • Fear of Current or Ex-Partner:    • Emotionally Abused:    • Physically Abused:    • Sexually Abused:          MDM (Data Review):     Records reviewed and summarized in current documentation    Lab Data Review:  Recent Results (from the past 24 hour(s))   Triglyceride    Collection Time: 02/10/21  3:15 PM   Result Value Ref Range    Triglycerides 97 0 - 149 mg/dL   TROPONIN    Collection Time: 02/10/21  3:15 PM   Result Value Ref Range    Troponin T 25 (H) 6 - 19 ng/L   Comp Metabolic Panel    Collection Time: 02/10/21  3:15 PM   Result Value Ref Range    Sodium 128 (L) 135 - 145 mmol/L    Potassium 4.0 3.6 - 5.5 mmol/L    Chloride 89 (L) 96 - 112 mmol/L    Co2 31 20 - 33 mmol/L    Anion Gap 8.0 7.0 - 16.0    Glucose 120 (H) 65 - 99 mg/dL    Bun 16 8 - 22 mg/dL    Creatinine 0.70 0.50 - 1.40 mg/dL    Calcium 8.1 (L) 8.5 - 10.5 mg/dL    AST(SGOT) 75 (H) 12 - 45 U/L    ALT(SGPT) 34 2 - 50 U/L    Alkaline Phosphatase 200 (H) 30 - 99 U/L    Total Bilirubin 0.8 0.1 - 1.5 mg/dL    Albumin 3.0 (L) 3.2 - 4.9 g/dL    Total Protein 5.3 (L) 6.0 - 8.2 g/dL    Globulin 2.3 1.9 - 3.5 g/dL    A-G Ratio 1.3 g/dL   PHOSPHORUS    Collection Time: 02/10/21  3:15 PM   Result Value Ref Range    Phosphorus 1.4 (L) 2.5 - 4.5 mg/dL   LIPASE    Collection Time: 02/10/21  3:15 PM   Result Value Ref Range    Lipase 212 (H) 11 - 82 U/L   ESTIMATED GFR    Collection Time: 02/10/21  3:15 PM   Result Value Ref Range    GFR If African American >60 >60 mL/min/1.73 m 2    GFR If Non African American >60 >60 mL/min/1.73 m 2   Phosphorus    Collection Time: 02/10/21  8:34 PM   Result Value Ref Range    Phosphorus 0.9 (LL) 2.5 - 4.5 mg/dL   LIPASE    Collection Time: 02/10/21  8:34 PM   Result Value Ref Range    Lipase 209 (H) 11 - 82  U/L   Comp Metabolic Panel    Collection Time: 02/11/21  5:42 AM   Result Value Ref Range    Sodium 126 (L) 135 - 145 mmol/L    Potassium 2.8 (L) 3.6 - 5.5 mmol/L    Chloride 92 (L) 96 - 112 mmol/L    Co2 29 20 - 33 mmol/L    Anion Gap 5.0 (L) 7.0 - 16.0    Glucose 101 (H) 65 - 99 mg/dL    Bun 11 8 - 22 mg/dL    Creatinine 0.50 0.50 - 1.40 mg/dL    Calcium 7.4 (L) 8.5 - 10.5 mg/dL    AST(SGOT) 61 (H) 12 - 45 U/L    ALT(SGPT) 29 2 - 50 U/L    Alkaline Phosphatase 200 (H) 30 - 99 U/L    Total Bilirubin 0.6 0.1 - 1.5 mg/dL    Albumin 2.9 (L) 3.2 - 4.9 g/dL    Total Protein 5.0 (L) 6.0 - 8.2 g/dL    Globulin 2.1 1.9 - 3.5 g/dL    A-G Ratio 1.4 g/dL   CBC WITH DIFFERENTIAL    Collection Time: 02/11/21  5:42 AM   Result Value Ref Range    WBC 6.3 4.8 - 10.8 K/uL    RBC 2.72 (L) 4.20 - 5.40 M/uL    Hemoglobin 8.5 (L) 12.0 - 16.0 g/dL    Hematocrit 26.5 (L) 37.0 - 47.0 %    MCV 96.3 81.4 - 97.8 fL    MCH 31.3 27.0 - 33.0 pg    MCHC 32.4 (L) 33.6 - 35.0 g/dL    RDW 61.9 (H) 35.9 - 50.0 fL    Platelet Count 121 (L) 164 - 446 K/uL    MPV 11.8 9.0 - 12.9 fL    Neutrophils-Polys 64.10 44.00 - 72.00 %    Lymphocytes 22.00 22.00 - 41.00 %    Monocytes 11.70 0.00 - 13.40 %    Eosinophils 1.40 0.00 - 6.90 %    Basophils 0.50 0.00 - 1.80 %    Immature Granulocytes 0.30 0.00 - 0.90 %    Nucleated RBC 0.00 /100 WBC    Neutrophils (Absolute) 4.01 2.00 - 7.15 K/uL    Lymphs (Absolute) 1.38 1.00 - 4.80 K/uL    Monos (Absolute) 0.73 0.00 - 0.85 K/uL    Eos (Absolute) 0.09 0.00 - 0.51 K/uL    Baso (Absolute) 0.03 0.00 - 0.12 K/uL    Immature Granulocytes (abs) 0.02 0.00 - 0.11 K/uL    NRBC (Absolute) 0.00 K/uL   PHOSPHORUS    Collection Time: 02/11/21  5:42 AM   Result Value Ref Range    Phosphorus 2.2 (L) 2.5 - 4.5 mg/dL   MAGNESIUM    Collection Time: 02/11/21  5:42 AM   Result Value Ref Range    Magnesium 1.9 1.5 - 2.5 mg/dL   ESTIMATED GFR    Collection Time: 02/11/21  5:42 AM   Result Value Ref Range    GFR If  >60 >60  mL/min/1.73 m 2    GFR If Non African American >60 >60 mL/min/1.73 m 2   OCCULT BLOOD STOOL    Collection Time: 02/11/21  6:00 AM   Result Value Ref Range    Occult Blood Feces Positive (A) Negative   RETICULOCYTES COUNT    Collection Time: 02/11/21  8:14 AM   Result Value Ref Range    Reticulocyte Count 1.6 0.8 - 2.1 %    Retic, Absolute 0.05 0.04 - 0.06 M/uL    Imm. Reticulocyte Fraction 29.0 (H) 9.3 - 17.4 %    Retic Hgb Equivalent 40.6 (H) 29.0 - 35.0 pg/cell   IRON/TOTAL IRON BIND    Collection Time: 02/11/21  8:14 AM   Result Value Ref Range    Iron 15 (L) 40 - 170 ug/dL    Total Iron Binding 208 (L) 250 - 450 ug/dL    Unsat Iron Binding 193 110 - 370 ug/dL    % Saturation 7 (L) 15 - 55 %   FERRITIN    Collection Time: 02/11/21  8:14 AM   Result Value Ref Range    Ferritin 303.0 (H) 10.0 - 291.0 ng/mL   FOLATE    Collection Time: 02/11/21  8:14 AM   Result Value Ref Range    Folate -Folic Acid 13.7 >4.0 ng/mL   CORTISOL    Collection Time: 02/11/21  8:14 AM   Result Value Ref Range    Cortisol 13.0 0.0 - 23.0 ug/dL       Imaging/Procedures Review:      DX-ANKLE 2- VIEWS RIGHT   Final Result      Fracture of the distal fibular shaft which may be slightly comminuted. Main distal fracture fragment appears slightly displaced laterally.      CT-ABDOMEN-PELVIS W/O   Final Result         1.  Hazy fat stranding adjacent to the tail of pancreas, appearance suggests pancreatitis.   2.  Right ovarian cyst, follow-up with pelvic sonogram for further characterization   3.  Left nephrolithiasis without visualized obstructive changes   4.  Segment 2 duodenal diverticula   5.  Hepatomegaly and diffuse hepatic steatosis      DX-CHEST-PORTABLE (1 VIEW)   Final Result      No acute cardiac or pulmonary abnormality is noted.      CT-HEAD W/O   Final Result      No acute intracranial abnormality is identified.      Posterior left occipital scalp swelling/hematoma.              MDM (Assessment and Plan):     Patient Active Problem  List    Diagnosis Date Noted   • Acute pancreatitis 02/10/2021   • Alcohol withdrawal syndrome with complication (HCC) 02/09/2021   • Elevated liver enzymes 01/17/2019   • Closed right ankle fracture 02/10/2021   • Low serum phosphorus for age 02/10/2021   • Increased ammonia level 02/10/2021   • Dehydration 02/10/2021   • Electrolyte abnormality 02/10/2021   • Thrombocytopenia (HCC) 02/10/2021   • Elevated troponin 02/10/2021   • Hypokalemia 02/09/2021   • Hypomagnesemia 02/09/2021   • GARRETT (acute kidney injury) (HCC) 02/09/2021   • Pain of upper abdomen 02/09/2021   • Vitamin D deficiency 01/13/2021   • History of bilateral breast implants 04/19/2019   • Colon polyp 03/29/2019   • Diverticulosis of colon 01/23/2019   • Hypertriglyceridemia 01/23/2019   • Low serum HDL 01/23/2019   • Anxiety and depression 01/23/2019   • Vitamin B12 deficiency 01/23/2019   • Alcohol dependence (HCC) 01/17/2019   • Tobacco use 01/17/2019   • Hypothyroidism 01/17/2019     IMPRESSION: This is a 50 yo female that has had 4 day history of coffee ground emesis, black tarry stools, epigastric abdominal pain, anemia. Hgb trending down 8.5. CT scan revealed pancreatitis. History of alcohol abuse in withdrawals.    Problems:  1. Melena: Likely upper GI bleed with findings of melena, coffee ground emesis. 2. Coffee ground emesis: Likely upper GI bleed. Last episode when she was admitted to the hospital.  3. Anemia: Hgb trending down from 15.6 (admission) to 8.5 today. No blood transfusions at this time  4. Epigastric abdominal pain: likely from ulceration vs gastritis. Also, diagnosed with pancreatitis.  5. Alcohol abuse with withdrawals. Last alcohol use was 3 days ago.  6. Elevated LFT's: likely alcohol related due to AST/ALT ratio. CT scan revealed fatty liver. Platelet count: low.   7. Pancreatitis: likely alcohol induced. No findings of choledocolithiasis. Lipase elevated. Triglycerides normal.  8. Hyponatremia: likely due to Alcohol  induced hepatitis. Current: 126. Before proceeding with EGD will need to get Sodium 130 or above.  9. Hypokalemia: likely due to #6. Will need to replace Potassium before EGD.  10. Thrombocytopenia: Platelet count ranging from 103-145,000.  11. COVID negative 2/9/21  12. Increased complexity of medical decision making due to aforementioned.      Recommendations:  -Risks vs benefits of upper endoscopy was discussed with patient. Risks include heart and lung event due to anesthesia. Other risks include perforation, bleeding, infection. Questions were answered.   -CLD then NPO after midnight  -Sodium needs to be 130 or above. Discussed with hospitalist  -Potassium replacement-discussed with hospitalist  -Start Protonix 40mg IV BID. D/C oral PPI  -Start Octreotide 50 mcg/hour  -Vitamin K sc daily x 3 days, noted that patient has normal INR but she has extensive bruising  -Trend hgb: transfuse to keep Hgb >7  -CIWA protocol per hospitalist  -Continue Ceftriaxone 2g IV every 24 hours  -Avoid NSAIDs    Risks, benefits, and alternatives of EGD with attempted hemostasis, biopsies, possible varices banding and/or other endotherapy under anesthesia were discussed with patient in detail before proceeding.  Patient was given opportunities to ask questions and discuss other options.  Risks including but not limited to stricture with banding, chest pain, perforation, infection, bleeding, missed lesion(s), possible need for surgery(ies) and/or interventional radiology, possible need for repeat procedure(s) and/or additional testing, hospitalization possibly prolonged, cardiac and/or pulmonary event, aspiration, hypoxia, stroke, medication (s) and/or anesthesia reaction(s), indefinite diagnosis, discomfort/pain, unsuccessful and/or incomplete procedure, ineffective therapy, persistent symptoms, damage to adjacent organs/structure and/or vascular, and other adverse events possibly life-threatening.  Interactive discussion was  undertaken with Layman's terms.  I answered questions in full and to satisfaction.  Patient stated understanding and acceptance of these risks, and wished to proceed.  Informed consent was given in clear state of mind.     ---  Dear Dr. Yifan López,  Thank you for asking us to see your patient in consultation.  Please refer to my consult note as per above for details and recommendations.     JAMISON Chacon.

## 2021-02-11 NOTE — ASSESSMENT & PLAN NOTE
GI consulted 2/11 (Dr Kasi Chan) and signed off 2/13  Carafate and PPI BID  Cessation of NSAIDS and EtOH recommended  [place] diet w/ avoidance of acidic foods/beverages as well as hot temperature foods discussed.  2/12 Endoscopy:     Esophagus: severe LA classification grade D reflux esophagitis, biopsied to exclude opportunistic infection.  No evidence of esophageal varices.    Stomach: small hiatal hernia otherwise, endoscopically unremarkable, biopsies obtained to evaluate H.pylori status.  No evidence of gastric varices nor   portal hypertensive gastropathy.    Duodenum: mild bulbar non-erosive duodenitis, otherwise endoscopically unremarkable to 2nd portion, biopsies obtained to exclude celiac sprue.

## 2021-02-11 NOTE — PROGRESS NOTES
"Hospital Medicine Daily Progress Note    Date of Service  2/11/2021    Chief Complaint  Altered mentation    Hospital Course  Per initial H+P: \"51 y.o. female who presented 2/9/2021 with slurred speech, mild confusion.  Patient has a past medical history of chronic alcohol abuse, anxiety, depression, hypothyroidism, hypertriglyceridemia.  Around noon today patient was in the bathroom, reportedly assisted by her , who witnessed her drop her head and become momentarily unresponsive.  Upon waking, she reportedly had difficulty speaking and was confused.  EMS was called; it was noted her symptoms had resolved by their arrival.  Per chart review and the Neurology team, the patient reportedly had a transient decrease level of consciousness en route to the hospital.  Neurology was consulted via code stroke.  Neurology saw the patient, reported the patient had a nonfocal neurological exam and NIHSS of 1 with slight dysarthria.  CT head without contrast showed no acute intracranial abnormality.     The patient did also present to [facility] on 2/7/2021 secondary to alcohol related injury, hitting the back of her head.  The patient reports she drinks about 12 mini bottles of vodka daily, though yesterday she drank 3 mini bottles.       Reports mild headache.  Denies chest pain, abdominal pain, shortness of breath, fever/chills, vision changes, numbness/tingling, bowel/bladder incontinence.  She initially reported she may have had two episodes of possible black emesis 2 days ago and perhaps one dark stool last week, though later was unsure.\"     Interval Problem Update  2/10 Alert.  Mildly lethargic but speech clear and talking in sentences.  Alert to being in Hospital[facility] In [place],but initially thought it was 2010 but later stated 2021.  She knew the month. Ongoing epigastric pain.    2/11 Required bolus of 500cc NS overnight for lower BP with dizziness. Xray shows right distal fibula fx with displacement, I have " consulted [facility] as she has seen them in recent past for same.  Pt's mother at bedside and given updates.  Patient alert and oriented.  I have consulted Dr Chan, GI.  Patient states bowel and bladder incontinence but denies sensory loss or extremity weakness.    Consultants/Specialty  Intensivist    Code Status  Full Code    Disposition  Home once stable.    Review of Systems  Review of Systems   Constitutional: Positive for malaise/fatigue. Negative for fever.   HENT: Negative for sore throat.    Respiratory: Negative for shortness of breath.    Cardiovascular: Negative for chest pain, palpitations and leg swelling.   Gastrointestinal: Positive for abdominal pain, diarrhea, heartburn and nausea. Negative for blood in stool and vomiting.        Bowel incontinence but not bloody   Genitourinary: Negative for dysuria.        Incontinence   Musculoskeletal: Positive for back pain.   Neurological: Positive for headaches. Negative for dizziness and speech change.   Psychiatric/Behavioral: Positive for substance abuse (alcohol). The patient is not nervous/anxious.         Physical Exam  Temp:  [37 °C (98.6 °F)-37.5 °C (99.5 °F)] 37.3 °C (99.2 °F)  Pulse:  [60-94] 83  Resp:  [17-20] 18  BP: ()/(45-65) 94/57  SpO2:  [90 %-97 %] 90 %    Physical Exam  Vitals reviewed.   Constitutional:       Appearance: Normal appearance. She is not diaphoretic.   HENT:      Head: Normocephalic and atraumatic.      Nose: Nose normal. No congestion.      Mouth/Throat:      Mouth: Mucous membranes are moist.      Pharynx: No oropharyngeal exudate.   Eyes:      General: No scleral icterus.        Right eye: No discharge.         Left eye: No discharge.      Extraocular Movements: Extraocular movements intact.      Conjunctiva/sclera: Conjunctivae normal.   Cardiovascular:      Rate and Rhythm: Normal rate and regular rhythm.      Pulses:           Radial pulses are 2+ on the right side and 2+ on the left side.        Dorsalis pedis pulses  are 2+ on the right side and 2+ on the left side.      Heart sounds: No murmur.   Pulmonary:      Effort: Pulmonary effort is normal. No respiratory distress.      Breath sounds: Normal breath sounds. No wheezing or rales.   Abdominal:      General: Bowel sounds are normal. There is no distension.      Palpations: Abdomen is soft.      Tenderness: There is no abdominal tenderness.   Musculoskeletal:         General: Tenderness (right lower ankle) present. No swelling.      Cervical back: No muscular tenderness.      Right lower leg: No edema.      Left lower leg: No edema.   Lymphadenopathy:      Cervical: No cervical adenopathy.   Skin:     Coloration: Skin is not jaundiced or pale.      Findings: Bruising (right temporal region and occipital region) present.   Neurological:      General: No focal deficit present.      Mental Status: She is alert and oriented to person, place, and time. Mental status is at baseline.      Cranial Nerves: No cranial nerve deficit.   Psychiatric:         Mood and Affect: Mood normal.         Behavior: Behavior normal.         Fluids    Intake/Output Summary (Last 24 hours) at 2/11/2021 1415  Last data filed at 2/10/2021 1600  Gross per 24 hour   Intake 225 ml   Output --   Net 225 ml       Laboratory  Recent Labs     02/09/21  1320 02/10/21  0624 02/11/21  0542   WBC 11.2* 6.2 6.3   RBC 4.96 3.39* 2.72*   HEMOGLOBIN 13.9  15.6 10.7* 8.5*   HEMATOCRIT 44.9  48.1* 32.2* 26.5*   MCV 97.0 95.0 96.3   MCH 31.5 31.3 31.3   MCHC 32.4* 32.9* 32.4*   RDW 62.4* 60.3* 61.9*   PLATELETCT 145* 103* 121*   MPV 11.8 11.9 11.8     Recent Labs     02/10/21  0624 02/10/21  1515 02/11/21  0542   SODIUM 127* 128* 126*   POTASSIUM 3.5* 4.0 2.8*   CHLORIDE 85* 89* 92*   CO2 29 31 29   GLUCOSE 85 120* 101*   BUN 26* 16 11   CREATININE 0.85 0.70 0.50   CALCIUM 9.5 8.1* 7.4*     Recent Labs     02/09/21  1320   APTT 28.8   INR 1.00         Recent Labs     02/10/21  1515   TRIGLYCERIDE 97        Imaging  DX-ANKLE 2- VIEWS RIGHT   Final Result      Fracture of the distal fibular shaft which may be slightly comminuted. Main distal fracture fragment appears slightly displaced laterally.      CT-ABDOMEN-PELVIS W/O   Final Result         1.  Hazy fat stranding adjacent to the tail of pancreas, appearance suggests pancreatitis.   2.  Right ovarian cyst, follow-up with pelvic sonogram for further characterization   3.  Left nephrolithiasis without visualized obstructive changes   4.  Segment 2 duodenal diverticula   5.  Hepatomegaly and diffuse hepatic steatosis      DX-CHEST-PORTABLE (1 VIEW)   Final Result      No acute cardiac or pulmonary abnormality is noted.      CT-HEAD W/O   Final Result      No acute intracranial abnormality is identified.      Posterior left occipital scalp swelling/hematoma.              Assessment/Plan  * Alcohol withdrawal syndrome with complication (HCC)- (present on admission)  Assessment & Plan  [facility] protocol  Thiamine, folate, MVI  Cessation of EtOH encouraged and discussed  Monitor LFT's  Harms of continued EtOH (pancreatitis complications, Liver failure) discussed    Acute pancreatitis  Assessment & Plan  Continue liquid diet for now as tolerates.  Increase diet if okay with GI and no endoscopy planned  IV fluids to be weaned  Pain management  EtOH cessation encouraged    Closed right ankle fracture  Assessment & Plan  Occurred a few weeks prior to admit per patient.  Was followed outpatient by [facility] per patient  Has a brace in her room.  2/10 xray right ankle showing distal fibular displaced fracture.    Consulted Ortho 2/11.  Okay for weight bearing if wearing leg boot brace only  2/11 Oscal and Vit D ordered    Atrial fibrillation (HCC)  Assessment & Plan  Episodic.  Monitoring on telemetry and appears NSR.    GI bleed  Assessment & Plan  I have consulted Dr Kasi Chan  Patient was having some N/V prior to hospital and question if esophageal tear vs EtOH induced  gastritis or ulcer  PPI BID  Monitoring cbc.  Checked Iron, ferritin, b12, folate, Retic count.    Elevated troponin  Assessment & Plan  Repeat troponin to varify no upward swing  Likely due to GARRETT.    Thrombocytopenia (HCC)  Assessment & Plan  EtOH related  Monitor cbc    Electrolyte abnormality  Assessment & Plan  NaCl w/ KCl x 1L 2/11  Monitor BMP  Worsening hyponatremia  Improved hypercalcemia 2/10  Improving hypokalemia but on KCl IV  Monitor Mg, Ca, Phos  Replete and monitor    Dehydration  Assessment & Plan  IV fluids to be stopped 2/11  Watch for volume overload.    Increased ammonia level  Assessment & Plan  Check follow up level.  Alert x2 had initial hard time with the year but got it second chance.  If worsens mentally will start lactulose    Low serum phosphorus for age  Assessment & Plan  Replace and monitor    Pain of upper abdomen- (present on admission)  Assessment & Plan  Likely EtOH related gastritis along with acute pancreatitis  Continue BID PPI and IV fluids  Continue liquid diet for now.  If Hgb drops or melena noted will consider GI consult.  Monitor CMP  Check UA    GARRETT (acute kidney injury) (HCC)- (present on admission)  Assessment & Plan  2/11 BUN:11, Cr:0.5  2/10: Cr:0.85  Improved from 2/9 Cr:1.69  NaCl IV with cessation of fluids 2/11  Monitor BMP and I/O's    Hypomagnesemia- (present on admission)  Assessment & Plan  Replete to keep Mg > 2.   Monitor Mg level.     Hypokalemia- (present on admission)  Assessment & Plan  Goal K >4.  replete  Monitor BMP.       VTE prophylaxis: lovenox

## 2021-02-11 NOTE — PROGRESS NOTES
Updated MD Sloan of BP 81/45 with pt complaints of light headedness upon sitting up. MD put in order for 500mL bolus. Will continue to monitor

## 2021-02-11 NOTE — PROGRESS NOTES
Updated MD Park of phos: 0.9. MD will put in orders for oral supplement. Will continue to monitor

## 2021-02-12 ENCOUNTER — ANESTHESIA EVENT (OUTPATIENT)
Dept: SURGERY | Facility: MEDICAL CENTER | Age: 51
DRG: 896 | End: 2021-02-12
Payer: COMMERCIAL

## 2021-02-12 ENCOUNTER — ANESTHESIA (OUTPATIENT)
Dept: SURGERY | Facility: MEDICAL CENTER | Age: 51
DRG: 896 | End: 2021-02-12
Payer: COMMERCIAL

## 2021-02-12 PROBLEM — R23.3 EASY BRUISING: Status: ACTIVE | Noted: 2021-02-12

## 2021-02-12 PROBLEM — N20.0 NEPHROLITHIASIS: Status: ACTIVE | Noted: 2021-02-12

## 2021-02-12 PROBLEM — R32 URINARY INCONTINENCE: Status: ACTIVE | Noted: 2021-02-12

## 2021-02-12 PROBLEM — N83.8 OVARIAN MASS, RIGHT: Status: ACTIVE | Noted: 2021-02-12

## 2021-02-12 LAB
ALBUMIN SERPL BCP-MCNC: 3.3 G/DL (ref 3.2–4.9)
ANION GAP SERPL CALC-SCNC: 11 MMOL/L (ref 7–16)
BACTERIA UR CULT: NORMAL
BUN SERPL-MCNC: 6 MG/DL (ref 8–22)
CALCIUM SERPL-MCNC: 7.1 MG/DL (ref 8.5–10.5)
CHLORIDE SERPL-SCNC: 99 MMOL/L (ref 96–112)
CO2 SERPL-SCNC: 24 MMOL/L (ref 20–33)
CREAT SERPL-MCNC: 0.43 MG/DL (ref 0.5–1.4)
ERYTHROCYTE [DISTWIDTH] IN BLOOD BY AUTOMATED COUNT: 64.3 FL (ref 35.9–50)
GLUCOSE SERPL-MCNC: 105 MG/DL (ref 65–99)
HCT VFR BLD AUTO: 29.5 % (ref 37–47)
HGB BLD-MCNC: 9.4 G/DL (ref 12–16)
MCH RBC QN AUTO: 31.3 PG (ref 27–33)
MCHC RBC AUTO-ENTMCNC: 31.9 G/DL (ref 33.6–35)
MCV RBC AUTO: 98.3 FL (ref 81.4–97.8)
PATHOLOGY CONSULT NOTE: NORMAL
PLATELET # BLD AUTO: 161 K/UL (ref 164–446)
PMV BLD AUTO: 11.1 FL (ref 9–12.9)
POTASSIUM SERPL-SCNC: 4.2 MMOL/L (ref 3.6–5.5)
RBC # BLD AUTO: 3 M/UL (ref 4.2–5.4)
SIGNIFICANT IND 70042: NORMAL
SITE SITE: NORMAL
SODIUM SERPL-SCNC: 134 MMOL/L (ref 135–145)
SOURCE SOURCE: NORMAL
WBC # BLD AUTO: 7.8 K/UL (ref 4.8–10.8)

## 2021-02-12 PROCEDURE — 500066 HCHG BITE BLOCK, ECT: Performed by: INTERNAL MEDICINE

## 2021-02-12 PROCEDURE — 36415 COLL VENOUS BLD VENIPUNCTURE: CPT

## 2021-02-12 PROCEDURE — A9270 NON-COVERED ITEM OR SERVICE: HCPCS | Performed by: HOSPITALIST

## 2021-02-12 PROCEDURE — 160048 HCHG OR STATISTICAL LEVEL 1-5: Performed by: INTERNAL MEDICINE

## 2021-02-12 PROCEDURE — 700102 HCHG RX REV CODE 250 W/ 637 OVERRIDE(OP): Performed by: HOSPITALIST

## 2021-02-12 PROCEDURE — 85027 COMPLETE CBC AUTOMATED: CPT

## 2021-02-12 PROCEDURE — 88312 SPECIAL STAINS GROUP 1: CPT

## 2021-02-12 PROCEDURE — 160203 HCHG ENDO MINUTES - 1ST 30 MINS LEVEL 4: Performed by: INTERNAL MEDICINE

## 2021-02-12 PROCEDURE — 700111 HCHG RX REV CODE 636 W/ 250 OVERRIDE (IP): Performed by: HOSPITALIST

## 2021-02-12 PROCEDURE — 700102 HCHG RX REV CODE 250 W/ 637 OVERRIDE(OP): Performed by: INTERNAL MEDICINE

## 2021-02-12 PROCEDURE — 88305 TISSUE EXAM BY PATHOLOGIST: CPT | Mod: 59

## 2021-02-12 PROCEDURE — A9270 NON-COVERED ITEM OR SERVICE: HCPCS | Performed by: STUDENT IN AN ORGANIZED HEALTH CARE EDUCATION/TRAINING PROGRAM

## 2021-02-12 PROCEDURE — 700102 HCHG RX REV CODE 250 W/ 637 OVERRIDE(OP): Performed by: STUDENT IN AN ORGANIZED HEALTH CARE EDUCATION/TRAINING PROGRAM

## 2021-02-12 PROCEDURE — 700111 HCHG RX REV CODE 636 W/ 250 OVERRIDE (IP): Performed by: ANESTHESIOLOGY

## 2021-02-12 PROCEDURE — 700105 HCHG RX REV CODE 258: Performed by: ANESTHESIOLOGY

## 2021-02-12 PROCEDURE — 700101 HCHG RX REV CODE 250: Performed by: INTERNAL MEDICINE

## 2021-02-12 PROCEDURE — 700111 HCHG RX REV CODE 636 W/ 250 OVERRIDE (IP): Performed by: NURSE PRACTITIONER

## 2021-02-12 PROCEDURE — 0DB98ZX EXCISION OF DUODENUM, VIA NATURAL OR ARTIFICIAL OPENING ENDOSCOPIC, DIAGNOSTIC: ICD-10-PCS | Performed by: INTERNAL MEDICINE

## 2021-02-12 PROCEDURE — 160035 HCHG PACU - 1ST 60 MINS PHASE I: Performed by: INTERNAL MEDICINE

## 2021-02-12 PROCEDURE — A9270 NON-COVERED ITEM OR SERVICE: HCPCS | Performed by: INTERNAL MEDICINE

## 2021-02-12 PROCEDURE — 0DB68ZX EXCISION OF STOMACH, VIA NATURAL OR ARTIFICIAL OPENING ENDOSCOPIC, DIAGNOSTIC: ICD-10-PCS | Performed by: INTERNAL MEDICINE

## 2021-02-12 PROCEDURE — 82040 ASSAY OF SERUM ALBUMIN: CPT

## 2021-02-12 PROCEDURE — 700101 HCHG RX REV CODE 250: Performed by: HOSPITALIST

## 2021-02-12 PROCEDURE — 99232 SBSQ HOSP IP/OBS MODERATE 35: CPT | Performed by: HOSPITALIST

## 2021-02-12 PROCEDURE — 160009 HCHG ANES TIME/MIN: Performed by: INTERNAL MEDICINE

## 2021-02-12 PROCEDURE — 160002 HCHG RECOVERY MINUTES (STAT): Performed by: INTERNAL MEDICINE

## 2021-02-12 PROCEDURE — 700105 HCHG RX REV CODE 258: Performed by: HOSPITALIST

## 2021-02-12 PROCEDURE — 700111 HCHG RX REV CODE 636 W/ 250 OVERRIDE (IP): Performed by: INTERNAL MEDICINE

## 2021-02-12 PROCEDURE — C9113 INJ PANTOPRAZOLE SODIUM, VIA: HCPCS | Performed by: NURSE PRACTITIONER

## 2021-02-12 PROCEDURE — 770006 HCHG ROOM/CARE - MED/SURG/GYN SEMI*

## 2021-02-12 PROCEDURE — 80048 BASIC METABOLIC PNL TOTAL CA: CPT

## 2021-02-12 PROCEDURE — 0DB58ZX EXCISION OF ESOPHAGUS, VIA NATURAL OR ARTIFICIAL OPENING ENDOSCOPIC, DIAGNOSTIC: ICD-10-PCS | Performed by: INTERNAL MEDICINE

## 2021-02-12 RX ORDER — HYDRALAZINE HYDROCHLORIDE 20 MG/ML
5 INJECTION INTRAMUSCULAR; INTRAVENOUS
Status: DISCONTINUED | OUTPATIENT
Start: 2021-02-12 | End: 2021-02-12 | Stop reason: HOSPADM

## 2021-02-12 RX ORDER — SODIUM CHLORIDE, SODIUM LACTATE, POTASSIUM CHLORIDE, CALCIUM CHLORIDE 600; 310; 30; 20 MG/100ML; MG/100ML; MG/100ML; MG/100ML
INJECTION, SOLUTION INTRAVENOUS
Status: DISCONTINUED | OUTPATIENT
Start: 2021-02-12 | End: 2021-02-12 | Stop reason: SURG

## 2021-02-12 RX ORDER — DIPHENHYDRAMINE HYDROCHLORIDE 50 MG/ML
12.5 INJECTION INTRAMUSCULAR; INTRAVENOUS
Status: DISCONTINUED | OUTPATIENT
Start: 2021-02-12 | End: 2021-02-12 | Stop reason: HOSPADM

## 2021-02-12 RX ORDER — ONDANSETRON 2 MG/ML
4 INJECTION INTRAMUSCULAR; INTRAVENOUS
Status: DISCONTINUED | OUTPATIENT
Start: 2021-02-12 | End: 2021-02-12 | Stop reason: HOSPADM

## 2021-02-12 RX ORDER — SODIUM CHLORIDE, SODIUM LACTATE, POTASSIUM CHLORIDE, CALCIUM CHLORIDE 600; 310; 30; 20 MG/100ML; MG/100ML; MG/100ML; MG/100ML
INJECTION, SOLUTION INTRAVENOUS CONTINUOUS
Status: DISCONTINUED | OUTPATIENT
Start: 2021-02-12 | End: 2021-02-12 | Stop reason: HOSPADM

## 2021-02-12 RX ORDER — SUCRALFATE ORAL 1 G/10ML
1 SUSPENSION ORAL
Status: DISCONTINUED | OUTPATIENT
Start: 2021-02-12 | End: 2021-02-14 | Stop reason: HOSPADM

## 2021-02-12 RX ORDER — LABETALOL HYDROCHLORIDE 5 MG/ML
5 INJECTION, SOLUTION INTRAVENOUS
Status: DISCONTINUED | OUTPATIENT
Start: 2021-02-12 | End: 2021-02-12 | Stop reason: HOSPADM

## 2021-02-12 RX ORDER — HALOPERIDOL 5 MG/ML
1 INJECTION INTRAMUSCULAR
Status: DISCONTINUED | OUTPATIENT
Start: 2021-02-12 | End: 2021-02-12 | Stop reason: HOSPADM

## 2021-02-12 RX ADMIN — PROPOFOL 100 MCG/KG/MIN: 10 INJECTION, EMULSION INTRAVENOUS at 12:12

## 2021-02-12 RX ADMIN — LORAZEPAM 0.5 MG: 2 INJECTION INTRAMUSCULAR; INTRAVENOUS at 22:08

## 2021-02-12 RX ADMIN — LORAZEPAM 0.5 MG: 0.5 TABLET ORAL at 08:50

## 2021-02-12 RX ADMIN — PROPOFOL 50 MG: 10 INJECTION, EMULSION INTRAVENOUS at 12:18

## 2021-02-12 RX ADMIN — Medication 1 TABLET: at 16:09

## 2021-02-12 RX ADMIN — POTASSIUM CHLORIDE 20 MEQ: 1500 TABLET, EXTENDED RELEASE ORAL at 16:10

## 2021-02-12 RX ADMIN — FOLIC ACID 1 MG: 1 TABLET ORAL at 05:17

## 2021-02-12 RX ADMIN — CALCIUM GLUCONATE 1 G: 98 INJECTION, SOLUTION INTRAVENOUS at 16:00

## 2021-02-12 RX ADMIN — SUCRALFATE 1 G: 1 SUSPENSION ORAL at 21:40

## 2021-02-12 RX ADMIN — THIAMINE HCL TAB 100 MG 100 MG: 100 TAB at 05:18

## 2021-02-12 RX ADMIN — OXYCODONE 5 MG: 5 TABLET ORAL at 15:59

## 2021-02-12 RX ADMIN — PROPOFOL 50 MG: 10 INJECTION, EMULSION INTRAVENOUS at 12:13

## 2021-02-12 RX ADMIN — DIBASIC SODIUM PHOSPHATE, MONOBASIC POTASSIUM PHOSPHATE AND MONOBASIC SODIUM PHOSPHATE 500 MG: 852; 155; 130 TABLET ORAL at 05:17

## 2021-02-12 RX ADMIN — OXYCODONE 5 MG: 5 TABLET ORAL at 05:33

## 2021-02-12 RX ADMIN — LEVOTHYROXINE SODIUM 88 MCG: 0.09 TABLET ORAL at 05:17

## 2021-02-12 RX ADMIN — OMEPRAZOLE 40 MG: KIT at 16:00

## 2021-02-12 RX ADMIN — Medication 400 MG: at 16:09

## 2021-02-12 RX ADMIN — OXYCODONE 5 MG: 5 TABLET ORAL at 19:58

## 2021-02-12 RX ADMIN — LIDOCAINE 1 PATCH: 50 PATCH TOPICAL at 09:00

## 2021-02-12 RX ADMIN — DIBASIC SODIUM PHOSPHATE, MONOBASIC POTASSIUM PHOSPHATE AND MONOBASIC SODIUM PHOSPHATE 500 MG: 852; 155; 130 TABLET ORAL at 16:09

## 2021-02-12 RX ADMIN — OXYCODONE 5 MG: 5 TABLET ORAL at 09:55

## 2021-02-12 RX ADMIN — Medication 400 MG: at 05:18

## 2021-02-12 RX ADMIN — SUCRALFATE 1 G: 1 SUSPENSION ORAL at 16:00

## 2021-02-12 RX ADMIN — CEFTRIAXONE SODIUM 2 G: 2 INJECTION, POWDER, FOR SOLUTION INTRAMUSCULAR; INTRAVENOUS at 05:18

## 2021-02-12 RX ADMIN — POTASSIUM CHLORIDE AND SODIUM CHLORIDE: 900; 300 INJECTION, SOLUTION INTRAVENOUS at 05:02

## 2021-02-12 RX ADMIN — PROPOFOL 30 MG: 10 INJECTION, EMULSION INTRAVENOUS at 12:15

## 2021-02-12 RX ADMIN — THERA TABS 1 TABLET: TAB at 05:18

## 2021-02-12 RX ADMIN — PANTOPRAZOLE SODIUM 40 MG: 40 INJECTION, POWDER, FOR SOLUTION INTRAVENOUS at 05:18

## 2021-02-12 RX ADMIN — POTASSIUM CHLORIDE 20 MEQ: 1500 TABLET, EXTENDED RELEASE ORAL at 05:18

## 2021-02-12 RX ADMIN — PROPOFOL 20 MG: 10 INJECTION, EMULSION INTRAVENOUS at 12:16

## 2021-02-12 RX ADMIN — FLUOXETINE 20 MG: 20 CAPSULE ORAL at 05:18

## 2021-02-12 RX ADMIN — SODIUM CHLORIDE, POTASSIUM CHLORIDE, SODIUM LACTATE AND CALCIUM CHLORIDE: 600; 310; 30; 20 INJECTION, SOLUTION INTRAVENOUS at 12:19

## 2021-02-12 RX ADMIN — Medication 1000 UNITS: at 05:18

## 2021-02-12 RX ADMIN — Medication 1 TABLET: at 05:17

## 2021-02-12 ASSESSMENT — LIFESTYLE VARIABLES
NAUSEA AND VOMITING: NO NAUSEA AND NO VOMITING
TREMOR: NO TREMOR
ORIENTATION AND CLOUDING OF SENSORIUM: ORIENTED AND CAN DO SERIAL ADDITIONS
TOTAL SCORE: 5
ANXIETY: *
NAUSEA AND VOMITING: MILD NAUSEA WITH NO VOMITING
HEADACHE, FULLNESS IN HEAD: NOT PRESENT
AGITATION: *
HEADACHE, FULLNESS IN HEAD: NOT PRESENT
AGITATION: SOMEWHAT MORE THAN NORMAL ACTIVITY
ANXIETY: NO ANXIETY (AT EASE)
PAROXYSMAL SWEATS: NO SWEAT VISIBLE
TREMOR: TREMOR NOT VISIBLE BUT CAN BE FELT, FINGERTIP TO FINGERTIP
VISUAL DISTURBANCES: NOT PRESENT
TREMOR: NO TREMOR
TOTAL SCORE: 6
TOTAL SCORE: 4
ANXIETY: NO ANXIETY (AT EASE)
AUDITORY DISTURBANCES: NOT PRESENT
AUDITORY DISTURBANCES: NOT PRESENT
VISUAL DISTURBANCES: NOT PRESENT
TREMOR: *
VISUAL DISTURBANCES: NOT PRESENT
ORIENTATION AND CLOUDING OF SENSORIUM: ORIENTED AND CAN DO SERIAL ADDITIONS
VISUAL DISTURBANCES: NOT PRESENT
TOTAL SCORE: 2
ORIENTATION AND CLOUDING OF SENSORIUM: ORIENTED AND CAN DO SERIAL ADDITIONS
HEADACHE, FULLNESS IN HEAD: VERY MILD
AGITATION: SOMEWHAT MORE THAN NORMAL ACTIVITY
VISUAL DISTURBANCES: NOT PRESENT
AUDITORY DISTURBANCES: NOT PRESENT
TOTAL SCORE: 2
PAROXYSMAL SWEATS: NO SWEAT VISIBLE
PAROXYSMAL SWEATS: NO SWEAT VISIBLE
HEADACHE, FULLNESS IN HEAD: NOT PRESENT
AGITATION: *
NAUSEA AND VOMITING: MILD NAUSEA WITH NO VOMITING
PAROXYSMAL SWEATS: NO SWEAT VISIBLE
ANXIETY: MILDLY ANXIOUS
AGITATION: SOMEWHAT MORE THAN NORMAL ACTIVITY
ORIENTATION AND CLOUDING OF SENSORIUM: ORIENTED AND CAN DO SERIAL ADDITIONS
ORIENTATION AND CLOUDING OF SENSORIUM: ORIENTED AND CAN DO SERIAL ADDITIONS
ANXIETY: MILDLY ANXIOUS
PAROXYSMAL SWEATS: NO SWEAT VISIBLE
NAUSEA AND VOMITING: NO NAUSEA AND NO VOMITING
HEADACHE, FULLNESS IN HEAD: NOT PRESENT
TREMOR: *
AUDITORY DISTURBANCES: NOT PRESENT
NAUSEA AND VOMITING: NO NAUSEA AND NO VOMITING
AUDITORY DISTURBANCES: NOT PRESENT

## 2021-02-12 ASSESSMENT — ENCOUNTER SYMPTOMS
DIARRHEA: 0
SORE THROAT: 0
SHORTNESS OF BREATH: 0
BRUISES/BLEEDS EASILY: 1
VOMITING: 0
NERVOUS/ANXIOUS: 0
SPEECH CHANGE: 0
DIZZINESS: 0
PALPITATIONS: 0
HEARTBURN: 1
FEVER: 0
STRIDOR: 0
ABDOMINAL PAIN: 1

## 2021-02-12 ASSESSMENT — COGNITIVE AND FUNCTIONAL STATUS - GENERAL
MOVING FROM LYING ON BACK TO SITTING ON SIDE OF FLAT BED: A LITTLE
TOILETING: A LITTLE
STANDING UP FROM CHAIR USING ARMS: A LITTLE
CLIMB 3 TO 5 STEPS WITH RAILING: A LITTLE
MOBILITY SCORE: 19
DAILY ACTIVITIY SCORE: 19
MOVING TO AND FROM BED TO CHAIR: A LITTLE
HELP NEEDED FOR BATHING: A LITTLE
SUGGESTED CMS G CODE MODIFIER MOBILITY: CK
WALKING IN HOSPITAL ROOM: A LITTLE
DRESSING REGULAR UPPER BODY CLOTHING: A LITTLE
SUGGESTED CMS G CODE MODIFIER DAILY ACTIVITY: CK
DRESSING REGULAR LOWER BODY CLOTHING: A LOT

## 2021-02-12 ASSESSMENT — PAIN DESCRIPTION - PAIN TYPE
TYPE: SURGICAL PAIN
TYPE: ACUTE PAIN
TYPE: SURGICAL PAIN

## 2021-02-12 ASSESSMENT — FIBROSIS 4 INDEX: FIB4 SCORE: 3.73

## 2021-02-12 ASSESSMENT — PAIN SCALES - GENERAL: PAIN_LEVEL: 0

## 2021-02-12 NOTE — PROGRESS NOTES
Leaves floor via gurney with Flex RN and Transport staff to go to EGD. Discussed pt's care with MARCIO Ignacio RN.

## 2021-02-12 NOTE — PROGRESS NOTES
"Hospital Medicine Daily Progress Note    Date of Service  2/12/2021    Chief Complaint  Altered mentation    Hospital Course  Per initial H+P: \"51 y.o. female who presented 2/9/2021 with slurred speech, mild confusion.  Patient has a past medical history of chronic alcohol abuse, anxiety, depression, hypothyroidism, hypertriglyceridemia.  Around noon today patient was in the bathroom, reportedly assisted by her , who witnessed her drop her head and become momentarily unresponsive.  Upon waking, she reportedly had difficulty speaking and was confused.  EMS was called; it was noted her symptoms had resolved by their arrival.  Per chart review and the Neurology team, the patient reportedly had a transient decrease level of consciousness en route to the hospital.  Neurology was consulted via code stroke.  Neurology saw the patient, reported the patient had a nonfocal neurological exam and NIHSS of 1 with slight dysarthria.  CT head without contrast showed no acute intracranial abnormality.     The patient did also present to [facility] on 2/[facility]/2021 secondary to alcohol related injury, hitting the back of her head.  The patient reports she drinks about 12 mini bottles of vodka daily, though yesterday she drank 3 mini bottles.       Reports mild headache.  Denies chest pain, abdominal pain, shortness of breath, fever/chills, vision changes, numbness/tingling, bowel/bladder incontinence.  She initially reported she may have had two episodes of possible black emesis 2 days ago and perhaps one dark stool last week, though later was unsure.\"     Interval Problem Update  2/10 Alert.  Mildly lethargic but speech clear and talking in sentences.  Alert to being in Hospital[facility] In [place],but initially thought it was 2010 but later stated 2021.  She knew the month. Ongoing epigastric pain.    2/11 Required bolus of 500cc NS overnight for lower BP with dizziness. Xray shows right distal fibula fx with displacement, I have " consulted [facility] as she has seen them in recent past for same.  Pt's mother at bedside and given updates.  Patient alert and oriented.  I have consulted Dr Chan, GI.  Patient states bowel and bladder incontinence but denies sensory loss or extremity weakness.    2/12: Alerted patient of keeping to a bland diet if she is allowed a diet after her today's endoscopy.  She is alert.  States easy bruising from the BP cuff.  Still with urinary incontinence.  She has a purewick external catheter in place.  NPO and has not had any bowel movement today.  Some right ankle pain today.  We discussed her CT finding of a ovarian mass (likely cyst) with the need for follow up with an U/S outpatient.  Also alerted her of her left nonocclusive kidney stone and suggest plenty of water intake.    Consultants/Specialty  Intensivist (signed off)  Gastroenterology    Code Status  Full Code    Disposition  Home once stable.    Review of Systems  Review of Systems   Constitutional: Positive for malaise/fatigue. Negative for fever.   HENT: Negative for sore throat.    Respiratory: Negative for shortness of breath and stridor.    Cardiovascular: Negative for palpitations and leg swelling.   Gastrointestinal: Positive for abdominal pain (epigastric) and heartburn. Negative for diarrhea and vomiting.        Bowel incontinence but not bloody   Genitourinary: Negative for dysuria and hematuria.        Incontinence   Musculoskeletal: Positive for joint pain (right ankle).   Neurological: Negative for dizziness and speech change.   Endo/Heme/Allergies: Bruises/bleeds easily.   Psychiatric/Behavioral: The patient is not nervous/anxious.         Physical Exam  Temp:  [36.2 °C (97.1 °F)-37.3 °C (99.2 °F)] 36.6 °C (97.8 °F)  Pulse:  [80-98] 80  Resp:  [16-19] 16  BP: ()/(56-74) 126/64  SpO2:  [90 %-98 %] 94 %    Physical Exam  Vitals reviewed.   Constitutional:       Appearance: Normal appearance. She is not diaphoretic.   HENT:      Head:  Normocephalic and atraumatic.      Nose: Nose normal. No congestion.      Mouth/Throat:      Mouth: Mucous membranes are moist.      Pharynx: No oropharyngeal exudate.   Eyes:      General: No scleral icterus.        Right eye: No discharge.         Left eye: No discharge.      Extraocular Movements: Extraocular movements intact.      Conjunctiva/sclera: Conjunctivae normal.   Cardiovascular:      Rate and Rhythm: Normal rate and regular rhythm.      Pulses:           Radial pulses are 2+ on the right side and 2+ on the left side.        Dorsalis pedis pulses are 2+ on the right side and 2+ on the left side.      Heart sounds: No murmur.   Pulmonary:      Effort: Pulmonary effort is normal. No respiratory distress.      Breath sounds: Normal breath sounds. No wheezing or rales.   Abdominal:      General: Bowel sounds are normal. There is no distension.      Palpations: Abdomen is soft.      Tenderness: There is no abdominal tenderness.   Musculoskeletal:         General: Tenderness (right lower ankle) present. No swelling.      Cervical back: No muscular tenderness.      Right lower leg: No edema.      Left lower leg: No edema.   Lymphadenopathy:      Cervical: No cervical adenopathy.   Skin:     Coloration: Skin is not jaundiced or pale.      Findings: Bruising (right temporal region and occipital regional and extremities) present.   Neurological:      General: No focal deficit present.      Mental Status: She is alert and oriented to person, place, and time. Mental status is at baseline.      Cranial Nerves: No cranial nerve deficit.   Psychiatric:         Mood and Affect: Mood normal.         Behavior: Behavior normal.         Fluids    Intake/Output Summary (Last 24 hours) at 2/12/2021 0905  Last data filed at 2/12/2021 0600  Gross per 24 hour   Intake 1813.33 ml   Output 900 ml   Net 913.33 ml       Laboratory  Recent Labs     02/10/21  0624 02/11/21  0542 02/12/21  0402   WBC 6.2 6.3 7.8   RBC 3.39* 2.72*  3.00*   HEMOGLOBIN 10.7* 8.5* 9.4*   HEMATOCRIT 32.2* 26.5* 29.5*   MCV 95.0 96.3 98.3*   MCH 31.3 31.3 31.3   MCHC 32.9* 32.4* 31.9*   RDW 60.3* 61.9* 64.3*   PLATELETCT 103* 121* 161*   MPV 11.9 11.8 11.1     Recent Labs     02/11/21  0542 02/11/21  1925 02/12/21  0402   SODIUM 126* 141 134*   POTASSIUM 2.8* 3.6 4.2   CHLORIDE 92* 102 99   CO2 29 27 24   GLUCOSE 101* 96 105*   BUN 11 6* 6*   CREATININE 0.50 0.42* 0.43*   CALCIUM 7.4* 7.3* 7.1*     Recent Labs     02/09/21  1320   APTT 28.8   INR 1.00         Recent Labs     02/10/21  1515   TRIGLYCERIDE 97       Imaging  DX-ANKLE 2- VIEWS RIGHT   Final Result      Fracture of the distal fibular shaft which may be slightly comminuted. Main distal fracture fragment appears slightly displaced laterally.      CT-ABDOMEN-PELVIS W/O   Final Result         1.  Hazy fat stranding adjacent to the tail of pancreas, appearance suggests pancreatitis.   2.  Right ovarian cyst, follow-up with pelvic sonogram for further characterization   3.  Left nephrolithiasis without visualized obstructive changes   4.  Segment 2 duodenal diverticula   5.  Hepatomegaly and diffuse hepatic steatosis      DX-CHEST-PORTABLE (1 VIEW)   Final Result      No acute cardiac or pulmonary abnormality is noted.      CT-HEAD W/O   Final Result      No acute intracranial abnormality is identified.      Posterior left occipital scalp swelling/hematoma.              Assessment/Plan  * Alcohol withdrawal syndrome with complication (HCC)- (present on admission)  Assessment & Plan  [facility] protocol  Thiamine, folate, MVI  Cessation of EtOH encouraged and discussed  Monitor LFT's  Harms of continued EtOH (pancreatitis complications, Liver failure) discussed    Acute pancreatitis  Assessment & Plan  Continue liquid diet for now as tolerates.  Increase diet if okay with GI and no endoscopy planned  IV fluids to be weaned  Pain management  EtOH cessation encouraged    Easy bruising  Assessment & Plan  Normal INR on  admit  Agree with GI to give vit K  Alerted patient to eat an increase in green leafy vegetables in her diet.  Some bruising concerning from possible falls while intoxicated  Patient did states some bruising here just from our BP cuff.    Urinary incontinence  Assessment & Plan  Patient states this is new.  No loss of sensory.  Has a purewick in place.    Atrial fibrillation (HCC)  Assessment & Plan  Episodic.  Monitoring on telemetry and appears NSR.    Closed right ankle fracture  Assessment & Plan  Occurred a few weeks prior to admit per patient.  Was followed outpatient by [facility] per patient  Has a brace in her room.  2/10 xray right ankle showing distal fibular displaced fracture.    Consulted Ortho 2/11.  Okay for weight bearing if wearing leg boot brace only  2/11 Oscal and Vit D ordered    Ovarian mass, right  Assessment & Plan  I alerted patient of her ovarian mass and told her she will need an outpatient transvaginal or pelvic U/S for follow up.  Mass Noted on CT     GI bleed  Assessment & Plan  GI consulted 2/11 (Dr Kasi Chan)  Patient was having some N/V prior to hospital and question if esophageal tear vs EtOH induced gastritis or ulcer  PPI BID and octreotide  Monitoring cbc.  2/12 Hgb: 9.4    Elevated troponin  Assessment & Plan  Repeat troponin to varify no upward swing  Likely due to GARRETT.    Thrombocytopenia (HCC)  Assessment & Plan  EtOH related  Monitor cbc  2/12 Plt:161    Electrolyte abnormality  Assessment & Plan  NaCl w/ KCl x 1L 2/11  Monitor BMP  2/12 Na:134  Replete and monitor    Increased ammonia level  Assessment & Plan  Stop EtOH use.  Alert and oriented.    Low serum phosphorus for age  Assessment & Plan  Replace and monitor    Pain of upper abdomen- (present on admission)  Assessment & Plan  Likely EtOH related gastritis along with acute pancreatitis  Continue BID PPI and IV fluids  NPO for endoscopy 2/12 am.  On octreotide per Dr Chan, GI.  Restart diet based on endoscopic findings  and keep to low fat bland diet.    GARRETT (acute kidney injury) (HCC)- (present on admission)  Assessment & Plan  2/11 BUN:11, Cr:0.5  2/10: Cr:0.85  Improved from 2/9 Cr:1.69  NaCl IV with cessation of fluids 2/11  Monitor BMP and I/O's    Hypomagnesemia- (present on admission)  Assessment & Plan  Replete to keep Mg > 2.   Monitor Mg level.     Hypokalemia- (present on admission)  Assessment & Plan  Goal K >4.  replete  Monitor BMP.    Dehydration  Assessment & Plan  IV fluids to be stopped 2/11  Watch for volume overload.       VTE prophylaxis: lovenox

## 2021-02-12 NOTE — CARE PLAN
"  Problem: Knowledge Deficit  Goal: Knowledge of disease process/condition, treatment plan, diagnostic tests, and medications will improve  Outcome: PROGRESSING AS EXPECTED    Discussed plan of care for today w/ pt this am, she is A+O x 4, with some prompting for time, she verbalizes understanding of her plan of care, no questions. Emotional support given. Reinforcing education as necessary. Discussed pt's care with Hospitalist Dr. López this am.       Problem: Pain Management  Goal: Pain level will decrease to patient's comfort goal  Outcome: PROGRESSING AS EXPECTED    Receives po prn for pain. See MAR. C/O aching pain \"Everywhere.\" Assessing every four hrs for pain per protocol; see doc flowsheets        "

## 2021-02-12 NOTE — CARE PLAN
Problem: Communication  Goal: The ability to communicate needs accurately and effectively will improve  Outcome: PROGRESSING AS EXPECTED  Note: Pt's whiteboard is updated, pt has been updated on POC, all questions have been answered at this time       Problem: Safety  Goal: Will remain free from falls  Outcome: PROGRESSING AS EXPECTED  Note: Bed locked in lowest position. Bed alarm on. Treaded socks in use. Call light and belongings within reach. Pt educated to call for assistance. Hourly rounding in place.

## 2021-02-12 NOTE — ANESTHESIA PREPROCEDURE EVALUATION
Relevant Problems   CARDIAC   (+) Atrial fibrillation (HCC)         (+)     (+) GARRETT (acute kidney injury) (HCC)      ENDO   (+) Hypothyroidism       Physical Exam    Airway   Mallampati: II  TM distance: >3 FB  Neck ROM: full       Cardiovascular - normal exam  Rhythm: regular  Rate: normal  (-) murmur     Dental - normal exam           Pulmonary - normal exam  Breath sounds clear to auscultation     Abdominal - normal exam     Neurological - normal exam                 Anesthesia Plan    ASA 2       Plan - MAC               Induction: intravenous      Pertinent diagnostic labs and testing reviewed    Informed Consent:    Anesthetic plan and risks discussed with patient.    Use of blood products discussed with: patient whom consented to blood products.

## 2021-02-12 NOTE — PROGRESS NOTES
Patient seen and examined before proceeding with EGD with attempted hemostasis, biopsies, possible varices banding and/or other endotherapy under anesthesia were discussed with patient in detail before proceeding.  Risks, benefits, and alternatives of aforementioned procedures were discussed with patient and mother in detail before proceeding.  Consenting persons were given opportunities to ask questions and discuss other options.  Risks including but not limited to perforation, infection, bleeding, missed lesion(s), possible need for surgery(ies) and/or interventional radiology, possible need for repeat procedure(s) and/or additional testing, hospitalization possibly prolonged, cardiac and/or pulmonary event, aspiration, hypoxia, stroke, medication (s) and/or anesthesia reaction(s), indefinite diagnosis, discomfort/pain, unsuccessful and/or incomplete procedure, ineffective therapy, persistent symptoms, damage to adjacent organs/structure and/or vascular, and other adverse events possibly life-threatening.  Interactive discussion was undertaken with Layman's terms.  I answered questions in full and to satisfaction.  Consenting persons stated understanding and acceptance of these risks, and wished to proceed.  I gave opportunity to cancel/delay/reschedule.  Informed consent was given in clear state of mind and paper permit was signed.

## 2021-02-12 NOTE — ANESTHESIA POSTPROCEDURE EVALUATION
Patient: Carie Santiago    Procedure Summary     Date: 02/12/21 Room / Location: [facility] ROOM 26 / SURGERY SAME DAY [facility]    Anesthesia Start: 1209 Anesthesia Stop: 1226    Procedures:       GASTROSCOPY (N/A Esophagus)      GASTROSCOPY, WITH BIOPSY (N/A Esophagus) Diagnosis: (REFLUX ESOPHAGITIS)    Surgeons: Kasi Chan M.D. Responsible Provider: Dante Sims M.D.    Anesthesia Type: MAC ASA Status: 2          Final Anesthesia Type: MAC  Last vitals  BP   Blood Pressure: (!) 93/68    Temp   36.6 °C (97.9 °F)    Pulse   85   Resp   17    SpO2   99 %      Anesthesia Post Evaluation    Patient location during evaluation: PACU  Patient participation: complete - patient participated  Level of consciousness: awake and alert  Pain score: 0    Airway patency: patent  Anesthetic complications: no  Cardiovascular status: hemodynamically stable  Respiratory status: acceptable  Hydration status: euvolemic    PONV: none          No complications documented.     Nurse Pain Score: 6 (NPRS)

## 2021-02-12 NOTE — CONSULTS
UROLOGY Consult Note:    AMIE Borjas  Date & Time note created:    2/12/2021   2:34 PM       Patient ID:   Name:             Carie Santiago   YOB: 1970  Age:                 51 y.o.  female   MRN:               [id number]                                                             Reason for Consult:      Urinary incontinence    History of Present Illness:    This is a 51 y.o. Female who presented 02/09/2021 with slurred speech, mild confusion, and is on CIWA protocol.  She has a past medical history of chronic alcohol abuse, anxiety, depression, hypothyroidism, hypertriglyceridemia.  Urology has been consulted due to urinary incontinence that has worsened over the last several days.  Patient denies any prior history of urinary incontinence, but does reports several month history of frequency, urgency, nocturia. She denies incontinence with coughing, laughing, sneezing. Currently, she is managing with GeoGRAFI.  Since hospital admission, she has been experience severe urgency and urge incontinence. She denies fevers, chills, nausea, vomiting.    Review of Systems:      Constitutional: Denies fevers, Denies weight changes  Eyes: Denies changes in vision, no eye pain  Ears/Nose/Throat/Mouth: Denies nasal congestion or sore throat   Cardiovascular: no chest pain, no palpitations   Respiratory: no shortness of breath , Denies cough  Gastrointestinal/Hepatic: See HPI  Genitourinary: See HPI  Musculoskeletal/Rheum: Denies  joint pain and swelling, no edema  Skin: Denies rash  Neurological: Denies headache, confusion, memory loss or focal weakness/parasthesias  Psychiatric: denies mood disorder   Endocrine: Kate thyroid problems  Heme/Oncology/Lymph Nodes: Denies enlarged lymph nodes, denies brusing or known bleeding disorder  All other systems were reviewed and are negative (AMA/CMS criteria)                Past Medical History:   Past Medical History:   Diagnosis Date   • Anxiety and  depression 1/23/2019   • Colon polyp 3/29/2019    Colonoscopy March 2019: 3mm descending colon polyp   • Disorder of thyroid    • Diverticulosis of colon 1/23/2019    MRI abdomen Jan. 2019   • Generalized anxiety disorder    • Hypertriglyceridemia 1/23/2019   • Low serum HDL 1/23/2019   • Menopause    • Vitamin B12 deficiency 1/23/2019     Active Hospital Problems    Diagnosis    • Acute pancreatitis [K85.90]      Priority: High   • Alcohol withdrawal syndrome with complication (HCC) [F10.239]      Priority: High   • Urinary incontinence [R32]      Priority: Medium   • Easy bruising [R23.8]      Priority: Medium   • Atrial fibrillation (HCC) [I48.91]      Priority: Medium   • Closed right ankle fracture [S82.891A]      Priority: Medium   • Dehydration [E86.0]      Priority: Low   • Ovarian mass, right [N83.8]    •   [N20.0]    • GI bleed [K92.2]    • Low serum phosphorus for age [R79.0]    • Increased ammonia level [R79.89]    • Electrolyte abnormality [E87.8]    • Thrombocytopenia (HCC) [D69.6]    • Elevated troponin [R77.8]    • Hypokalemia [E87.6]    • Hypomagnesemia [E83.42]    • GARRETT (acute kidney injury) (HCC) [N17.9]    • Pain of upper abdomen [R10.10]        Past Surgical History:  Past Surgical History:   Procedure Laterality Date   • WOUND CLOSURE ORTHO Left 9/24/2019    Procedure: CLOSURE, WOUND, ORTHO - LEG W/WOUND VAC REMOVAL;  Surgeon: Paolo Odell M.D.;  Location: [facility];  Service: Orthopedics   • IRRIGATION & DEBRIDEMENT ORTHO Left 9/21/2019    Procedure: IRRIGATION AND DEBRIDEMENT, WOUND - FOR PROXIMAL TIBIA HEMATOMA EVACUATION AND WOUND VAC APPLICATION;  Surgeon: Paolo Odell M.D.;  Location: [facility];  Service: Orthopedics   • OTHER ORTHOPEDIC SURGERY Right 2017   • ROBOTIC LAPAROSCOPIC CHOLECYSTECTOMY  2017   • UMBILICAL HERNIA REPAIR  2017   • INGUINAL HERNIA REPAIR BILATERAL  1999    with Mesh   • TONSILLECTOMY AND ADENOIDECTOMY  1976   • APPENDECTOMY     •  MAMMOPLASTY AUGMENTATION Bilateral        Hospital Medications:    Current Facility-Administered Medications:   •  sucralfate (CARAFATE) 1 GM/10ML suspension 1 g, 1 g, Oral, 4X/DAY ACHS, Kasi Chan M.D.  •  omeprazole (FIRST-OMEPRAZOLE) 2 mg/mL oral susp 40 mg, 40 mg, Oral, DAILY, Kasi Chan M.D.  •  calcium GLUConate 1 g in  mL IVPB, 1 g, Intravenous, Once, Yifan López D.O.  •  oyster shell calcium/vitamin D 250-125 MG-UNIT tablet 1 tablet, 1 tablet, Oral, TID, Yifan López D.O., 1 tablet at 02/12/21 0517  •  vitamin D (cholecalciferol) tablet 1,000 Units, 1,000 Units, Oral, DAILY, Yifan López D.O., 1,000 Units at 02/12/21 0518  •  cefTRIAXone (ROCEPHIN) 2 g in  mL IVPB, 2 g, Intravenous, Q24HRS, Yifan López D.O., Stopped at 02/12/21 0548  •  LORazepam (ATIVAN) tablet 0.5 mg, 0.5 mg, Oral, Q4HRS PRN, Logan Chris M.D., 0.5 mg at 02/12/21 0850  •  LORazepam (ATIVAN) tablet 1 mg, 1 mg, Oral, Q4HRS PRN, 1 mg at 02/11/21 1350 **OR** LORazepam (ATIVAN) injection 0.5 mg, 0.5 mg, Intravenous, Q4HRS PRN, Logan Chris M.D.  •  LORazepam (ATIVAN) tablet 2 mg, 2 mg, Oral, Q2HRS PRN **OR** LORazepam (ATIVAN) injection 1 mg, 1 mg, Intravenous, Q2HRS PRN, Logan Chris M.D.  •  LORazepam (ATIVAN) tablet 3 mg, 3 mg, Oral, Q HOUR PRN **OR** LORazepam (ATIVAN) injection 1.5 mg, 1.5 mg, Intravenous, Q HOUR PRN, Logan Chris M.D.  •  LORazepam (ATIVAN) tablet 4 mg, 4 mg, Oral, Q15 MIN PRN **OR** LORazepam (ATIVAN) injection 2 mg, 2 mg, Intravenous, Q15 MIN PRN, Logan Chris M.D.  •  oxyCODONE immediate-release (ROXICODONE) tablet 5 mg, 5 mg, Oral, Q4HRS PRN, 5 mg at 02/12/21 0955 **OR** oxyCODONE immediate release (ROXICODONE) tablet 10 mg, 10 mg, Oral, Q4HRS PRN, Logan Chris M.D.  •  lidocaine (LIDODERM) 5 % 1 Patch, 1 Patch, Transdermal, Q24HR, Logan Chris M.D., 1 Patch at 02/12/21 0900  •  levothyroxine (SYNTHROID) tablet 88 mcg, 88 mcg, Oral, AM ES, Yifan López D.O., 88  mcg at 02/12/21 0517  •  FLUoxetine (PROZAC) capsule 20 mg, 20 mg, Oral, DAILY, Yifan López D.O., 20 mg at 02/12/21 0518  •  ondansetron (ZOFRAN) syringe/vial injection 4 mg, 4 mg, Intravenous, Q4HRS PRN, Logan Chris M.D., 4 mg at 02/11/21 0309  •  phosphorus (K-Phos-Neutral) per tablet 500 mg, 500 mg, Oral, TID, Elpidio Sloan M.D., 500 mg at 02/12/21 0517  •  senna-docusate (PERICOLACE or SENOKOT S) 8.6-50 MG per tablet 2 Tab, 2 tablet, Oral, BID, Stopped at 02/09/21 1901 **AND** polyethylene glycol/lytes (MIRALAX) PACKET 1 Packet, 1 Packet, Oral, QDAY PRN **AND** magnesium hydroxide (MILK OF MAGNESIA) suspension 30 mL, 30 mL, Oral, QDAY PRN **AND** bisacodyl (DULCOLAX) suppository 10 mg, 10 mg, Rectal, QDAY PRN, Sheri Pitts M.D.  •  Respiratory Therapy Consult, , Nebulization, Continuous RT, Sheri Pitts M.D.  •  acetaminophen (Tylenol) tablet 650 mg, 650 mg, Oral, Q6HRS PRN, Sheri Pitts M.D., 650 mg at 02/10/21 2045  •  [DISCONTINUED] detox IV 1000 mL (D5LR + magnesium 1 g + thiamine 100 mg + folic acid 1 mg) infusion, , Intravenous, Once **AND** thiamine (Vitamin B-1) tablet 100 mg, 100 mg, Oral, DAILY, 100 mg at 02/12/21 0518 **AND** multivitamin (THERAGRAN) tablet 1 Tab, 1 tablet, Oral, DAILY, 1 tablet at 02/12/21 0518 **AND** folic acid (FOLVITE) tablet 1 mg, 1 mg, Oral, DAILY, Venu Irwin D.O., 1 mg at 02/12/21 0517  •  [COMPLETED] magnesium sulfate IVPB premix 2 g, 2 g, Intravenous, Once, Stopped at 02/09/21 2213 **AND** magnesium oxide (MAG-OX) tablet 400 mg, 400 mg, Oral, BID, Venu Alida, D.O., 400 mg at 02/12/21 0518  •  potassium chloride SA (Kdur) tablet 20 mEq, 20 mEq, Oral, BID, Venu Alida, D.O., 20 mEq at 02/12/21 0518  •  labetalol (NORMODYNE/TRANDATE) injection 10 mg, 10 mg, Intravenous, Q HOUR PRN **OR** labetalol (NORMODYNE) tablet 200 mg, 200 mg, Oral, Q6HRS PRN, Venu Alida, D.O.    Current Outpatient Medications:  Medications Prior to Admission    Medication Sig Dispense Refill Last Dose   • guaiFENesin ER (MUCINEX) 600 MG TABLET SR 12 HR Take 600 mg by mouth 2 times a day as needed.   2/9/2021 at am   • Ibuprofen (ADVIL LIQUI-GELS MINIS PO) Take 2 tablet by mouth every 6 hours as needed.   > 5 days ago at [facility]   • Naproxen Sodium (ALEVE) 220 MG Cap Take 440 mg by mouth 2 times a day as needed.   few days ago at [facility]   • traMADol (ULTRAM) 50 MG Tab Take  mg by mouth every four hours as needed.   2/8/2021 at pm   • progesterone (PROMETRIUM) 100 MG Cap Take 1 Cap by mouth every day. 90 Cap 3 >3 days ago at [facility]   • SYNTHROID 88 MCG Tab TAKE ONE TABLET BY MOUTH EVERY DAY IN THE MORNING ON AN EMPTY STOMACH (Patient taking differently: Take 88 mcg by mouth every morning. TAKE ONE TABLET BY MOUTH EVERY DAY IN THE MORNING ON AN EMPTY STOMACH) 90 Tab 3 2/8/2021 at am   • VITAMIN D PO Take 1 Cap by mouth every day. 100 Cap 3 > 5days ago at [facility]   • ALPRAZolam (XANAX) 0.5 MG Tab Take 1 Tab by mouth 2 times a day as needed for Sleep or Anxiety for up to 30 days. (Patient taking differently: Take 0.5-1 mg by mouth at bedtime as needed for Sleep or Anxiety.) 60 Tab 0 2/8/2021 at pm   • nitrofurantoin (MACROBID) 100 MG Cap Take 1 Cap by mouth 2 times a day. (Patient not taking: Reported on 2/9/2021) 10 Cap 0 Not Taking at Not Taking   • fluconazole (DIFLUCAN) 150 MG tablet Take 1 tablet by mouth today; repeat with second tablet in 3 days (Patient not taking: Reported on 2/9/2021) 2 Tab 0 Not Taking at Not Taking   • albuterol 108 (90 Base) MCG/ACT Aero Soln inhalation aerosol Inhale 2 Puffs every four hours as needed for Shortness of Breath. 1 Each 2 2/9/2021 at 0400   • ibuprofen (MOTRIN) 800 MG Tab Take 1 Tab by mouth every 8 hours as needed for Moderate Pain, Headache or Inflammation. (Patient taking differently: Take 400 mg by mouth every 8 hours as needed for Moderate Pain, Headache or Inflammation.) 30 Tab 0 > a week ago at [facility]   • FLUoxetine (PROZAC) 20 MG Cap  "TAKE ONE CAPSULE BY MOUTH EVERY DAY (Patient taking differently: Take 20 mg by mouth every day.) 90 Cap 3 > 5 days ago at [facility]   • NABOR 0.0375 MG/24HR patch Apply patch on skin twice a week (Patient taking differently: Place 1 Patch on the skin two times a week. Applys on mondays and thursdays) 24 Tab 4 2/8/2021 at patch is on   • Omega-3 Fatty Acids (FISH OIL) 1000 MG Cap capsule Take 1,000 mg by mouth every day.   > 5 days ago at [facility]   • Multiple Vitamins-Minerals (OCUVITE-LUTEIN) Tab Take 1 tablet by mouth every day.   > 5 days ago at [facility]       Medication Allergy:  Allergies   Allergen Reactions   • Sulfa Drugs Anaphylaxis   • Norco [Apap-Fd&C Yellow #10 Al Cervantes-Hydrocodone]      itching   • Zofran [Dextrose-Ondansetron] Unspecified     Migraine tolerates Injection/ IV   • Lorazepam Anxiety and Unspecified     \"IT MAKES ME CRY\"       Family History:  Family History   Problem Relation Age of Onset   • Heart Disease Mother         Atrial fibrillation   • Thyroid Mother    • Cancer Mother         Melanoma   • Heart Disease Brother    • Diabetes Son    • Dementia Neg Hx    • Colon Cancer Neg Hx    • Breast Cancer Neg Hx        Social History:  Social History     Socioeconomic History   • Marital status:      Spouse name: Not on file   • Number of children: Not on file   • Years of education: Not on file   • Highest education level: Not on file   Occupational History   • Not on file   Tobacco Use   • Smoking status: Current Every Day Smoker     Packs/day: 0.50     Years: 30.00     Pack years: 15.00     Types: Cigarettes   • Smokeless tobacco: Never Used   Substance and Sexual Activity   • Alcohol use: Yes     Comment: 4-12 mini bottles per day   • Drug use: No   • Sexual activity: Not on file   Other Topics Concern   • Not on file   Social History Narrative   • Not on file     Social Determinants of Health     Financial Resource Strain:    • Difficulty of Paying Living Expenses:    Food Insecurity:    • " "Worried About Running Out of Food in the Last Year:    • Ran Out of Food in the Last Year:    Transportation Needs:    • Lack of Transportation (Medical):    • Lack of Transportation (Non-Medical):    Physical Activity:    • Days of Exercise per Week:    • Minutes of Exercise per Session:    Stress:    • Feeling of Stress :    Social Connections:    • Frequency of Communication with Friends and Family:    • Frequency of Social Gatherings with Friends and Family:    • Attends Mosque Services:    • Active Member of Clubs or Organizations:    • Attends Club or Organization Meetings:    • Marital Status:    Intimate Partner Violence:    • Fear of Current or Ex-Partner:    • Emotionally Abused:    • Physically Abused:    • Sexually Abused:          Physical Exam:  Vitals/ General Appearance:   Weight/BMI: Body mass index is 27.05 kg/m².  /73   Pulse 80   Temp 36.4 °C (97.6 °F) (Temporal)   Resp 16   Ht 1.778 m (5' 10\")   Wt 85.5 kg (188 lb 7.9 oz)   SpO2 90%   Vitals:    02/12/21 1229 02/12/21 1231 02/12/21 1242 02/12/21 1258   BP: (!) 74/46 (!) 80/66 (!) 92/63 101/73   Pulse: 71 76 79 80   Resp: 16 16 16 16   Temp: 36.4 °C (97.6 °F)      TempSrc: Temporal      SpO2: 99% 98% 92% 90%   Weight:       Height:         Oxygen Therapy:  Pulse Oximetry: 90 %, O2 (LPM): 4, O2 Delivery Device: None - Room Air    Constitutional:   No acute distress  HENMT:  Normocephalic  Eyes:  EOMI  Lungs:  Avis lrespiratory effort.   Skin: No erythema  Neurologic: Alert & oriented x 3  Psychiatric: Affect normal, Judgment normal, Mood normal.      MDM (Data Review):     Records reviewed and summarized in current documentation    Lab Data Review:  Recent Results (from the past 24 hour(s))   Basic Metabolic Panel    Collection Time: 02/11/21  7:25 PM   Result Value Ref Range    Sodium 141 135 - 145 mmol/L    Potassium 3.6 3.6 - 5.5 mmol/L    Chloride 102 96 - 112 mmol/L    Co2 27 20 - 33 mmol/L    Glucose 96 65 - 99 mg/dL    Bun 6 " (L) 8 - 22 mg/dL    Creatinine 0.42 (L) 0.50 - 1.40 mg/dL    Calcium 7.3 (L) 8.5 - 10.5 mg/dL    Anion Gap 12.0 7.0 - 16.0   ESTIMATED GFR    Collection Time: 02/11/21  7:25 PM   Result Value Ref Range    GFR If African American >60 >60 mL/min/1.73 m 2    GFR If Non African American >60 >60 mL/min/1.73 m 2   Basic Metabolic Panel    Collection Time: 02/12/21  4:02 AM   Result Value Ref Range    Sodium 134 (L) 135 - 145 mmol/L    Potassium 4.2 3.6 - 5.5 mmol/L    Chloride 99 96 - 112 mmol/L    Co2 24 20 - 33 mmol/L    Glucose 105 (H) 65 - 99 mg/dL    Bun 6 (L) 8 - 22 mg/dL    Creatinine 0.43 (L) 0.50 - 1.40 mg/dL    Calcium 7.1 (L) 8.5 - 10.5 mg/dL    Anion Gap 11.0 7.0 - 16.0   CBC WITHOUT DIFFERENTIAL    Collection Time: 02/12/21  4:02 AM   Result Value Ref Range    WBC 7.8 4.8 - 10.8 K/uL    RBC 3.00 (L) 4.20 - 5.40 M/uL    Hemoglobin 9.4 (L) 12.0 - 16.0 g/dL    Hematocrit 29.5 (L) 37.0 - 47.0 %    MCV 98.3 (H) 81.4 - 97.8 fL    MCH 31.3 27.0 - 33.0 pg    MCHC 31.9 (L) 33.6 - 35.0 g/dL    RDW 64.3 (H) 35.9 - 50.0 fL    Platelet Count 161 (L) 164 - 446 K/uL    MPV 11.1 9.0 - 12.9 fL   ESTIMATED GFR    Collection Time: 02/12/21  4:02 AM   Result Value Ref Range    GFR If African American >60 >60 mL/min/1.73 m 2    GFR If Non African American >60 >60 mL/min/1.73 m 2   ALBUMIN    Collection Time: 02/12/21  4:02 AM   Result Value Ref Range    Albumin 3.3 3.2 - 4.9 g/dL   Histology Request    Collection Time: 02/12/21  1:10 PM   Result Value Ref Range    Pathology Request Sent to Histo        Imaging/Procedures Review:    Reviewed    MDM (Assessment and Plan):     Active Hospital Problems    Diagnosis    • Acute pancreatitis [K85.90]      Priority: High   • Alcohol withdrawal syndrome with complication (HCC) [F10.239]      Priority: High   • Urinary incontinence [R32]      Priority: Medium   • Easy bruising [R23.8]      Priority: Medium   • Atrial fibrillation (HCC) [I48.91]      Priority: Medium   • Closed right ankle  fracture [S82.891A]      Priority: Medium   • Dehydration [E86.0]      Priority: Low   • Ovarian mass, right [N83.8]    •   [N20.0]    • GI bleed [K92.2]    • Low serum phosphorus for age [R79.0]    • Increased ammonia level [R79.89]    • Electrolyte abnormality [E87.8]    • Thrombocytopenia (HCC) [D69.6]    • Elevated troponin [R77.8]    • Hypokalemia [E87.6]    • Hypomagnesemia [E83.42]    • GARRETT (acute kidney injury) (HCC) [N17.9]    • Pain of upper abdomen [R10.10]      51 y.o. Female with LUTS, including frequency, urgency, and now new onset of urge incontinence.  I discussed with patient options for management of OAB, and urge incontinence including oxybutynin, myrbetriq. For now, she would like to wait and work up the incontinence as a outpatient.  She can continue purewick for convenience.  If she would like to try some as a inpatient, urology recommends to consider oxybutynin.  Otherwise, we will plan to re-evaluate her LUTs, and urge incontinence as a outpatient in 2-4 weeks and decide on medical management at that time. She is agreeable to this plan. Urology is available if there are further questions or concerns.  Urology signing off.    Dr. Mireles is aware of this consult

## 2021-02-12 NOTE — ANESTHESIA TIME REPORT
Anesthesia Start and Stop Event Times     Date Time Event    2/12/2021 1206 Ready for Procedure     1209 Anesthesia Start     1226 Anesthesia Stop        Responsible Staff  02/12/21    Name Role Begin End    August W MAEGAN Sims Anesth 1209 1226        Preop Diagnosis (Free Text):  Pre-op Diagnosis     MELENA,ANEMIA,ALCOHOL ABUSE        Preop Diagnosis (Codes):    Post op Diagnosis  Melena      Premium Reason  Non-Premium    Comments:

## 2021-02-12 NOTE — ASSESSMENT & PLAN NOTE
Normal INR on admit  Given supplemental Vit K  Alerted patient to eat an increase in green leafy vegetables in her diet.  Patient did states some bruising here just from our BP cuff.

## 2021-02-12 NOTE — THERAPY
Missed Therapy     Patient Name: Carie Santiago  Age:  51 y.o., Sex:  female  Medical Record #: [id number]  Today's Date: 2/12/2021 02/12/21 0752   Interdisciplinary Plan of Care Collaboration   IDT Collaboration with  Other (See Comments)  (EMR)   Collaboration Comments OT consult received, pt scheduled for gastroscopy today, will see post procedure as appropriate.

## 2021-02-12 NOTE — ASSESSMENT & PLAN NOTE
I alerted patient of her ovarian mass and told her she will need an outpatient transvaginal or pelvic U/S for follow up.  Mass Noted on CT   I did alert her primary Dr Ballard

## 2021-02-12 NOTE — PROGRESS NOTES
Bedside report received from day shift RN. Assumed care at 1900. Pt is A&Ox3. Pt is in bed. Pt denies pain at this time. Pt was updated on plan of care. Pt has call light, personal belongings, and bedside table within reach. Bed is in the lowest position and bed alarm is on. Will continue to monitor

## 2021-02-12 NOTE — PROCEDURES
Pre-procedure Diagnoses   Melena [K92.1]   Anemia associated with acute blood loss [D62]   Epigastric pain [R10.13]   Alcohol abuse [F10.10]   Post-procedure Diagnoses   Gastroesophageal reflux disease with esophagitis and hemorrhage [K21.01]   Duodenitis [K29.80]   Procedures   ESOPHAGOGASTRODUODENOSCOPY OR UPPER GI ENDOSCOPY WITH BIOPSIES [30077 (CPT®)]       Endoscopist: Kasi Chan MD, [facility]    Monitored anesthesia care: Dante Sims M.D.    Consent: Patient seen and examined before proceeding with EGD with attempted hemostasis, biopsies, possible varices banding and/or other endotherapy under anesthesia were discussed with patient in detail before proceeding.  Risks, benefits, and alternatives of aforementioned procedures were discussed with patient and mother in detail before proceeding.  Consenting persons were given opportunities to ask questions and discuss other options.  Risks including but not limited to perforation, infection, bleeding, missed lesion(s), possible need for surgery(ies) and/or interventional radiology, possible need for repeat procedure(s) and/or additional testing, hospitalization possibly prolonged, cardiac and/or pulmonary event, aspiration, hypoxia, stroke, medication (s) and/or anesthesia reaction(s), indefinite diagnosis, discomfort/pain, unsuccessful and/or incomplete procedure, ineffective therapy, persistent symptoms, damage to adjacent organs/structure and/or vascular, and other adverse events possibly life-threatening.  Interactive discussion was undertaken with Layman's terms.  I answered questions in full and to satisfaction.  Consenting persons stated understanding and acceptance of these risks, and wished to proceed.  I gave opportunity to cancel/delay/reschedule.  Informed consent was given in clear state of mind and paper permit was signed.    Endoscopic procedures in detail: Diagnostic flexible Olympus endoscope was inserted from mouth into second portion of  the duodenum, retroflexion was performed in the stomach.  Esophageal, gastric and duodenal biopsies were obtained and sent to pathology in separate containers.   I suctioned insufflated air and stomach fluid contents upon removal.      Procedure times:  - In-room 12:10  - Start 12:14  - Completed 12:9  - Out of room per nursing records    Esophagogastroduodenoscopy Findings:  - Esophagus: severe LA classification grade D reflux esophagitis, biopsied to exclude opportunistic infection.  No evidence of esophageal varices.  - Stomach: small hiatal hernia otherwise, endoscopically unremarkable, biopsies obtained to evaluate H.pylori status.  No evidence of gastric varices nor portal hypertensive gastropathy.  - Duodenum: mild bulbar non-erosive duodenitis, otherwise endoscopically unremarkable to 2nd portion, biopsies obtained to exclude celiac sprue.    Impression: Severe reflux esophagitis likely from prior alcohol intoxication followed by prolonged recumbent reflux    Recommendations:   1.  Routine post-endoscopy anesthesia recovery care.  Transfer patient to prior hospital room when she is awake, alert and comfortable, when recovery criteria are met.  Aspiration and fall precautions x 24 hours.   2.  Follow-up biopsies.  3.  Dr. Wyatt and/or Anya Tucker, [email or web address] will be back tomorrow to round on patient.  4.  Prilosec suspension x 21 days and carafate QID x 14 days.  5.  I spoke to patient, mother and recovery nurse about impression, diagnosis and recommendations.  I answered questions in full and to satisfaction

## 2021-02-12 NOTE — PROGRESS NOTES
Received care of pt from NOC RN this am. Pt is A+O x 4 with some prompting for time this morning. Lidocaine patch placed as scheduled to pt's RUE per her request. Receives ativan po prn for elevated CIWA score.

## 2021-02-13 PROBLEM — E87.6 HYPOKALEMIA: Status: RESOLVED | Noted: 2021-02-09 | Resolved: 2021-02-13

## 2021-02-13 PROBLEM — R78.81 BACTEREMIA DUE TO GRAM-NEGATIVE BACTERIA: Status: ACTIVE | Noted: 2021-02-13

## 2021-02-13 PROBLEM — E83.42 HYPOMAGNESEMIA: Status: RESOLVED | Noted: 2021-02-09 | Resolved: 2021-02-13

## 2021-02-13 PROBLEM — J34.89 SINUS DRAINAGE: Status: ACTIVE | Noted: 2021-02-13

## 2021-02-13 LAB
ANION GAP SERPL CALC-SCNC: 13 MMOL/L (ref 7–16)
BUN SERPL-MCNC: 5 MG/DL (ref 8–22)
CALCIUM SERPL-MCNC: 8 MG/DL (ref 8.5–10.5)
CHLORIDE SERPL-SCNC: 100 MMOL/L (ref 96–112)
CO2 SERPL-SCNC: 23 MMOL/L (ref 20–33)
CREAT SERPL-MCNC: 0.44 MG/DL (ref 0.5–1.4)
ERYTHROCYTE [DISTWIDTH] IN BLOOD BY AUTOMATED COUNT: 66.4 FL (ref 35.9–50)
GLUCOSE SERPL-MCNC: 96 MG/DL (ref 65–99)
HCT VFR BLD AUTO: 32.1 % (ref 37–47)
HGB BLD-MCNC: 10.3 G/DL (ref 12–16)
MCH RBC QN AUTO: 31.2 PG (ref 27–33)
MCHC RBC AUTO-ENTMCNC: 32.1 G/DL (ref 33.6–35)
MCV RBC AUTO: 97.3 FL (ref 81.4–97.8)
PLATELET # BLD AUTO: 281 K/UL (ref 164–446)
PMV BLD AUTO: 10.4 FL (ref 9–12.9)
POTASSIUM SERPL-SCNC: 4.2 MMOL/L (ref 3.6–5.5)
RBC # BLD AUTO: 3.3 M/UL (ref 4.2–5.4)
SODIUM SERPL-SCNC: 136 MMOL/L (ref 135–145)
WBC # BLD AUTO: 6.7 K/UL (ref 4.8–10.8)

## 2021-02-13 PROCEDURE — 700105 HCHG RX REV CODE 258: Performed by: HOSPITALIST

## 2021-02-13 PROCEDURE — 97165 OT EVAL LOW COMPLEX 30 MIN: CPT

## 2021-02-13 PROCEDURE — A9270 NON-COVERED ITEM OR SERVICE: HCPCS | Performed by: INTERNAL MEDICINE

## 2021-02-13 PROCEDURE — 700111 HCHG RX REV CODE 636 W/ 250 OVERRIDE (IP): Performed by: INTERNAL MEDICINE

## 2021-02-13 PROCEDURE — 99232 SBSQ HOSP IP/OBS MODERATE 35: CPT | Performed by: HOSPITALIST

## 2021-02-13 PROCEDURE — 80048 BASIC METABOLIC PNL TOTAL CA: CPT

## 2021-02-13 PROCEDURE — 87040 BLOOD CULTURE FOR BACTERIA: CPT

## 2021-02-13 PROCEDURE — 97161 PT EVAL LOW COMPLEX 20 MIN: CPT

## 2021-02-13 PROCEDURE — A9270 NON-COVERED ITEM OR SERVICE: HCPCS | Performed by: HOSPITALIST

## 2021-02-13 PROCEDURE — 700101 HCHG RX REV CODE 250: Performed by: INTERNAL MEDICINE

## 2021-02-13 PROCEDURE — 700102 HCHG RX REV CODE 250 W/ 637 OVERRIDE(OP): Performed by: HOSPITALIST

## 2021-02-13 PROCEDURE — 770006 HCHG ROOM/CARE - MED/SURG/GYN SEMI*

## 2021-02-13 PROCEDURE — 700102 HCHG RX REV CODE 250 W/ 637 OVERRIDE(OP): Performed by: STUDENT IN AN ORGANIZED HEALTH CARE EDUCATION/TRAINING PROGRAM

## 2021-02-13 PROCEDURE — A9270 NON-COVERED ITEM OR SERVICE: HCPCS | Performed by: STUDENT IN AN ORGANIZED HEALTH CARE EDUCATION/TRAINING PROGRAM

## 2021-02-13 PROCEDURE — 700111 HCHG RX REV CODE 636 W/ 250 OVERRIDE (IP): Performed by: HOSPITALIST

## 2021-02-13 PROCEDURE — 700102 HCHG RX REV CODE 250 W/ 637 OVERRIDE(OP): Performed by: INTERNAL MEDICINE

## 2021-02-13 PROCEDURE — 85027 COMPLETE CBC AUTOMATED: CPT

## 2021-02-13 RX ORDER — AMOXICILLIN AND CLAVULANATE POTASSIUM 875; 125 MG/1; MG/1
1 TABLET, FILM COATED ORAL EVERY 12 HOURS
Status: DISCONTINUED | OUTPATIENT
Start: 2021-02-13 | End: 2021-02-14 | Stop reason: HOSPADM

## 2021-02-13 RX ADMIN — Medication 1 TABLET: at 11:39

## 2021-02-13 RX ADMIN — Medication 1000 UNITS: at 04:43

## 2021-02-13 RX ADMIN — Medication 1 TABLET: at 17:05

## 2021-02-13 RX ADMIN — OXYCODONE HYDROCHLORIDE 10 MG: 10 TABLET ORAL at 21:40

## 2021-02-13 RX ADMIN — THIAMINE HCL TAB 100 MG 100 MG: 100 TAB at 04:43

## 2021-02-13 RX ADMIN — Medication 400 MG: at 17:05

## 2021-02-13 RX ADMIN — Medication 1 TABLET: at 04:43

## 2021-02-13 RX ADMIN — SUCRALFATE 1 G: 1 SUSPENSION ORAL at 11:39

## 2021-02-13 RX ADMIN — FOLIC ACID 1 MG: 1 TABLET ORAL at 04:43

## 2021-02-13 RX ADMIN — ONDANSETRON 4 MG: 2 INJECTION INTRAMUSCULAR; INTRAVENOUS at 10:55

## 2021-02-13 RX ADMIN — AMOXICILLIN AND CLAVULANATE POTASSIUM 1 TABLET: 875; 125 TABLET, FILM COATED ORAL at 17:05

## 2021-02-13 RX ADMIN — ONDANSETRON 4 MG: 2 INJECTION INTRAMUSCULAR; INTRAVENOUS at 19:28

## 2021-02-13 RX ADMIN — LORAZEPAM 0.5 MG: 2 INJECTION INTRAMUSCULAR; INTRAVENOUS at 02:11

## 2021-02-13 RX ADMIN — FLUOXETINE 20 MG: 20 CAPSULE ORAL at 04:43

## 2021-02-13 RX ADMIN — OXYCODONE 5 MG: 5 TABLET ORAL at 04:47

## 2021-02-13 RX ADMIN — SUCRALFATE 1 G: 1 SUSPENSION ORAL at 17:05

## 2021-02-13 RX ADMIN — LEVOTHYROXINE SODIUM 88 MCG: 0.09 TABLET ORAL at 04:45

## 2021-02-13 RX ADMIN — POTASSIUM CHLORIDE 20 MEQ: 1500 TABLET, EXTENDED RELEASE ORAL at 04:43

## 2021-02-13 RX ADMIN — Medication 400 MG: at 04:43

## 2021-02-13 RX ADMIN — SUCRALFATE 1 G: 1 SUSPENSION ORAL at 08:14

## 2021-02-13 RX ADMIN — SUCRALFATE 1 G: 1 SUSPENSION ORAL at 21:40

## 2021-02-13 RX ADMIN — OMEPRAZOLE 40 MG: KIT at 04:47

## 2021-02-13 RX ADMIN — LIDOCAINE 1 PATCH: 50 PATCH TOPICAL at 08:50

## 2021-02-13 RX ADMIN — OXYCODONE HYDROCHLORIDE 10 MG: 10 TABLET ORAL at 08:50

## 2021-02-13 RX ADMIN — CEFTRIAXONE SODIUM 2 G: 2 INJECTION, POWDER, FOR SOLUTION INTRAMUSCULAR; INTRAVENOUS at 04:42

## 2021-02-13 RX ADMIN — DIBASIC SODIUM PHOSPHATE, MONOBASIC POTASSIUM PHOSPHATE AND MONOBASIC SODIUM PHOSPHATE 500 MG: 852; 155; 130 TABLET ORAL at 11:39

## 2021-02-13 RX ADMIN — DIBASIC SODIUM PHOSPHATE, MONOBASIC POTASSIUM PHOSPHATE AND MONOBASIC SODIUM PHOSPHATE 500 MG: 852; 155; 130 TABLET ORAL at 04:42

## 2021-02-13 RX ADMIN — THERA TABS 1 TABLET: TAB at 04:43

## 2021-02-13 RX ADMIN — OXYCODONE HYDROCHLORIDE 10 MG: 10 TABLET ORAL at 12:46

## 2021-02-13 RX ADMIN — OXYCODONE HYDROCHLORIDE 10 MG: 10 TABLET ORAL at 17:05

## 2021-02-13 ASSESSMENT — ENCOUNTER SYMPTOMS
WHEEZING: 0
SORE THROAT: 0
FALLS: 1
EYE REDNESS: 0
BRUISES/BLEEDS EASILY: 1
SEIZURES: 0
DIARRHEA: 0
DIZZINESS: 0
PALPITATIONS: 0
EYE DISCHARGE: 0
CHILLS: 0
NERVOUS/ANXIOUS: 0
FEVER: 0
WEAKNESS: 1
SHORTNESS OF BREATH: 0
POLYDIPSIA: 0
NECK PAIN: 1
STRIDOR: 0
HALLUCINATIONS: 0
ABDOMINAL PAIN: 1
SPEECH CHANGE: 0
HEMOPTYSIS: 0
HEARTBURN: 1
CONSTIPATION: 0
VOMITING: 0
HEARTBURN: 0

## 2021-02-13 ASSESSMENT — COGNITIVE AND FUNCTIONAL STATUS - GENERAL
HELP NEEDED FOR BATHING: A LITTLE
SUGGESTED CMS G CODE MODIFIER DAILY ACTIVITY: CI
DAILY ACTIVITIY SCORE: 23
MOVING FROM LYING ON BACK TO SITTING ON SIDE OF FLAT BED: A LITTLE
MOBILITY SCORE: 23
SUGGESTED CMS G CODE MODIFIER MOBILITY: CI

## 2021-02-13 ASSESSMENT — ACTIVITIES OF DAILY LIVING (ADL): TOILETING: INDEPENDENT

## 2021-02-13 ASSESSMENT — PAIN DESCRIPTION - PAIN TYPE
TYPE: ACUTE PAIN

## 2021-02-13 ASSESSMENT — LIFESTYLE VARIABLES
HEADACHE, FULLNESS IN HEAD: NOT PRESENT
NAUSEA AND VOMITING: NO NAUSEA AND NO VOMITING
ANXIETY: *
TOTAL SCORE: 3
PAROXYSMAL SWEATS: NO SWEAT VISIBLE
AGITATION: SOMEWHAT MORE THAN NORMAL ACTIVITY
ORIENTATION AND CLOUDING OF SENSORIUM: ORIENTED AND CAN DO SERIAL ADDITIONS
NAUSEA AND VOMITING: MILD NAUSEA WITH NO VOMITING
AUDITORY DISTURBANCES: NOT PRESENT
PAROXYSMAL SWEATS: NO SWEAT VISIBLE
HEADACHE, FULLNESS IN HEAD: NOT PRESENT
TREMOR: TREMOR NOT VISIBLE BUT CAN BE FELT, FINGERTIP TO FINGERTIP
ORIENTATION AND CLOUDING OF SENSORIUM: ORIENTED AND CAN DO SERIAL ADDITIONS
AUDITORY DISTURBANCES: NOT PRESENT
TREMOR: NO TREMOR
VISUAL DISTURBANCES: NOT PRESENT
SUBSTANCE_ABUSE: 1
VISUAL DISTURBANCES: NOT PRESENT
AGITATION: NORMAL ACTIVITY
TOTAL SCORE: 6
ANXIETY: *

## 2021-02-13 ASSESSMENT — GAIT ASSESSMENTS
GAIT LEVEL OF ASSIST: SUPERVISED
DISTANCE (FEET): 200
ASSISTIVE DEVICE: HAND HELD ASSIST

## 2021-02-13 ASSESSMENT — FIBROSIS 4 INDEX: FIB4 SCORE: 2.14

## 2021-02-13 NOTE — PROGRESS NOTES
"Hospital Medicine Daily Progress Note    Date of Service  2/13/2021    Chief Complaint  Altered mentation    Hospital Course  Per initial H+P: \"51 y.o. female who presented 2/9/2021 with slurred speech, mild confusion.  Patient has a past medical history of chronic alcohol abuse, anxiety, depression, hypothyroidism, hypertriglyceridemia.  Around noon today patient was in the bathroom, reportedly assisted by her , who witnessed her drop her head and become momentarily unresponsive.  Upon waking, she reportedly had difficulty speaking and was confused.  EMS was called; it was noted her symptoms had resolved by their arrival.  Per chart review and the Neurology team, the patient reportedly had a transient decrease level of consciousness en route to the hospital.  Neurology was consulted via code stroke.  Neurology saw the patient, reported the patient had a nonfocal neurological exam and NIHSS of 1 with slight dysarthria.  CT head without contrast showed no acute intracranial abnormality.     The patient did also present to [facility] on 2/7/2021 secondary to alcohol related injury, hitting the back of her head.  The patient reports she drinks about 12 mini bottles of vodka daily, though yesterday she drank 3 mini bottles.       Reports mild headache.  Denies chest pain, abdominal pain, shortness of breath, fever/chills, vision changes, numbness/tingling, bowel/bladder incontinence.  She initially reported she may have had two episodes of possible black emesis 2 days ago and perhaps one dark stool last week, though later was unsure.\"     Interval Problem Update  2/10 Alert.  Mildly lethargic but speech clear and talking in sentences.  Alert to being in [facility] [place],but initially thought it was 2010 but later stated 2021.  She knew the month. Ongoing epigastric pain.    2/11 Required bolus of 500cc NS overnight for lower BP with dizziness. Xray shows right distal fibula fx with displacement, I have " consulted [facility] as she has seen them in recent past for same.  Pt's mother at bedside and given updates.  Patient alert and oriented.  I have consulted Dr Chan, GI.  Patient states bowel and bladder incontinence but denies sensory loss or extremity weakness.    2/12: Alerted patient of keeping to a bland diet if she is allowed a diet after her today's endoscopy.  She is alert.  States easy bruising from the BP cuff.  Still with urinary incontinence.  She has a purewick external catheter in place.  NPO and has not had any bowel movement today.  Some right ankle pain today.  We discussed her CT finding of a ovarian mass (likely cyst) with the need for follow up with an U/S outpatient.  Also alerted her of her left nonocclusive kidney stone and suggest plenty of water intake.    2/13: Awake.  Has ongoing concern of bilateral breast masses and still wanted a breast evaluation.  She states her nasal/sinus congestion is improving with the antibiotics. Since stopping her IV fluids she has had resolve of urine incontinence.  Tolerating advance of diet.  Alert w/o signifcant withdrawal.  Right ankle tenderness and neck pain. Patient alerted of GNR bacteremia with unclear source but possible due to translocation from esophagitis.  I did alert patient that I did reach Dr Ballard yesterday evening and alerted him of the patient's current findings (ovarian mass, asymptomatic nephrolithiasis, bruising, patient's breast mass concerns, ankle pain, and alcohol use).    Consultants/Specialty  Intensivist (signed off)  Gastroenterology    Code Status  Full Code    Disposition  Home once stable.    Review of Systems  Review of Systems   Constitutional: Positive for malaise/fatigue. Negative for fever.   HENT: Negative for congestion and sore throat.    Respiratory: Negative for shortness of breath and stridor.    Cardiovascular: Negative for palpitations and leg swelling.   Gastrointestinal: Positive for abdominal pain (epigastric) and  heartburn. Negative for diarrhea and vomiting.        Bowel incontinence but not bloody   Genitourinary: Negative for dysuria and hematuria.        Incontinence   Musculoskeletal: Positive for joint pain (right ankle) and neck pain.   Neurological: Negative for dizziness and speech change.   Endo/Heme/Allergies: Bruises/bleeds easily.   Psychiatric/Behavioral: The patient is not nervous/anxious.         Physical Exam  Temp:  [36.3 °C (97.3 °F)-37.1 °C (98.7 °F)] 36.7 °C (98 °F)  Pulse:  [73-82] 73  Resp:  [17-18] 18  BP: ()/(61-74) 96/65  SpO2:  [92 %-99 %] 94 %    Physical Exam  Vitals reviewed. Exam conducted with a chaperone present.   Constitutional:       Appearance: Normal appearance. She is not diaphoretic.   HENT:      Head: Normocephalic and atraumatic.      Nose: Nose normal. No congestion.      Mouth/Throat:      Mouth: Mucous membranes are moist.      Pharynx: No oropharyngeal exudate.   Eyes:      General: No scleral icterus.        Right eye: No discharge.         Left eye: No discharge.      Extraocular Movements: Extraocular movements intact.      Conjunctiva/sclera: Conjunctivae normal.   Cardiovascular:      Rate and Rhythm: Normal rate and regular rhythm.      Pulses:           Radial pulses are 2+ on the right side and 2+ on the left side.        Dorsalis pedis pulses are 2+ on the right side and 2+ on the left side.      Heart sounds: No murmur.   Pulmonary:      Effort: Pulmonary effort is normal. No respiratory distress.      Breath sounds: Normal breath sounds. No wheezing or rales.      Comments: Bilateral breast exam performed with female chaperone at bedside.  Using gloved hands the bilateral axilla and breast were palpated. No gross adenopathy/mass appreciated. Breast implants palpable.  External nipples appear normal as did skin.  Prior implant surgical scars w/o discoloration.  Abdominal:      General: Bowel sounds are normal. There is no distension.      Palpations: Abdomen is  soft.      Tenderness: There is no abdominal tenderness.   Musculoskeletal:         General: Tenderness (right lower ankle) present. No swelling.      Cervical back: No muscular tenderness.      Right lower leg: No edema.      Left lower leg: No edema.   Lymphadenopathy:      Cervical: No cervical adenopathy.   Skin:     Coloration: Skin is not jaundiced or pale.      Findings: Bruising (right temporal region and occipital regional and extremities) present.   Neurological:      General: No focal deficit present.      Mental Status: She is alert and oriented to person, place, and time. Mental status is at baseline.      Cranial Nerves: No cranial nerve deficit.   Psychiatric:         Mood and Affect: Mood normal.         Behavior: Behavior normal.         Fluids    Intake/Output Summary (Last 24 hours) at 2/13/2021 1333  Last data filed at 2/13/2021 0900  Gross per 24 hour   Intake 740 ml   Output --   Net 740 ml       Laboratory  Recent Labs     02/11/21  0542 02/12/21  0402 02/13/21  0637   WBC 6.3 7.8 6.7   RBC 2.72* 3.00* 3.30*   HEMOGLOBIN 8.5* 9.4* 10.3*   HEMATOCRIT 26.5* 29.5* 32.1*   MCV 96.3 98.3* 97.3   MCH 31.3 31.3 31.2   MCHC 32.4* 31.9* 32.1*   RDW 61.9* 64.3* 66.4*   PLATELETCT 121* 161* 281   MPV 11.8 11.1 10.4     Recent Labs     02/11/21  1925 02/12/21  0402 02/13/21  0637   SODIUM 141 134* 136   POTASSIUM 3.6 4.2 4.2   CHLORIDE 102 99 100   CO2 27 24 23   GLUCOSE 96 105* 96   BUN 6* 6* 5*   CREATININE 0.42* 0.43* 0.44*   CALCIUM 7.3* 7.1* 8.0*             Recent Labs     02/10/21  1515   TRIGLYCERIDE 97       Imaging  DX-ANKLE 2- VIEWS RIGHT   Final Result      Fracture of the distal fibular shaft which may be slightly comminuted. Main distal fracture fragment appears slightly displaced laterally.      CT-ABDOMEN-PELVIS W/O   Final Result         1.  Hazy fat stranding adjacent to the tail of pancreas, appearance suggests pancreatitis.   2.  Right ovarian cyst, follow-up with pelvic sonogram for  further characterization   3.  Left nephrolithiasis without visualized obstructive changes   4.  Segment 2 duodenal diverticula   5.  Hepatomegaly and diffuse hepatic steatosis      DX-CHEST-PORTABLE (1 VIEW)   Final Result      No acute cardiac or pulmonary abnormality is noted.      CT-HEAD W/O   Final Result      No acute intracranial abnormality is identified.      Posterior left occipital scalp swelling/hematoma.              Assessment/Plan  * Alcohol withdrawal syndrome with complication (HCC)- (present on admission)  Assessment & Plan  CIWA protocol  Thiamine, folate, MVI  Cessation of EtOH encouraged and discussed  Monitor LFT's  Harms of continued EtOH (pancreatitis complications, Liver failure) discussed    Acute pancreatitis  Assessment & Plan  Advance to low fat diet as tolerates  Pain management  EtOH cessation encouraged    Bacteremia due to Gram-negative bacteria  Assessment & Plan  Anaerobic gram negative chang  1 of 2 blood cultures positive with growth of Parabacteroides distasonis  Question translocation from esophagitis vs contaminant?   Repeat blood cultures  Was on ceftriaxone for 4 doses.  Start augmentin x 7 days.    Easy bruising  Assessment & Plan  Normal INR on admit  Given supplemental Vit K  Alerted patient to eat an increase in green leafy vegetables in her diet.  Patient did states some bruising here just from our BP cuff.    Urinary incontinence  Assessment & Plan  Resolved with cessation of IV fluids per patient.    Atrial fibrillation (HCC)  Assessment & Plan  Episodic and resolved  Monitored on telemetry and appears NSR.    Closed right ankle fracture  Assessment & Plan  Occurred a few weeks prior to admit per patient.  Was followed outpatient by [facility] per patient  Has a brace in her room.  2/10 xray right ankle showing distal fibular displaced fracture.    Consulted Ortho 2/11.  Okay for weight bearing if wearing leg boot brace only  2/11 [place] and Vit D ordered    Sinus  drainage  Assessment & Plan  Patient complains of sinus drainage with possible dx of sinusitis  While treating for possible translocation of bacteria from GI source, she states it has gotten better.  Continue Augmentin x 7 day course.    GI bleed  Assessment & Plan  GI consulted 2/11 (Dr Kasi Chan) and signed off 2/13  Carafate and PPI BID  Cessation of NSAIDS and EtOH recommended  [place] diet w/ avoidance of acidic foods/beverages as well as hot temperature foods discussed.  2/12 Endoscopy:     Esophagus: severe LA classification grade D reflux esophagitis, biopsied to exclude opportunistic infection.  No evidence of esophageal varices.    Stomach: small hiatal hernia otherwise, endoscopically unremarkable, biopsies obtained to evaluate H.pylori status.  No evidence of gastric varices nor   portal hypertensive gastropathy.    Duodenum: mild bulbar non-erosive duodenitis, otherwise endoscopically unremarkable to 2nd portion, biopsies obtained to exclude celiac sprue.    Elevated troponin  Assessment & Plan  Repeat troponin without upward swing  Likely due to GARRETT.    Thrombocytopenia (HCC)  Assessment & Plan  EtOH related  Monitor cbc  2/12 Plt:161  2/13 Plt:281    Electrolyte abnormality  Assessment & Plan  2/13 Ca:8  Monitor BMP  Replete and monitor    Increased ammonia level  Assessment & Plan  Stop EtOH use.  Alert and oriented.    Low serum phosphorus for age  Assessment & Plan  Replace and monitor    Pain of upper abdomen- (present on admission)  Assessment & Plan  Likely EtOH related gastritis along with acute pancreatitis  Continue BID PPI and advancing diet  Improving.     GARRETT (acute kidney injury) (HCC)- (present on admission)  Assessment & Plan  Resolved  2/11 BUN:11, Cr:0.5  2/10: Cr:0.85  Improved from 2/9 Cr:1.69  NaCl IV with cessation of fluids 2/11  Monitor BMP and I/O's    Ovarian mass, right  Assessment & Plan  I alerted patient of her ovarian mass and told her she will need an outpatient  transvaginal or pelvic U/S for follow up.  Mass Noted on CT   I did alert her primary Dr Ballard    Dehydration  Assessment & Plan  S/p IV fluids  Watch for volume overload.       VTE prophylaxis: lovenox

## 2021-02-13 NOTE — ASSESSMENT & PLAN NOTE
Patient complains of sinus drainage with possible dx of sinusitis  While treating for possible translocation of bacteria from GI source, she states it has gotten better.  Continue Augmentin x 7 day course.

## 2021-02-13 NOTE — PROGRESS NOTES
"Bedside report received.  Assessment complete.  A&O x 4. Patient calls appropriately.  Patient ambulates with one assist. Bed alarm on.   Patient has 7-8/10 pain. Pain managed with prescribed medications.  Denies N&V. Tolerating gi soft diet.  + void, + flatus, + BM.  Patient denies SOB.  SCD's refused.  Patient is calm and cooperative.  Review plan with of care with patient. Call light and personal belongings with in reach. Hourly rounding in place. All needs met at this time.  /61   Pulse 82   Temp 36.5 °C (97.7 °F) (Temporal)   Resp 18   Ht 1.778 m (5' 10\")   Wt 78.8 kg (173 lb 11.6 oz)   SpO2 99%   BMI 24.93 kg/m²     "

## 2021-02-13 NOTE — ASSESSMENT & PLAN NOTE
Anaerobic gram negative chang  1 of 2 blood cultures positive with growth of Parabacteroides distasonis  Question translocation from esophagitis vs contaminant?   Repeat blood cultures  Was on ceftriaxone for 4 doses.  Start augmentin x 7 days.

## 2021-02-13 NOTE — CARE PLAN
Problem: Safety  Goal: Will remain free from injury  Outcome: PROGRESSING AS EXPECTED     Problem: Safety:  Goal: Will remain free from injury  Outcome: PROGRESSING AS EXPECTED     Problem: Health Behavior:  Goal: Ability to identify changes in lifestyle to reduce recurrence of condition will improve  Outcome: PROGRESSING AS EXPECTED     Problem: Pain Management  Goal: Pain level will decrease to patient's comfort goal  Outcome: PROGRESSING AS EXPECTED

## 2021-02-13 NOTE — PROGRESS NOTES
Gastroenterology Consult Note:    MYLES Chacon  Date & Time note created:    2/13/2021   10:09 AM     Referring MD:  Dr. Yifan López  Patient ID:  Name:             Carie Santiago   YOB: 1970  Age:                 51 y.o.  female   MRN:               [id number]                                                             Reason for Consult:      1. Melena x 4 days  2. Coffee ground emesis x 4 days  3. Epigastric abdominal pain  4. Anemia  5. Alcohol abuse     History of Present Illness:    This is a 50 yo female, PMH: alcohol abuse, hypothyroidism, DASIA who presented to the ER 2/9/21 with slurred speech, mild confusion. Neurology consulted findings of nonfocal neurological exam. CT head showed no acute intracranial abnormalities. Admits to drinking less one day prior to admission. Usually, will have 12 mini bottles of vodka daily, however the day before admission she only had 3 minibottles.    Patient has been experiencing epigastric abdominal pain, coffee ground emesis, black tarry stools 3-4 days prior to admission. Denies daily NSAID's. No history of blood thinners. Yesterday, she had multiple episodes of black stools. Heme positive stool. Hgb trending down from 15.6 (2/9/21) to 8.5 (2/11/21). She had nonbloody mucousy emesis this morning. According to the nurse, patient has had several brown stools this morning. Patient is having alcohol withdrawals on [facility] protocol.    LABS 2/11/21: WBC: 6.3. hgb: 8.5. hct: 26.5. MCV: 96.3. Platelet count: 121,000.  Sodium: 126. Potassium: 2.8. Glucose: 101, BUN: 11. Crt: 0.50. AST: 61. ALT: 29. Alkaline phosphatase: 200. TB: 0.6. Albumin: 2.9.  Lipase 209.   Triglycerides: 97. INR: 1.00,    CT scan abdomen/pelvis: Hazy fat stranding adjacent to the tail of pancreas, appearance suggests pancreatitis.  Right ovarian cyst, follow-up with pelvic sonogram for further characterization.  Left nephrolithiasis without visualized obstructive  changes.  Segment 2 duodenal diverticula.  Hepatomegaly and diffuse hepatic steatosis    Colonoscopy 2019 performed by Dr. Stanton: diverticular disease. 1 polyp. Internal hemorrhoids.    Denied further modifying factors, associated symptoms or timing issues.     INTERVAL HISTORY:    2/13/21: Stable. Having mild sore throat when swallowing since EGD. Hgb stable. No melenic stool.     EGD 2/12/21 performed by Rocio: Esophagus: severe LA classification grade D reflux esophagitis, biopsied to exclude opportunistic infection.  No evidence of esophageal varices. Stomach: small hiatal hernia otherwise, endoscopically unremarkable, biopsies obtained to evaluate H.pylori status.  No evidence of gastric varices nor portal hypertensive gastropathy. Duodenum: mild bulbar non-erosive duodenitis, otherwise endoscopically unremarkable to 2nd portion, biopsies obtained to exclude celiac sprue.        Review of Systems:      Review of Systems   Constitutional: Negative for chills, fever and malaise/fatigue.   HENT: Positive for congestion.    Eyes: Negative for discharge and redness.   Respiratory: Negative for hemoptysis and wheezing.    Cardiovascular: Negative for chest pain and leg swelling.   Gastrointestinal: Positive for abdominal pain. Negative for constipation, heartburn and melena.   Genitourinary: Negative for dysuria and urgency.   Musculoskeletal: Positive for falls.   Skin: Negative for rash.   Neurological: Positive for weakness. Negative for speech change and seizures.   Endo/Heme/Allergies: Negative for environmental allergies and polydipsia.   Psychiatric/Behavioral: Positive for substance abuse. Negative for hallucinations and suicidal ideas.   All other systems reviewed and are negative.            Physical Exam:  Vitals/ General Appearance:   Weight/BMI: Body mass index is 24.93 kg/m².    Vitals:    02/12/21 1949 02/13/21 0006 02/13/21 0404 02/13/21 0800   BP: 109/73 (!) 96/73 101/64 123/61   Pulse: 76 76 80  82   Resp: 18 17 17 18   Temp: 37.1 °C (98.7 °F) 36.9 °C (98.5 °F) 36.7 °C (98.1 °F) 36.5 °C (97.7 °F)   TempSrc: Temporal Temporal Temporal Temporal   SpO2: 93% 92% 92% 99%   Weight: 78.8 kg (173 lb 11.6 oz)      Height:         Oxygen Therapy:  Pulse Oximetry: 99 %, O2 (LPM): 0, O2 Delivery Device: None - Room Air    Physical Exam   Constitutional: She is oriented to person, place, and time and well-developed, well-nourished, and in no distress.   Bruising to right eye and right side of forehead.   HENT:   Head: Normocephalic and atraumatic.   Mouth/Throat: No oropharyngeal exudate.   Cardiovascular: Normal rate and regular rhythm.   Pulmonary/Chest: Effort normal and breath sounds normal.   Abdominal: Soft. Bowel sounds are normal. There is abdominal tenderness.   Epigastric tenderness   Musculoskeletal:         General: No edema. Normal range of motion.   Neurological: She is alert and oriented to person, place, and time.   Skin: Skin is warm and dry. Bruising (right eye and  right forehead) noted. There is erythema.   Extensive bruising   Nursing note and vitals reviewed.      Past Medical History:   Past Medical History:   Diagnosis Date   • Anxiety and depression 1/23/2019   • Colon polyp 3/29/2019    Colonoscopy March 2019: 3mm descending colon polyp   • Disorder of thyroid    • Diverticulosis of colon 1/23/2019    MRI abdomen Jan. 2019   • Generalized anxiety disorder    • Hypertriglyceridemia 1/23/2019   • Low serum HDL 1/23/2019   • Menopause    • Vitamin B12 deficiency 1/23/2019       Past Surgical History:  Past Surgical History:   Procedure Laterality Date   • PB UPPER GI ENDOSCOPY,DIAGNOSIS N/A 2/12/2021    Procedure: GASTROSCOPY;  Surgeon: Kasi Chan M.D.;  Location: SURGERY SAME DAY [facility];  Service: Gastroenterology   • PB UPPER GI ENDOSCOPY,BIOPSY N/A 2/12/2021    Procedure: GASTROSCOPY, WITH BIOPSY;  Surgeon: Kasi Chan M.D.;  Location: SURGERY SAME DAY [facility];  Service:  Gastroenterology   • WOUND CLOSURE ORTHO Left 9/24/2019    Procedure: CLOSURE, WOUND, ORTHO - LEG W/WOUND VAC REMOVAL;  Surgeon: Paolo Odell M.D.;  Location: SURGERY [facility];  Service: Orthopedics   • IRRIGATION & DEBRIDEMENT ORTHO Left 9/21/2019    Procedure: IRRIGATION AND DEBRIDEMENT, WOUND - FOR PROXIMAL TIBIA HEMATOMA EVACUATION AND WOUND VAC APPLICATION;  Surgeon: Paolo Odell M.D.;  Location: SURGERY [facility];  Service: Orthopedics   • OTHER ORTHOPEDIC SURGERY Right 2017   • ROBOTIC LAPAROSCOPIC CHOLECYSTECTOMY  2017   • UMBILICAL HERNIA REPAIR  2017   • INGUINAL HERNIA REPAIR BILATERAL  1999    with Mesh   • TONSILLECTOMY AND ADENOIDECTOMY  1976   • APPENDECTOMY     • MAMMOPLASTY AUGMENTATION Bilateral        Hospital Medications:    Current Facility-Administered Medications:   •  sucralfate (CARAFATE) 1 GM/10ML suspension 1 g, 1 g, Oral, 4X/DAY ACHS, Kasi Chan M.D., 1 g at 02/13/21 0814  •  omeprazole (FIRST-OMEPRAZOLE) 2 mg/mL oral susp 40 mg, 40 mg, Oral, DAILY, Kasi Chan M.D., 40 mg at 02/13/21 0447  •  oyster shell calcium/vitamin D 250-125 MG-UNIT tablet 1 tablet, 1 tablet, Oral, TID, Yifan López D.O., 1 tablet at 02/13/21 0443  •  vitamin D (cholecalciferol) tablet 1,000 Units, 1,000 Units, Oral, DAILY, SUYAPA GeorgeO., 1,000 Units at 02/13/21 0443  •  LORazepam (ATIVAN) tablet 0.5 mg, 0.5 mg, Oral, Q4HRS PRN, Logan Chris M.D., 0.5 mg at 02/12/21 0850  •  oxyCODONE immediate-release (ROXICODONE) tablet 5 mg, 5 mg, Oral, Q4HRS PRN, 5 mg at 02/13/21 0447 **OR** oxyCODONE immediate release (ROXICODONE) tablet 10 mg, 10 mg, Oral, Q4HRS PRN, Logan Chris M.D., 10 mg at 02/13/21 0850  •  lidocaine (LIDODERM) 5 % 1 Patch, 1 Patch, Transdermal, Q24HR, Logan Chris M.D., 1 Patch at 02/13/21 0850  •  levothyroxine (SYNTHROID) tablet 88 mcg, 88 mcg, Oral, AM ES, Yifan López D.O., 88 mcg at 02/13/21 0445  •  FLUoxetine (PROZAC) capsule 20 mg, 20 mg, Oral,  DAILY, Yifan López D.O., 20 mg at 02/13/21 0443  •  ondansetron (ZOFRAN) syringe/vial injection 4 mg, 4 mg, Intravenous, Q4HRS PRN, Logan Chris M.D., 4 mg at 02/11/21 0309  •  phosphorus (K-Phos-Neutral) per tablet 500 mg, 500 mg, Oral, TID, Elpidio Sloan M.D., 500 mg at 02/13/21 0442  •  senna-docusate (PERICOLACE or SENOKOT S) 8.6-50 MG per tablet 2 Tab, 2 tablet, Oral, BID, Stopped at 02/09/21 1901 **AND** polyethylene glycol/lytes (MIRALAX) PACKET 1 Packet, 1 Packet, Oral, QDAY PRN **AND** magnesium hydroxide (MILK OF MAGNESIA) suspension 30 mL, 30 mL, Oral, QDAY PRN **AND** bisacodyl (DULCOLAX) suppository 10 mg, 10 mg, Rectal, QDAY PRN, Sheri Pitts M.D.  •  Respiratory Therapy Consult, , Nebulization, Continuous RT, Sheri Pitts M.D.  •  acetaminophen (Tylenol) tablet 650 mg, 650 mg, Oral, Q6HRS PRN, Sheri Pitts M.D., 650 mg at 02/10/21 2045  •  [COMPLETED] magnesium sulfate IVPB premix 2 g, 2 g, Intravenous, Once, Stopped at 02/09/21 2213 **AND** magnesium oxide (MAG-OX) tablet 400 mg, 400 mg, Oral, BID, Venu Irwin, D.O., 400 mg at 02/13/21 0443  •  labetalol (NORMODYNE/TRANDATE) injection 10 mg, 10 mg, Intravenous, Q HOUR PRN **OR** labetalol (NORMODYNE) tablet 200 mg, 200 mg, Oral, Q6HRS PRN, Venu Irwin, D.O.    Current Outpatient Medications:  Current Facility-Administered Medications   Medication Dose Route Frequency Provider Last Rate Last Admin   • sucralfate (CARAFATE) 1 GM/10ML suspension 1 g  1 g Oral 4X/DAY ACHS Kasi Chan M.D.   1 g at 02/13/21 0814   • omeprazole (FIRST-OMEPRAZOLE) 2 mg/mL oral susp 40 mg  40 mg Oral DAILY Kasi Chan M.D.   40 mg at 02/13/21 0447   • oyster shell calcium/vitamin D 250-125 MG-UNIT tablet 1 tablet  1 tablet Oral TID SUYAPA GeorgeO.   1 tablet at 02/13/21 0443   • vitamin D (cholecalciferol) tablet 1,000 Units  1,000 Units Oral DAILY JESSE George.O.   1,000 Units at 02/13/21 0443   • LORazepam (ATIVAN) tablet 0.5  mg  0.5 mg Oral Q4HRS PRN Logan Chris M.D.   0.5 mg at 02/12/21 0850   • oxyCODONE immediate-release (ROXICODONE) tablet 5 mg  5 mg Oral Q4HRS PRN Logan Chris M.D.   5 mg at 02/13/21 0447    Or   • oxyCODONE immediate release (ROXICODONE) tablet 10 mg  10 mg Oral Q4HRS PRN Logan Chris M.D.   10 mg at 02/13/21 0850   • lidocaine (LIDODERM) 5 % 1 Patch  1 Patch Transdermal Q24HR Logan Chris M.D.   1 Patch at 02/13/21 0850   • levothyroxine (SYNTHROID) tablet 88 mcg  88 mcg Oral AM ES SUYAPA GeorgeOZully   88 mcg at 02/13/21 0445   • FLUoxetine (PROZAC) capsule 20 mg  20 mg Oral DAILY Yifan López DZullyOZully   20 mg at 02/13/21 0443   • ondansetron (ZOFRAN) syringe/vial injection 4 mg  4 mg Intravenous Q4HRS PRN Logan Chris M.D.   4 mg at 02/11/21 0309   • phosphorus (K-Phos-Neutral) per tablet 500 mg  500 mg Oral TID Elpidio Sloan M.D.   500 mg at 02/13/21 0442   • senna-docusate (PERICOLACE or SENOKOT S) 8.6-50 MG per tablet 2 Tab  2 tablet Oral BID Sheri Pitts M.D.   Stopped at 02/09/21 1901    And   • polyethylene glycol/lytes (MIRALAX) PACKET 1 Packet  1 Packet Oral QDAY PRN Sheri Pitts M.D.        And   • magnesium hydroxide (MILK OF MAGNESIA) suspension 30 mL  30 mL Oral QDAY PRN Sheri Pitts M.D.        And   • bisacodyl (DULCOLAX) suppository 10 mg  10 mg Rectal QDAY PRN Sheri Pitts M.D.       • Respiratory Therapy Consult   Nebulization Continuous RT Sheri Pitts M.D.       • acetaminophen (Tylenol) tablet 650 mg  650 mg Oral Q6HRS PRN Sheri Pitts M.D.   650 mg at 02/10/21 2045   • magnesium oxide (MAG-OX) tablet 400 mg  400 mg Oral BID JESSE Galaviz.OZully   400 mg at 02/13/21 0443   • labetalol (NORMODYNE/TRANDATE) injection 10 mg  10 mg Intravenous Q HOUR PRN SUYAPA GalavizO.        Or   • labetalol (NORMODYNE) tablet 200 mg  200 mg Oral Q6HRS PRN Venu Irwin D.O.           Medication Allergy:  Allergies   Allergen Reactions   • Sulfa Drugs  "Anaphylaxis   • Norco [Apap-Fd&C Yellow #10 Al Cervantes-Hydrocodone]      itching   • Zofran [Dextrose-Ondansetron] Unspecified     Migraine tolerates Injection/ IV   • Lorazepam Anxiety and Unspecified     \"IT MAKES ME CRY\"       Family History:  Family History   Problem Relation Age of Onset   • Heart Disease Mother         Atrial fibrillation   • Thyroid Mother    • Cancer Mother         Melanoma   • Heart Disease Brother    • Diabetes Son    • Dementia Neg Hx    • Colon Cancer Neg Hx    • Breast Cancer Neg Hx        Social History:  Social History     Socioeconomic History   • Marital status:      Spouse name: Not on file   • Number of children: Not on file   • Years of education: Not on file   • Highest education level: Not on file   Occupational History   • Not on file   Tobacco Use   • Smoking status: Current Every Day Smoker     Packs/day: 0.50     Years: 30.00     Pack years: 15.00     Types: Cigarettes   • Smokeless tobacco: Never Used   Substance and Sexual Activity   • Alcohol use: Yes     Comment: 4-12 mini bottles per day   • Drug use: No   • Sexual activity: Not on file   Other Topics Concern   • Not on file   Social History Narrative   • Not on file     Social Determinants of Health     Financial Resource Strain:    • Difficulty of Paying Living Expenses:    Food Insecurity:    • Worried About Running Out of Food in the Last Year:    • Ran Out of Food in the Last Year:    Transportation Needs:    • Lack of Transportation (Medical):    • Lack of Transportation (Non-Medical):    Physical Activity:    • Days of Exercise per Week:    • Minutes of Exercise per Session:    Stress:    • Feeling of Stress :    Social Connections:    • Frequency of Communication with Friends and Family:    • Frequency of Social Gatherings with Friends and Family:    • Attends Sikhism Services:    • Active Member of Clubs or Organizations:    • Attends Club or Organization Meetings:    • Marital Status:    Intimate " Partner Violence:    • Fear of Current or Ex-Partner:    • Emotionally Abused:    • Physically Abused:    • Sexually Abused:          MDM (Data Review):     Records reviewed and summarized in current documentation    Lab Data Review:  Recent Results (from the past 24 hour(s))   Histology Request    Collection Time: 02/12/21  1:10 PM   Result Value Ref Range    Pathology Request Sent to Hist[facility]    Basic Metabolic Panel    Collection Time: 02/13/21  6:37 AM   Result Value Ref Range    Sodium 136 135 - 145 mmol/L    Potassium 4.2 3.6 - 5.5 mmol/L    Chloride 100 96 - 112 mmol/L    Co2 23 20 - 33 mmol/L    Glucose 96 65 - 99 mg/dL    Bun 5 (L) 8 - 22 mg/dL    Creatinine 0.44 (L) 0.50 - 1.40 mg/dL    Calcium 8.0 (L) 8.5 - 10.5 mg/dL    Anion Gap 13.0 7.0 - 16.0   CBC WITHOUT DIFFERENTIAL    Collection Time: 02/13/21  6:37 AM   Result Value Ref Range    WBC 6.7 4.8 - 10.8 K/uL    RBC 3.30 (L) 4.20 - 5.40 M/uL    Hemoglobin 10.3 (L) 12.0 - 16.0 g/dL    Hematocrit 32.1 (L) 37.0 - 47.0 %    MCV 97.3 81.4 - 97.8 fL    MCH 31.2 27.0 - 33.0 pg    MCHC 32.1 (L) 33.6 - 35.0 g/dL    RDW 66.4 (H) 35.9 - 50.0 fL    Platelet Count 281 164 - 446 K/uL    MPV 10.4 9.0 - 12.9 fL   ESTIMATED GFR    Collection Time: 02/13/21  6:37 AM   Result Value Ref Range    GFR If African American >60 >60 mL/min/1.73 m 2    GFR If Non African American >60 >60 mL/min/1.73 m 2       Imaging/Procedures Review:      DX-ANKLE 2- VIEWS RIGHT   Final Result      Fracture of the distal fibular shaft which may be slightly comminuted. Main distal fracture fragment appears slightly displaced laterally.      CT-ABDOMEN-PELVIS W/O   Final Result         1.  Hazy fat stranding adjacent to the tail of pancreas, appearance suggests pancreatitis.   2.  Right ovarian cyst, follow-up with pelvic sonogram for further characterization   3.  Left nephrolithiasis without visualized obstructive changes   4.  Segment 2 duodenal diverticula   5.  Hepatomegaly and diffuse hepatic  steatosis      DX-CHEST-PORTABLE (1 VIEW)   Final Result      No acute cardiac or pulmonary abnormality is noted.      CT-HEAD W/O   Final Result      No acute intracranial abnormality is identified.      Posterior left occipital scalp swelling/hematoma.              MDM (Assessment and Plan):     Patient Active Problem List    Diagnosis Date Noted   • Acute pancreatitis 02/10/2021   • Alcohol withdrawal syndrome with complication (HCC) 02/09/2021   • Elevated liver enzymes 01/17/2019   • Urinary incontinence 02/12/2021   • Easy bruising 02/12/2021   • Atrial fibrillation (HCC) 02/11/2021   • Closed right ankle fracture 02/10/2021   • Dehydration 02/10/2021   • Ovarian mass, right 02/12/2021   •   02/12/2021   • GI bleed 02/11/2021   • Low serum phosphorus for age 02/10/2021   • Increased ammonia level 02/10/2021   • Electrolyte abnormality 02/10/2021   • Thrombocytopenia ([facility]) 02/10/2021   • Elevated troponin 02/10/2021   • Hypokalemia 02/09/2021   • Hypomagnesemia 02/09/2021   • GARRETT (acute kidney injury) ([facility]) 02/09/2021   • Pain of upper abdomen 02/09/2021   • Vitamin D deficiency 01/13/2021   • History of bilateral breast implants 04/19/2019   • Colon polyp 03/29/2019   • Diverticulosis of colon 01/23/2019   • Hypertriglyceridemia 01/23/2019   • Low serum HDL 01/23/2019   • Anxiety and depression 01/23/2019   • Vitamin B12 deficiency 01/23/2019   • Alcohol dependence ([facility]) 01/17/2019   • Tobacco use 01/17/2019   • Hypothyroidism 01/17/2019     IMPRESSION: This is a 50 yo female that has had 4 day history of coffee ground emesis, black tarry stools, epigastric abdominal pain, anemia. Hgb trending down 8.5. CT scan revealed pancreatitis. History of alcohol abuse in withdrawals.    Problems:  1. Melena: Resolved. Likely upper GI bleed with findings of melena, coffee ground emesis.   2. Coffee ground emesis: Resolved. Likely upper GI bleed. Last episode when she was admitted to the hospital.     EGD  2/12/21:Esophagus: severe LA classification grade D reflux esophagitis, biopsied to exclude opportunistic infection.  No evidence of esophageal varices.  - Stomach: small hiatal hernia otherwise, endoscopically unremarkable, biopsies obtained to evaluate H.pylori status.  No evidence of gastric varices nor portal hypertensive gastropathy.  - Duodenum: mild bulbar non-erosive duodenitis, otherwise endoscopically unremarkable to 2nd portion, biopsies obtained to exclude celiac sprue.      3. Anemia: Hgb trending down from 15.6 (admission) to 8.5 today. No blood transfusions at this time 2/13/21: Hgb: 10.3  4. Epigastric abdominal pain: likely from Grade D esophagitis and duodenitis.  5. Alcohol abuse with withdrawals. Last alcohol use was 3 days ago.  6. Elevated LFT's: likely alcohol related due to AST/ALT ratio. CT scan revealed fatty liver. Platelet count: low.   7. Pancreatitis: likely alcohol induced. No findings of choledocolithiasis. Lipase elevated. Triglycerides normal.  8. Hyponatremia: likely due to Alcohol induced hepatitis. Current: 126. Before proceeding with EGD will need to get Sodium 130 or above.  9. Hypokalemia: likely due to #6. Will need to replace Potassium before EGD.  10. Thrombocytopenia: Platelet count ranging from 103-145,000.  11. COVID negative 2/9/21  12. Increased complexity of medical decision making due to aforementioned.      Recommendations:  -Omeprazole 40mg suspension x 21 days  -Continue Sucralfate 1gm QID x 14 days  -Avoid NSAID's, ASA, alcohol  -Pathology letter will be sent to patient in 2 weeks.    GI WILL SIGN OFF. PLEASE CALL IF ANY QUESTIONS     ---  Dear Dr. Yifan López,  Thank you for asking us to see your patient in consultation.  Please refer to my consult note as per above for details and recommendations.     JAMISON Chacon.

## 2021-02-13 NOTE — CARE PLAN
Problem: Communication  Goal: The ability to communicate needs accurately and effectively will improve  Outcome: PROGRESSING AS EXPECTED     Problem: Safety  Goal: Will remain free from injury  Outcome: PROGRESSING AS EXPECTED  Goal: Will remain free from falls  Outcome: PROGRESSING AS EXPECTED     Problem: Bowel/Gastric:  Goal: Normal bowel function is maintained or improved  Outcome: PROGRESSING AS EXPECTED  Goal: Will not experience complications related to bowel motility  Outcome: PROGRESSING AS EXPECTED     Problem: Knowledge Deficit  Goal: Knowledge of disease process/condition, treatment plan, diagnostic tests, and medications will improve  Outcome: PROGRESSING AS EXPECTED  Goal: Knowledge of the prescribed therapeutic regimen will improve  Outcome: PROGRESSING AS EXPECTED     Problem: Respiratory:  Goal: Respiratory status will improve  Outcome: PROGRESSING AS EXPECTED     Problem: Safety:  Goal: Will remain free from injury  Outcome: PROGRESSING AS EXPECTED     Problem: Knowledge Deficit:  Goal: Knowledge of disease process/condition, treatment plan, diagnostic tests, and medications will improve  Outcome: PROGRESSING AS EXPECTED

## 2021-02-13 NOTE — THERAPY
Occupational Therapy   Initial Evaluation     Patient Name: Carie Santiago  Age:  51 y.o., Sex:  female  Medical Record #: [id number]  Today's Date: 2/13/2021     Precautions  Precautions: Fall Risk  Comments: closed R ankle fx, per pt WBAT with cam boot on    Assessment  Patient is 51 y.o. female presents to [facility] following c/o slurred speech and mild confusion, recent closed R ankle fx, per pt, WBAT with cam boot. Pt was able to perform basic self care tasks, functional mobility and t/f's with supervision. Pt speech and cognition is back to baseline per pt, and no overt concerns noted, BUE strength, ROM, coordination intact. No further acute OT services indicated at this time.     Plan    Recommend Occupational Therapy for Evaluation only   DC Equipment Recommendations: None        Objective       02/13/21 0912   Prior Living Situation   Prior Services Other (Comments)  (Has  who's been assisting)   Housing / Facility 1 Story House   Steps Into Home 3   Steps In Home 0   Bathroom Set up Walk In Shower;Shower Chair   Equipment Owned Tub / Shower Seat   Lives with - Patient's Self Care Capacity Alone and Able to Care For Self   Comments I with ADLs/IADLs baseline. Pt has sister, mother and  to help out as needed   Prior Level of ADL Function   Self Feeding Independent   Grooming / Hygiene Independent   Bathing Independent   Dressing Independent   Toileting Independent   Prior Level of IADL Function   Medication Management Independent   Laundry Independent   Kitchen Mobility Independent   Finances Independent   Home Management Independent   Shopping Independent   Prior Level Of Mobility Independent Without Device in Community   Driving / Transportation Driving Independent   Balance Assessment   Sitting Balance (Static) Fair +   Sitting Balance (Dynamic) Fair +   Standing Balance (Static) Fair   Standing Balance (Dynamic) Fair   Weight Shift Sitting Fair   Weight Shift  Standing Fair   Comments w/o AD, no lob noted   Bed Mobility    Supine to Sit Supervised   Sit to Supine Supervised   Scooting Supervised   Rolling Supervised   ADL Assessment   Eating Independent   Grooming Supervision;Standing   Bathing   (discussed home s/u and modifications)   Upper Body Dressing Supervision   Lower Body Dressing Supervision   Toileting Supervision   Functional Mobility   Sit to Stand Supervised   Bed, Chair, Wheelchair Transfer Supervised   Toilet Transfers Supervised   Transfer Method Stand Pivot   Mobility within room and hallway   Comments w/o AD, no lob noted   Anticipated Discharge Equipment and Recommendations   DC Equipment Recommendations None

## 2021-02-13 NOTE — PROGRESS NOTES
Tele discontinued today, pt is medical.     Pt medicated for pain control. Up to BR 1 person assist. Will pass care to NOC RN @ [facility]

## 2021-02-13 NOTE — THERAPY
Physical Therapy   Initial Evaluation     Patient Name: Carie Santiago  Age:  51 y.o., Sex:  female  Medical Record #: [id number]  Today's Date: 2/13/2021     Precautions: Fall Risk    Assessment  Patient is 51 y.o. female with a diagnosis of ETOH abuse.  Patient demonstrates community level functional mobility at supervision level.  Patient currently prefers HHA with ambulation, however is not interested in utilize can for short period of time.  Patient understands she is increased fall risk without using an AD, while still being supervision level.  PT will not follow.      02/13/21 1010   Precautions   Precautions Fall Risk   Prior Living Situation   Prior Services None   Housing / Facility 1 Story House   Steps Into Home 3   Steps In Home 0   Lives with - Patient's Self Care Capacity Alone and Able to Care For Self   Comments has sister that lives in area that can help   Prior Level of Functional Mobility   Bed Mobility Independent   Transfer Status Independent   Ambulation Independent   Distance Ambulation (Feet) 150   Assistive Devices Used None   Stairs Independent   History of Falls   History of Falls No   Cognition    Cognition / Consciousness WDL   Passive ROM Lower Body   Passive ROM Lower Body WDL   Active ROM Lower Body    Active ROM Lower Body  WDL   Strength Lower Body   Lower Body Strength  WDL   Sensation Lower Body   Lower Extremity Sensation   WDL   Balance Assessment   Sitting Balance (Static) Fair   Sitting Balance (Dynamic) Fair   Standing Balance (Static) Fair   Standing Balance (Dynamic) Fair   Weight Shift Sitting Fair   Weight Shift Standing Fair   Comments no AD   Gait Analysis   Gait Level Of Assist Supervised   Assistive Device Hand Held Assist   Distance (Feet) 200   # of Times Distance was Traveled 1   Deviation   (pt prefers HHA, demonstrates no AD capabilities)   # of Stairs Climbed 3   Level of Assist with Stairs Supervised   Weight Bearing Status fwb   Bed Mobility    Supine  to Sit Supervised   Sit to Supine Supervised   Scooting Supervised   Functional Mobility   Sit to Stand Supervised   How much difficulty does the patient currently have...   Turning over in bed (including adjusting bedclothes, sheets and blankets)? 4   Sitting down on and standing up from a chair with arms (e.g., wheelchair, bedside commode, etc.) 3   Moving from lying on back to sitting on the side of the bed? 4   How much help from another person does the patient currently need...   Moving to and from a bed to a chair (including a wheelchair)? 4   Need to walk in a hospital room? 4   Climbing 3-5 steps with a railing? 4   6 clicks Mobility Score 23   Education Group   Education Provided Role of Physical Therapist;Gait Training   Role of Physical Therapist Patient Response Patient;Acceptance;Explanation;Demonstration;Verbal Demonstration;Action Demonstration   Gait Training Patient Response Patient;Acceptance;Explanation;Demonstration;Verbal Demonstration;Action Demonstration   Anticipated Discharge Equipment and Recommendations   DC Equipment Recommendations None     Plan    Recommend Physical Therapy for Evaluation only     DC Equipment Recommendations: None  Discharge Recommendations: Anticipate that the patient will have no further physical therapy needs after discharge from the hospital

## 2021-02-13 NOTE — PROGRESS NOTES
Bedside report received from day RN, pt care assumed, assessment completed. Pt is A&O4, pain 5- provided pain medication, not on the monitor. Updated on POC, questions answered. Bed in lowest, locked position, treaded socks on, call light and belongings within reach. Fall precautions in place.

## 2021-02-14 VITALS
RESPIRATION RATE: 18 BRPM | OXYGEN SATURATION: 92 % | HEIGHT: 70 IN | BODY MASS INDEX: 26.54 KG/M2 | SYSTOLIC BLOOD PRESSURE: 112 MMHG | HEART RATE: 82 BPM | TEMPERATURE: 98.8 F | WEIGHT: 185.41 LBS | DIASTOLIC BLOOD PRESSURE: 71 MMHG

## 2021-02-14 PROBLEM — R10.10 PAIN OF UPPER ABDOMEN: Status: RESOLVED | Noted: 2021-02-09 | Resolved: 2021-02-14

## 2021-02-14 PROBLEM — N20.0 NEPHROLITHIASIS: Status: RESOLVED | Noted: 2021-02-12 | Resolved: 2021-02-14

## 2021-02-14 PROBLEM — N17.9 AKI (ACUTE KIDNEY INJURY) (HCC): Status: RESOLVED | Noted: 2021-02-09 | Resolved: 2021-02-14

## 2021-02-14 PROBLEM — K92.2 GI BLEED: Status: RESOLVED | Noted: 2021-02-11 | Resolved: 2021-02-14

## 2021-02-14 PROBLEM — E86.0 DEHYDRATION: Status: RESOLVED | Noted: 2021-02-10 | Resolved: 2021-02-14

## 2021-02-14 PROBLEM — K85.90 ACUTE PANCREATITIS: Status: RESOLVED | Noted: 2021-02-10 | Resolved: 2021-02-14

## 2021-02-14 PROBLEM — I48.91 ATRIAL FIBRILLATION (HCC): Status: RESOLVED | Noted: 2021-02-11 | Resolved: 2021-02-14

## 2021-02-14 PROBLEM — R79.89 ELEVATED TROPONIN: Status: RESOLVED | Noted: 2021-02-10 | Resolved: 2021-02-14

## 2021-02-14 PROBLEM — D69.6 THROMBOCYTOPENIA (HCC): Status: RESOLVED | Noted: 2021-02-10 | Resolved: 2021-02-14

## 2021-02-14 PROBLEM — J34.89 SINUS DRAINAGE: Status: RESOLVED | Noted: 2021-02-13 | Resolved: 2021-02-14

## 2021-02-14 PROBLEM — R32 URINARY INCONTINENCE: Status: RESOLVED | Noted: 2021-02-12 | Resolved: 2021-02-14

## 2021-02-14 PROBLEM — E87.8 ELECTROLYTE ABNORMALITY: Status: RESOLVED | Noted: 2021-02-10 | Resolved: 2021-02-14

## 2021-02-14 PROBLEM — R79.89 INCREASED AMMONIA LEVEL: Status: RESOLVED | Noted: 2021-02-10 | Resolved: 2021-02-14

## 2021-02-14 LAB
BACTERIA BLD CULT: NORMAL
SIGNIFICANT IND 70042: NORMAL
SITE SITE: NORMAL
SOURCE SOURCE: NORMAL

## 2021-02-14 PROCEDURE — A9270 NON-COVERED ITEM OR SERVICE: HCPCS | Performed by: HOSPITALIST

## 2021-02-14 PROCEDURE — A9270 NON-COVERED ITEM OR SERVICE: HCPCS | Performed by: INTERNAL MEDICINE

## 2021-02-14 PROCEDURE — 700102 HCHG RX REV CODE 250 W/ 637 OVERRIDE(OP): Performed by: INTERNAL MEDICINE

## 2021-02-14 PROCEDURE — 700102 HCHG RX REV CODE 250 W/ 637 OVERRIDE(OP): Performed by: HOSPITALIST

## 2021-02-14 PROCEDURE — 700111 HCHG RX REV CODE 636 W/ 250 OVERRIDE (IP): Performed by: INTERNAL MEDICINE

## 2021-02-14 PROCEDURE — 99239 HOSP IP/OBS DSCHRG MGMT >30: CPT | Performed by: HOSPITALIST

## 2021-02-14 RX ORDER — OMEPRAZOLE 40 MG/1
40 CAPSULE, DELAYED RELEASE ORAL 2 TIMES DAILY
Qty: 60 CAPSULE | Refills: 1 | Status: SHIPPED | OUTPATIENT
Start: 2021-02-14 | End: 2021-05-05

## 2021-02-14 RX ORDER — AMOXICILLIN AND CLAVULANATE POTASSIUM 875; 125 MG/1; MG/1
1 TABLET, FILM COATED ORAL EVERY 12 HOURS
Qty: 14 TABLET | Refills: 0 | Status: SHIPPED | OUTPATIENT
Start: 2021-02-14 | End: 2021-02-21

## 2021-02-14 RX ORDER — SUCRALFATE ORAL 1 G/10ML
1 SUSPENSION ORAL 4 TIMES DAILY
Qty: 400 ML | Refills: 0 | Status: SHIPPED | OUTPATIENT
Start: 2021-02-14 | End: 2021-02-24

## 2021-02-14 RX ORDER — LIDOCAINE 50 MG/G
1 PATCH TOPICAL EVERY 24 HOURS
Qty: 10 PATCH | Refills: 1 | Status: ON HOLD | OUTPATIENT
Start: 2021-02-14 | End: 2022-11-03

## 2021-02-14 RX ADMIN — Medication 400 MG: at 05:06

## 2021-02-14 RX ADMIN — Medication 1000 UNITS: at 05:06

## 2021-02-14 RX ADMIN — LEVOTHYROXINE SODIUM 88 MCG: 0.09 TABLET ORAL at 05:06

## 2021-02-14 RX ADMIN — ONDANSETRON 4 MG: 2 INJECTION INTRAMUSCULAR; INTRAVENOUS at 08:14

## 2021-02-14 RX ADMIN — FLUOXETINE 20 MG: 20 CAPSULE ORAL at 05:06

## 2021-02-14 RX ADMIN — OXYCODONE HYDROCHLORIDE 10 MG: 10 TABLET ORAL at 01:06

## 2021-02-14 RX ADMIN — Medication 1 TABLET: at 05:06

## 2021-02-14 RX ADMIN — SUCRALFATE 1 G: 1 SUSPENSION ORAL at 07:49

## 2021-02-14 RX ADMIN — AMOXICILLIN AND CLAVULANATE POTASSIUM 1 TABLET: 875; 125 TABLET, FILM COATED ORAL at 05:06

## 2021-02-14 RX ADMIN — OMEPRAZOLE 40 MG: KIT at 05:06

## 2021-02-14 ASSESSMENT — PAIN DESCRIPTION - PAIN TYPE: TYPE: ACUTE PAIN

## 2021-02-14 NOTE — PROGRESS NOTES
Received evening report from day RN. Pt is comfortable, and walked successfully to the bathroom with R foot boot on. Bed is locked in low position with call light and belongings within reach. POC discussed and all questions answered. All needs and requirements met at this time.

## 2021-02-14 NOTE — DISCHARGE INSTRUCTIONS
Discharge Instructions    Discharged to home by car with relative. Discharged via wheelchair, hospital escort: Yes.  Special equipment needed: Not Applicable    Be sure to schedule a follow-up appointment with your primary care doctor or any specialists as instructed.     Discharge Plan:   Diet Plan: Discussed  Activity Level: Discussed  Confirmed Follow up Appointment: Patient to Call and Schedule Appointment  Confirmed Symptoms Management: Discussed  Medication Reconciliation Updated: Yes  Influenza Vaccine Indication: Patient Refuses    I understand that a diet low in cholesterol, fat, and sodium is recommended for good health. Unless I have been given specific instructions below for another diet, I accept this instruction as my diet prescription.   Other diet: Regular    Special Instructions: None    · Is patient discharged on Warfarin / Coumadin?   No     Depression / Suicide Risk    As you are discharged from this [facility] facility, it is important to learn how to keep safe from harming yourself.    Recognize the warning signs:  · Abrupt changes in personality, positive or negative- including increase in energy   · Giving away possessions  · Change in eating patterns- significant weight changes-  positive or negative  · Change in sleeping patterns- unable to sleep or sleeping all the time   · Unwillingness or inability to communicate  · Depression  · Unusual sadness, discouragement and loneliness  · Talk of wanting to die  · Neglect of personal appearance   · Rebelliousness- reckless behavior  · Withdrawal from people/activities they love  · Confusion- inability to concentrate     If you or a loved one observes any of these behaviors or has concerns about self-harm, here's what you can do:  · Talk about it- your feelings and reasons for harming yourself  · Remove any means that you might use to hurt yourself (examples: pills, rope, extension cords, firearm)  · Get professional help from the community  (Mental Health, Substance Abuse, psychological counseling)  · Do not be alone:Call your Safe Contact- someone whom you trust who will be there for you.  · Call your local CRISIS HOTLINE 272-7497 or 025-203-4643  · Call your local Children's Mobile Crisis Response Team [place] (769) 410-1775 or www.[employer]  · Call the toll free National Suicide Prevention Hotlines   · National Suicide Prevention Lifeline 947-972-LXSM (1767)  · National Hope Line Network 800-SUICIDE (457-6310)      Discharge Instructions per Yifan López D.O.      DIET: Healthy balanced diet low in acidic foods and only intake luke warm food/beverages for the next few weeks until further esophageal and gut healing.  Avoid alcohol all together.  No ibuprofen, aspirin, Advil, Naprosyn, Alieve, Motrin unless instructed by your primary care provider    ACTIVITY: as tolerates.  Wear your right Orthopedic boot while ambulating until notified by [facility]    DIAGNOSIS: Alcohol substance use, Bacteremia, Esophagitis, sinusitis, episode of atrial fibrillation, easy bruising, anemia, right ovarian mass/cyst, asymptomatic left nephrolithiasis    Return to ER if recurrence of need.          Alcohol Use Disorder  Alcohol use disorder is when your drinking disrupts your daily life. When you have this condition, you drink too much alcohol and you cannot control your drinking.  Alcohol use disorder can cause serious problems with your physical health. It can affect your brain, heart, liver, pancreas, immune system, stomach, and intestines. Alcohol use disorder can increase your risk for certain cancers and cause problems with your mental health, such as depression, anxiety, psychosis, delirium, and dementia. People with this disorder risk hurting themselves and others.  What are the causes?  This condition is caused by drinking too much alcohol over time. It is not caused by drinking too much alcohol only one or two times. Some people  with this condition drink alcohol to cope with or escape from negative life events. Others drink to relieve pain or symptoms of mental illness.  What increases the risk?  You are more likely to develop this condition if:  · You have a family history of alcohol use disorder.  · Your culture encourages drinking to the point of intoxication, or makes alcohol easy to get.  · You had a mood or conduct disorder in childhood.  · You have been a victim of abuse.  · You are an adolescent and:  ? You have poor grades or difficulties in school.  ? Your caregivers do not talk to you about saying no to alcohol, or supervise your activities.  ? You are impulsive or you have trouble with self-control.  What are the signs or symptoms?  Symptoms of this condition include:  · Drinking more than you want to.  · Drinking for longer than you want to.  · Trying several times to drink less or to control your drinking.  · Spending a lot of time getting alcohol, drinking, or recovering from drinking.  · Craving alcohol.  · Having problems at work, at school, or at home due to drinking.  · Having problems in relationships due to drinking.  · Drinking when it is dangerous to drink, such as before driving a car.  · Continuing to drink even though you know you might have a physical or mental problem related to drinking.  · Needing more and more alcohol to get the same effect you want from the alcohol (building up tolerance).  · Having symptoms of withdrawal when you stop drinking. Symptoms of withdrawal include:  ? Fatigue.  ? Nightmares.  ? Trouble sleeping.  ? Depression.  ? Anxiety.  ? Fever.  ? Seizures.  ? Severe confusion.  ? Feeling or seeing things that are not there (hallucinations).  ? Tremors.  ? Rapid heart rate.  ? Rapid breathing.  ? High blood pressure.  · Drinking to avoid symptoms of withdrawal.  How is this diagnosed?  This condition is diagnosed with an assessment. Your health care provider may start the assessment by asking  three or four questions about your drinking.  Your health care provider may perform a physical exam or do lab tests to see if you have physical problems resulting from alcohol use. She or he may refer you to a mental health professional for evaluation.  How is this treated?  Some people with alcohol use disorder are able to reduce their alcohol use to low-risk levels. Others need to completely quit drinking alcohol. When necessary, mental health professionals with specialized training in substance use treatment can help. Your health care provider can help you decide how severe your alcohol use disorder is and what type of treatment you need. The following forms of treatment are available:  · Detoxification. Detoxification involves quitting drinking and using prescription medicines within the first week to help lessen withdrawal symptoms. This treatment is important for people who have had withdrawal symptoms before and for heavy drinkers who are likely to have withdrawal symptoms. Alcohol withdrawal can be dangerous, and in severe cases, it can cause death. Detoxification may be provided in a home, community, or primary care setting, or in a hospital or substance use treatment facility.  · Counseling. This treatment is also called talk therapy. It is provided by substance use treatment counselors. A counselor can address the reasons you use alcohol and suggest ways to keep you from drinking again or to prevent problem drinking. The goals of talk therapy are to:  ? Find healthy activities and ways for you to cope with stress.  ? Identify and avoid the things that trigger your alcohol use.  ? Help you learn how to handle cravings.  · Medicines. Medicines can help treat alcohol use disorder by:  ? Decreasing alcohol cravings.  ? Decreasing the positive feeling you have when you drink alcohol.  ? Causing an uncomfortable physical reaction when you drink alcohol (aversion therapy).  · Support groups. Support groups are  led by people who have quit drinking. They provide emotional support, advice, and guidance.  These forms of treatment are often combined. Some people with this condition benefit from a combination of treatments provided by specialized substance use treatment centers.  Follow these instructions at home:  · Take over-the-counter and prescription medicines only as told by your health care provider.  · Check with your health care provider before starting any new medicines.  · Ask friends and family members not to offer you alcohol.  · Avoid situations where alcohol is served, including gatherings where others are drinking alcohol.  · Create a plan for what to do when you are tempted to use alcohol.  · Find hobbies or activities that you enjoy that do not include alcohol.  · Keep all follow-up visits as told by your health care provider. This is important.  How is this prevented?  · If you drink, limit alcohol intake to no more than 1 drink a day for nonpregnant women and 2 drinks a day for men. One drink equals 12 oz of beer, 5 oz of wine, or 1½ oz of hard liquor.  · If you have a mental health condition, get treatment and support.  · Do not give alcohol to adolescents.  · If you are an adolescent:  ? Do not drink alcohol.  ? Do not be afraid to say no if someone offers you alcohol. Speak up about why you do not want to drink. You can be a positive role model for your friends and set a good example for those around you by not drinking alcohol.  ? If your friends drink, spend time with others who do not drink alcohol. Make new friends who do not use alcohol.  ? Find healthy ways to manage stress and emotions, such as meditation or deep breathing, exercise, spending time in nature, listening to music, or talking with a trusted friend or family member.  Contact a health care provider if:  · You are not able to take your medicines as told.  · Your symptoms get worse.  · You return to drinking alcohol (relapse) and your  symptoms get worse.  Get help right away if:  · You have thoughts about hurting yourself or others.  If you ever feel like you may hurt yourself or others, or have thoughts about taking your own life, get help right away. You can go to your nearest emergency department or call:  · Your local emergency services (911 in the [place]  · A suicide crisis helpline, such as the National Suicide Prevention Lifeline at 1-672.684.3834. This is open 24 hours a day.  Summary  · Alcohol use disorder is when your drinking disrupts your daily life. When you have this condition, you drink too much alcohol and you cannot control your drinking.  · Treatment may include detoxification, counseling, medicine, and support groups.  · Ask friends and family members not to offer you alcohol. Avoid situations where alcohol is served.  · Get help right away if you have thoughts about hurting yourself or others.  This information is not intended to replace advice given to you by your health care provider. Make sure you discuss any questions you have with your health care provider.  Document Released: 01/25/2006 Document Revised: 11/30/2018 Document Reviewed: 09/14/2017  Elsevier Patient Education © 2020 [employer]

## 2021-02-14 NOTE — CARE PLAN
Problem: Communication  Goal: The ability to communicate needs accurately and effectively will improve  Outcome: PROGRESSING AS EXPECTED     Problem: Safety  Goal: Will remain free from injury  Outcome: PROGRESSING AS EXPECTED     Problem: Knowledge Deficit  Goal: Knowledge of disease process/condition, treatment plan, diagnostic tests, and medications will improve  Outcome: PROGRESSING AS EXPECTED     Problem: Safety:  Goal: Will remain free from injury  Outcome: PROGRESSING AS EXPECTED     Problem: Knowledge Deficit:  Goal: Knowledge of disease process/condition, treatment plan, diagnostic tests, and medications will improve  Outcome: PROGRESSING AS EXPECTED     Problem: Health Behavior:  Goal: Ability to identify changes in lifestyle to reduce recurrence of condition will improve  Outcome: PROGRESSING SLOWER THAN EXPECTED     Problem: Pain Management  Goal: Pain level will decrease to patient's comfort goal  Outcome: PROGRESSING SLOWER THAN EXPECTED

## 2021-02-14 NOTE — PROGRESS NOTES
Assumed care of patient at change of shift. Bedside report received. Patient is alert and oriented x 4 and able to make her needs known. Plan is for patient to discharge home this morning. Plan of care discussed with patient and call light is within reach.

## 2021-02-14 NOTE — DISCHARGE SUMMARY
"Discharge Summary    CHIEF COMPLAINT ON ADMISSION  Chief Complaint   Patient presents with   • Possible Stroke   • Detox   • Tachycardia     140-160's       Reason for Admission  Altered mentation    Admission Date  2/9/2021    CODE STATUS  Full Code    HPI & HOSPITAL COURSE  Per initial H+P: \"51 y.o. female who presented 2/9/2021 with slurred speech, mild confusion.  Patient has a past medical history of chronic alcohol abuse, anxiety, depression, hypothyroidism, hypertriglyceridemia.  Around noon today patient was in the bathroom, reportedly assisted by her , who witnessed her drop her head and become momentarily unresponsive.  Upon waking, she reportedly had difficulty speaking and was confused.  EMS was called; it was noted her symptoms had resolved by their arrival.  Per chart review and the Neurology team, the patient reportedly had a transient decrease level of consciousness en route to the hospital.  Neurology was consulted via code stroke.  Neurology saw the patient, reported the patient had a nonfocal neurological exam and NIHSS of 1 with slight dysarthria.  CT head without contrast showed no acute intracranial abnormality. (Neurology signed off and did not recommend further evaluation for stroke and there were no neurologic concerns for stroke during admit).     The patient did also present to [facility] on 2/7/2021 secondary to alcohol related injury, hitting the back of her head.  The patient reports she drinks about 12 mini bottles of vodka daily, one day prior to admit she drank 3 mini bottles.  Patient did not have any significant withdrawal during admit but lab abnormalities with liver insult, esophagitis and pancreatitis were expressed to likely be strongly associated with her alcohol use.  Cessation encouragement and discussion was on multiple times expressed during admit with her.  She was stating she is motivated to stop.     During admit she was guaiac positive and had a GI " consult with an EGD completed.  She has esophagitis and duodenitis and was started on PPI BID and carafate.  I did alert her to avoid taking her medications with the carafate as it would interfer with absorption.  She will follow up with GI outpatient for biopsy results and monitoring.  Discussed of foods and medicines to avoid over the next month was discussed and I recommended cessation of alcohol altogether.    She had complaint of bowel and urinary incontinence during admit but no sensory deficits.  Once IV fluids were stopped her urinary incontinence resolved.  She was seen by Urology and may follow outpatient with them if needed.    She on CT was found to have a right ovarian cyst/mass and I alerted her of this and she should have an outpatient f/u vaginal U/S.  She had concern of breast masses, but I did an exam w/o gross palpation of masses.  I expressed she should follow up with an outpatient mammogram.  I have alerted Dr Moyer of these issues.    She was found to have 1 of 2 blood cultures positive for gram negative anaerobic rods from unclear source but patient did not appear to be septic. She had a complaint of chronic sinus drainage.  She was on Rocephin initially during admit but switched to Augmentin to cover anaerobic GNR and sinusitis.      Therefore, she is discharged in good and stable condition to home with close outpatient follow-up.    The patient met 2-midnight criteria for an inpatient stay at the time of discharge.    Discharge Date  2/14/2021    FOLLOW UP ITEMS POST DISCHARGE  Endoscopic biopsy results  Repeat finalization of blood cultures.    DISCHARGE DIAGNOSES  Principal Problem:    Alcohol withdrawal syndrome with complication (HCC) POA: Yes  Active Problems:    Closed right ankle fracture POA: Unknown    Easy bruising POA: Unknown    Bacteremia due to Gram-negative bacteria POA: Unknown    Ovarian mass, right POA: Unknown  Resolved Problems:    Acute pancreatitis POA: Unknown     Atrial fibrillation (HCC) POA: Unknown    Urinary incontinence POA: Unknown    Hypokalemia POA: Yes    Hypomagnesemia POA: Yes    GARRETT (acute kidney injury) (HCC) POA: Yes    Pain of upper abdomen POA: Yes    Increased ammonia level POA: Unknown    Electrolyte abnormality POA: Unknown    Thrombocytopenia (HCC) POA: Unknown    Elevated troponin POA: Unknown    GI bleed POA: Unknown      POA: Unknown    Sinus drainage POA: Unknown    Dehydration POA: Unknown      FOLLOW UP  No future appointments.  Herbie Joshi M.D.  [address] [facility] [id number] [phone]    Call in 1 week      [facility]  [address] [facility] [address] [phone]  Schedule an appointment as soon as possible for a visit in 1 week      Herbie Joshi M.D.  [address] [facility] [id number] [phone]          Kasi Chan M.D.  [address] [address]  [phone]    Schedule an appointment as soon as possible for a visit in 3 weeks        MEDICATIONS ON DISCHARGE     Medication List      START taking these medications      Instructions   amoxicillin-clavulanate 875-125 MG Tabs  Commonly known as: AUGMENTIN   Take 1 tablet by mouth every 12 hours for 7 days.  Dose: 1 tablet     lidocaine 5 % Ptch  Commonly known as: LIDODERM   Place 1 Patch on the skin every 24 hours.  Dose: 1 Patch     omeprazole 40 MG delayed-release capsule  Commonly known as: PRILOSEC   Take 1 capsule by mouth 2 times a day.  Dose: 40 mg     sucralfate 1 GM/10ML Susp  Commonly known as: CARAFATE   Take 10 mL by mouth 4 times a day for 10 days.  Dose: 1 g        CHANGE how you take these medications      Instructions   Renita 0.0375 MG/24HR patch  What changed:   · how much to take  · how to take this  · when to take this  · additional instructions  Generic drug: estradiol   Apply patch on skin twice a week     FLUoxetine 20 MG Caps  What changed: when to take this  Commonly known as: PROZAC   TAKE ONE CAPSULE BY  "MOUTH EVERY DAY     Synthroid 88 MCG Tabs  What changed:   · how much to take  · how to take this  · when to take this  Generic drug: levothyroxine   TAKE ONE TABLET BY MOUTH EVERY DAY IN THE MORNING ON AN EMPTY STOMACH        CONTINUE taking these medications      Instructions   albuterol 108 (90 Base) MCG/ACT Aers inhalation aerosol   Doctor's comments: Give albuterol that is patient or insurance preference please (ProAir HFA or Ventolin HFA)  Inhale 2 Puffs every four hours as needed for Shortness of Breath.  Dose: 2 Puff     fish oil 1000 MG Caps capsule   Take 1,000 mg by mouth every day.  Dose: 1,000 mg     guaiFENesin  MG Tb12  Commonly known as: MUCINEX   Take 600 mg by mouth 2 times a day as needed.  Dose: 600 mg     Ocuvite-Lutein Tabs   Take 1 tablet by mouth every day.  Dose: 1 tablet     progesterone 100 MG Caps  Commonly known as: PROMETRIUM   Take 1 Cap by mouth every day.  Dose: 100 mg     traMADol 50 MG Tabs  Commonly known as: ULTRAM   Take  mg by mouth every four hours as needed.  Dose:  mg     VITAMIN D PO   Take 1 Cap by mouth every day.  Dose: 1 capsule        STOP taking these medications    ADVIL LIQUI-GELS MINIS PO     Aleve 220 MG Caps  Generic drug: Naproxen Sodium     ALPRAZolam 0.5 MG Tabs  Commonly known as: XANAX     fluconazole 150 MG tablet  Commonly known as: DIFLUCAN     ibuprofen 800 MG Tabs  Commonly known as: MOTRIN     nitrofurantoin 100 MG Caps  Commonly known as: MACROBID            Allergies  Allergies   Allergen Reactions   • Sulfa Drugs Anaphylaxis   • Norco [Apap-Fd&C Yellow #10 Al Cervantes-Hydrocodone]      itching   • Zofran [Dextrose-Ondansetron] Unspecified     Migraine tolerates Injection/ IV   • Lorazepam Anxiety and Unspecified     \"IT MAKES ME CRY\"       DIET  Orders Placed This Encounter   Procedures   • Diet Order Diet: Low Fiber(GI Soft); Nutrient modifications: (optional): Low Fat     Standing Status:   Standing     Number of Occurrences:   1     " Order Specific Question:   Diet:     Answer:   Low Fiber(GI Soft) [2]     Order Specific Question:   Nutrient modifications: (optional)     Answer:   Low Fat [5]       ACTIVITY  As tolerated.  Weight bearing as tolerated    CONSULTATIONS  GI  Urology    PROCEDURES  None    LABORATORY  Lab Results   Component Value Date    SODIUM 136 02/13/2021    POTASSIUM 4.2 02/13/2021    CHLORIDE 100 02/13/2021    CO2 23 02/13/2021    GLUCOSE 96 02/13/2021    BUN 5 (L) 02/13/2021    CREATININE 0.44 (L) 02/13/2021        Lab Results   Component Value Date    WBC 6.7 02/13/2021    HEMOGLOBIN 10.3 (L) 02/13/2021    HEMATOCRIT 32.1 (L) 02/13/2021    PLATELETCT 281 02/13/2021        Total time of the discharge process exceeds 31 minutes.

## 2021-02-16 DIAGNOSIS — N83.201 RIGHT OVARIAN CYST: ICD-10-CM

## 2021-02-16 DIAGNOSIS — N63.20 LEFT BREAST MASS: ICD-10-CM

## 2021-02-16 LAB
BACTERIA BLD CULT: ABNORMAL
BACTERIA BLD CULT: ABNORMAL
SIGNIFICANT IND 70042: ABNORMAL
SITE SITE: ABNORMAL
SOURCE SOURCE: ABNORMAL

## 2021-02-18 LAB
BACTERIA BLD CULT: NORMAL
BACTERIA BLD CULT: NORMAL
SIGNIFICANT IND 70042: NORMAL
SIGNIFICANT IND 70042: NORMAL
SITE SITE: NORMAL
SITE SITE: NORMAL
SOURCE SOURCE: NORMAL
SOURCE SOURCE: NORMAL

## 2021-02-24 ENCOUNTER — HOSPITAL ENCOUNTER (OUTPATIENT)
Dept: RADIOLOGY | Facility: MEDICAL CENTER | Age: 51
End: 2021-02-24
Attending: INTERNAL MEDICINE
Payer: COMMERCIAL

## 2021-02-24 DIAGNOSIS — N83.201 RIGHT OVARIAN CYST: ICD-10-CM

## 2021-02-24 PROCEDURE — 76830 TRANSVAGINAL US NON-OB: CPT

## 2021-02-25 DIAGNOSIS — N83.202 BILATERAL OVARIAN CYSTS: ICD-10-CM

## 2021-02-25 DIAGNOSIS — N83.201 BILATERAL OVARIAN CYSTS: ICD-10-CM

## 2021-02-25 DIAGNOSIS — F32.A ANXIETY AND DEPRESSION: ICD-10-CM

## 2021-02-25 DIAGNOSIS — F41.9 ANXIETY AND DEPRESSION: ICD-10-CM

## 2021-02-26 RX ORDER — ALPRAZOLAM 0.5 MG/1
0.5 TABLET ORAL 2 TIMES DAILY PRN
Qty: 60 TABLET | Refills: 0 | Status: SHIPPED | OUTPATIENT
Start: 2021-02-26 | End: 2021-03-29 | Stop reason: SDUPTHER

## 2021-03-01 ENCOUNTER — HOSPITAL ENCOUNTER (OUTPATIENT)
Dept: RADIOLOGY | Facility: MEDICAL CENTER | Age: 51
End: 2021-03-01
Attending: INTERNAL MEDICINE
Payer: COMMERCIAL

## 2021-03-01 DIAGNOSIS — N63.20 BILATERAL BREAST LUMP: ICD-10-CM

## 2021-03-01 DIAGNOSIS — N63.20 LEFT BREAST MASS: ICD-10-CM

## 2021-03-01 DIAGNOSIS — N63.10 BILATERAL BREAST LUMP: ICD-10-CM

## 2021-03-01 PROCEDURE — 76642 ULTRASOUND BREAST LIMITED: CPT | Mod: RT

## 2021-03-01 PROCEDURE — G0279 TOMOSYNTHESIS, MAMMO: HCPCS

## 2021-03-18 ENCOUNTER — HOSPITAL ENCOUNTER (OUTPATIENT)
Facility: MEDICAL CENTER | Age: 51
End: 2021-03-18
Attending: OBSTETRICS & GYNECOLOGY
Payer: COMMERCIAL

## 2021-03-18 PROCEDURE — 88175 CYTOPATH C/V AUTO FLUID REDO: CPT

## 2021-03-18 PROCEDURE — 87624 HPV HI-RISK TYP POOLED RSLT: CPT

## 2021-03-22 LAB
CYTOLOGY REG CYTOL: NORMAL
HPV HR 12 DNA CVX QL NAA+PROBE: NEGATIVE
HPV16 DNA SPEC QL NAA+PROBE: NEGATIVE
HPV18 DNA SPEC QL NAA+PROBE: NEGATIVE
SPECIMEN SOURCE: NORMAL

## 2021-03-29 DIAGNOSIS — F32.A ANXIETY AND DEPRESSION: ICD-10-CM

## 2021-03-29 DIAGNOSIS — F41.9 ANXIETY AND DEPRESSION: ICD-10-CM

## 2021-03-29 RX ORDER — ALPRAZOLAM 0.5 MG/1
0.5 TABLET ORAL 2 TIMES DAILY PRN
Qty: 60 TABLET | Refills: 0 | Status: SHIPPED | OUTPATIENT
Start: 2021-03-29 | End: 2021-04-29 | Stop reason: SDUPTHER

## 2021-04-06 ENCOUNTER — HOSPITAL ENCOUNTER (OUTPATIENT)
Dept: LAB | Facility: MEDICAL CENTER | Age: 51
End: 2021-04-06
Attending: OBSTETRICS & GYNECOLOGY
Payer: COMMERCIAL

## 2021-04-06 PROCEDURE — 36415 COLL VENOUS BLD VENIPUNCTURE: CPT

## 2021-04-06 PROCEDURE — 86304 IMMUNOASSAY TUMOR CA 125: CPT

## 2021-04-07 LAB — CANCER AG125 SERPL-ACNC: 9.4 U/ML (ref 0–35)

## 2021-04-16 DIAGNOSIS — R23.2 HOT FLASHES: ICD-10-CM

## 2021-04-16 RX ORDER — ESTRADIOL 0.04 MG/D
1 PATCH, EXTENDED RELEASE TRANSDERMAL
Qty: 24 PATCH | Refills: 3 | Status: SHIPPED | OUTPATIENT
Start: 2021-04-16 | End: 2022-02-28

## 2021-04-21 RX ORDER — ALBUTEROL SULFATE 90 UG/1
2 AEROSOL, METERED RESPIRATORY (INHALATION) EVERY 4 HOURS PRN
Qty: 1 EACH | Refills: 3 | Status: SHIPPED | OUTPATIENT
Start: 2021-04-21 | End: 2022-07-31 | Stop reason: SDUPTHER

## 2021-04-29 DIAGNOSIS — F32.A ANXIETY AND DEPRESSION: ICD-10-CM

## 2021-04-29 DIAGNOSIS — F41.9 ANXIETY AND DEPRESSION: ICD-10-CM

## 2021-04-29 RX ORDER — ALPRAZOLAM 0.5 MG/1
0.5 TABLET ORAL 2 TIMES DAILY PRN
Qty: 60 TABLET | Refills: 0 | Status: SHIPPED | OUTPATIENT
Start: 2021-04-29 | End: 2021-05-27

## 2021-05-05 ENCOUNTER — HOSPITAL ENCOUNTER (OUTPATIENT)
Facility: MEDICAL CENTER | Age: 51
End: 2021-05-05
Attending: INTERNAL MEDICINE
Payer: COMMERCIAL

## 2021-05-05 ENCOUNTER — HOSPITAL ENCOUNTER (OUTPATIENT)
Dept: RADIOLOGY | Facility: MEDICAL CENTER | Age: 51
End: 2021-05-05
Attending: INTERNAL MEDICINE
Payer: COMMERCIAL

## 2021-05-05 ENCOUNTER — OFFICE VISIT (OUTPATIENT)
Dept: INTERNAL MEDICINE | Facility: IMAGING CENTER | Age: 51
End: 2021-05-05
Payer: COMMERCIAL

## 2021-05-05 VITALS
OXYGEN SATURATION: 97 % | BODY MASS INDEX: 24.05 KG/M2 | DIASTOLIC BLOOD PRESSURE: 60 MMHG | SYSTOLIC BLOOD PRESSURE: 90 MMHG | TEMPERATURE: 98.7 F | WEIGHT: 168 LBS | HEIGHT: 70 IN | RESPIRATION RATE: 14 BRPM | HEART RATE: 78 BPM

## 2021-05-05 DIAGNOSIS — Z20.822 EXPOSURE TO COVID-19 VIRUS: ICD-10-CM

## 2021-05-05 DIAGNOSIS — E61.1 IRON DEFICIENCY: ICD-10-CM

## 2021-05-05 DIAGNOSIS — E55.9 VITAMIN D DEFICIENCY: ICD-10-CM

## 2021-05-05 DIAGNOSIS — G47.00 INSOMNIA, UNSPECIFIED TYPE: ICD-10-CM

## 2021-05-05 DIAGNOSIS — R05.9 COUGH: ICD-10-CM

## 2021-05-05 DIAGNOSIS — E87.6 HYPOKALEMIA: ICD-10-CM

## 2021-05-05 DIAGNOSIS — R74.8 ELEVATED LIVER ENZYMES: ICD-10-CM

## 2021-05-05 DIAGNOSIS — J45.30 MILD PERSISTENT ASTHMA WITHOUT COMPLICATION: ICD-10-CM

## 2021-05-05 DIAGNOSIS — F32.A ANXIETY AND DEPRESSION: ICD-10-CM

## 2021-05-05 DIAGNOSIS — F41.9 ANXIETY AND DEPRESSION: ICD-10-CM

## 2021-05-05 DIAGNOSIS — M25.50 ARTHRALGIA, UNSPECIFIED JOINT: ICD-10-CM

## 2021-05-05 PROBLEM — R78.81 BACTEREMIA DUE TO GRAM-NEGATIVE BACTERIA: Status: RESOLVED | Noted: 2021-02-13 | Resolved: 2021-05-05

## 2021-05-05 PROBLEM — S82.891A CLOSED RIGHT ANKLE FRACTURE: Status: RESOLVED | Noted: 2021-02-10 | Resolved: 2021-05-05

## 2021-05-05 LAB
ALBUMIN SERPL BCP-MCNC: 4.4 G/DL (ref 3.2–4.9)
ALBUMIN/GLOB SERPL: 1.3 G/DL
ALP SERPL-CCNC: 108 U/L (ref 30–99)
ALT SERPL-CCNC: 9 U/L (ref 2–50)
ANION GAP SERPL CALC-SCNC: 13 MMOL/L (ref 7–16)
AST SERPL-CCNC: 13 U/L (ref 12–45)
BILIRUB SERPL-MCNC: 0.3 MG/DL (ref 0.1–1.5)
BUN SERPL-MCNC: 16 MG/DL (ref 8–22)
CALCIUM SERPL-MCNC: 10.2 MG/DL (ref 8.5–10.5)
CHLORIDE SERPL-SCNC: 102 MMOL/L (ref 96–112)
CO2 SERPL-SCNC: 24 MMOL/L (ref 20–33)
CREAT SERPL-MCNC: 0.74 MG/DL (ref 0.5–1.4)
FERRITIN SERPL-MCNC: 11.6 NG/ML (ref 10–291)
GLOBULIN SER CALC-MCNC: 3.4 G/DL (ref 1.9–3.5)
GLUCOSE SERPL-MCNC: 86 MG/DL (ref 65–99)
IRON SATN MFR SERPL: 7 % (ref 15–55)
IRON SERPL-MCNC: 33 UG/DL (ref 40–170)
POTASSIUM SERPL-SCNC: 4.8 MMOL/L (ref 3.6–5.5)
PROT SERPL-MCNC: 7.8 G/DL (ref 6–8.2)
SARS-COV-2 AB SERPL QL IA: NORMAL
SODIUM SERPL-SCNC: 139 MMOL/L (ref 135–145)
TIBC SERPL-MCNC: 459 UG/DL (ref 250–450)
UIBC SERPL-MCNC: 426 UG/DL (ref 110–370)

## 2021-05-05 PROCEDURE — 71046 X-RAY EXAM CHEST 2 VIEWS: CPT

## 2021-05-05 PROCEDURE — 83540 ASSAY OF IRON: CPT

## 2021-05-05 PROCEDURE — 99214 OFFICE O/P EST MOD 30 MIN: CPT | Mod: CS | Performed by: INTERNAL MEDICINE

## 2021-05-05 PROCEDURE — 83550 IRON BINDING TEST: CPT

## 2021-05-05 PROCEDURE — 86769 SARS-COV-2 COVID-19 ANTIBODY: CPT

## 2021-05-05 PROCEDURE — 82728 ASSAY OF FERRITIN: CPT

## 2021-05-05 PROCEDURE — 80053 COMPREHEN METABOLIC PANEL: CPT

## 2021-05-05 PROCEDURE — 82306 VITAMIN D 25 HYDROXY: CPT

## 2021-05-05 RX ORDER — MELOXICAM 15 MG/1
15 TABLET ORAL DAILY
Qty: 30 TABLET | Refills: 11 | Status: SHIPPED | OUTPATIENT
Start: 2021-05-05 | End: 2021-05-12

## 2021-05-05 RX ORDER — TRAZODONE HYDROCHLORIDE 50 MG/1
50 TABLET ORAL NIGHTLY PRN
Qty: 30 TABLET | Refills: 11 | Status: SHIPPED | OUTPATIENT
Start: 2021-05-05 | End: 2022-04-19

## 2021-05-05 RX ORDER — FLUOXETINE 10 MG/1
10 CAPSULE ORAL DAILY
Qty: 30 CAPSULE | Refills: 11 | Status: SHIPPED | OUTPATIENT
Start: 2021-05-05 | End: 2022-04-19

## 2021-05-05 ASSESSMENT — ENCOUNTER SYMPTOMS
WHEEZING: 0
SHORTNESS OF BREATH: 0

## 2021-05-05 ASSESSMENT — FIBROSIS 4 INDEX: FIB4 SCORE: 2.14

## 2021-05-05 NOTE — PROGRESS NOTES
Established Patient Note   HPI:        Carie comes in today with complaint of hoarseness and coughing bouts over past couple months since hospitalization. She feels that albuterol helps somewhat. She also complains with difficulty sleeping. She complains of joint pain in multiple joints such as hands, knees, shoulders.    Past Medical History:   Diagnosis Date   • Anxiety and depression 1/23/2019   • Colon polyp 3/29/2019    Colonoscopy March 2019: 3mm descending colon polyp   • Diverticulosis of colon 1/23/2019    MRI abdomen Jan. 2019   • Generalized anxiety disorder    • Hypertriglyceridemia 1/23/2019   • Hypothyroid    • Low serum HDL 1/23/2019   • Menopause    • Vitamin B12 deficiency 1/23/2019       Current Outpatient Medications   Medication Sig Dispense Refill   • meloxicam (MOBIC) 15 MG tablet Take 1 tablet by mouth every day. 30 tablet 11   • traZODone (DESYREL) 50 MG Tab Take 1 tablet by mouth at bedtime as needed for Sleep. 30 tablet 11   • FLUoxetine (PROZAC) 10 MG Cap Take 1 capsule by mouth every day. 30 capsule 11   • Mometasone Furo-Formoterol Fum (DULERA) 100-5 MCG/ACT Aerosol Inhale 2 Puffs 2 times a day. 13 g 11   • ALPRAZolam (XANAX) 0.5 MG Tab Take 1 tablet by mouth 2 times a day as needed for Sleep or Anxiety for up to 30 days. 60 tablet 0   • albuterol 108 (90 Base) MCG/ACT Aero Soln inhalation aerosol INHALE 2 PUFFS EVERY FOUR HOURS AS NEEDED FOR SHORTNESS OF BREATH. 1 Each 3   • NABOR 0.0375 MG/24HR patch Place 1 Patch on the skin two times a week. Applys on mondays and thursdays 24 Patch 3   • lidocaine (LIDODERM) 5 % Patch Place 1 Patch on the skin every 24 hours. 10 Patch 1   • guaiFENesin ER (MUCINEX) 600 MG TABLET SR 12 HR Take 600 mg by mouth 2 times a day as needed.     • traMADol (ULTRAM) 50 MG Tab Take  mg by mouth every four hours as needed.     • progesterone (PROMETRIUM) 100 MG Cap Take 1 Cap by mouth every day. 90 Cap 3   • SYNTHROID 88 MCG Tab TAKE ONE TABLET BY MOUTH  "EVERY DAY IN THE MORNING ON AN EMPTY STOMACH (Patient taking differently: Take 88 mcg by mouth every morning. TAKE ONE TABLET BY MOUTH EVERY DAY IN THE MORNING ON AN EMPTY STOMACH) 90 Tab 3   • VITAMIN D PO Take 1 Cap by mouth every day. 100 Cap 3   • Omega-3 Fatty Acids (FISH OIL) 1000 MG Cap capsule Take 1,000 mg by mouth every day.     • Multiple Vitamins-Minerals (OCUVITE-LUTEIN) Tab Take 1 tablet by mouth every day.       No current facility-administered medications for this visit.         Allergies   Allergen Reactions   • Sulfa Drugs Anaphylaxis   • Norco [Apap-Fd&C Yellow #10 Al Cervantes-Hydrocodone]      itching   • Zofran [Dextrose-Ondansetron] Unspecified     Migraine tolerates Injection/ IV   • Lorazepam Anxiety and Unspecified     \"IT MAKES ME CRY\"         Social History     Tobacco Use   • Smoking status: Current Every Day Smoker     Packs/day: 0.50     Years: 30.00     Pack years: 15.00     Types: Cigarettes   • Smokeless tobacco: Never Used   Substance Use Topics   • Alcohol use: Yes     Comment: 4-12 mini bottles per day   • Drug use: No       Past Surgical History:   Procedure Laterality Date   • PB UPPER GI ENDOSCOPY,DIAGNOSIS N/A 2/12/2021    Procedure: GASTROSCOPY;  Surgeon: Kasi Chan M.D.;  Location: SURGERY SAME DAY [facility];  Service: Gastroenterology   • PB UPPER GI ENDOSCOPY,BIOPSY N/A 2/12/2021    Procedure: GASTROSCOPY, WITH BIOPSY;  Surgeon: Kasi Chan M.D.;  Location: SURGERY SAME DAY [facility];  Service: Gastroenterology   • WOUND CLOSURE ORTHO Left 9/24/2019    Procedure: CLOSURE, WOUND, ORTHO - LEG W/WOUND VAC REMOVAL;  Surgeon: Paolo Odell M.D.;  Location: [facility];  Service: Orthopedics   • IRRIGATION & DEBRIDEMENT ORTHO Left 9/21/2019    Procedure: IRRIGATION AND DEBRIDEMENT, WOUND - FOR PROXIMAL TIBIA HEMATOMA EVACUATION AND WOUND VAC APPLICATION;  Surgeon: Paolo Odell M.D.;  Location: [facility];  Service: Orthopedics   • OTHER ORTHOPEDIC " "SURGERY Right 2017   • ROBOTIC LAPAROSCOPIC CHOLECYSTECTOMY  2017   • UMBILICAL HERNIA REPAIR  2017   • INGUINAL HERNIA REPAIR BILATERAL  1999    with Mesh   • TONSILLECTOMY AND ADENOIDECTOMY  1976   • APPENDECTOMY     • MAMMOPLASTY AUGMENTATION Bilateral         Review of Systems   Respiratory: Negative for shortness of breath and wheezing.        Ambulatory Vitals  BP (!) 90/60 (BP Location: Left arm, Patient Position: Sitting, BP Cuff Size: Adult)   Pulse 78   Temp 37.1 °C (98.7 °F) (Temporal)   Resp 14   Ht 1.778 m (5' 10\")   Wt 76.2 kg (168 lb)   SpO2 97%   BMI 24.11 kg/m²     Physical Exam   Constitutional: She is well-developed, well-nourished, and in no distress. No distress.   Cardiovascular: Normal rate, regular rhythm and normal heart sounds. Exam reveals no gallop and no friction rub.   No murmur heard.  Pulmonary/Chest: Effort normal. She has no wheezes. She has no rales.   Abdominal: Soft. She exhibits no distension and no mass. There is no abdominal tenderness.   Musculoskeletal:         General: No edema.   Neurological: She is alert. No cranial nerve deficit. Gait normal.   Skin: She is not diaphoretic.     Spirometry:  Printer is not working properly  FEV1 63%    Assessment and Plan:     1. Mild persistent asthma without complication  Mometasone Furo-Formoterol Fum (DULERA) 100-5 MCG/ACT Aerosol   2. Cough  DX-CHEST-2 VIEWS   3. Insomnia, unspecified type  traZODone (DESYREL) 50 MG Tab   4. Arthralgia, unspecified joint  meloxicam (MOBIC) 15 MG tablet   5. Anxiety and depression  FLUoxetine (PROZAC) 10 MG Cap     Will Rx mobic 15 mg qd prn arthralgia. Decrease prozac from 20 mg to 10 mg qd. Start dulera inhaler bid as discussed. Also discussed may need complete PFTs in future to decipher asthma vs COPD; she wishes to wait on complete PFTs for now.    Herbie Joshi M.D.  "

## 2021-05-07 LAB — 25(OH)D3 SERPL-MCNC: 54 NG/ML (ref 30–80)

## 2021-05-12 ENCOUNTER — TELEPHONE (OUTPATIENT)
Dept: INTERNAL MEDICINE | Facility: IMAGING CENTER | Age: 51
End: 2021-05-12

## 2021-05-12 DIAGNOSIS — Z11.3 SCREEN FOR STD (SEXUALLY TRANSMITTED DISEASE): ICD-10-CM

## 2021-05-12 DIAGNOSIS — E61.1 IRON DEFICIENCY: ICD-10-CM

## 2021-05-12 RX ORDER — NABUMETONE 500 MG/1
1000 TABLET, FILM COATED ORAL DAILY
Qty: 180 TABLET | Refills: 1 | Status: ON HOLD | OUTPATIENT
Start: 2021-05-12 | End: 2022-04-14

## 2021-05-12 RX ORDER — FERROUS SULFATE 325(65) MG
650 TABLET ORAL
Status: ON HOLD | COMMUNITY
End: 2022-11-03

## 2021-05-12 NOTE — TELEPHONE ENCOUNTER
----- Message from Herbie Joshi M.D. sent at 5/11/2021  9:57 AM PDT -----  Regarding: FW: Multiple Questions  Elizabeth:       Carie has wanted to avoid NSAIDs due to risk of GI bleed which I think she may have had previously (if you can check with her). If this is accurate my guess is that it was probably alcohol related. I had her try meloxicam since it is somewhat SOW-2. I did not Rx celebrex which is SOW-2 due to her allergy to sulfa. We could try relafen 500 mg 2 tabs qd but this is more SOW-1 which may increase risk of GI bleed with long term use. She should start iron sulfate 325 mg qd and have her do iron/TIBC/%saturation and ferritin in 3 months. Regarding the MHA-TP we could have her do and RPR and if this is positive have her see ID for recommendation.  Dr. PRUETT  ----- Message -----  From: Mary Guillermo R.N.  Sent: 5/10/2021   4:23 PM PDT  To: Herbie Joshi M.D.  Subject: Multiple Questions                               Dr Ballard,    1.  Reaction to meloxicam - Wilma had vomiting & diarrhea after taking meloxicam; she is asking for an alternative for her joint pain.  2.  She is asking if she should take iron supplement following her last iron studies?  3.  She donates blood regularly at [facility].  The last time she was screened, she had a positive MHA-TP.  She was told it was likely a false positive.  This looks like syphilis screen.  Does she need an RPR?  Thank you,  Elizabeth

## 2021-05-26 DIAGNOSIS — F41.9 ANXIETY AND DEPRESSION: ICD-10-CM

## 2021-05-26 DIAGNOSIS — F32.A ANXIETY AND DEPRESSION: ICD-10-CM

## 2021-05-27 RX ORDER — ALPRAZOLAM 0.5 MG/1
TABLET ORAL
Qty: 60 TABLET | Refills: 0 | Status: SHIPPED | OUTPATIENT
Start: 2021-05-27 | End: 2021-06-21 | Stop reason: SDUPTHER

## 2021-06-04 ENCOUNTER — APPOINTMENT (OUTPATIENT)
Dept: RADIOLOGY | Facility: MEDICAL CENTER | Age: 51
End: 2021-06-04
Attending: OBSTETRICS & GYNECOLOGY
Payer: COMMERCIAL

## 2021-06-21 ENCOUNTER — NON-PROVIDER VISIT (OUTPATIENT)
Dept: INTERNAL MEDICINE | Facility: IMAGING CENTER | Age: 51
End: 2021-06-21
Payer: COMMERCIAL

## 2021-06-21 ENCOUNTER — HOSPITAL ENCOUNTER (OUTPATIENT)
Facility: MEDICAL CENTER | Age: 51
End: 2021-06-21
Attending: INTERNAL MEDICINE
Payer: COMMERCIAL

## 2021-06-21 DIAGNOSIS — M25.50 ARTHRALGIA, UNSPECIFIED JOINT: ICD-10-CM

## 2021-06-21 DIAGNOSIS — F41.9 ANXIETY AND DEPRESSION: ICD-10-CM

## 2021-06-21 DIAGNOSIS — M25.50 ARTHRALGIA OF MULTIPLE SITES, BILATERAL: ICD-10-CM

## 2021-06-21 DIAGNOSIS — F32.A ANXIETY AND DEPRESSION: ICD-10-CM

## 2021-06-21 DIAGNOSIS — E87.6 HYPOKALEMIA: ICD-10-CM

## 2021-06-21 DIAGNOSIS — Z01.89 ENCOUNTER FOR ROUTINE LABORATORY TESTING: ICD-10-CM

## 2021-06-21 DIAGNOSIS — Z11.3 SCREEN FOR STD (SEXUALLY TRANSMITTED DISEASE): ICD-10-CM

## 2021-06-21 DIAGNOSIS — E61.1 IRON DEFICIENCY: ICD-10-CM

## 2021-06-21 DIAGNOSIS — R74.8 ELEVATED LIVER ENZYMES: ICD-10-CM

## 2021-06-21 LAB
CRP SERPL HS-MCNC: <0.3 MG/DL (ref 0–0.75)
IRON SATN MFR SERPL: 34 % (ref 15–55)
IRON SERPL-MCNC: 129 UG/DL (ref 40–170)
TIBC SERPL-MCNC: 378 UG/DL (ref 250–450)
TREPONEMA PALLIDUM IGG+IGM AB [PRESENCE] IN SERUM OR PLASMA BY IMMUNOASSAY: NORMAL
UIBC SERPL-MCNC: 249 UG/DL (ref 110–370)

## 2021-06-21 PROCEDURE — 83550 IRON BINDING TEST: CPT

## 2021-06-21 PROCEDURE — 86200 CCP ANTIBODY: CPT

## 2021-06-21 PROCEDURE — 83540 ASSAY OF IRON: CPT

## 2021-06-21 PROCEDURE — 99999 PR NO CHARGE: CPT | Performed by: INTERNAL MEDICINE

## 2021-06-21 PROCEDURE — 85652 RBC SED RATE AUTOMATED: CPT

## 2021-06-21 PROCEDURE — 86780 TREPONEMA PALLIDUM: CPT

## 2021-06-21 PROCEDURE — 86431 RHEUMATOID FACTOR QUANT: CPT

## 2021-06-21 PROCEDURE — 86140 C-REACTIVE PROTEIN: CPT

## 2021-06-21 PROCEDURE — 80053 COMPREHEN METABOLIC PANEL: CPT

## 2021-06-21 PROCEDURE — 86038 ANTINUCLEAR ANTIBODIES: CPT

## 2021-06-21 RX ORDER — ALPRAZOLAM 0.5 MG/1
TABLET ORAL
Qty: 60 TABLET | Refills: 0 | Status: SHIPPED | OUTPATIENT
Start: 2021-06-21 | End: 2021-07-27 | Stop reason: SDUPTHER

## 2021-06-22 LAB
ALBUMIN SERPL BCP-MCNC: 4.3 G/DL (ref 3.2–4.9)
ALBUMIN/GLOB SERPL: 1.5 G/DL
ALP SERPL-CCNC: 104 U/L (ref 30–99)
ALT SERPL-CCNC: 18 U/L (ref 2–50)
ANION GAP SERPL CALC-SCNC: 12 MMOL/L (ref 7–16)
AST SERPL-CCNC: 23 U/L (ref 12–45)
BILIRUB SERPL-MCNC: 0.2 MG/DL (ref 0.1–1.5)
BUN SERPL-MCNC: 15 MG/DL (ref 8–22)
CALCIUM SERPL-MCNC: 9.7 MG/DL (ref 8.5–10.5)
CHLORIDE SERPL-SCNC: 105 MMOL/L (ref 96–112)
CO2 SERPL-SCNC: 25 MMOL/L (ref 20–33)
CREAT SERPL-MCNC: 0.71 MG/DL (ref 0.5–1.4)
ERYTHROCYTE [SEDIMENTATION RATE] IN BLOOD BY WESTERGREN METHOD: 6 MM/HOUR (ref 0–25)
GLOBULIN SER CALC-MCNC: 2.9 G/DL (ref 1.9–3.5)
GLUCOSE SERPL-MCNC: 111 MG/DL (ref 65–99)
POTASSIUM SERPL-SCNC: 4.2 MMOL/L (ref 3.6–5.5)
PROT SERPL-MCNC: 7.2 G/DL (ref 6–8.2)
SODIUM SERPL-SCNC: 142 MMOL/L (ref 135–145)

## 2021-06-23 LAB
CCP IGG SERPL-ACNC: 4 UNITS (ref 0–19)
RHEUMATOID FACT SER NEPH-ACNC: 12 IU/ML (ref 0–14)

## 2021-06-24 LAB — NUCLEAR IGG SER QL IA: NORMAL

## 2021-06-25 DIAGNOSIS — E03.9 HYPOTHYROIDISM (ACQUIRED): ICD-10-CM

## 2021-06-25 DIAGNOSIS — E78.1 HYPERTRIGLYCERIDEMIA: ICD-10-CM

## 2021-06-25 DIAGNOSIS — Z86.39 HISTORY OF NON ANEMIC VITAMIN B12 DEFICIENCY: ICD-10-CM

## 2021-06-25 DIAGNOSIS — Z86.39 HISTORY OF VITAMIN D DEFICIENCY: ICD-10-CM

## 2021-06-25 DIAGNOSIS — Z86.39 HISTORY OF IRON DEFICIENCY: ICD-10-CM

## 2021-06-25 DIAGNOSIS — R74.8 ELEVATED ALKALINE PHOSPHATASE LEVEL: ICD-10-CM

## 2021-06-25 NOTE — PROGRESS NOTES
Reviewed recent lab results with Carie. She states she had recent iron infusion and is feeling more energy. She also states she has been sober for 5 months. Advised checking CBC, iron/TIBC/%sat, ferritin and CMP in 6 months. If she is still having joint pain can repeat rheumatoid panel; she agrees.

## 2021-06-29 ENCOUNTER — HOSPITAL ENCOUNTER (OUTPATIENT)
Dept: RADIOLOGY | Facility: MEDICAL CENTER | Age: 51
End: 2021-06-29
Attending: OBSTETRICS & GYNECOLOGY
Payer: COMMERCIAL

## 2021-06-29 DIAGNOSIS — N83.201 BILATERAL OVARIAN CYSTS: ICD-10-CM

## 2021-06-29 DIAGNOSIS — N83.202 BILATERAL OVARIAN CYSTS: ICD-10-CM

## 2021-06-29 PROCEDURE — 76830 TRANSVAGINAL US NON-OB: CPT

## 2021-07-07 NOTE — DISCHARGE SUMMARY
DATE OF ADMISSION:  09/21/2019   DATE OF DISCHARGE:  09/24/2019     HOSPITAL COURSE:  The patient admitted to the hospital for I and D and VAC   placement, was kept over a 3-day period and then eventually taken back to the   operating room for closure.     FINAL DISCHARGE DIAGNOSIS:  Soft tissue wound managed surgically.     DISPOSITION:  To be seen in [facility] in a week to 10 days.  Call   the office for any interim period problems.        ______________________________  MD CASSIA Lawrence/REYNALDO/FINN    DD:  07/06/2021 14:55  DT:  07/06/2021 16:56    Job#:  [id number]

## 2021-07-26 ENCOUNTER — HOSPITAL ENCOUNTER (OUTPATIENT)
Dept: LAB | Facility: MEDICAL CENTER | Age: 51
End: 2021-07-26
Attending: INTERNAL MEDICINE
Payer: COMMERCIAL

## 2021-07-26 LAB — CANCER AG125 SERPL-ACNC: 3.4 U/ML (ref 0–35)

## 2021-07-26 PROCEDURE — 36415 COLL VENOUS BLD VENIPUNCTURE: CPT

## 2021-07-26 PROCEDURE — 86304 IMMUNOASSAY TUMOR CA 125: CPT

## 2021-07-27 DIAGNOSIS — F32.A ANXIETY AND DEPRESSION: ICD-10-CM

## 2021-07-27 DIAGNOSIS — F41.9 ANXIETY AND DEPRESSION: ICD-10-CM

## 2021-07-27 RX ORDER — ALPRAZOLAM 0.5 MG/1
TABLET ORAL
Qty: 60 TABLET | Refills: 0 | Status: SHIPPED | OUTPATIENT
Start: 2021-07-27 | End: 2021-08-27 | Stop reason: SDUPTHER

## 2021-07-28 ENCOUNTER — PRE-ADMISSION TESTING (OUTPATIENT)
Dept: ADMISSIONS | Facility: MEDICAL CENTER | Age: 51
End: 2021-07-28
Attending: OBSTETRICS & GYNECOLOGY
Payer: COMMERCIAL

## 2021-07-28 DIAGNOSIS — Z01.812 PRE-OPERATIVE LABORATORY EXAMINATION: ICD-10-CM

## 2021-07-28 LAB
ANION GAP SERPL CALC-SCNC: 12 MMOL/L (ref 7–16)
APPEARANCE UR: CLEAR
B-HCG SERPL-ACNC: <1 MIU/ML (ref 0–5)
BASOPHILS # BLD AUTO: 0.4 % (ref 0–1.8)
BASOPHILS # BLD: 0.04 K/UL (ref 0–0.12)
BILIRUB UR QL STRIP.AUTO: NEGATIVE
BUN SERPL-MCNC: 17 MG/DL (ref 8–22)
CALCIUM SERPL-MCNC: 9.9 MG/DL (ref 8.5–10.5)
CHLORIDE SERPL-SCNC: 102 MMOL/L (ref 96–112)
CO2 SERPL-SCNC: 25 MMOL/L (ref 20–33)
COLOR UR: YELLOW
COMMENT 1642: NORMAL
CREAT SERPL-MCNC: 0.54 MG/DL (ref 0.5–1.4)
EOSINOPHIL # BLD AUTO: 0.1 K/UL (ref 0–0.51)
EOSINOPHIL NFR BLD: 0.9 % (ref 0–6.9)
ERYTHROCYTE [DISTWIDTH] IN BLOOD BY AUTOMATED COUNT: 66.3 FL (ref 35.9–50)
GLUCOSE SERPL-MCNC: 92 MG/DL (ref 65–99)
GLUCOSE UR STRIP.AUTO-MCNC: NEGATIVE MG/DL
HCT VFR BLD AUTO: 47.9 % (ref 37–47)
HGB BLD-MCNC: 15.7 G/DL (ref 12–16)
IMM GRANULOCYTES # BLD AUTO: 0.03 K/UL (ref 0–0.11)
IMM GRANULOCYTES NFR BLD AUTO: 0.3 % (ref 0–0.9)
KETONES UR STRIP.AUTO-MCNC: NEGATIVE MG/DL
LEUKOCYTE ESTERASE UR QL STRIP.AUTO: NEGATIVE
LYMPHOCYTES # BLD AUTO: 3.97 K/UL (ref 1–4.8)
LYMPHOCYTES NFR BLD: 37 % (ref 22–41)
MCH RBC QN AUTO: 28.9 PG (ref 27–33)
MCHC RBC AUTO-ENTMCNC: 32.8 G/DL (ref 33.6–35)
MCV RBC AUTO: 88.2 FL (ref 81.4–97.8)
MICRO URNS: NORMAL
MONOCYTES # BLD AUTO: 0.67 K/UL (ref 0–0.85)
MONOCYTES NFR BLD AUTO: 6.3 % (ref 0–13.4)
MORPHOLOGY BLD-IMP: NORMAL
NEUTROPHILS # BLD AUTO: 5.91 K/UL (ref 2–7.15)
NEUTROPHILS NFR BLD: 55.1 % (ref 44–72)
NITRITE UR QL STRIP.AUTO: NEGATIVE
NRBC # BLD AUTO: 0 K/UL
NRBC BLD-RTO: 0 /100 WBC
PH UR STRIP.AUTO: 6.5 [PH] (ref 5–8)
PLATELET # BLD AUTO: 333 K/UL (ref 164–446)
PLATELET BLD QL SMEAR: NORMAL
PMV BLD AUTO: 9.8 FL (ref 9–12.9)
POTASSIUM SERPL-SCNC: 4.3 MMOL/L (ref 3.6–5.5)
PROT UR QL STRIP: NEGATIVE MG/DL
RBC # BLD AUTO: 5.43 M/UL (ref 4.2–5.4)
RBC BLD AUTO: NORMAL
RBC UR QL AUTO: NEGATIVE
SARS-COV-2 RNA RESP QL NAA+PROBE: NOTDETECTED
SODIUM SERPL-SCNC: 139 MMOL/L (ref 135–145)
SP GR UR STRIP.AUTO: 1.01
SPECIMEN SOURCE: NORMAL
UROBILINOGEN UR STRIP.AUTO-MCNC: 0.2 MG/DL
WBC # BLD AUTO: 10.7 K/UL (ref 4.8–10.8)

## 2021-07-28 PROCEDURE — U0003 INFECTIOUS AGENT DETECTION BY NUCLEIC ACID (DNA OR RNA); SEVERE ACUTE RESPIRATORY SYNDROME CORONAVIRUS 2 (SARS-COV-2) (CORONAVIRUS DISEASE [COVID-19]), AMPLIFIED PROBE TECHNIQUE, MAKING USE OF HIGH THROUGHPUT TECHNOLOGIES AS DESCRIBED BY CMS-2020-01-R: HCPCS

## 2021-07-28 PROCEDURE — 36415 COLL VENOUS BLD VENIPUNCTURE: CPT

## 2021-07-28 PROCEDURE — C9803 HOPD COVID-19 SPEC COLLECT: HCPCS

## 2021-07-28 PROCEDURE — U0005 INFEC AGEN DETEC AMPLI PROBE: HCPCS

## 2021-07-28 PROCEDURE — 80048 BASIC METABOLIC PNL TOTAL CA: CPT

## 2021-07-28 PROCEDURE — 84702 CHORIONIC GONADOTROPIN TEST: CPT

## 2021-07-28 PROCEDURE — 81003 URINALYSIS AUTO W/O SCOPE: CPT

## 2021-07-28 PROCEDURE — 85025 COMPLETE CBC W/AUTO DIFF WBC: CPT

## 2021-07-28 ASSESSMENT — FIBROSIS 4 INDEX: FIB4 SCORE: 1.02

## 2021-08-03 ENCOUNTER — HOSPITAL ENCOUNTER (OUTPATIENT)
Facility: MEDICAL CENTER | Age: 51
End: 2021-08-03
Attending: OBSTETRICS & GYNECOLOGY | Admitting: OBSTETRICS & GYNECOLOGY
Payer: COMMERCIAL

## 2021-08-03 ENCOUNTER — ANESTHESIA EVENT (OUTPATIENT)
Dept: SURGERY | Facility: MEDICAL CENTER | Age: 51
End: 2021-08-03
Payer: COMMERCIAL

## 2021-08-03 ENCOUNTER — ANESTHESIA (OUTPATIENT)
Dept: SURGERY | Facility: MEDICAL CENTER | Age: 51
End: 2021-08-03
Payer: COMMERCIAL

## 2021-08-03 VITALS
DIASTOLIC BLOOD PRESSURE: 93 MMHG | RESPIRATION RATE: 18 BRPM | HEART RATE: 64 BPM | BODY MASS INDEX: 24 KG/M2 | OXYGEN SATURATION: 94 % | WEIGHT: 162.04 LBS | HEIGHT: 69 IN | TEMPERATURE: 97.6 F | SYSTOLIC BLOOD PRESSURE: 116 MMHG

## 2021-08-03 LAB — PATHOLOGY CONSULT NOTE: NORMAL

## 2021-08-03 PROCEDURE — 501838 HCHG SUTURE GENERAL: Performed by: OBSTETRICS & GYNECOLOGY

## 2021-08-03 PROCEDURE — 501664 HCHG TUBING, FILTER STRYKER: Performed by: OBSTETRICS & GYNECOLOGY

## 2021-08-03 PROCEDURE — 160041 HCHG SURGERY MINUTES - EA ADDL 1 MIN LEVEL 4: Performed by: OBSTETRICS & GYNECOLOGY

## 2021-08-03 PROCEDURE — 88305 TISSUE EXAM BY PATHOLOGIST: CPT

## 2021-08-03 PROCEDURE — 160025 RECOVERY II MINUTES (STATS): Performed by: OBSTETRICS & GYNECOLOGY

## 2021-08-03 PROCEDURE — 700111 HCHG RX REV CODE 636 W/ 250 OVERRIDE (IP): Performed by: ANESTHESIOLOGY

## 2021-08-03 PROCEDURE — 501399 HCHG SPECIMAN BAG, ENDO CATC: Performed by: OBSTETRICS & GYNECOLOGY

## 2021-08-03 PROCEDURE — 500002 HCHG ADHESIVE, DERMABOND: Performed by: OBSTETRICS & GYNECOLOGY

## 2021-08-03 PROCEDURE — 700105 HCHG RX REV CODE 258: Performed by: OBSTETRICS & GYNECOLOGY

## 2021-08-03 PROCEDURE — 160048 HCHG OR STATISTICAL LEVEL 1-5: Performed by: OBSTETRICS & GYNECOLOGY

## 2021-08-03 PROCEDURE — 501581 HCHG TROCAR: Performed by: OBSTETRICS & GYNECOLOGY

## 2021-08-03 PROCEDURE — 160009 HCHG ANES TIME/MIN: Performed by: OBSTETRICS & GYNECOLOGY

## 2021-08-03 PROCEDURE — 160029 HCHG SURGERY MINUTES - 1ST 30 MINS LEVEL 4: Performed by: OBSTETRICS & GYNECOLOGY

## 2021-08-03 PROCEDURE — 160035 HCHG PACU - 1ST 60 MINS PHASE I: Performed by: OBSTETRICS & GYNECOLOGY

## 2021-08-03 PROCEDURE — 160002 HCHG RECOVERY MINUTES (STAT): Performed by: OBSTETRICS & GYNECOLOGY

## 2021-08-03 PROCEDURE — 160046 HCHG PACU - 1ST 60 MINS PHASE II: Performed by: OBSTETRICS & GYNECOLOGY

## 2021-08-03 PROCEDURE — 501568 HCHG TROCAR, BLUNTPORT 12MM: Performed by: OBSTETRICS & GYNECOLOGY

## 2021-08-03 PROCEDURE — 88307 TISSUE EXAM BY PATHOLOGIST: CPT

## 2021-08-03 PROCEDURE — 700101 HCHG RX REV CODE 250: Performed by: ANESTHESIOLOGY

## 2021-08-03 PROCEDURE — 500886 HCHG PACK, LAPAROSCOPY: Performed by: OBSTETRICS & GYNECOLOGY

## 2021-08-03 PROCEDURE — A9270 NON-COVERED ITEM OR SERVICE: HCPCS | Performed by: ANESTHESIOLOGY

## 2021-08-03 PROCEDURE — 88112 CYTOPATH CELL ENHANCE TECH: CPT

## 2021-08-03 PROCEDURE — 700102 HCHG RX REV CODE 250 W/ 637 OVERRIDE(OP): Performed by: ANESTHESIOLOGY

## 2021-08-03 RX ORDER — EPINEPHRINE 1 MG/ML(1)
AMPUL (ML) INJECTION
Status: DISCONTINUED
Start: 2021-08-03 | End: 2021-08-03 | Stop reason: HOSPADM

## 2021-08-03 RX ORDER — PHENYLEPHRINE HCL IN 0.9% NACL 0.5 MG/5ML
SYRINGE (ML) INTRAVENOUS PRN
Status: DISCONTINUED | OUTPATIENT
Start: 2021-08-03 | End: 2021-08-03 | Stop reason: SURG

## 2021-08-03 RX ORDER — MEPERIDINE HYDROCHLORIDE 25 MG/ML
12.5 INJECTION INTRAMUSCULAR; INTRAVENOUS; SUBCUTANEOUS
Status: DISCONTINUED | OUTPATIENT
Start: 2021-08-03 | End: 2021-08-03 | Stop reason: HOSPADM

## 2021-08-03 RX ORDER — OXYCODONE HYDROCHLORIDE AND ACETAMINOPHEN 5; 325 MG/1; MG/1
2 TABLET ORAL
Status: COMPLETED | OUTPATIENT
Start: 2021-08-03 | End: 2021-08-03

## 2021-08-03 RX ORDER — SODIUM CHLORIDE, SODIUM LACTATE, POTASSIUM CHLORIDE, CALCIUM CHLORIDE 600; 310; 30; 20 MG/100ML; MG/100ML; MG/100ML; MG/100ML
INJECTION, SOLUTION INTRAVENOUS CONTINUOUS
Status: DISCONTINUED | OUTPATIENT
Start: 2021-08-03 | End: 2021-08-03 | Stop reason: HOSPADM

## 2021-08-03 RX ORDER — SUCCINYLCHOLINE CHLORIDE 20 MG/ML
INJECTION INTRAMUSCULAR; INTRAVENOUS PRN
Status: DISCONTINUED | OUTPATIENT
Start: 2021-08-03 | End: 2021-08-03 | Stop reason: SURG

## 2021-08-03 RX ORDER — DEXAMETHASONE SODIUM PHOSPHATE 4 MG/ML
INJECTION, SOLUTION INTRA-ARTICULAR; INTRALESIONAL; INTRAMUSCULAR; INTRAVENOUS; SOFT TISSUE PRN
Status: DISCONTINUED | OUTPATIENT
Start: 2021-08-03 | End: 2021-08-03 | Stop reason: SURG

## 2021-08-03 RX ORDER — MIDAZOLAM HYDROCHLORIDE 1 MG/ML
INJECTION INTRAMUSCULAR; INTRAVENOUS PRN
Status: DISCONTINUED | OUTPATIENT
Start: 2021-08-03 | End: 2021-08-03 | Stop reason: SURG

## 2021-08-03 RX ORDER — DEXAMETHASONE SODIUM PHOSPHATE 4 MG/ML
INJECTION, SOLUTION INTRA-ARTICULAR; INTRALESIONAL; INTRAMUSCULAR; INTRAVENOUS; SOFT TISSUE PRN
Status: DISCONTINUED | OUTPATIENT
Start: 2021-08-03 | End: 2021-08-03

## 2021-08-03 RX ORDER — ONDANSETRON 2 MG/ML
INJECTION INTRAMUSCULAR; INTRAVENOUS PRN
Status: DISCONTINUED | OUTPATIENT
Start: 2021-08-03 | End: 2021-08-03

## 2021-08-03 RX ORDER — MIDAZOLAM HYDROCHLORIDE 1 MG/ML
1 INJECTION INTRAMUSCULAR; INTRAVENOUS
Status: DISCONTINUED | OUTPATIENT
Start: 2021-08-03 | End: 2021-08-03 | Stop reason: HOSPADM

## 2021-08-03 RX ORDER — DIPHENHYDRAMINE HYDROCHLORIDE 50 MG/ML
12.5 INJECTION INTRAMUSCULAR; INTRAVENOUS
Status: DISCONTINUED | OUTPATIENT
Start: 2021-08-03 | End: 2021-08-03 | Stop reason: HOSPADM

## 2021-08-03 RX ORDER — BUPIVACAINE HYDROCHLORIDE 2.5 MG/ML
INJECTION, SOLUTION EPIDURAL; INFILTRATION; INTRACAUDAL
Status: DISCONTINUED
Start: 2021-08-03 | End: 2021-08-03 | Stop reason: HOSPADM

## 2021-08-03 RX ORDER — HALOPERIDOL 5 MG/ML
1 INJECTION INTRAMUSCULAR
Status: DISCONTINUED | OUTPATIENT
Start: 2021-08-03 | End: 2021-08-03 | Stop reason: HOSPADM

## 2021-08-03 RX ORDER — LIDOCAINE HYDROCHLORIDE 20 MG/ML
INJECTION, SOLUTION EPIDURAL; INFILTRATION; INTRACAUDAL; PERINEURAL PRN
Status: DISCONTINUED | OUTPATIENT
Start: 2021-08-03 | End: 2021-08-03 | Stop reason: SURG

## 2021-08-03 RX ORDER — IBUPROFEN 600 MG/1
600 TABLET ORAL EVERY 6 HOURS PRN
Status: DISCONTINUED | OUTPATIENT
Start: 2021-08-03 | End: 2021-08-03 | Stop reason: HOSPADM

## 2021-08-03 RX ORDER — OXYCODONE HYDROCHLORIDE AND ACETAMINOPHEN 5; 325 MG/1; MG/1
1 TABLET ORAL
Status: COMPLETED | OUTPATIENT
Start: 2021-08-03 | End: 2021-08-03

## 2021-08-03 RX ADMIN — EPHEDRINE SULFATE 25 MG: 50 INJECTION, SOLUTION INTRAVENOUS at 07:29

## 2021-08-03 RX ADMIN — SODIUM CHLORIDE, POTASSIUM CHLORIDE, SODIUM LACTATE AND CALCIUM CHLORIDE: 600; 310; 30; 20 INJECTION, SOLUTION INTRAVENOUS at 06:44

## 2021-08-03 RX ADMIN — DEXAMETHASONE SODIUM PHOSPHATE 4 MG: 4 INJECTION, SOLUTION INTRA-ARTICULAR; INTRALESIONAL; INTRAMUSCULAR; INTRAVENOUS; SOFT TISSUE at 07:29

## 2021-08-03 RX ADMIN — MIDAZOLAM HYDROCHLORIDE 2 MG: 1 INJECTION, SOLUTION INTRAMUSCULAR; INTRAVENOUS at 07:29

## 2021-08-03 RX ADMIN — Medication 200 MCG: at 07:44

## 2021-08-03 RX ADMIN — LIDOCAINE HYDROCHLORIDE 50 MG: 20 INJECTION, SOLUTION EPIDURAL; INFILTRATION; INTRACAUDAL at 07:29

## 2021-08-03 RX ADMIN — FENTANYL CITRATE 100 MCG: 50 INJECTION, SOLUTION INTRAMUSCULAR; INTRAVENOUS at 07:29

## 2021-08-03 RX ADMIN — FENTANYL CITRATE 50 MCG: 50 INJECTION, SOLUTION INTRAMUSCULAR; INTRAVENOUS at 08:50

## 2021-08-03 RX ADMIN — PROPOFOL 200 MG: 10 INJECTION, EMULSION INTRAVENOUS at 07:29

## 2021-08-03 RX ADMIN — EPHEDRINE SULFATE 25 MG: 50 INJECTION, SOLUTION INTRAVENOUS at 07:43

## 2021-08-03 RX ADMIN — SUCCINYLCHOLINE CHLORIDE 100 MG: 20 INJECTION, SOLUTION INTRAMUSCULAR; INTRAVENOUS; PARENTERAL at 07:29

## 2021-08-03 RX ADMIN — OXYCODONE HYDROCHLORIDE AND ACETAMINOPHEN 2 TABLET: 5; 325 TABLET ORAL at 09:09

## 2021-08-03 ASSESSMENT — PAIN DESCRIPTION - PAIN TYPE
TYPE: SURGICAL PAIN
TYPE: ACUTE PAIN
TYPE: SURGICAL PAIN

## 2021-08-03 ASSESSMENT — FIBROSIS 4 INDEX: FIB4 SCORE: 0.86

## 2021-08-03 ASSESSMENT — PAIN SCALES - GENERAL: PAIN_LEVEL: 3

## 2021-08-03 NOTE — DISCHARGE INSTRUCTIONS
ACTIVITY: Rest and take it easy for the first 24 hours.  A responsible adult is recommended to remain with you during that time.  It is normal to feel sleepy.  We encourage you to not do anything that requires balance, judgment or coordination.    MILD FLU-LIKE SYMPTOMS ARE NORMAL. YOU MAY EXPERIENCE GENERALIZED MUSCLE ACHES, THROAT IRRITATION, HEADACHE AND/OR SOME NAUSEA.    FOR 24 HOURS DO NOT:  Drive, operate machinery or run household appliances.  Drink beer or alcoholic beverages.   Make important decisions or sign legal documents.    SPECIAL INSTRUCTIONS: see attached discharge instructions on pelviscopy    DIET: To avoid nausea, slowly advance diet as tolerated, avoiding spicy or greasy foods for the first day.  Add more substantial food to your diet according to your physician's instructions.  Babies can be fed formula or breast milk as soon as they are hungry.  INCREASE FLUIDS AND fiber    FOLLOW-UP APPOINTMENT:  A follow-up appointment should be arranged with your doctor in [phone]; call to schedule.    You should CALL YOUR PHYSICIAN if you develop:  Fever greater than 101 degrees F.  Pain not relieved by medication, or persistent nausea or vomiting.  Excessive bleeding (blood soaking through dressing) or unexpected drainage from the wound.  Extreme redness or swelling around the incision site, drainage of pus or foul smelling drainage.  Inability to urinate or empty your bladder within 8 hours.  Problems with breathing or chest pain.    You should call [phone] if you develop problems with breathing or chest pain.  If you are unable to contact your doctor or surgical center, you should go to the nearest emergency room or urgent care center.  Physician's telephone #: [phone]    If any questions arise, call your doctor.  If your doctor is not available, please feel free to call the Surgical Center at [phone]. The Contact Center is open Monday through Friday 7AM to 5PM and may speak to a nurse at  [phone], or toll free at [phone].     A registered nurse may call you a few days after your surgery to see how you are doing after your procedure.    MEDICATIONS: Resume taking daily medication.  Take prescribed pain medication with food.  If no medication is prescribed, you may take non-aspirin pain medication if needed.  PAIN MEDICATION CAN BE VERY CONSTIPATING.  Take a stool softener or laxative such as senokot, pericolace, or milk of magnesia if needed.    If your physician has prescribed pain medication that includes Acetaminophen (Tylenol), do not take additional Acetaminophen (Tylenol) while taking the prescribed medication.    Depression / Suicide Risk    As you are discharged from this [facility] facility, it is important to learn how to keep safe from harming yourself.    Recognize the warning signs:  · Abrupt changes in personality, positive or negative- including increase in energy   · Giving away possessions  · Change in eating patterns- significant weight changes-  positive or negative  · Change in sleeping patterns- unable to sleep or sleeping all the time   · Unwillingness or inability to communicate  · Depression  · Unusual sadness, discouragement and loneliness  · Talk of wanting to die  · Neglect of personal appearance   · Rebelliousness- reckless behavior  · Withdrawal from people/activities they love  · Confusion- inability to concentrate     If you or a loved one observes any of these behaviors or has concerns about self-harm, here's what you can do:  · Talk about it- your feelings and reasons for harming yourself  · Remove any means that you might use to hurt yourself (examples: pills, rope, extension cords, firearm)  · Get professional help from the community (Mental Health, Substance Abuse, psychological counseling)  · Do not be alone:Call your Safe Contact- someone whom you trust who will be there for you.  · Call your local CRISIS HOTLINE 771-4238 or 355-551-1878  · Call  your local Children's Mobile Crisis Response Team [place] (168) 893-9079 or www.[employer]  · Call the toll free National Suicide Prevention Hotlines   · National Suicide Prevention Lifeline 309-267-MQNE (3420)  · National Hope Line Network 800-SUICIDE (321-8464)

## 2021-08-03 NOTE — OR NURSING
0841 Patient arrived from OR post pelviscopy. Patient awake, denies pain, denies nausea. Surgical sites clean. Report received from Anesthesiologist and RN  0920 Criteria met to transition patient to stage 2 recovery.   0926 Patient tolerating po intake.   0940 Criteria me to discharge patient home.  0947 discharge instructions given to patient and son. Both verbalize understanding. Copy of discharge given to son.   0958 Patient escorted via w/c to responsible adult with all her personal belongings. Surgical sites clean dry intact at time of discharge.

## 2021-08-03 NOTE — OP REPORT
Laparoscopy Procedure Note     Name: Carie Santiago MRN [id number]   YOB: 1970   Date: 8/3/2021   Time: 8:37 AM   Pre-operative Diagnosis: Pelvic mass, Pelvic pain, Postmenopause  Post-operatIve Diagnosis: Same, extensive pelvic adhesions, right ovarian cyst, left paratubal cyst     Surgeon(s) and Role:     * Fabrice Camejo D.O. - Primary     * Katya Oreilly M.D. - Assisting     Anesthesiologist: Price Valdez M.D.   Anesthesia: General     Procedure: Diagnostic Laparoscopy, Bilateral Salpingoophorectomy, Excision right ovarian mass, Excision left paratubal cyst, pelvic washings, lysis of adhesions.  Estimated Blood Loss: Minimal  Complications: none  Findings: Normal appearing uterus, bilateral fallopian tubes. Enlarged right ovary with a large mass - suspected complex cyst, left paratubal cyst, extensive pelvic adhesions.  Specimens:   ID Type Source Tests Collected by Time Destination   1 : pelvic washing  Other Other MISCELLANEOUS TEST Fabrice Camejo D.O. 8/3/2021  8:17 AM    A : Bilateral ovaries, fallopian tubes, and peritubal cyst  Other Other PATHOLOGY SPECIMEN Fabrice Camejo D.O. 8/3/2021  8:13 AM        Operation: Diagnostic Laparoscopy, Bilateral Salpingoophorectomy, Excision right ovarian mass, Excision left paratubal cyst, pelvic washings, lysis of adhesions.    Procedure Details:  The risks, benefits, and alternatives of the planned procedure were discussed with the patient, and informed consent was obtained prior to surgery.  The patient was then taken to the operating room with IV running.  Patient was identified and time out procedure was performed.  She was then laid on the operating room table and given general anesthesia, which was found to be adequate.  She was then situated in the dorsal lithotomy position in [facility] and then prepped and draped in the usual fashion for a laparoscopic procedure.  A Liu catheter was inserted into the bladder and allowed  to drain to gravity.      Attention was first placed to the vagina.  A weighted speculum was placed in the vagina and the anterior lip of the cervix was grasped with a tenaculum.  The uterus was sounded to 8 cm.  The cervix was then serially dilated.  A V-Care uterine manipulator was inserted into the uterine cavity.    Attention was then turned to the abdomen.  An 11-mm vertical incision was then made using the scalpel at the umbilicus.  The fascia was identified, grasped with clamps and entered using a scalpel.  The peritoneum was bluntly entered.  The 11-mm Hasaan trocar was inserted and CO2 gas was used to insufflate the abdominal cavity.  The 10-mm 30 degrees laparoscope was inserted and the cavity was inspected.      Subsequently, two other 5 mm trocars were placed under direct visual guidance in the left and right lower quadrants of the abdomen.  Pelvic washing were obtained and sent to pathology.  Pelvic adhesions were seen bilaterally in the left and right lower quadrants.  These were taken down using the ligasure.  Excellent hemostasis was noted.  The ureter was traced on both sides prior to the start of the procedure. Using the 5 mm LigaSure, the fallopian tube and ovary on the patient’s left and right sides were removed.  The ovarian mass on the right was removed with the right ovary.  There was no spillage.  The left paratubal cyst was also removed.  At this point, the Endocatch bag was inserted.  The specimens were placed in the bag and then removed through the umbilical port site.  They were sent to pathology.  The port was reinserted and the surgical site was inspected.  Excellent hemostasis was noted.     The rest of the pelvis appear normal following the excisions.  All instruments were removed from the abdomen.  The V-care was removed from the vagina.  The pinto was also removed.  The fascia was closed at the 10-mm trocar site using 0 vicryl suture.  The skin was closed using Dermabond at all  trocar sites.  Sponge, needle, and instrument counts were correct times two.            Disposition: PACU - hemodynamically stable.  Condition: stable    Attending Attestation: I was present and scrubbed for the entire procedure.    Fabrice Camejo D.O.

## 2021-08-03 NOTE — ANESTHESIA POSTPROCEDURE EVALUATION
Patient: Carie Santiago    Procedure Summary     Date: 08/03/21 Room / Location: [facility] ROOM 27 / SURGERY SAME DAY [facility]    Anesthesia Start: 0729 Anesthesia Stop:     Procedures:       PELVISCOPY - WITH PELVIC WASHINGS, EXCISION OF RIGHT OVARIAN  MASS (Abdomen)      SALPINGO-OOPHORECTOMY. (Bilateral Abdomen) Diagnosis: (RIGHT OVARIAN CYST VS RIGHT ADNEXAL MASS, POST MENOPAUSE)    Surgeons: Fabrice Camejo D.O. Responsible Provider: Price Valdez M.D.    Anesthesia Type: general ASA Status: 2          Final Anesthesia Type: general  Last vitals  BP   Blood Pressure: 100/65    Temp   36.2 °C (97.1 °F)    Pulse   66   Resp   18    SpO2   94 %      Anesthesia Post Evaluation    Patient location during evaluation: PACU  Patient participation: complete - patient participated  Level of consciousness: awake and alert  Pain score: 3    Airway patency: patent  Anesthetic complications: no  Cardiovascular status: hemodynamically stable  Respiratory status: acceptable  Hydration status: euvolemic    PONV: none          No complications documented.     Nurse Pain Score: 0 (NPRS)

## 2021-08-03 NOTE — ANESTHESIA PROCEDURE NOTES
Airway    Date/Time: 8/3/2021 7:29 AM  Performed by: Price Valdez M.D.  Authorized by: Price Valdez M.D.     Location:  OR  Urgency:  Elective  Indications for Airway Management:  Anesthesia      Spontaneous Ventilation: absent    Sedation Level:  Deep  Preoxygenated: Yes    Patient Position:  Sniffing  Final Airway Type:  Endotracheal airway  Final Endotracheal Airway:  ETT  Cuffed: Yes    Technique Used for Successful ETT Placement:  Direct laryngoscopy    Insertion Site:  Oral  Blade Type:  Nhung  Laryngoscope Blade/Videolaryngoscope Blade Size:  3  ETT Size (mm):  7.0  Measured from:  Teeth  ETT to Teeth (cm):  20  Placement Verified by: auscultation and capnometry    Cormack-Lehane Classification:  Grade I - full view of glottis  Number of Attempts at Approach:  1

## 2021-08-03 NOTE — ANESTHESIA TIME REPORT
Anesthesia Start and Stop Event Times     Date Time Event    8/3/2021 0724 Ready for Procedure     0729 Anesthesia Start     0843 Anesthesia Stop        Responsible Staff  08/03/21    Name Role Begin End    Price Valdez M.D. Anesth 0729 [id number]        Preop Diagnosis (Free Text):  Pre-op Diagnosis     RIGHT OVARIAN CYST VS RIGHT ADNEXAL MASS, POST MENOPAUSE        Preop Diagnosis (Codes):    Post op Diagnosis  Ovarian cyst      Premium Reason  Non-Premium    Comments:

## 2021-08-03 NOTE — ANESTHESIA PREPROCEDURE EVALUATION
Relevant Problems   NEURO   (positive) History of iron deficiency      ENDO   (positive) Hypothyroid       Physical Exam    Airway   Mallampati: II  TM distance: >3 FB  Neck ROM: full       Cardiovascular - normal exam  Rhythm: regular  Rate: normal  (-) murmur     Dental - normal exam           Pulmonary - normal exam  Breath sounds clear to auscultation     Abdominal    Neurological - normal exam                 Anesthesia Plan    ASA 2       Plan - general       Airway plan will be ETT          Induction: intravenous    Postoperative Plan: Postoperative administration of opioids is intended.    Pertinent diagnostic labs and testing reviewed    Informed Consent:    Anesthetic plan and risks discussed with patient.    Use of blood products discussed with: patient whom consented to blood products.

## 2021-08-27 DIAGNOSIS — F32.A ANXIETY AND DEPRESSION: ICD-10-CM

## 2021-08-27 DIAGNOSIS — F41.9 ANXIETY AND DEPRESSION: ICD-10-CM

## 2021-08-27 RX ORDER — ALPRAZOLAM 0.5 MG/1
TABLET ORAL
Qty: 60 TABLET | Refills: 0 | Status: SHIPPED | OUTPATIENT
Start: 2021-08-27 | End: 2021-09-28 | Stop reason: SDUPTHER

## 2021-09-17 ENCOUNTER — TELEPHONE (OUTPATIENT)
Dept: INTERNAL MEDICINE | Facility: IMAGING CENTER | Age: 51
End: 2021-09-17

## 2021-09-17 RX ORDER — FLUCONAZOLE 150 MG/1
150 TABLET ORAL ONCE
Qty: 3 TABLET | Refills: 0 | Status: SHIPPED | OUTPATIENT
Start: 2021-09-17 | End: 2022-08-29 | Stop reason: SDUPTHER

## 2021-09-17 NOTE — TELEPHONE ENCOUNTER
"Patient was on 2 weeks of cefalexin following breast implant reduction surgery.  She now reports thrush in her oral cavity & throat, and a burning feeling in her vagina.  She is \"very sensitive\" to antibiotics and these symptoms have occurred before despite using probiotics.  Diflucan rx to preferred pharmacy per Dr Ballard.  "

## 2021-09-28 DIAGNOSIS — F41.9 ANXIETY AND DEPRESSION: ICD-10-CM

## 2021-09-28 DIAGNOSIS — F32.A ANXIETY AND DEPRESSION: ICD-10-CM

## 2021-09-29 RX ORDER — ALPRAZOLAM 0.5 MG/1
TABLET ORAL
Qty: 60 TABLET | Refills: 0 | Status: SHIPPED | OUTPATIENT
Start: 2021-09-29 | End: 2021-11-17 | Stop reason: SDUPTHER

## 2021-11-01 ENCOUNTER — TELEPHONE (OUTPATIENT)
Dept: INTERNAL MEDICINE | Facility: IMAGING CENTER | Age: 51
End: 2021-11-01

## 2021-11-01 RX ORDER — CYCLOBENZAPRINE HCL 10 MG
10 TABLET ORAL 3 TIMES DAILY PRN
Qty: 15 TABLET | Refills: 0 | Status: SHIPPED | OUTPATIENT
Start: 2021-11-01 | End: 2021-12-28

## 2021-11-02 NOTE — TELEPHONE ENCOUNTER
Patient helped her parents move all weekend and she requesst Flexeril for muscle pain neck and between shoulder blades.

## 2021-11-17 ENCOUNTER — TELEPHONE (OUTPATIENT)
Dept: INTERNAL MEDICINE | Facility: IMAGING CENTER | Age: 51
End: 2021-11-17

## 2021-11-17 DIAGNOSIS — F41.9 ANXIETY AND DEPRESSION: ICD-10-CM

## 2021-11-17 DIAGNOSIS — F32.A ANXIETY AND DEPRESSION: ICD-10-CM

## 2021-11-17 RX ORDER — ALPRAZOLAM 0.5 MG/1
TABLET ORAL
Qty: 60 TABLET | Refills: 0 | Status: SHIPPED | OUTPATIENT
Start: 2021-11-17 | End: 2021-11-18 | Stop reason: SDUPTHER

## 2021-11-18 DIAGNOSIS — F32.A ANXIETY AND DEPRESSION: ICD-10-CM

## 2021-11-18 DIAGNOSIS — F41.9 ANXIETY AND DEPRESSION: ICD-10-CM

## 2021-11-18 RX ORDER — ALPRAZOLAM 0.5 MG/1
TABLET ORAL
Qty: 60 TABLET | Refills: 0 | Status: SHIPPED | OUTPATIENT
Start: 2021-11-18 | End: 2021-12-20 | Stop reason: SDUPTHER

## 2021-11-29 ENCOUNTER — TELEPHONE (OUTPATIENT)
Dept: INTERNAL MEDICINE | Facility: IMAGING CENTER | Age: 51
End: 2021-11-29

## 2021-11-29 DIAGNOSIS — Z78.0 MENOPAUSE: ICD-10-CM

## 2021-11-30 RX ORDER — PROGESTERONE 100 MG/1
CAPSULE ORAL
Qty: 90 CAPSULE | Refills: 3 | Status: SHIPPED | OUTPATIENT
Start: 2021-11-30 | End: 2022-07-13

## 2021-12-03 ENCOUNTER — HOSPITAL ENCOUNTER (OUTPATIENT)
Dept: RADIOLOGY | Facility: MEDICAL CENTER | Age: 51
End: 2021-12-03
Attending: NURSE PRACTITIONER
Payer: COMMERCIAL

## 2021-12-03 DIAGNOSIS — M25.511 RIGHT SHOULDER PAIN, UNSPECIFIED CHRONICITY: ICD-10-CM

## 2021-12-03 PROCEDURE — 73030 X-RAY EXAM OF SHOULDER: CPT | Mod: RT

## 2021-12-08 ENCOUNTER — APPOINTMENT (OUTPATIENT)
Dept: RADIOLOGY | Facility: MEDICAL CENTER | Age: 51
End: 2021-12-08
Attending: NURSE PRACTITIONER
Payer: COMMERCIAL

## 2021-12-08 DIAGNOSIS — M25.511 RIGHT SHOULDER PAIN, UNSPECIFIED CHRONICITY: ICD-10-CM

## 2021-12-08 PROCEDURE — 73221 MRI JOINT UPR EXTREM W/O DYE: CPT | Mod: RT

## 2021-12-20 DIAGNOSIS — F32.A ANXIETY AND DEPRESSION: ICD-10-CM

## 2021-12-20 DIAGNOSIS — F41.9 ANXIETY AND DEPRESSION: ICD-10-CM

## 2021-12-21 ENCOUNTER — NON-PROVIDER VISIT (OUTPATIENT)
Dept: INTERNAL MEDICINE | Facility: IMAGING CENTER | Age: 51
End: 2021-12-21
Payer: COMMERCIAL

## 2021-12-21 ENCOUNTER — HOSPITAL ENCOUNTER (OUTPATIENT)
Facility: MEDICAL CENTER | Age: 51
End: 2021-12-21
Attending: INTERNAL MEDICINE
Payer: COMMERCIAL

## 2021-12-21 DIAGNOSIS — Z86.39 HISTORY OF NON ANEMIC VITAMIN B12 DEFICIENCY: ICD-10-CM

## 2021-12-21 DIAGNOSIS — E03.9 HYPOTHYROIDISM (ACQUIRED): ICD-10-CM

## 2021-12-21 DIAGNOSIS — E78.1 HYPERTRIGLYCERIDEMIA: ICD-10-CM

## 2021-12-21 DIAGNOSIS — Z86.39 HISTORY OF IRON DEFICIENCY: ICD-10-CM

## 2021-12-21 DIAGNOSIS — Z01.89 ENCOUNTER FOR ROUTINE LABORATORY TESTING: ICD-10-CM

## 2021-12-21 DIAGNOSIS — R74.8 ELEVATED ALKALINE PHOSPHATASE LEVEL: ICD-10-CM

## 2021-12-21 DIAGNOSIS — Z86.39 HISTORY OF VITAMIN D DEFICIENCY: ICD-10-CM

## 2021-12-21 LAB
25(OH)D3 SERPL-MCNC: 82 NG/ML (ref 30–100)
ALBUMIN SERPL BCP-MCNC: 4.4 G/DL (ref 3.2–4.9)
ALBUMIN/GLOB SERPL: 1.8 G/DL
ALP SERPL-CCNC: 80 U/L (ref 30–99)
ALT SERPL-CCNC: 23 U/L (ref 2–50)
ANION GAP SERPL CALC-SCNC: 12 MMOL/L (ref 7–16)
AST SERPL-CCNC: 21 U/L (ref 12–45)
BASOPHILS # BLD AUTO: 0.4 % (ref 0–1.8)
BASOPHILS # BLD: 0.06 K/UL (ref 0–0.12)
BILIRUB SERPL-MCNC: 0.2 MG/DL (ref 0.1–1.5)
BUN SERPL-MCNC: 16 MG/DL (ref 8–22)
CALCIUM SERPL-MCNC: 9.8 MG/DL (ref 8.5–10.5)
CHLORIDE SERPL-SCNC: 106 MMOL/L (ref 96–112)
CHOLEST SERPL-MCNC: 194 MG/DL (ref 100–199)
CO2 SERPL-SCNC: 24 MMOL/L (ref 20–33)
CREAT SERPL-MCNC: 0.69 MG/DL (ref 0.5–1.4)
EOSINOPHIL # BLD AUTO: 0.19 K/UL (ref 0–0.51)
EOSINOPHIL NFR BLD: 1.3 % (ref 0–6.9)
ERYTHROCYTE [DISTWIDTH] IN BLOOD BY AUTOMATED COUNT: 48.5 FL (ref 35.9–50)
FERRITIN SERPL-MCNC: 77 NG/ML (ref 10–291)
GGT SERPL-CCNC: 12 U/L (ref 7–34)
GLOBULIN SER CALC-MCNC: 2.5 G/DL (ref 1.9–3.5)
GLUCOSE SERPL-MCNC: 94 MG/DL (ref 65–99)
HCT VFR BLD AUTO: 48.3 % (ref 37–47)
HDLC SERPL-MCNC: 58 MG/DL
HGB BLD-MCNC: 16.1 G/DL (ref 12–16)
IMM GRANULOCYTES # BLD AUTO: 0.06 K/UL (ref 0–0.11)
IMM GRANULOCYTES NFR BLD AUTO: 0.4 % (ref 0–0.9)
IRON SATN MFR SERPL: 23 % (ref 15–55)
IRON SERPL-MCNC: 67 UG/DL (ref 40–170)
LDLC SERPL CALC-MCNC: 124 MG/DL
LYMPHOCYTES # BLD AUTO: 5.05 K/UL (ref 1–4.8)
LYMPHOCYTES NFR BLD: 34.5 % (ref 22–41)
MCH RBC QN AUTO: 31.1 PG (ref 27–33)
MCHC RBC AUTO-ENTMCNC: 33.3 G/DL (ref 33.6–35)
MCV RBC AUTO: 93.4 FL (ref 81.4–97.8)
MONOCYTES # BLD AUTO: 0.87 K/UL (ref 0–0.85)
MONOCYTES NFR BLD AUTO: 6 % (ref 0–13.4)
NEUTROPHILS # BLD AUTO: 8.39 K/UL (ref 2–7.15)
NEUTROPHILS NFR BLD: 57.4 % (ref 44–72)
NRBC # BLD AUTO: 0 K/UL
NRBC BLD-RTO: 0 /100 WBC
PLATELET # BLD AUTO: 338 K/UL (ref 164–446)
PMV BLD AUTO: 9.8 FL (ref 9–12.9)
POTASSIUM SERPL-SCNC: 4.4 MMOL/L (ref 3.6–5.5)
PROT SERPL-MCNC: 6.9 G/DL (ref 6–8.2)
RBC # BLD AUTO: 5.17 M/UL (ref 4.2–5.4)
SODIUM SERPL-SCNC: 142 MMOL/L (ref 135–145)
TIBC SERPL-MCNC: 289 UG/DL (ref 250–450)
TRIGL SERPL-MCNC: 62 MG/DL (ref 0–149)
TSH SERPL DL<=0.005 MIU/L-ACNC: 1.33 UIU/ML (ref 0.38–5.33)
UIBC SERPL-MCNC: 222 UG/DL (ref 110–370)
VIT B12 SERPL-MCNC: >4000 PG/ML (ref 211–911)
WBC # BLD AUTO: 14.6 K/UL (ref 4.8–10.8)

## 2021-12-21 PROCEDURE — 85025 COMPLETE CBC W/AUTO DIFF WBC: CPT

## 2021-12-21 PROCEDURE — 82977 ASSAY OF GGT: CPT

## 2021-12-21 PROCEDURE — 84443 ASSAY THYROID STIM HORMONE: CPT

## 2021-12-21 PROCEDURE — 80061 LIPID PANEL: CPT

## 2021-12-21 PROCEDURE — 83550 IRON BINDING TEST: CPT

## 2021-12-21 PROCEDURE — 82306 VITAMIN D 25 HYDROXY: CPT

## 2021-12-21 PROCEDURE — 82607 VITAMIN B-12: CPT

## 2021-12-21 PROCEDURE — 82728 ASSAY OF FERRITIN: CPT

## 2021-12-21 PROCEDURE — 83540 ASSAY OF IRON: CPT

## 2021-12-21 PROCEDURE — 80053 COMPREHEN METABOLIC PANEL: CPT

## 2021-12-21 RX ORDER — ALPRAZOLAM 0.5 MG/1
TABLET ORAL
Qty: 60 TABLET | Refills: 0 | Status: SHIPPED | OUTPATIENT
Start: 2021-12-21 | End: 2022-01-25 | Stop reason: SDUPTHER

## 2021-12-27 ENCOUNTER — OFFICE VISIT (OUTPATIENT)
Dept: INTERNAL MEDICINE | Facility: IMAGING CENTER | Age: 51
End: 2021-12-27
Payer: COMMERCIAL

## 2021-12-27 VITALS
BODY MASS INDEX: 21.62 KG/M2 | OXYGEN SATURATION: 94 % | HEART RATE: 77 BPM | SYSTOLIC BLOOD PRESSURE: 95 MMHG | HEIGHT: 70 IN | DIASTOLIC BLOOD PRESSURE: 64 MMHG | TEMPERATURE: 97.2 F | WEIGHT: 151 LBS | RESPIRATION RATE: 14 BRPM

## 2021-12-27 DIAGNOSIS — E03.9 HYPOTHYROIDISM (ACQUIRED): ICD-10-CM

## 2021-12-27 DIAGNOSIS — Z86.39 HISTORY OF IRON DEFICIENCY: ICD-10-CM

## 2021-12-27 DIAGNOSIS — D72.829 LEUKOCYTOSIS, UNSPECIFIED TYPE: ICD-10-CM

## 2021-12-27 DIAGNOSIS — B07.0 PLANTAR WART OF RIGHT FOOT: ICD-10-CM

## 2021-12-27 DIAGNOSIS — G89.29 CHRONIC RIGHT SHOULDER PAIN: ICD-10-CM

## 2021-12-27 DIAGNOSIS — M25.511 CHRONIC RIGHT SHOULDER PAIN: ICD-10-CM

## 2021-12-27 PROCEDURE — 99214 OFFICE O/P EST MOD 30 MIN: CPT | Performed by: INTERNAL MEDICINE

## 2021-12-27 ASSESSMENT — FIBROSIS 4 INDEX: FIB4 SCORE: 0.69

## 2021-12-27 NOTE — PROGRESS NOTES
Established Patient Note   HPI:        Carie comes in today to follow hypothyroid. She states she is doing well emotionally and would like to stop prozac. She is having chronic right shoulder pain for past couple years but has worsened over past couple months.    Past Medical History:   Diagnosis Date   • Anxiety and depression 1/23/2019   • Colon polyp 3/29/2019    Colonoscopy March 2019: 3mm descending colon polyp   • Diverticulosis of colon 1/23/2019    MRI abdomen Jan. 2019   • Generalized anxiety disorder    • Hypertriglyceridemia 1/23/2019   • Hypothyroid    • Low serum HDL 1/23/2019   • Menopause    • Vitamin B12 deficiency 1/23/2019       Current Outpatient Medications   Medication Sig Dispense Refill   • ALPRAZolam (XANAX) 0.5 MG Tab TAKE 1 TABLET BY MOUTH 2 TIMES A DAY AS NEEDED FOR SLEEP OR ANXIETY FOR UP TO 30 DAYS.F41.9 60 Tablet 0   • progesterone (PROMETRIUM) 100 MG Cap TAKE 1 CAPSULE BY MOUTH EVERY DAY 90 Capsule 3   • albuterol (PROVENTIL) 2 MG tablet Take 1 Tablet by mouth 3 times a day as needed (for reactive airways). 90 Tablet 0   • cyclobenzaprine (FLEXERIL) 10 mg Tab Take 1 Tablet by mouth 3 times a day as needed for Muscle Spasms. 15 Tablet 0   • nabumetone (RELAFEN) 500 MG Tab Take 2 Tablets by mouth every day. 180 tablet 1   • ferrous sulfate 325 (65 Fe) MG tablet Take 650 mg by mouth every day.     • traZODone (DESYREL) 50 MG Tab Take 1 tablet by mouth at bedtime as needed for Sleep. 30 tablet 11   • FLUoxetine (PROZAC) 10 MG Cap Take 1 capsule by mouth every day. 30 capsule 11   • Mometasone Furo-Formoterol Fum (DULERA) 100-5 MCG/ACT Aerosol Inhale 2 Puffs 2 times a day. 13 g 11   • albuterol 108 (90 Base) MCG/ACT Aero Soln inhalation aerosol INHALE 2 PUFFS EVERY FOUR HOURS AS NEEDED FOR SHORTNESS OF BREATH. 1 Each 3   • NABOR 0.0375 MG/24HR patch Place 1 Patch on the skin two times a week. Applys on mondays and thursdays 24 Patch 3   • lidocaine (LIDODERM) 5 % Patch Place 1 Patch on the  "skin every 24 hours. 10 Patch 1   • traMADol (ULTRAM) 50 MG Tab Take  mg by mouth every four hours as needed.     • SYNTHROID 88 MCG Tab TAKE ONE TABLET BY MOUTH EVERY DAY IN THE MORNING ON AN EMPTY STOMACH (Patient taking differently: Take 88 mcg by mouth every morning. TAKE ONE TABLET BY MOUTH EVERY DAY IN THE MORNING ON AN EMPTY STOMACH) 90 Tab 3   • VITAMIN D PO Take 1 Cap by mouth every day. 100 Cap 3   • Omega-3 Fatty Acids (FISH OIL) 1000 MG Cap capsule Take 1,000 mg by mouth every day.     • Multiple Vitamins-Minerals (OCUVITE-LUTEIN) Tab Take 1 tablet by mouth every day.     • guaiFENesin ER (MUCINEX) 600 MG TABLET SR 12 HR Take 600 mg by mouth 2 times a day as needed. (Patient not taking: Reported on 12/27/2021)       No current facility-administered medications for this visit.         Allergies   Allergen Reactions   • Meloxicam Diarrhea and Vomiting     Severe     • Sulfa Drugs Anaphylaxis   • Norco [Apap-Fd&C Yellow #10 Al Cervantes-Hydrocodone]      itching   • Zofran [Dextrose-Ondansetron] Unspecified     Migraine tolerates Injection/ IV   • Lorazepam Anxiety and Unspecified     \"IT MAKES ME CRY\"         Social History     Tobacco Use   • Smoking status: Former Smoker     Packs/day: 0.50     Years: 30.00     Pack years: 15.00     Types: Cigarettes   • Smokeless tobacco: Never Used   • Tobacco comment: using oral nicotine 7/31   Vaping Use   • Vaping Use: Never used   Substance Use Topics   • Alcohol use: Yes     Comment: 4-12 mini bottles per day 7/31 none since jan 2021   • Drug use: No       Past Surgical History:   Procedure Laterality Date   • PB LAP,DIAGNOSTIC ABDOMEN  8/3/2021    Procedure: PELVISCOPY - WITH PELVIC WASHINGS, EXCISION OF RIGHT OVARIAN  MASS;  Surgeon: Fabrice Camejo D.O.;  Location: SURGERY SAME DAY [facility];  Service: Obstetrics   • SALPINGO OOPHORECTOMY Bilateral 8/3/2021    Procedure: SALPINGO-OOPHORECTOMY.;  Surgeon: Fabrice Camejo D.O.;  Location: SURGERY SAME DAY " "[facility];  Service: Obstetrics   • PB UPPER GI ENDOSCOPY,DIAGNOSIS N/A 2/12/2021    Procedure: GASTROSCOPY;  Surgeon: Kasi Chan M.D.;  Location: SURGERY SAME DAY [facility];  Service: Gastroenterology   • PB UPPER GI ENDOSCOPY,BIOPSY N/A 2/12/2021    Procedure: GASTROSCOPY, WITH BIOPSY;  Surgeon: Kasi Chan M.D.;  Location: SURGERY SAME DAY [facility];  Service: Gastroenterology   • WOUND CLOSURE ORTHO Left 9/24/2019    Procedure: CLOSURE, WOUND, ORTHO - LEG W/WOUND VAC REMOVAL;  Surgeon: Paolo Odell M.D.;  Location: SURGERY [facility];  Service: Orthopedics   • IRRIGATION & DEBRIDEMENT ORTHO Left 9/21/2019    Procedure: IRRIGATION AND DEBRIDEMENT, WOUND - FOR PROXIMAL TIBIA HEMATOMA EVACUATION AND WOUND VAC APPLICATION;  Surgeon: Paolo Odell M.D.;  Location: SURGERY [facility];  Service: Orthopedics   • OTHER ORTHOPEDIC SURGERY Right 2017   • ROBOTIC LAPAROSCOPIC CHOLECYSTECTOMY  2017   • UMBILICAL HERNIA REPAIR  2017   • INGUINAL HERNIA REPAIR BILATERAL  1999    with Mesh   • TONSILLECTOMY AND ADENOIDECTOMY  1976   • APPENDECTOMY     • MAMMOPLASTY AUGMENTATION Bilateral         ROS    Ambulatory Vitals  BP (!) 95/64   Pulse 77   Temp 36.2 °C (97.2 °F) (Temporal)   Resp 14   Ht 1.778 m (5' 10\")   Wt 68.5 kg (151 lb)   SpO2 94%   BMI 21.67 kg/m²     Physical Exam  Constitutional:       Appearance: Normal appearance.   Cardiovascular:      Rate and Rhythm: Normal rate and regular rhythm.      Heart sounds: Normal heart sounds. No murmur heard.  No friction rub. No gallop.    Pulmonary:      Effort: Pulmonary effort is normal.      Breath sounds: Normal breath sounds. No wheezing or rales.   Abdominal:      General: There is no distension.      Palpations: Abdomen is soft. There is no mass.      Tenderness: There is no abdominal tenderness. There is no right CVA tenderness or left CVA tenderness.   Musculoskeletal:      Right lower leg: No edema.      Left lower leg: No edema.      " Comments: ROM of right shoulder with pain on abduction along with internal and external rotation.   Neurological:      General: No focal deficit present.      Coordination: Coordination normal.      Gait: Gait normal.         Component      Latest Ref Rng & Units 7/28/2021 8/3/2021 12/21/2021   WBC      4.8 - 10.8 K/uL 10.7  14.6 (H)   RBC      4.20 - 5.40 M/uL 5.43 (H)  5.17   Hemoglobin      12.0 - 16.0 g/dL 15.7  16.1 (H)   Hematocrit      37.0 - 47.0 % 47.9 (H)  48.3 (H)   MCV      81.4 - 97.8 fL 88.2  93.4   MCH      27.0 - 33.0 pg 28.9  31.1   MCHC      33.6 - 35.0 g/dL 32.8 (L)  33.3 (L)   RDW      35.9 - 50.0 fL 66.3 (H)  48.5   Platelet Count      164 - 446 K/uL 333  338   MPV      9.0 - 12.9 fL 9.8  9.8   Neutrophils-Polys      44.00 - 72.00 % 55.10  57.40   Lymphocytes      22.00 - 41.00 % 37.00  34.50   Monocytes      0.00 - 13.40 % 6.30  6.00   Eosinophils      0.00 - 6.90 % 0.90  1.30   Basophils      0.00 - 1.80 % 0.40  0.40   Immature Granulocytes      0.00 - 0.90 % 0.30  0.40   Nucleated RBC      /100 WBC 0.00  0.00   Neutrophils (Absolute)      2.00 - 7.15 K/uL 5.91  8.39 (H)   Lymphs (Absolute)      1.00 - 4.80 K/uL 3.97  5.05 (H)   Monos (Absolute)      0.00 - 0.85 K/uL 0.67  0.87 (H)   Eos (Absolute)      0.00 - 0.51 K/uL 0.10  0.19   Baso (Absolute)      0.00 - 0.12 K/uL 0.04  0.06   Immature Granulocytes (abs)      0.00 - 0.11 K/uL 0.03  0.06   NRBC (Absolute)      K/uL 0.00  0.00   Sodium      135 - 145 mmol/L 139  142   Potassium      3.6 - 5.5 mmol/L 4.3  4.4   Chloride      96 - 112 mmol/L 102  106   Co2      20 - 33 mmol/L 25  24   Anion Gap      7.0 - 16.0 12.0  12.0   Glucose      65 - 99 mg/dL 92  94   Bun      8 - 22 mg/dL 17  16   Creatinine      0.50 - 1.40 mg/dL 0.54  0.69   Calcium      8.5 - 10.5 mg/dL 9.9  9.8   AST(SGOT)      12 - 45 U/L   21   ALT(SGPT)      2 - 50 U/L   23   Alkaline Phosphatase      30 - 99 U/L   80   Total Bilirubin      0.1 - 1.5 mg/dL   0.2   Albumin       3.2 - 4.9 g/dL   4.4   Total Protein      6.0 - 8.2 g/dL   6.9   Globulin      1.9 - 3.5 g/dL   2.5   A-G Ratio      g/dL   1.8   Color       Yellow     Character       Clear     Specific Gravity      <1.035 1.007     Ph      5.0 - 8.0 6.5     Glucose      Negative mg/dL Negative     Ketones      Negative mg/dL Negative     Protein      Negative mg/dL Negative     Bilirubin      Negative Negative     Urobilinogen, Urine      Negative 0.2     Nitrite      Negative Negative     Leukocyte Esterase      Negative Negative     Occult Blood      Negative Negative     Micro Urine Req       see below     Cholesterol,Tot      100 - 199 mg/dL   194   Triglycerides      0 - 149 mg/dL   62   HDL      >=40 mg/dL   58   LDL      <100 mg/dL   124 (H)   Iron      40 - 170 ug/dL   67   Total Iron Binding      250 - 450 ug/dL   289   Unsat Iron Binding      110 - 370 ug/dL   222   % Saturation      15 - 55 %   23   SARS-CoV-2 Source       Nasal Swab     SARS-CoV-2 by PCR       NotDetected     GFR If African American      >60 mL/min/1.73 m 2 >60  >60   GFR If Non African American      >60 mL/min/1.73 m 2 >60  >60   Ca 125      0.0 - 35.0 U/mL      Bhcg      0.0 - 5.0 mIU/mL <1.0     Peripheral Smear Review       see below     Plt Estimation       Normal     RBC Morphology       Normal     Comments-Diff       see below     Pathology Request        Sent to Histo    Gamma Gt      7 - 34 U/L   12   TSH      0.380 - 5.330 uIU/mL   1.330   25-Hydroxy   Vitamin D 25      30 - 100 ng/mL   82   Ferritin      10.0 - 291.0 ng/mL   77.0   Vitamin B12 -True Cobalamin      211 - 911 pg/mL   >4000 (H)   MR-SHOULDER-W/O RIGHT  Order: [id number]   Status: Final result     Visible to patient: Yes (seen)     Next appt: None     Dx: Right shoulder pain, unspecified ...     0 Result Notes    Details    Reading Physician Reading Date Result Priority   Paolo Magallanes M.D.  [phone] 12/9/2021 Routine     Narrative & Impression     12/8/2021 3:09  PM     HISTORY/REASON FOR EXAM:  3 years of pain with limited range of motion. No operation by provided history.     TECHNIQUE/EXAM DESCRIPTION:  MRI of the RIGHT shoulder without contrast.     The study was performed on a Siemens Skyra 3.0 [place] MRI scanner.     T1 sagittal, fast spin-echo T2 fat-suppressed oblique coronal, sagittal, and axial and intermediate fast spin-echo oblique coronal images were obtained.     COMPARISON: No radiograph or MRI     FINDINGS:  Some micrometallic debris is seen in the anterior superior shoulder and in the distal clavicle compatible with prior operation. There is slight atrophy of the deltoid muscle along the course of this further supporting.     Glenohumeral joint space: There is a medium sized joint effusion.  This communicates with the subdeltoid space.     Rotator cuff:  Near complete supraspinatus tendon tear posteriorly is moderately edematous and there is no fatty atrophy.     Some edema is seen in the subscapularis but no fiber tearing is confirmed     Edema in the infraspinatus is seen anteriorly along with some intratendinous cystic change 3 cm proximal to the attachment     Labrum:  There is abnormal superior labral signal without adjacent cyst     The long head biceps is normal in position but there is some thickening and increased signal. It is perched over the lesser tuberosity     Osseous structures/cartilage:  There is mild glenohumeral cartilage thinning with subchondral edema.     AC joint centered moderate edema and some cranial spurring.     IMPRESSION:     1.  Near complete supraspinatus tendon tear with no fatty atrophy and considerable edema     2.  Subscapularis and infraspinatus tendinopathy with mild infraspinatus intratendinous cystic change     3.  SLAP tear     4.  Moderate biceps tendinopathy     5.  Mild AC joint osteoarthritis with moderate edema and evidence of prior operative intervention in the vicinity             Exam Ended: 12/08/21  3:45 PM  Last Resulted: 12/09/21 10:10 AM             Assessment and Plan:     1. Hypothyroidism (acquired)      TSH 1.330 uIU/ml on synthroid 88 mcg qam   2. Chronic right shoulder pain  Referral to Orthopedics   3. Plantar wart of right foot  Referral to Podiatry   4. History of iron deficiency  IRON/TOTAL IRON BIND    FERRITIN   5. Leukocytosis, unspecified type  CBC WITH DIFFERENTIAL     Decrease iron sulfate from 650 mg qd to 325 mg qd. Check CBC, iron/TIBC/%sat, ferritin in 3 months. Discussed cholesterol level is not elevated enough to require starting statin Rx. Advised she quit smoking. Recommend she repeat vascular screening in 2 years especially if she continues to smoke.    Herbie Joshi M.D.

## 2021-12-28 RX ORDER — CYCLOBENZAPRINE HCL 10 MG
10 TABLET ORAL 3 TIMES DAILY PRN
Qty: 15 TABLET | Refills: 0 | Status: ON HOLD | OUTPATIENT
Start: 2021-12-28 | End: 2022-04-14

## 2022-01-25 DIAGNOSIS — F41.9 ANXIETY AND DEPRESSION: ICD-10-CM

## 2022-01-25 DIAGNOSIS — F32.A ANXIETY AND DEPRESSION: ICD-10-CM

## 2022-01-25 DIAGNOSIS — E03.9 HYPOTHYROIDISM, UNSPECIFIED TYPE: ICD-10-CM

## 2022-01-25 RX ORDER — LEVOTHYROXINE SODIUM 88 MCG
TABLET ORAL
Qty: 90 TABLET | Refills: 3 | Status: SHIPPED | OUTPATIENT
Start: 2022-01-25 | End: 2023-01-16

## 2022-01-26 DIAGNOSIS — G89.29 CHRONIC RIGHT SHOULDER PAIN: ICD-10-CM

## 2022-01-26 DIAGNOSIS — M25.511 CHRONIC RIGHT SHOULDER PAIN: ICD-10-CM

## 2022-01-26 RX ORDER — ALPRAZOLAM 0.5 MG/1
TABLET ORAL
Qty: 60 TABLET | Refills: 0 | Status: SHIPPED | OUTPATIENT
Start: 2022-01-26 | End: 2022-02-28 | Stop reason: SDUPTHER

## 2022-01-26 RX ORDER — OXYCODONE HYDROCHLORIDE AND ACETAMINOPHEN 5; 325 MG/1; MG/1
1 TABLET ORAL NIGHTLY PRN
Qty: 20 TABLET | Refills: 0 | Status: SHIPPED | OUTPATIENT
Start: 2022-01-26 | End: 2022-02-28 | Stop reason: SDUPTHER

## 2022-01-27 NOTE — TELEPHONE ENCOUNTER
----- Message from Herbie Joshi M.D. sent at 1/26/2022 10:04 AM PST -----  Regarding: FW: Right bShoulder Pain  Elizabeth:       We could start with tramadol if she is willing; if not, then we could do a small Rx for the percocet 5/325 mg such as #20 to use only at bedtime.  Dr. PRUETT  ----- Message -----  From: Mary Guillermo R.N.  Sent: 1/25/2022   2:36 PM PST  To: Herbie Joshi M.D.  Subject: Right bShoulder Pain                             Dr Ballard    Carie is having significant right shoulder pain that interferes with her sleep.  Her MRI shows a significant tear and other damage.  She has an appointment with Dr Buckley on Feb 7.  She has tried tylenol, advil, CBD cream, and flexeril 10 mg without significant improvement.  She is approaching her 1 year sobriety date.  She feels confident that she is not at risk for narcotic addiction.  She had Percocet 5/325 following GYN surgery in August, and following breast reduction in September, but only for 1-2 days.  Do you have any ideas for her pain control?  Thank you,  Elizabeth

## 2022-02-03 ENCOUNTER — TELEPHONE (OUTPATIENT)
Dept: INTERNAL MEDICINE | Facility: IMAGING CENTER | Age: 52
End: 2022-02-03

## 2022-02-03 DIAGNOSIS — U07.1 COVID-19: ICD-10-CM

## 2022-02-03 RX ORDER — AMOXICILLIN AND CLAVULANATE POTASSIUM 875; 125 MG/1; MG/1
1 TABLET, FILM COATED ORAL 2 TIMES DAILY
Qty: 14 TABLET | Refills: 0 | Status: SHIPPED | OUTPATIENT
Start: 2022-02-03 | End: 2022-02-10

## 2022-02-03 NOTE — TELEPHONE ENCOUNTER
Discussed both Augmentin (she has used this in the past for sinusitis) and Molnupiravir prescriptions.  Both rx's sent to patient's pharmacy - she will hold off on filling either prescription and wait to see how her symptoms progress over the next 24-48 hours.  She understands that the Molnupiravir must be taken within 5 days of symptom onset.

## 2022-02-07 ENCOUNTER — TELEPHONE (OUTPATIENT)
Dept: INTERNAL MEDICINE | Facility: IMAGING CENTER | Age: 52
End: 2022-02-07

## 2022-02-07 DIAGNOSIS — Z87.19 HISTORY OF UMBILICAL HERNIA REPAIR: ICD-10-CM

## 2022-02-07 DIAGNOSIS — K42.9 UMBILICAL HERNIA WITHOUT OBSTRUCTION AND WITHOUT GANGRENE: ICD-10-CM

## 2022-02-07 DIAGNOSIS — Z98.890 HISTORY OF UMBILICAL HERNIA REPAIR: ICD-10-CM

## 2022-02-28 DIAGNOSIS — G89.29 CHRONIC RIGHT SHOULDER PAIN: ICD-10-CM

## 2022-02-28 DIAGNOSIS — R23.2 HOT FLASHES: ICD-10-CM

## 2022-02-28 DIAGNOSIS — F32.A ANXIETY AND DEPRESSION: ICD-10-CM

## 2022-02-28 DIAGNOSIS — F41.9 ANXIETY AND DEPRESSION: ICD-10-CM

## 2022-02-28 DIAGNOSIS — M25.511 CHRONIC RIGHT SHOULDER PAIN: ICD-10-CM

## 2022-02-28 RX ORDER — ALPRAZOLAM 0.5 MG/1
TABLET ORAL
Qty: 60 TABLET | Refills: 0 | Status: SHIPPED | OUTPATIENT
Start: 2022-02-28 | End: 2022-03-28 | Stop reason: SDUPTHER

## 2022-02-28 RX ORDER — OXYCODONE HYDROCHLORIDE AND ACETAMINOPHEN 5; 325 MG/1; MG/1
1 TABLET ORAL NIGHTLY PRN
Qty: 20 TABLET | Refills: 0 | Status: SHIPPED | OUTPATIENT
Start: 2022-02-28 | End: 2022-03-28 | Stop reason: SDUPTHER

## 2022-02-28 RX ORDER — ESTRADIOL 0.04 MG/D
PATCH, EXTENDED RELEASE TRANSDERMAL
Qty: 24 PATCH | Refills: 3 | Status: SHIPPED | OUTPATIENT
Start: 2022-02-28 | End: 2022-07-13

## 2022-03-28 DIAGNOSIS — F41.9 ANXIETY AND DEPRESSION: ICD-10-CM

## 2022-03-28 DIAGNOSIS — M25.511 CHRONIC RIGHT SHOULDER PAIN: ICD-10-CM

## 2022-03-28 DIAGNOSIS — G89.29 CHRONIC RIGHT SHOULDER PAIN: ICD-10-CM

## 2022-03-28 DIAGNOSIS — F32.A ANXIETY AND DEPRESSION: ICD-10-CM

## 2022-03-28 RX ORDER — ALPRAZOLAM 0.5 MG/1
TABLET ORAL
Qty: 60 TABLET | Refills: 0 | Status: SHIPPED | OUTPATIENT
Start: 2022-03-28 | End: 2022-04-26 | Stop reason: SDUPTHER

## 2022-03-28 RX ORDER — OXYCODONE HYDROCHLORIDE AND ACETAMINOPHEN 5; 325 MG/1; MG/1
1 TABLET ORAL NIGHTLY PRN
Qty: 20 TABLET | Refills: 0 | Status: SHIPPED | OUTPATIENT
Start: 2022-03-28 | End: 2022-05-25 | Stop reason: SDUPTHER

## 2022-04-03 DIAGNOSIS — H10.023 PINK EYE DISEASE OF BOTH EYES: ICD-10-CM

## 2022-04-03 RX ORDER — CIPROFLOXACIN HYDROCHLORIDE 3.5 MG/ML
1 SOLUTION/ DROPS TOPICAL
Qty: 5 ML | Refills: 0 | Status: SHIPPED | OUTPATIENT
Start: 2022-04-03 | End: 2022-07-13

## 2022-04-03 NOTE — PROGRESS NOTES
Carie states a friend's child had pink eye that was visiting a couple days ago. Carie feels certain she has developed pink eye. Will Rx cipro ophthalmic qid for next 5 days.

## 2022-04-12 ENCOUNTER — PRE-ADMISSION TESTING (OUTPATIENT)
Dept: ADMISSIONS | Facility: MEDICAL CENTER | Age: 52
End: 2022-04-12
Attending: PLASTIC SURGERY
Payer: COMMERCIAL

## 2022-04-12 DIAGNOSIS — Z01.810 PRE-OPERATIVE CARDIOVASCULAR EXAMINATION: ICD-10-CM

## 2022-04-12 DIAGNOSIS — Z01.812 PRE-OPERATIVE LABORATORY EXAMINATION: ICD-10-CM

## 2022-04-12 LAB
ALBUMIN SERPL BCP-MCNC: 4.6 G/DL (ref 3.2–4.9)
ALBUMIN/GLOB SERPL: 2 G/DL
ALP SERPL-CCNC: 89 U/L (ref 30–99)
ALT SERPL-CCNC: 31 U/L (ref 2–50)
ANION GAP SERPL CALC-SCNC: 11 MMOL/L (ref 7–16)
AST SERPL-CCNC: 22 U/L (ref 12–45)
BASOPHILS # BLD AUTO: 0.8 % (ref 0–1.8)
BASOPHILS # BLD: 0.08 K/UL (ref 0–0.12)
BILIRUB SERPL-MCNC: 0.3 MG/DL (ref 0.1–1.5)
BUN SERPL-MCNC: 9 MG/DL (ref 8–22)
CALCIUM SERPL-MCNC: 9.5 MG/DL (ref 8.5–10.5)
CHLORIDE SERPL-SCNC: 101 MMOL/L (ref 96–112)
CO2 SERPL-SCNC: 28 MMOL/L (ref 20–33)
CREAT SERPL-MCNC: 0.74 MG/DL (ref 0.5–1.4)
EKG IMPRESSION: NORMAL
EOSINOPHIL # BLD AUTO: 0.56 K/UL (ref 0–0.51)
EOSINOPHIL NFR BLD: 5.7 % (ref 0–6.9)
ERYTHROCYTE [DISTWIDTH] IN BLOOD BY AUTOMATED COUNT: 48.8 FL (ref 35.9–50)
GFR SERPLBLD CREATININE-BSD FMLA CKD-EPI: 96 ML/MIN/1.73 M 2
GLOBULIN SER CALC-MCNC: 2.3 G/DL (ref 1.9–3.5)
GLUCOSE SERPL-MCNC: 79 MG/DL (ref 65–99)
HCT VFR BLD AUTO: 45.6 % (ref 37–47)
HGB BLD-MCNC: 15.2 G/DL (ref 12–16)
IMM GRANULOCYTES # BLD AUTO: 0.02 K/UL (ref 0–0.11)
IMM GRANULOCYTES NFR BLD AUTO: 0.2 % (ref 0–0.9)
LYMPHOCYTES # BLD AUTO: 4.04 K/UL (ref 1–4.8)
LYMPHOCYTES NFR BLD: 40.8 % (ref 22–41)
MCH RBC QN AUTO: 31.6 PG (ref 27–33)
MCHC RBC AUTO-ENTMCNC: 33.3 G/DL (ref 33.6–35)
MCV RBC AUTO: 94.8 FL (ref 81.4–97.8)
MONOCYTES # BLD AUTO: 0.52 K/UL (ref 0–0.85)
MONOCYTES NFR BLD AUTO: 5.2 % (ref 0–13.4)
NEUTROPHILS # BLD AUTO: 4.69 K/UL (ref 2–7.15)
NEUTROPHILS NFR BLD: 47.3 % (ref 44–72)
NRBC # BLD AUTO: 0 K/UL
NRBC BLD-RTO: 0 /100 WBC
PLATELET # BLD AUTO: 327 K/UL (ref 164–446)
PMV BLD AUTO: 9.1 FL (ref 9–12.9)
POTASSIUM SERPL-SCNC: 4.5 MMOL/L (ref 3.6–5.5)
PROT SERPL-MCNC: 6.9 G/DL (ref 6–8.2)
RBC # BLD AUTO: 4.81 M/UL (ref 4.2–5.4)
SODIUM SERPL-SCNC: 140 MMOL/L (ref 135–145)
WBC # BLD AUTO: 9.9 K/UL (ref 4.8–10.8)

## 2022-04-12 PROCEDURE — 36415 COLL VENOUS BLD VENIPUNCTURE: CPT

## 2022-04-12 PROCEDURE — 85025 COMPLETE CBC W/AUTO DIFF WBC: CPT

## 2022-04-12 PROCEDURE — 80053 COMPREHEN METABOLIC PANEL: CPT

## 2022-04-12 PROCEDURE — 93010 ELECTROCARDIOGRAM REPORT: CPT | Performed by: INTERNAL MEDICINE

## 2022-04-12 PROCEDURE — 93005 ELECTROCARDIOGRAM TRACING: CPT

## 2022-04-12 RX ORDER — POLYETHYLENE GLYCOL 3350 17 G/17G
17 POWDER, FOR SOLUTION ORAL DAILY
COMMUNITY
End: 2022-07-27

## 2022-04-12 ASSESSMENT — FIBROSIS 4 INDEX: FIB4 SCORE: 0.7

## 2022-04-14 ENCOUNTER — ANESTHESIA (OUTPATIENT)
Dept: SURGERY | Facility: MEDICAL CENTER | Age: 52
End: 2022-04-14
Payer: COMMERCIAL

## 2022-04-14 ENCOUNTER — ANESTHESIA EVENT (OUTPATIENT)
Dept: SURGERY | Facility: MEDICAL CENTER | Age: 52
End: 2022-04-14
Payer: COMMERCIAL

## 2022-04-14 ENCOUNTER — HOSPITAL ENCOUNTER (OUTPATIENT)
Facility: MEDICAL CENTER | Age: 52
End: 2022-04-14
Attending: PLASTIC SURGERY | Admitting: PLASTIC SURGERY
Payer: COMMERCIAL

## 2022-04-14 VITALS
HEART RATE: 61 BPM | OXYGEN SATURATION: 95 % | SYSTOLIC BLOOD PRESSURE: 133 MMHG | DIASTOLIC BLOOD PRESSURE: 66 MMHG | WEIGHT: 148.59 LBS | BODY MASS INDEX: 21.27 KG/M2 | HEIGHT: 70 IN | RESPIRATION RATE: 20 BRPM | TEMPERATURE: 97.7 F

## 2022-04-14 PROCEDURE — 160025 RECOVERY II MINUTES (STATS): Performed by: PLASTIC SURGERY

## 2022-04-14 PROCEDURE — 700105 HCHG RX REV CODE 258: Performed by: PLASTIC SURGERY

## 2022-04-14 PROCEDURE — 160048 HCHG OR STATISTICAL LEVEL 1-5: Performed by: PLASTIC SURGERY

## 2022-04-14 PROCEDURE — 700111 HCHG RX REV CODE 636 W/ 250 OVERRIDE (IP): Performed by: STUDENT IN AN ORGANIZED HEALTH CARE EDUCATION/TRAINING PROGRAM

## 2022-04-14 PROCEDURE — 502779 HCHG SUTURE, QUILL: Performed by: PLASTIC SURGERY

## 2022-04-14 PROCEDURE — 160046 HCHG PACU - 1ST 60 MINS PHASE II: Performed by: PLASTIC SURGERY

## 2022-04-14 PROCEDURE — 160009 HCHG ANES TIME/MIN: Performed by: PLASTIC SURGERY

## 2022-04-14 PROCEDURE — 160036 HCHG PACU - EA ADDL 30 MINS PHASE I: Performed by: PLASTIC SURGERY

## 2022-04-14 PROCEDURE — 160029 HCHG SURGERY MINUTES - 1ST 30 MINS LEVEL 4: Performed by: PLASTIC SURGERY

## 2022-04-14 PROCEDURE — 700101 HCHG RX REV CODE 250: Performed by: PLASTIC SURGERY

## 2022-04-14 PROCEDURE — 00832 ANES HERNIA REPAIR VNT&INCAL: CPT | Performed by: STUDENT IN AN ORGANIZED HEALTH CARE EDUCATION/TRAINING PROGRAM

## 2022-04-14 PROCEDURE — 700102 HCHG RX REV CODE 250 W/ 637 OVERRIDE(OP): Performed by: STUDENT IN AN ORGANIZED HEALTH CARE EDUCATION/TRAINING PROGRAM

## 2022-04-14 PROCEDURE — 160002 HCHG RECOVERY MINUTES (STAT): Performed by: PLASTIC SURGERY

## 2022-04-14 PROCEDURE — 500064 HCHG BINDER, 4-PANEL MED/LG: Performed by: PLASTIC SURGERY

## 2022-04-14 PROCEDURE — 160041 HCHG SURGERY MINUTES - EA ADDL 1 MIN LEVEL 4: Performed by: PLASTIC SURGERY

## 2022-04-14 PROCEDURE — 700101 HCHG RX REV CODE 250: Performed by: STUDENT IN AN ORGANIZED HEALTH CARE EDUCATION/TRAINING PROGRAM

## 2022-04-14 PROCEDURE — A9270 NON-COVERED ITEM OR SERVICE: HCPCS | Performed by: STUDENT IN AN ORGANIZED HEALTH CARE EDUCATION/TRAINING PROGRAM

## 2022-04-14 PROCEDURE — 302699 HCHG ABDOMINAL BINDER: Performed by: PLASTIC SURGERY

## 2022-04-14 PROCEDURE — 160035 HCHG PACU - 1ST 60 MINS PHASE I: Performed by: PLASTIC SURGERY

## 2022-04-14 PROCEDURE — 501838 HCHG SUTURE GENERAL: Performed by: PLASTIC SURGERY

## 2022-04-14 RX ORDER — MEPERIDINE HYDROCHLORIDE 25 MG/ML
12.5 INJECTION INTRAMUSCULAR; INTRAVENOUS; SUBCUTANEOUS
Status: DISCONTINUED | OUTPATIENT
Start: 2022-04-14 | End: 2022-04-14 | Stop reason: HOSPADM

## 2022-04-14 RX ORDER — LIDOCAINE HYDROCHLORIDE 10 MG/ML
INJECTION, SOLUTION EPIDURAL; INFILTRATION; INTRACAUDAL; PERINEURAL
Status: DISCONTINUED
Start: 2022-04-14 | End: 2022-04-14 | Stop reason: HOSPADM

## 2022-04-14 RX ORDER — OXYCODONE HCL 5 MG/5 ML
5 SOLUTION, ORAL ORAL
Status: COMPLETED | OUTPATIENT
Start: 2022-04-14 | End: 2022-04-14

## 2022-04-14 RX ORDER — HYDROMORPHONE HYDROCHLORIDE 1 MG/ML
0.2 INJECTION, SOLUTION INTRAMUSCULAR; INTRAVENOUS; SUBCUTANEOUS
Status: DISCONTINUED | OUTPATIENT
Start: 2022-04-14 | End: 2022-04-14 | Stop reason: HOSPADM

## 2022-04-14 RX ORDER — LABETALOL HYDROCHLORIDE 5 MG/ML
5 INJECTION, SOLUTION INTRAVENOUS
Status: DISCONTINUED | OUTPATIENT
Start: 2022-04-14 | End: 2022-04-14 | Stop reason: HOSPADM

## 2022-04-14 RX ORDER — MIDAZOLAM HYDROCHLORIDE 1 MG/ML
INJECTION INTRAMUSCULAR; INTRAVENOUS PRN
Status: DISCONTINUED | OUTPATIENT
Start: 2022-04-14 | End: 2022-04-14 | Stop reason: SURG

## 2022-04-14 RX ORDER — BUPIVACAINE HYDROCHLORIDE AND EPINEPHRINE 5; 5 MG/ML; UG/ML
INJECTION, SOLUTION EPIDURAL; INTRACAUDAL; PERINEURAL
Status: DISCONTINUED
Start: 2022-04-14 | End: 2022-04-14 | Stop reason: HOSPADM

## 2022-04-14 RX ORDER — EPINEPHRINE 1 MG/ML(1)
AMPUL (ML) INJECTION
Status: DISCONTINUED
Start: 2022-04-14 | End: 2022-04-14 | Stop reason: HOSPADM

## 2022-04-14 RX ORDER — LIDOCAINE HYDROCHLORIDE 20 MG/ML
INJECTION, SOLUTION EPIDURAL; INFILTRATION; INTRACAUDAL; PERINEURAL PRN
Status: DISCONTINUED | OUTPATIENT
Start: 2022-04-14 | End: 2022-04-14 | Stop reason: SURG

## 2022-04-14 RX ORDER — ROPIVACAINE HYDROCHLORIDE 5 MG/ML
INJECTION, SOLUTION EPIDURAL; INFILTRATION; PERINEURAL
Status: DISCONTINUED
Start: 2022-04-14 | End: 2022-04-14 | Stop reason: HOSPADM

## 2022-04-14 RX ORDER — GABAPENTIN 300 MG/1
300 CAPSULE ORAL ONCE
Status: COMPLETED | OUTPATIENT
Start: 2022-04-14 | End: 2022-04-14

## 2022-04-14 RX ORDER — HYDRALAZINE HYDROCHLORIDE 20 MG/ML
5 INJECTION INTRAMUSCULAR; INTRAVENOUS
Status: DISCONTINUED | OUTPATIENT
Start: 2022-04-14 | End: 2022-04-14 | Stop reason: HOSPADM

## 2022-04-14 RX ORDER — HYDROMORPHONE HYDROCHLORIDE 1 MG/ML
0.4 INJECTION, SOLUTION INTRAMUSCULAR; INTRAVENOUS; SUBCUTANEOUS
Status: DISCONTINUED | OUTPATIENT
Start: 2022-04-14 | End: 2022-04-14 | Stop reason: HOSPADM

## 2022-04-14 RX ORDER — BUPIVACAINE HYDROCHLORIDE AND EPINEPHRINE 5; 5 MG/ML; UG/ML
INJECTION, SOLUTION EPIDURAL; INTRACAUDAL; PERINEURAL
Status: DISCONTINUED | OUTPATIENT
Start: 2022-04-14 | End: 2022-04-14 | Stop reason: HOSPADM

## 2022-04-14 RX ORDER — HYDROMORPHONE HYDROCHLORIDE 1 MG/ML
0.1 INJECTION, SOLUTION INTRAMUSCULAR; INTRAVENOUS; SUBCUTANEOUS
Status: DISCONTINUED | OUTPATIENT
Start: 2022-04-14 | End: 2022-04-14 | Stop reason: HOSPADM

## 2022-04-14 RX ORDER — ONDANSETRON 2 MG/ML
4 INJECTION INTRAMUSCULAR; INTRAVENOUS
Status: DISCONTINUED | OUTPATIENT
Start: 2022-04-14 | End: 2022-04-14 | Stop reason: HOSPADM

## 2022-04-14 RX ORDER — LIDOCAINE HYDROCHLORIDE AND EPINEPHRINE 10; 10 MG/ML; UG/ML
INJECTION, SOLUTION INFILTRATION; PERINEURAL
Status: DISCONTINUED | OUTPATIENT
Start: 2022-04-14 | End: 2022-04-14 | Stop reason: HOSPADM

## 2022-04-14 RX ORDER — BACITRACIN ZINC 500 [USP'U]/G
OINTMENT TOPICAL
Status: DISCONTINUED
Start: 2022-04-14 | End: 2022-04-14 | Stop reason: HOSPADM

## 2022-04-14 RX ORDER — DEXAMETHASONE SODIUM PHOSPHATE 4 MG/ML
INJECTION, SOLUTION INTRA-ARTICULAR; INTRALESIONAL; INTRAMUSCULAR; INTRAVENOUS; SOFT TISSUE PRN
Status: DISCONTINUED | OUTPATIENT
Start: 2022-04-14 | End: 2022-04-14 | Stop reason: SURG

## 2022-04-14 RX ORDER — HALOPERIDOL 5 MG/ML
1 INJECTION INTRAMUSCULAR
Status: DISCONTINUED | OUTPATIENT
Start: 2022-04-14 | End: 2022-04-14 | Stop reason: HOSPADM

## 2022-04-14 RX ORDER — ACETAMINOPHEN 500 MG
1000 TABLET ORAL ONCE
Status: COMPLETED | OUTPATIENT
Start: 2022-04-14 | End: 2022-04-14

## 2022-04-14 RX ORDER — DIPHENHYDRAMINE HYDROCHLORIDE 50 MG/ML
12.5 INJECTION INTRAMUSCULAR; INTRAVENOUS
Status: DISCONTINUED | OUTPATIENT
Start: 2022-04-14 | End: 2022-04-14 | Stop reason: HOSPADM

## 2022-04-14 RX ORDER — METOPROLOL TARTRATE 1 MG/ML
1 INJECTION, SOLUTION INTRAVENOUS
Status: DISCONTINUED | OUTPATIENT
Start: 2022-04-14 | End: 2022-04-14 | Stop reason: HOSPADM

## 2022-04-14 RX ORDER — ONDANSETRON 2 MG/ML
INJECTION INTRAMUSCULAR; INTRAVENOUS PRN
Status: DISCONTINUED | OUTPATIENT
Start: 2022-04-14 | End: 2022-04-14 | Stop reason: SURG

## 2022-04-14 RX ORDER — SODIUM CHLORIDE, SODIUM LACTATE, POTASSIUM CHLORIDE, CALCIUM CHLORIDE 600; 310; 30; 20 MG/100ML; MG/100ML; MG/100ML; MG/100ML
INJECTION, SOLUTION INTRAVENOUS CONTINUOUS
Status: ACTIVE | OUTPATIENT
Start: 2022-04-14 | End: 2022-04-14

## 2022-04-14 RX ORDER — CEFAZOLIN SODIUM 1 G/3ML
INJECTION, POWDER, FOR SOLUTION INTRAMUSCULAR; INTRAVENOUS PRN
Status: DISCONTINUED | OUTPATIENT
Start: 2022-04-14 | End: 2022-04-14 | Stop reason: SURG

## 2022-04-14 RX ORDER — OXYCODONE HCL 5 MG/5 ML
10 SOLUTION, ORAL ORAL
Status: COMPLETED | OUTPATIENT
Start: 2022-04-14 | End: 2022-04-14

## 2022-04-14 RX ORDER — LIDOCAINE HYDROCHLORIDE 40 MG/ML
SOLUTION TOPICAL PRN
Status: DISCONTINUED | OUTPATIENT
Start: 2022-04-14 | End: 2022-04-14 | Stop reason: SURG

## 2022-04-14 RX ORDER — HYDROMORPHONE HYDROCHLORIDE 2 MG/ML
INJECTION, SOLUTION INTRAMUSCULAR; INTRAVENOUS; SUBCUTANEOUS PRN
Status: DISCONTINUED | OUTPATIENT
Start: 2022-04-14 | End: 2022-04-14 | Stop reason: SURG

## 2022-04-14 RX ADMIN — ROCURONIUM BROMIDE 50 MG: 10 INJECTION, SOLUTION INTRAVENOUS at 14:57

## 2022-04-14 RX ADMIN — MIDAZOLAM HYDROCHLORIDE 2 MG: 1 INJECTION, SOLUTION INTRAMUSCULAR; INTRAVENOUS at 14:42

## 2022-04-14 RX ADMIN — CEFAZOLIN 2 G: 330 INJECTION, POWDER, FOR SOLUTION INTRAMUSCULAR; INTRAVENOUS at 14:59

## 2022-04-14 RX ADMIN — ACETAMINOPHEN 1000 MG: 500 TABLET ORAL at 13:14

## 2022-04-14 RX ADMIN — LIDOCAINE HYDROCHLORIDE 80 MG: 20 INJECTION, SOLUTION EPIDURAL; INFILTRATION; INTRACAUDAL at 14:57

## 2022-04-14 RX ADMIN — FENTANYL CITRATE 50 MCG: 50 INJECTION, SOLUTION INTRAMUSCULAR; INTRAVENOUS at 17:11

## 2022-04-14 RX ADMIN — OXYCODONE HYDROCHLORIDE 10 MG: 5 SOLUTION ORAL at 17:11

## 2022-04-14 RX ADMIN — LIDOCAINE HYDROCHLORIDE 4 ML: 40 SOLUTION TOPICAL at 14:58

## 2022-04-14 RX ADMIN — HYDROMORPHONE HYDROCHLORIDE 0.6 MG: 2 INJECTION INTRAMUSCULAR; INTRAVENOUS; SUBCUTANEOUS at 14:55

## 2022-04-14 RX ADMIN — FENTANYL CITRATE 100 MCG: 50 INJECTION, SOLUTION INTRAMUSCULAR; INTRAVENOUS at 14:56

## 2022-04-14 RX ADMIN — HYDROMORPHONE HYDROCHLORIDE 0.4 MG: 2 INJECTION INTRAMUSCULAR; INTRAVENOUS; SUBCUTANEOUS at 15:33

## 2022-04-14 RX ADMIN — GABAPENTIN 300 MG: 300 CAPSULE ORAL at 13:14

## 2022-04-14 RX ADMIN — PROPOFOL 100 MG: 10 INJECTION, EMULSION INTRAVENOUS at 14:57

## 2022-04-14 RX ADMIN — SODIUM CHLORIDE, POTASSIUM CHLORIDE, SODIUM LACTATE AND CALCIUM CHLORIDE: 600; 310; 30; 20 INJECTION, SOLUTION INTRAVENOUS at 13:15

## 2022-04-14 RX ADMIN — ONDANSETRON 4 MG: 2 INJECTION INTRAMUSCULAR; INTRAVENOUS at 15:02

## 2022-04-14 RX ADMIN — DEXAMETHASONE SODIUM PHOSPHATE 4 MG: 4 INJECTION, SOLUTION INTRA-ARTICULAR; INTRALESIONAL; INTRAMUSCULAR; INTRAVENOUS; SOFT TISSUE at 15:02

## 2022-04-14 ASSESSMENT — FIBROSIS 4 INDEX
FIB4 SCORE: 0.65
FIB4 SCORE: 0.65

## 2022-04-14 NOTE — OP REPORT
"Pre op diagnosis:  Recurrent ventral incisional hernia  Post op diagnosis:  Same    Procedure:  Repair of recurrent ventral incisional hernia    Surgeon:  Hiral Rock MD  Assistant:  Anel Morris CFA  Anesthesiologist:  Jaime Christopher MD    Anesthesia:  General  Pre op meds:  Ancef  ASA 2    Indications:  This is a 52 year old female with two previous umbilical hernia repairs, at least one with mesh.  After discussing risks and benefits of her options, she is ready to proceed with open repair in conjunction with abdominoplasty.  In Pre-op, she mentioned feeling two small \"bubbles\" in her lower lateral abdominal wall, wondering if these were also hernias.    Findings:  2.5cm defect with preperitoneal fat    Summary:  The patient was anesthetized, then prepped and draped in sterile fashion.  Time out was confirmed.  Dr. Travis had already made his incision and dissected the abdominal wall off the anterior fascia.  I inspected the abdominal wall in the additional areas noted by her in Pre Op, but no obvious other defects were noted.      I therefore focused on the recurrent umbilical fascial defect.  I was not able to appreciate any residual mesh.  Given the plan for plication of the rectus by Dr. Travis, we decided that placing mesh would not be best choice, so I placed multiple buried interrupted 0-Ethibonds to close the defect primarily.  Her fascia was quite healthy.  Dr. Travis then completed his plication and abdominoplasty.  Assistance was required for retraction.    CC: DO Herbie Lynne MD            "

## 2022-04-14 NOTE — ANESTHESIA TIME REPORT
Anesthesia Start and Stop Event Times     Date Time Event    4/14/2022 1443 Ready for Procedure     1447 Anesthesia Start     1657 Anesthesia Stop        Responsible Staff  04/14/22    Name Role Begin End    Jaime Christopher M.D. Anesth 1447 1657        Overtime Reason:  overtime    Comments:

## 2022-04-14 NOTE — OR SURGEON
Immediate Post OP Note    PreOp Diagnosis: Rectus diastases, cosmetic abdominal concerns, ventral hernia, stretched earlobes, chin scar      PostOp Diagnosis: Abdominoplasty with rectus diastases repair, bilateral earlobe reduction, revision of submental chin scar with intermediate closure 1.5 cm.      Procedure(s):  ABDOMINOPLASTY - Wound Class: Clean  RECONSTRUCTION, EAR - Wound Class: Clean  REVISION, SCAR - TO CHIN - Wound Class: Clean  REPAIR, HERNIA, VENTRAL    Surgeon(s):  MAEGAN Brown M.D.    Anesthesiologist/Type of Anesthesia:  Anesthesiologist: Jaime Christopher M.D./General    Surgical Staff:  Assistant: Ammy Davies  Circulator: Jenniffer Macias R.N.  Scrub Person: Yifan Earl    Specimens removed if any:  * No specimens in log *    Estimated Blood Loss: Minimal    Findings: See operative note    Complications: No apparent    #[id number]    4/14/2022 2:33 PM Paolo Travis M.D.

## 2022-04-14 NOTE — ANESTHESIA PREPROCEDURE EVALUATION
" Case: [id number] Date/Time: 04/14/22 7547    Procedures:       ABDOMINOPLASTY (Abdomen)      RECONSTRUCTION, EAR (Bilateral Ear)      REVISION, SCAR - TO CHIN (Chin)      REPAIR, HERNIA, VENTRAL    Anesthesia type: General    Pre-op diagnosis: Z41.1, VENTRAL HERNIA    Location: [facility] ROOM 28 / SURGERY SAME DAY [facility]    Surgeons: Paolo Travis M.D.; Hiral Rock M.D.      Patient with several concerns preoperatively:  Firstly, she sustained a RUE humeral fx nd still suffers from residual pain, and cannot abduct more than 10-15 degrees. I explained that we could have her position herself in the OR to ensure her arm was in a comfortable position. Secondly, she states that she \"normally has low BP, like 90/60, and a low heart rate, like 50s.\" I told her I understood and that we would attempt to keep her VS within 20% of her baseline. All other questions and concerns addressed.    Relevant Problems   NEURO   (positive) History of iron deficiency      ENDO   (positive) Hypothyroid       Physical Exam    Airway   Mallampati: II  TM distance: >3 FB  Neck ROM: full       Cardiovascular - normal exam  Rhythm: regular  Rate: normal  (-) murmur     Dental - normal exam           Pulmonary - normal exam  Breath sounds clear to auscultation     Abdominal    Neurological - normal exam                 Anesthesia Plan    ASA 3   ASA physical status 3 criteria: alcohol and/or substance dependence or abuse    Plan - general       Airway plan will be ETT          Induction: intravenous    Postoperative Plan: Postoperative administration of opioids is intended.    Pertinent diagnostic labs and testing reviewed    Informed Consent:    Anesthetic plan and risks discussed with patient.    Use of blood products discussed with: patient whom consented to blood products.         "

## 2022-04-15 NOTE — OR NURSING
"1650 - Patient arrived from OR, report received from anesthesia and RN. Orders verified, oral airway w/ [id number] in place. Lung sounds present and clear in all lung fields.   Bilateral ear surgical sites open to air and clean/dry/intact.   Chin surgical site clean/dry/intact, open to air.   Abdominal binder w/ dressing in place over abdomen. Patient in \"bent at waist\" positioning per MD order     1711 - Patient tolerating liquids.   Oxycodone and fentanyl administered per order     1755 - Criteria met to transition patient to phase II recovery.   Patient dressed at bedside, steady gait     1814 - All discharge criteria met. Discharge instructions given to patient and patient friend, Angelia. All questions answered.   IV removed. Patient escorted out by staff in stable condition w/ all belongings.   Patient discharged       "

## 2022-04-15 NOTE — DISCHARGE INSTRUCTIONS
ACTIVITY: Rest and take it easy for the first 24 hours.  A responsible adult is recommended to remain with you during that time.  It is normal to feel sleepy.  We encourage you to not do anything that requires balance, judgment or coordination.    MILD FLU-LIKE SYMPTOMS ARE NORMAL. YOU MAY EXPERIENCE GENERALIZED MUSCLE ACHES, THROAT IRRITATION, HEADACHE AND/OR SOME NAUSEA.    FOR 24 HOURS DO NOT:  Drive, operate machinery or run household appliances.  Drink beer or alcoholic beverages.   Make important decisions or sign legal documents.    SPECIAL INSTRUCTIONS: Follow Dr. Travis's Instructions   * * * * Stay with waist bent for 1 week     DIET: To avoid nausea, slowly advance diet as tolerated, avoiding spicy or greasy foods for the first day.  Add more substantial food to your diet according to your physician's instructions.  Babies can be fed formula or breast milk as soon as they are hungry.  INCREASE FLUIDS AND FIBER TO AVOID CONSTIPATION.      You should CALL YOUR PHYSICIAN if you develop:  Fever greater than 101 degrees F.  Pain not relieved by medication, or persistent nausea or vomiting.  Excessive bleeding (blood soaking through dressing) or unexpected drainage from the wound.  Extreme redness or swelling around the incision site, drainage of pus or foul smelling drainage.  Inability to urinate or empty your bladder within 8 hours.  Problems with breathing or chest pain.    You should call 911 if you develop problems with breathing or chest pain.  If you are unable to contact your doctor or surgical center, you should go to the nearest emergency room or urgent care center.    Physician's telephone #: [phone] - Dr. Travis     If any questions arise, call your doctor.  If your doctor is not available, please feel free to call the Surgical Center at [phone].     A registered nurse may call you a few days after your surgery to see how you are doing after your procedure.    MEDICATIONS: Resume  taking daily medication.  Take prescribed pain medication with food.  If no medication is prescribed, you may take non-aspirin pain medication if needed.  PAIN MEDICATION CAN BE VERY CONSTIPATING.  Take a stool softener or laxative such as senokot, pericolace, or milk of magnesia if needed.    Prescriptions already given. Last pain medication given at 5:11p.m.     If your physician has prescribed pain medication that includes Acetaminophen (Tylenol), do not take additional Acetaminophen (Tylenol) while taking the prescribed medication.    Depression / Suicide Risk    As you are discharged from this [facility] facility, it is important to learn how to keep safe from harming yourself.    Recognize the warning signs:  · Abrupt changes in personality, positive or negative- including increase in energy   · Giving away possessions  · Change in eating patterns- significant weight changes-  positive or negative  · Change in sleeping patterns- unable to sleep or sleeping all the time   · Unwillingness or inability to communicate  · Depression  · Unusual sadness, discouragement and loneliness  · Talk of wanting to die  · Neglect of personal appearance   · Rebelliousness- reckless behavior  · Withdrawal from people/activities they love  · Confusion- inability to concentrate     If you or a loved one observes any of these behaviors or has concerns about self-harm, here's what you can do:  · Talk about it- your feelings and reasons for harming yourself  · Remove any means that you might use to hurt yourself (examples: pills, rope, extension cords, firearm)  · Get professional help from the community (Mental Health, Substance Abuse, psychological counseling)  · Do not be alone:Call your Safe Contact- someone whom you trust who will be there for you.  · Call your local CRISIS HOTLINE 195-5310 or 690-049-2505  · Call your local Children's Mobile Crisis Response Team [place] (862) 860-8534 or www.[employer]  · Call  the toll free National Suicide Prevention Hotlines   · National Suicide Prevention Lifeline 111-829-XHBU (1473)  · National Hope Line Network 800-SUICIDE (925-8060)

## 2022-04-15 NOTE — OP REPORT
DATE OF SERVICE:  04/14/2022     PREOPERATIVE DIAGNOSES:  1.  Recurrent ventral hernia.  2.  Rectus diastasis.  3.  Cosmetic abdominal concerns.  4.  Stretched earlobes.  5.  Chin scar.     POSTOPERATIVE DIAGNOSES:  1.  Recurrent ventral hernia.  2.  Rectus diastasis.  3.  Cosmetic abdominal concerns.  4.  Stretched earlobes.  5.  Chin scar.     PROCEDURES:  1.  Ventral hernia repair by Dr. Rock.  2.  Rectus diastasis repair.  3.  Abdominoplasty.  4.  Bilateral earlobe reduction with wedge excisions.  5.  Revision of submental chin scar with intermediate closure 1.5 cm.     ATTENDING SURGEONS:  Paolo Travis MD and Hiral Rock MD     ASSISTANTS:  SPEEDY Valenzuela, for the plastic surgery portion of   the operation, and CHRISSY Matthews, for the ventral hernia repair.     ANESTHESIOLOGIST:  Jaime Christopher MD     SPECIMENS:  None.     ESTIMATED BLOOD LOSS:  Minimal.     COMPLICATIONS:  No apparent.     INDICATIONS FOR PROCEDURE:  The patient is a 52-year-old woman who has had   previous bilateral inguinal hernias when she was younger and 2 different   ventral hernia repairs.  She does have recurrent ventral hernia.  She also has   redundant skin on her abdomen that is bothersome.  Clinically, she does have   fairly significant rectus diastasis along with the ventral hernia.  She now   presents for a combined operation for the ventral hernia repair, repair of the   rectus diastasis and an abdominoplasty.     The patient also has large earlobes that cosmetically are bothersome to her   and we discussed doing a wedge excision with closure and has a scar in her   submental chin that is bothersome and we discussed doing a scar revision.  The   patient now presents for the above operation.     INTRAOPERATIVE FINDINGS:  The total tissue resected from the abdomen was 329   grams.  There was about 400 mL of tumescent solution from the flank and about   100 mL of lipoaspirate from each flank for a total of  200 mL.     The insurance portion of the operation went from 3:07 p.m. to 3:35 p.m. and   the cosmetic portion from 3:35 p.m. to 4:42 p.m.     PROCEDURE:  After the operative and nonoperative options were discussed   including risks, benefits and alternatives, which included, but was not   limited to bleeding, infection, damage to surrounding structure, need for   further surgery, reaction to anesthetic agent, scarring, wound healing   difficulties, intraabdominal injury, abdominal organ perforation, recurrent   hernia formation, persistent or temporary numbness or tingling, persistent or   temporary pain, dissatisfaction with appearance, hypertrophic keloid scarring,   deep venous thrombosis, pulmonary embolus, myocardial infarction, stroke, fat   embolus, and/or death, informed consent was obtained.  Preoperatively, the   patient was identified.  The planned earlobe reductions were drawn out   bilaterally and a wedge-shaped excision to excise the patient's first earlobe   piercing.  The patient had a scar in her submental chin that was marked out   with a horizontal ellipse.  For the abdomen, the lower abdominoplasty incision   was marked out about 4 cm above the vulvar commissure and extending out   laterally in her relaxed skin tension lines.  The planned upper area of   resection was marked out.  The patient had a very amorphous umbilicus from her   previous umbilical hernia repairs and we discussed doing an umbilical float   and then tacking down the umbilicus.  The general surgeon marked the area of   the hernia.  A preoperative abdominal exam was performed.  She did not appear   to have any other hernias on her abdomen.  Antibiotics were given.  Sequential   compression devices were placed.  The patient was brought back to the   operating room and general anesthesia was induced.  Starting to expose the   hernia, the incision was made along the lower abdomen.  Cautery was used to   dissect down through the  underlying tissue down through Nils's fascia.  In   the midline, the dissection was then carried down to the abdominal wall fascia   and the flap was then elevated up to the level of the umbilicus.  Lateral to   the mid portion of the rectus abdominis, the flap was then elevated off under   Nils's fascia up to the level of costal margin.  The umbilicus was then   freed up and the flap was then further elevated up all the way to the level of   the xiphoid.  At this point, Dr. Rock came in and did the ventral hernia   repair.  She did dissect the fascial edges of the hernia and then repaired   this with figure-of-eight 0 Ethibond sutures.  Please see her separate   dictation for the ventral hernia repair.  After the ventral hernia repair was   completed, the medial borders of the rectus abdominis muscles were then marked   out.  A #1 looped PDS suture was then run from the xiphoid all the way down   to the umbilicus.  This gave a second layer of closure over the ventral hernia   repair.  In doing so, this repaired the rectus diastasis.  A number of   interrupted 0 Vicryl sutures were then used to further reinforce this.  For   postoperative pain control, 0.25% Marcaine was then injected by doing open TAP   blocks.  Going about 5 cm above the anterior superior iliac spine, dissection   was then carried down through the external fascia going to cut down through   the external oblique down to the internal oblique down to the transverse   abdominis.  A 20 mL of 0.25% Marcaine with epinephrine was then injected.  The   fascial incision was then closed with interrupted 0 Vicryl sutures.  The   patient was then put in a slightly flexed position.  The progressive tension   sutures were then placed from the xiphoid down to the new location of the   umbilicus with 0 Vicryl sutures.  Then, in order to try to recreate an   umbilicus, a 0 Ethibond suture was then placed to the dermis in a   figure-of-eight fashion and then down  to the abdominal wall fascia to try to   create an umbilical hollow.  She did have significant scarring from her   previous surgery and so the amount of indentation was fairly limited.  The   progressive tension sutures were then placed from below this all the way down   to the lower abdominoplasty incision.  The lower abdominoplasty incision was   then marked out and incised with a 10 blade and then removed with the Bovie   electrocautery.  This was then weighed and discarded.  Nils's fascia was   then closed with 0 Vicryl sutures and a running 0 barbed suture.  The dermis   was then closed with 3-0 deep dermal Monocryl sutures and running 4-0 Monocryl   subcuticular stitches were used to close the overlying skin.     A poke incision was made in the flank bilaterally.  Tumescent solution was   then placed in this location.  Adequate time was allowed for hemostatic   effects of epinephrine to take effect.  Liposuction was then performed   removing about 100 mL of lipoaspirate from each flank.  The poke incision was   then closed with interrupted 5-0 fast absorbing suture.     We then turned our attention to the face.  The face was prepped and draped in   the usual sterile fashion.  Local anesthetic was injected into the planned   treatment areas.  Adequate time was allowed for hemostatic effects of   epinephrine to take effect.  A 15 blade was then used to excise down and   remove a wedge of tissue from the lobule bilaterally.  Hemostasis was obtained   with cautery.  These were then closed with 5-0 Vicryl sutures in the deep   tissue and then running 5-0 nylon sutures on the skin.     We then turned our attention to the submental scar.  This was then incised   with a 15 blade and removed in the subcutaneous plane.  The edges were   slightly undermined with iris scissors.  The wound was then closed in   intermediate fashion with 5-0 Vicryl sutures in the deep dermis and   interrupted 5-0 nylon sutures on the skin.   The patient was then washed.    Antibiotic ointment was then placed in the earlobes and the chin.  The patient   was then placed in a compressive binder on her abdomen.  She was then   awakened, extubated and transferred to PACU in stable condition.  At the end   of the procedure, all sponge, instrument and needle counts were correct.        ______________________________  MD JELANI CHANDLER/ISMAEL    DD:  04/14/2022 17:04  DT:  04/14/2022 18:51    Job#:  [id number]

## 2022-04-18 DIAGNOSIS — G47.00 INSOMNIA, UNSPECIFIED TYPE: ICD-10-CM

## 2022-04-18 DIAGNOSIS — F32.A ANXIETY AND DEPRESSION: ICD-10-CM

## 2022-04-18 DIAGNOSIS — F41.9 ANXIETY AND DEPRESSION: ICD-10-CM

## 2022-04-18 ASSESSMENT — PAIN SCALES - GENERAL: PAIN_LEVEL: 4

## 2022-04-18 NOTE — ANESTHESIA POSTPROCEDURE EVALUATION
Patient: Carie Santiago    Procedure Summary     Date: 04/14/22 Room / Location: [facility] ROOM 28 / SURGERY SAME DAY [facility]    Anesthesia Start: 1447 Anesthesia Stop: 1657    Procedures:       ABDOMINOPLASTY (Abdomen)      RECONSTRUCTION, EAR (Bilateral Ear)      REVISION, SCAR - TO CHIN (Chin)      REPAIR, HERNIA, VENTRAL (Abdomen) Diagnosis: (RECURRENT VENTRAL HERNIA, STRETCHED EARLONBES, CHIN SCAR, RECTUS DIASTASIS)    Surgeons: Paolo Travis M.D.; Hiral Rock M.D. Responsible Provider: Jaime Christopher M.D.    Anesthesia Type: general ASA Status: 3          Final Anesthesia Type: general  Last vitals  /76       Temp 36 C       Pulse 72      Resp 17   SpO2 99%         Anesthesia Post Evaluation    Patient location during evaluation: PACU  Patient participation: complete - patient participated  Level of consciousness: awake and alert  Pain score: 4    Airway patency: patent  Anesthetic complications: no  Cardiovascular status: hemodynamically stable  Respiratory status: acceptable  Hydration status: euvolemic    PONV: none          No complications documented.     Nurse Pain Score: 4 (NPRS)

## 2022-04-19 RX ORDER — TRAZODONE HYDROCHLORIDE 50 MG/1
TABLET ORAL
Qty: 90 TABLET | Refills: 3 | Status: ON HOLD | OUTPATIENT
Start: 2022-04-19 | End: 2022-07-19

## 2022-04-19 RX ORDER — FLUOXETINE 10 MG/1
10 CAPSULE ORAL DAILY
Qty: 90 CAPSULE | Refills: 3 | Status: ON HOLD | OUTPATIENT
Start: 2022-04-19 | End: 2022-11-03

## 2022-04-21 NOTE — H&P
Surgery Plastic History & Physical Note    Date  4/21/2022    Primary Care Physician  Herbie Joshi M.D.    CC  * No Diagnosis Codes entered *    HPI  This is a 52 y.o. female who presented with umbilical hernia and rectus diastases.    Past Medical History:   Diagnosis Date   • Anxiety and depression 1/23/2019   • Colon polyp 3/29/2019    Colonoscopy March 2019: 3mm descending colon polyp   • COVID-19     H/O   • COVID-19     H/O 2nd time 2-2022   • Diverticulosis of colon 1/23/2019    MRI abdomen Jan. 2019   • Generalized anxiety disorder    • H/O cervical fracture     x2   • Humerus fracture     Right   • Hypertriglyceridemia 1/23/2019   • Hypothyroid    • Low serum HDL 1/23/2019   • Menopause    • Ovarian cyst    • Umbilical hernia     X3   • Vitamin B12 deficiency 1/23/2019       Past Surgical History:   Procedure Laterality Date   • ABDOMINOPLASTY  4/14/2022    Procedure: ABDOMINOPLASTY;  Surgeon: Paolo Trvais M.D.;  Location: SURGERY SAME DAY [facility];  Service: Plastics   • EAR RECONSTRUCTION Bilateral 4/14/2022    Procedure: RECONSTRUCTION, EAR;  Surgeon: Paolo Travis M.D.;  Location: SURGERY SAME DAY [facility];  Service: Plastics   • SCAR REVISION  4/14/2022    Procedure: REVISION, SCAR - TO CHIN;  Surgeon: Paolo Travis M.D.;  Location: SURGERY SAME DAY [facility];  Service: Plastics   • VENTRAL HERNIA REPAIR  4/14/2022    Procedure: REPAIR, HERNIA, VENTRAL;  Surgeon: Hiral Rock M.D.;  Location: SURGERY SAME DAY [facility];  Service: Plastics   • DC LAP,DIAGNOSTIC ABDOMEN  8/3/2021    Procedure: PELVISCOPY - WITH PELVIC WASHINGS, EXCISION OF RIGHT OVARIAN  MASS;  Surgeon: Fabrice Camejo D.O.;  Location: SURGERY SAME DAY [facility];  Service: Obstetrics   • SALPINGO OOPHORECTOMY Bilateral 8/3/2021    Procedure: SALPINGO-OOPHORECTOMY.;  Surgeon: Fabrice Camejo D.O.;  Location: SURGERY SAME DAY [facility];  Service: Obstetrics   • DC UPPER GI ENDOSCOPY,DIAGNOSIS N/A 2/12/2021     Procedure: GASTROSCOPY;  Surgeon: Kasi Chan M.D.;  Location: SURGERY SAME DAY [facility];  Service: Gastroenterology   • AZ UPPER GI ENDOSCOPY,BIOPSY N/A 2/12/2021    Procedure: GASTROSCOPY, WITH BIOPSY;  Surgeon: Kasi Chan M.D.;  Location: SURGERY SAME DAY [facility];  Service: Gastroenterology   • WOUND CLOSURE ORTHO Left 9/24/2019    Procedure: CLOSURE, WOUND, ORTHO - LEG W/WOUND VAC REMOVAL;  Surgeon: Paolo Odell M.D.;  Location: SURGERY [facility];  Service: Orthopedics   • IRRIGATION & DEBRIDEMENT ORTHO Left 9/21/2019    Procedure: IRRIGATION AND DEBRIDEMENT, WOUND - FOR PROXIMAL TIBIA HEMATOMA EVACUATION AND WOUND VAC APPLICATION;  Surgeon: Paolo Odell M.D.;  Location: SURGERY [facility];  Service: Orthopedics   • ROBOTIC LAPAROSCOPIC CHOLECYSTECTOMY  2017   • OTHER ORTHOPEDIC SURGERY Right 2017    kNEE   • UMBILICAL HERNIA REPAIR  2017   • INGUINAL HERNIA REPAIR BILATERAL  1999    with Mesh   • TONSILLECTOMY AND ADENOIDECTOMY  1976   • APPENDECTOMY     • BONE GRAFT     • CHOLECYSTECTOMY     • MAMMOPLASTY AUGMENTATION Bilateral    • MAMMOPLASTY REDUCTION     • UMBILICAL HERNIA REPAIR     • VENTRAL HERNIA REPAIR         No current facility-administered medications for this encounter.     Current Outpatient Medications   Medication Sig Dispense Refill   • traZODone (DESYREL) 50 MG Tab TAKE 1 TABLET BY MOUTH EVERY DAY AT BEDTIME AS NEEDED FOR SLEEP 90 Tablet 3   • FLUoxetine (PROZAC) 10 MG Cap TAKE 1 CAPSULE BY MOUTH EVERY DAY 90 Capsule 3   • polyethylene glycol/lytes (MIRALAX) 17 g Pack Take 17 g by mouth every day.     • ciprofloxacin (CILOXIN) 0.3 % Solution Administer 1 Drop into both eyes 4 times a day. Treat for 5-7 days 5 mL 0   • ALPRAZolam (XANAX) 0.5 MG Tab TAKE 1 TABLET BY MOUTH 2 TIMES A DAY AS NEEDED FOR SLEEP OR ANXIETY FOR UP TO 30 DAYS.F41.9 60 Tablet 0   • NABOR 0.0375 MG/24HR patch PLACE 1 PATCH ON THE SKIN TWO TIMES A WEEK. APPLYS ON MONDAYS AND THURSDAYS DAW 1 24  Patch 3   • SYNTHROID 88 MCG Tab TAKE ONE TABLET BY MOUTH EVERY DAY IN THE MORNING ON AN EMPTY STOMACH 90 Tablet 3   • progesterone (PROMETRIUM) 100 MG Cap TAKE 1 CAPSULE BY MOUTH EVERY DAY 90 Capsule 3   • albuterol (PROVENTIL) 2 MG tablet Take 1 Tablet by mouth 3 times a day as needed (for reactive airways). 90 Tablet 0   • ferrous sulfate 325 (65 Fe) MG tablet Take 650 mg by mouth every day.     • Mometasone Furo-Formoterol Fum (DULERA) 100-5 MCG/ACT Aerosol Inhale 2 Puffs 2 times a day. 13 g 11   • albuterol 108 (90 Base) MCG/ACT Aero Soln inhalation aerosol INHALE 2 PUFFS EVERY FOUR HOURS AS NEEDED FOR SHORTNESS OF BREATH. 1 Each 3   • lidocaine (LIDODERM) 5 % Patch Place 1 Patch on the skin every 24 hours. 10 Patch 1   • traMADol (ULTRAM) 50 MG Tab Take  mg by mouth every four hours as needed.     • VITAMIN D PO Take 1 Cap by mouth every day. 100 Cap 3   • Omega-3 Fatty Acids (FISH OIL) 1000 MG Cap capsule Take 1,000 mg by mouth every day.     • Multiple Vitamins-Minerals (OCUVITE-LUTEIN) Tab Take 1 tablet by mouth every day.         Social History     Socioeconomic History   • Marital status: Single     Spouse name: Not on file   • Number of children: Not on file   • Years of education: Not on file   • Highest education level: Bachelor's degree (e.g., BA, AB, BS)   Occupational History   • Not on file   Tobacco Use   • Smoking status: Former Smoker     Packs/day: 0.50     Years: 30.00     Pack years: 15.00     Types: Cigarettes   • Smokeless tobacco: Never Used   • Tobacco comment: QUIT 7-2021   Vaping Use   • Vaping Use: Never used   Substance and Sexual Activity   • Alcohol use: Not Currently     Comment: Pt. states quit drinking 2-6-2021   • Drug use: No   • Sexual activity: Not on file   Other Topics Concern   • Not on file   Social History Narrative   • Not on file     Social Determinants of Health     Financial Resource Strain: Low Risk    • Difficulty of Paying Living Expenses: Not very hard    Food Insecurity: No Food Insecurity   • Worried About Running Out of Food in the Last Year: Never true   • Ran Out of Food in the Last Year: Never true   Transportation Needs: No Transportation Needs   • Lack of Transportation (Medical): No   • Lack of Transportation (Non-Medical): No   Physical Activity: Insufficiently Active   • Days of Exercise per Week: 2 days   • Minutes of Exercise per Session: 20 min   Stress: No Stress Concern Present   • Feeling of Stress : Not at all   Social Connections: Moderately Integrated   • Frequency of Communication with Friends and Family: More than three times a week   • Frequency of Social Gatherings with Friends and Family: More than three times a week   • Attends [employer] Services: More than 4 times per year   • Active Member of Clubs or Organizations: Yes   • Attends Club or Organization Meetings: More than 4 times per year   • Marital Status:    Intimate Partner Violence: Not on file   Housing Stability: Low Risk    • Unable to Pay for Housing in the Last Year: No   • Number of Places Lived in the Last Year: 2   • Unstable Housing in the Last Year: No       Family History   Problem Relation Age of Onset   • Heart Disease Mother         Atrial fibrillation   • Thyroid Mother    • Cancer Mother         Melanoma   • Heart Disease Brother    • Diabetes Son    • Dementia Neg Hx    • Colon Cancer Neg Hx    • Breast Cancer Neg Hx        Allergies  Meloxicam, Sulfa drugs, Zofran [dextrose-ondansetron], and Lorazepam    Review of Systems  Negative    Physical Exam    Vital Signs  Blood Pressure: 133/66   Temperature: 36.5 °C (97.7 °F)   Pulse: 61   Respiration: 20   Pulse Oximetry: 95 %       Labs:                    Radiology:  No orders to display         Assessment/Plan: No change in history or plans.  This is an addendum to history and physical.  * No Diagnosis Codes entered *  Procedure(s):  ABDOMINOPLASTY  RECONSTRUCTION, EAR  REVISION, SCAR - TO CHIN  REPAIR,  HERNIA, VENTRAL

## 2022-04-26 DIAGNOSIS — F41.9 ANXIETY AND DEPRESSION: ICD-10-CM

## 2022-04-26 DIAGNOSIS — F32.A ANXIETY AND DEPRESSION: ICD-10-CM

## 2022-04-26 DIAGNOSIS — R05.9 COUGH: ICD-10-CM

## 2022-04-26 RX ORDER — ALPRAZOLAM 0.5 MG/1
TABLET ORAL
Qty: 60 TABLET | Refills: 0 | Status: SHIPPED | OUTPATIENT
Start: 2022-04-26 | End: 2022-05-25 | Stop reason: SDUPTHER

## 2022-04-26 RX ORDER — BENZONATATE 200 MG/1
200 CAPSULE ORAL 3 TIMES DAILY PRN
Qty: 30 CAPSULE | Refills: 0 | Status: ON HOLD | OUTPATIENT
Start: 2022-04-26 | End: 2022-11-03

## 2022-05-04 ENCOUNTER — TELEPHONE (OUTPATIENT)
Dept: INTERNAL MEDICINE | Facility: IMAGING CENTER | Age: 52
End: 2022-05-04
Payer: COMMERCIAL

## 2022-05-04 DIAGNOSIS — J01.90 ACUTE NON-RECURRENT SINUSITIS, UNSPECIFIED LOCATION: ICD-10-CM

## 2022-05-04 DIAGNOSIS — R05.9 COUGH: ICD-10-CM

## 2022-05-04 RX ORDER — PROMETHAZINE HYDROCHLORIDE AND CODEINE PHOSPHATE 6.25; 1 MG/5ML; MG/5ML
5 SYRUP ORAL 4 TIMES DAILY PRN
Qty: 100 ML | Refills: 0 | Status: SHIPPED | OUTPATIENT
Start: 2022-05-04 | End: 2022-05-09

## 2022-05-04 RX ORDER — AMOXICILLIN AND CLAVULANATE POTASSIUM 875; 125 MG/1; MG/1
1 TABLET, FILM COATED ORAL 2 TIMES DAILY
Qty: 14 TABLET | Refills: 0 | Status: SHIPPED | OUTPATIENT
Start: 2022-05-04 | End: 2022-05-11

## 2022-05-04 NOTE — TELEPHONE ENCOUNTER
Patient has had a URI for the last 7-10 days.  (She called 1 week ago for Tessalon).  Now she has large amounts of green nasal discharge and lots of post nasal drip making her cough.  She had abdominal surgery (plastic surgery with umbilical hernia repair) 3 weeks ago.  Her cough really aggravates her incisional pain.  She is not yet driving.  She is using Advil for pain control.  Antibiotic and cough prescriptions to preferred pharmacy per Dr Ballard.

## 2022-05-25 DIAGNOSIS — G89.29 CHRONIC RIGHT SHOULDER PAIN: ICD-10-CM

## 2022-05-25 DIAGNOSIS — F41.9 ANXIETY AND DEPRESSION: ICD-10-CM

## 2022-05-25 DIAGNOSIS — F32.A ANXIETY AND DEPRESSION: ICD-10-CM

## 2022-05-25 DIAGNOSIS — M25.511 CHRONIC RIGHT SHOULDER PAIN: ICD-10-CM

## 2022-05-26 RX ORDER — OXYCODONE HYDROCHLORIDE AND ACETAMINOPHEN 5; 325 MG/1; MG/1
1 TABLET ORAL NIGHTLY PRN
Qty: 20 TABLET | Refills: 0 | Status: SHIPPED | OUTPATIENT
Start: 2022-05-26 | End: 2022-06-29 | Stop reason: SDUPTHER

## 2022-05-26 RX ORDER — ALPRAZOLAM 0.5 MG/1
TABLET ORAL
Qty: 60 TABLET | Refills: 0 | Status: SHIPPED | OUTPATIENT
Start: 2022-05-26 | End: 2022-06-29 | Stop reason: SDUPTHER

## 2022-06-01 ENCOUNTER — HOSPITAL ENCOUNTER (OUTPATIENT)
Dept: RADIOLOGY | Facility: MEDICAL CENTER | Age: 52
End: 2022-06-01
Attending: INTERNAL MEDICINE
Payer: COMMERCIAL

## 2022-06-01 DIAGNOSIS — Z12.31 ENCOUNTER FOR SCREENING MAMMOGRAM FOR MALIGNANT NEOPLASM OF BREAST: ICD-10-CM

## 2022-06-01 PROCEDURE — 77063 BREAST TOMOSYNTHESIS BI: CPT

## 2022-06-14 ENCOUNTER — HOSPITAL ENCOUNTER (OUTPATIENT)
Dept: RADIOLOGY | Facility: MEDICAL CENTER | Age: 52
End: 2022-06-14
Attending: INTERNAL MEDICINE
Payer: COMMERCIAL

## 2022-06-14 DIAGNOSIS — R92.8 ABNORMAL MAMMOGRAM: ICD-10-CM

## 2022-06-14 PROCEDURE — 76642 ULTRASOUND BREAST LIMITED: CPT | Mod: LT

## 2022-06-14 PROCEDURE — G0279 TOMOSYNTHESIS, MAMMO: HCPCS

## 2022-06-22 ENCOUNTER — HOSPITAL ENCOUNTER (OUTPATIENT)
Dept: RADIOLOGY | Facility: MEDICAL CENTER | Age: 52
End: 2022-06-22
Attending: INTERNAL MEDICINE
Payer: COMMERCIAL

## 2022-06-22 DIAGNOSIS — R92.8 ABNORMAL FINDINGS ON DIAGNOSTIC IMAGING OF BREAST: ICD-10-CM

## 2022-06-22 LAB — PATHOLOGY CONSULT NOTE: NORMAL

## 2022-06-22 PROCEDURE — 88305 TISSUE EXAM BY PATHOLOGIST: CPT

## 2022-06-22 PROCEDURE — 88360 TUMOR IMMUNOHISTOCHEM/MANUAL: CPT | Mod: 91

## 2022-06-22 PROCEDURE — 77065 DX MAMMO INCL CAD UNI: CPT | Mod: LT

## 2022-06-22 PROCEDURE — 88341 IMHCHEM/IMCYTCHM EA ADD ANTB: CPT

## 2022-06-22 PROCEDURE — 88342 IMHCHEM/IMCYTCHM 1ST ANTB: CPT

## 2022-06-23 ENCOUNTER — TELEPHONE (OUTPATIENT)
Dept: RADIOLOGY | Facility: MEDICAL CENTER | Age: 52
End: 2022-06-23
Payer: COMMERCIAL

## 2022-06-23 PROBLEM — C50.919 BREAST CANCER (HCC): Status: ACTIVE | Noted: 2022-06-23

## 2022-06-24 DIAGNOSIS — D05.12 DUCTAL CARCINOMA IN SITU (DCIS) OF LEFT BREAST: ICD-10-CM

## 2022-06-24 RX ORDER — NITROFURANTOIN 25; 75 MG/1; MG/1
100 CAPSULE ORAL
Qty: 30 CAPSULE | Refills: 1 | Status: ON HOLD | OUTPATIENT
Start: 2022-06-24 | End: 2022-11-03

## 2022-06-27 ENCOUNTER — PATIENT MESSAGE (OUTPATIENT)
Dept: HEALTH INFORMATION MANAGEMENT | Facility: OTHER | Age: 52
End: 2022-06-27

## 2022-06-27 ENCOUNTER — PATIENT OUTREACH (OUTPATIENT)
Dept: OTHER | Facility: MEDICAL CENTER | Age: 52
End: 2022-06-27
Payer: COMMERCIAL

## 2022-06-27 NOTE — LETTER
[facility]   [address]  [place] [address]  Phone: [phone] - Fax: [phone]              Carie Santiago  [address] [place] [address]     Date: 06/27/22  Medical Record Number: [id number]    Dear Carie,    I am a Cancer Nurse Navigator, a certified oncology nurse. My role is to assess any needs you may have with education, guidance and support. I am available to you and your family from diagnosis through your survivorship.       I am available to address your needs during your journey with the following services:     Care Coordination  I can assist you in facilitating communication between your cancer care treatment team to ensure timely treatment and follow-up.  I can also assist with transition of care back to your primary care provider, or other specialist, as needed.  My goal is to bridge gaps for you throughout the course of your active treatment.       Education Services  Understanding the recommended treatment course by your physician is key. I can provide educational resources personalized to your cancer diagnosis to help you understand your diagnosis and treatment. Please let me know if you would like to receive information about your diagnosis and treatment plan.  I am here to help.     Support Services/Resource Information  [facility] we offer a full scope of support services.  I can assist you with referral information to:  · Cancer Clinical Trials & Research  · Nutrition counseling  · Support groups  · Complementary Therapies such as Mind-Body Techniques Meditation  · Patient Financial Advocates  ·   · [facility], an [employer] affiliate office, our volunteers can assist you with accessing our [employer] library, support services information, head coverings and comfort items  · Community and national resources, included eligibility based [place] assistance and pharmaceutical access programs, if you  are in need of additional information.     [facility] offers services that include:  · Behavioral Health  · Genetic counseling & testing  · Acupuncture  · Lymphedema prevention/treatment program  · Palliative care services.       I hope you have an excellent patient experience.  Please feel free to share with me your comments regarding the care you have received- we value your feedback.    Sincerely,     Sidney Sarmiento R.N.  Cancer Nurse Navigator    Office: [phone] / [phone]    If you would like to attend our Breast Cancer Newly Diagnosed Class please call [phone] or visit [email or web address] to register.

## 2022-06-29 DIAGNOSIS — M25.511 CHRONIC RIGHT SHOULDER PAIN: ICD-10-CM

## 2022-06-29 DIAGNOSIS — G89.29 CHRONIC RIGHT SHOULDER PAIN: ICD-10-CM

## 2022-06-29 DIAGNOSIS — F32.A ANXIETY AND DEPRESSION: ICD-10-CM

## 2022-06-29 DIAGNOSIS — F41.9 ANXIETY AND DEPRESSION: ICD-10-CM

## 2022-06-29 RX ORDER — OXYCODONE HYDROCHLORIDE AND ACETAMINOPHEN 5; 325 MG/1; MG/1
1 TABLET ORAL NIGHTLY PRN
Qty: 20 TABLET | Refills: 0 | Status: ON HOLD | OUTPATIENT
Start: 2022-06-29 | End: 2022-07-19

## 2022-06-29 RX ORDER — ALPRAZOLAM 0.5 MG/1
TABLET ORAL
Qty: 60 TABLET | Refills: 0 | Status: SHIPPED | OUTPATIENT
Start: 2022-06-29 | End: 2022-07-31 | Stop reason: SDUPTHER

## 2022-07-13 ENCOUNTER — PRE-ADMISSION TESTING (OUTPATIENT)
Dept: ADMISSIONS | Facility: MEDICAL CENTER | Age: 52
End: 2022-07-13
Attending: SURGERY
Payer: COMMERCIAL

## 2022-07-13 DIAGNOSIS — Z01.812 PRE-OPERATIVE LABORATORY EXAMINATION: ICD-10-CM

## 2022-07-13 LAB
ALBUMIN SERPL BCP-MCNC: 4.8 G/DL (ref 3.2–4.9)
ALBUMIN/GLOB SERPL: 1.9 G/DL
ALP SERPL-CCNC: 69 U/L (ref 30–99)
ALT SERPL-CCNC: 11 U/L (ref 2–50)
ANION GAP SERPL CALC-SCNC: 11 MMOL/L (ref 7–16)
AST SERPL-CCNC: 8 U/L (ref 12–45)
BASOPHILS # BLD AUTO: 0.6 % (ref 0–1.8)
BASOPHILS # BLD: 0.06 K/UL (ref 0–0.12)
BILIRUB SERPL-MCNC: 0.3 MG/DL (ref 0.1–1.5)
BUN SERPL-MCNC: 16 MG/DL (ref 8–22)
CALCIUM SERPL-MCNC: 9.8 MG/DL (ref 8.5–10.5)
CHLORIDE SERPL-SCNC: 104 MMOL/L (ref 96–112)
CO2 SERPL-SCNC: 25 MMOL/L (ref 20–33)
CREAT SERPL-MCNC: 0.64 MG/DL (ref 0.5–1.4)
EOSINOPHIL # BLD AUTO: 0.12 K/UL (ref 0–0.51)
EOSINOPHIL NFR BLD: 1.3 % (ref 0–6.9)
ERYTHROCYTE [DISTWIDTH] IN BLOOD BY AUTOMATED COUNT: 46.1 FL (ref 35.9–50)
GFR SERPLBLD CREATININE-BSD FMLA CKD-EPI: 105 ML/MIN/1.73 M 2
GLOBULIN SER CALC-MCNC: 2.5 G/DL (ref 1.9–3.5)
GLUCOSE SERPL-MCNC: 94 MG/DL (ref 65–99)
HCT VFR BLD AUTO: 45.6 % (ref 37–47)
HGB BLD-MCNC: 15.3 G/DL (ref 12–16)
IMM GRANULOCYTES # BLD AUTO: 0.02 K/UL (ref 0–0.11)
IMM GRANULOCYTES NFR BLD AUTO: 0.2 % (ref 0–0.9)
LYMPHOCYTES # BLD AUTO: 4.13 K/UL (ref 1–4.8)
LYMPHOCYTES NFR BLD: 44.7 % (ref 22–41)
MCH RBC QN AUTO: 31.4 PG (ref 27–33)
MCHC RBC AUTO-ENTMCNC: 33.6 G/DL (ref 33.6–35)
MCV RBC AUTO: 93.4 FL (ref 81.4–97.8)
MONOCYTES # BLD AUTO: 0.62 K/UL (ref 0–0.85)
MONOCYTES NFR BLD AUTO: 6.7 % (ref 0–13.4)
NEUTROPHILS # BLD AUTO: 4.29 K/UL (ref 2–7.15)
NEUTROPHILS NFR BLD: 46.5 % (ref 44–72)
NRBC # BLD AUTO: 0 K/UL
NRBC BLD-RTO: 0 /100 WBC
PLATELET # BLD AUTO: 338 K/UL (ref 164–446)
PMV BLD AUTO: 9.2 FL (ref 9–12.9)
POTASSIUM SERPL-SCNC: 4.2 MMOL/L (ref 3.6–5.5)
PROT SERPL-MCNC: 7.3 G/DL (ref 6–8.2)
RBC # BLD AUTO: 4.88 M/UL (ref 4.2–5.4)
SODIUM SERPL-SCNC: 140 MMOL/L (ref 135–145)
WBC # BLD AUTO: 9.2 K/UL (ref 4.8–10.8)

## 2022-07-13 PROCEDURE — 36415 COLL VENOUS BLD VENIPUNCTURE: CPT

## 2022-07-13 PROCEDURE — 85025 COMPLETE CBC W/AUTO DIFF WBC: CPT

## 2022-07-13 PROCEDURE — 80053 COMPREHEN METABOLIC PANEL: CPT

## 2022-07-13 ASSESSMENT — FIBROSIS 4 INDEX: FIB4 SCORE: 0.65

## 2022-07-19 ENCOUNTER — ANESTHESIA (OUTPATIENT)
Dept: SURGERY | Facility: MEDICAL CENTER | Age: 52
End: 2022-07-19
Payer: COMMERCIAL

## 2022-07-19 ENCOUNTER — HOSPITAL ENCOUNTER (OUTPATIENT)
Facility: MEDICAL CENTER | Age: 52
End: 2022-07-19
Attending: SURGERY | Admitting: SURGERY
Payer: COMMERCIAL

## 2022-07-19 ENCOUNTER — ANESTHESIA EVENT (OUTPATIENT)
Dept: SURGERY | Facility: MEDICAL CENTER | Age: 52
End: 2022-07-19
Payer: COMMERCIAL

## 2022-07-19 ENCOUNTER — APPOINTMENT (OUTPATIENT)
Dept: RADIOLOGY | Facility: MEDICAL CENTER | Age: 52
End: 2022-07-19
Attending: SURGERY
Payer: COMMERCIAL

## 2022-07-19 VITALS
TEMPERATURE: 98.8 F | WEIGHT: 147.71 LBS | HEART RATE: 61 BPM | SYSTOLIC BLOOD PRESSURE: 96 MMHG | HEIGHT: 70 IN | OXYGEN SATURATION: 97 % | BODY MASS INDEX: 21.15 KG/M2 | RESPIRATION RATE: 20 BRPM | DIASTOLIC BLOOD PRESSURE: 63 MMHG

## 2022-07-19 DIAGNOSIS — D05.12 INTRADUCTAL CARCINOMA IN SITU OF LEFT BREAST: ICD-10-CM

## 2022-07-19 LAB — PATHOLOGY CONSULT NOTE: NORMAL

## 2022-07-19 PROCEDURE — 160035 HCHG PACU - 1ST 60 MINS PHASE I: Performed by: SURGERY

## 2022-07-19 PROCEDURE — 700101 HCHG RX REV CODE 250: Performed by: SURGERY

## 2022-07-19 PROCEDURE — 700105 HCHG RX REV CODE 258: Performed by: ANESTHESIOLOGY

## 2022-07-19 PROCEDURE — 88360 TUMOR IMMUNOHISTOCHEM/MANUAL: CPT

## 2022-07-19 PROCEDURE — 700102 HCHG RX REV CODE 250 W/ 637 OVERRIDE(OP): Performed by: ANESTHESIOLOGY

## 2022-07-19 PROCEDURE — 700111 HCHG RX REV CODE 636 W/ 250 OVERRIDE (IP): Performed by: ANESTHESIOLOGY

## 2022-07-19 PROCEDURE — 88307 TISSUE EXAM BY PATHOLOGIST: CPT

## 2022-07-19 PROCEDURE — A9270 NON-COVERED ITEM OR SERVICE: HCPCS | Performed by: ANESTHESIOLOGY

## 2022-07-19 PROCEDURE — 700102 HCHG RX REV CODE 250 W/ 637 OVERRIDE(OP): Performed by: SURGERY

## 2022-07-19 PROCEDURE — 700101 HCHG RX REV CODE 250: Performed by: ANESTHESIOLOGY

## 2022-07-19 PROCEDURE — 160048 HCHG OR STATISTICAL LEVEL 1-5: Performed by: SURGERY

## 2022-07-19 PROCEDURE — A9270 NON-COVERED ITEM OR SERVICE: HCPCS | Performed by: SURGERY

## 2022-07-19 PROCEDURE — 00400 ANES INTEGUMENTARY SYS NOS: CPT | Performed by: ANESTHESIOLOGY

## 2022-07-19 PROCEDURE — 160039 HCHG SURGERY MINUTES - EA ADDL 1 MIN LEVEL 3: Performed by: SURGERY

## 2022-07-19 PROCEDURE — 76098 X-RAY EXAM SURGICAL SPECIMEN: CPT | Mod: LT

## 2022-07-19 PROCEDURE — 160028 HCHG SURGERY MINUTES - 1ST 30 MINS LEVEL 3: Performed by: SURGERY

## 2022-07-19 PROCEDURE — 160025 RECOVERY II MINUTES (STATS): Performed by: SURGERY

## 2022-07-19 PROCEDURE — 160002 HCHG RECOVERY MINUTES (STAT): Performed by: SURGERY

## 2022-07-19 PROCEDURE — 160046 HCHG PACU - 1ST 60 MINS PHASE II: Performed by: SURGERY

## 2022-07-19 PROCEDURE — 19281 PERQ DEVICE BREAST 1ST IMAG: CPT | Mod: LT

## 2022-07-19 PROCEDURE — 160009 HCHG ANES TIME/MIN: Performed by: SURGERY

## 2022-07-19 RX ORDER — OXYCODONE HCL 5 MG/5 ML
10 SOLUTION, ORAL ORAL
Status: COMPLETED | OUTPATIENT
Start: 2022-07-19 | End: 2022-07-19

## 2022-07-19 RX ORDER — DEXAMETHASONE SODIUM PHOSPHATE 4 MG/ML
INJECTION, SOLUTION INTRA-ARTICULAR; INTRALESIONAL; INTRAMUSCULAR; INTRAVENOUS; SOFT TISSUE PRN
Status: DISCONTINUED | OUTPATIENT
Start: 2022-07-19 | End: 2022-07-19 | Stop reason: SURG

## 2022-07-19 RX ORDER — SODIUM CHLORIDE, SODIUM LACTATE, POTASSIUM CHLORIDE, CALCIUM CHLORIDE 600; 310; 30; 20 MG/100ML; MG/100ML; MG/100ML; MG/100ML
INJECTION, SOLUTION INTRAVENOUS CONTINUOUS
Status: DISCONTINUED | OUTPATIENT
Start: 2022-07-19 | End: 2022-07-19 | Stop reason: HOSPADM

## 2022-07-19 RX ORDER — ONDANSETRON 2 MG/ML
INJECTION INTRAMUSCULAR; INTRAVENOUS PRN
Status: DISCONTINUED | OUTPATIENT
Start: 2022-07-19 | End: 2022-07-19 | Stop reason: SURG

## 2022-07-19 RX ORDER — MEPERIDINE HYDROCHLORIDE 25 MG/ML
12.5 INJECTION INTRAMUSCULAR; INTRAVENOUS; SUBCUTANEOUS
Status: DISCONTINUED | OUTPATIENT
Start: 2022-07-19 | End: 2022-07-19 | Stop reason: HOSPADM

## 2022-07-19 RX ORDER — CELECOXIB 200 MG/1
200 CAPSULE ORAL ONCE
Status: DISCONTINUED | OUTPATIENT
Start: 2022-07-19 | End: 2022-07-19 | Stop reason: HOSPADM

## 2022-07-19 RX ORDER — SODIUM CHLORIDE, SODIUM LACTATE, POTASSIUM CHLORIDE, CALCIUM CHLORIDE 600; 310; 30; 20 MG/100ML; MG/100ML; MG/100ML; MG/100ML
INJECTION, SOLUTION INTRAVENOUS CONTINUOUS
Status: ACTIVE | OUTPATIENT
Start: 2022-07-19 | End: 2022-07-19

## 2022-07-19 RX ORDER — HYDROMORPHONE HYDROCHLORIDE 1 MG/ML
0.4 INJECTION, SOLUTION INTRAMUSCULAR; INTRAVENOUS; SUBCUTANEOUS
Status: DISCONTINUED | OUTPATIENT
Start: 2022-07-19 | End: 2022-07-19 | Stop reason: HOSPADM

## 2022-07-19 RX ORDER — PHENYLEPHRINE HCL IN 0.9% NACL 0.5 MG/5ML
SYRINGE (ML) INTRAVENOUS PRN
Status: DISCONTINUED | OUTPATIENT
Start: 2022-07-19 | End: 2022-07-19 | Stop reason: SURG

## 2022-07-19 RX ORDER — CELECOXIB 100 MG/1
CAPSULE ORAL PRN
Status: DISCONTINUED | OUTPATIENT
Start: 2022-07-19 | End: 2022-07-19 | Stop reason: SURG

## 2022-07-19 RX ORDER — HYDROMORPHONE HYDROCHLORIDE 1 MG/ML
0.2 INJECTION, SOLUTION INTRAMUSCULAR; INTRAVENOUS; SUBCUTANEOUS
Status: DISCONTINUED | OUTPATIENT
Start: 2022-07-19 | End: 2022-07-19 | Stop reason: HOSPADM

## 2022-07-19 RX ORDER — ONDANSETRON 4 MG/1
4 TABLET, FILM COATED ORAL EVERY 8 HOURS PRN
Qty: 9 TABLET | Refills: 2 | Status: SHIPPED | OUTPATIENT
Start: 2022-07-19 | End: 2022-07-22

## 2022-07-19 RX ORDER — LIDOCAINE HYDROCHLORIDE 20 MG/ML
INJECTION, SOLUTION EPIDURAL; INFILTRATION; INTRACAUDAL; PERINEURAL PRN
Status: DISCONTINUED | OUTPATIENT
Start: 2022-07-19 | End: 2022-07-19 | Stop reason: SURG

## 2022-07-19 RX ORDER — BUPIVACAINE HYDROCHLORIDE AND EPINEPHRINE 5; 5 MG/ML; UG/ML
INJECTION, SOLUTION EPIDURAL; INTRACAUDAL; PERINEURAL
Status: DISCONTINUED | OUTPATIENT
Start: 2022-07-19 | End: 2022-07-19 | Stop reason: HOSPADM

## 2022-07-19 RX ORDER — BUPIVACAINE HYDROCHLORIDE AND EPINEPHRINE 5; 5 MG/ML; UG/ML
INJECTION, SOLUTION EPIDURAL; INTRACAUDAL; PERINEURAL
Status: DISCONTINUED
Start: 2022-07-19 | End: 2022-07-19 | Stop reason: HOSPADM

## 2022-07-19 RX ORDER — HYDROMORPHONE HYDROCHLORIDE 1 MG/ML
0.1 INJECTION, SOLUTION INTRAMUSCULAR; INTRAVENOUS; SUBCUTANEOUS
Status: DISCONTINUED | OUTPATIENT
Start: 2022-07-19 | End: 2022-07-19 | Stop reason: HOSPADM

## 2022-07-19 RX ORDER — ALPRAZOLAM 0.25 MG/1
.25-.5 TABLET ORAL
Status: COMPLETED | OUTPATIENT
Start: 2022-07-19 | End: 2022-07-19

## 2022-07-19 RX ORDER — ACETAMINOPHEN 325 MG/1
TABLET ORAL PRN
Status: DISCONTINUED | OUTPATIENT
Start: 2022-07-19 | End: 2022-07-19 | Stop reason: SURG

## 2022-07-19 RX ORDER — OXYCODONE HCL 5 MG/5 ML
5 SOLUTION, ORAL ORAL
Status: COMPLETED | OUTPATIENT
Start: 2022-07-19 | End: 2022-07-19

## 2022-07-19 RX ORDER — HALOPERIDOL 5 MG/ML
1 INJECTION INTRAMUSCULAR
Status: DISCONTINUED | OUTPATIENT
Start: 2022-07-19 | End: 2022-07-19 | Stop reason: HOSPADM

## 2022-07-19 RX ORDER — SODIUM CHLORIDE, SODIUM LACTATE, POTASSIUM CHLORIDE, CALCIUM CHLORIDE 600; 310; 30; 20 MG/100ML; MG/100ML; MG/100ML; MG/100ML
INJECTION, SOLUTION INTRAVENOUS
Status: DISCONTINUED | OUTPATIENT
Start: 2022-07-19 | End: 2022-07-19 | Stop reason: SURG

## 2022-07-19 RX ORDER — ACETAMINOPHEN 500 MG
1000 TABLET ORAL ONCE
Status: DISCONTINUED | OUTPATIENT
Start: 2022-07-19 | End: 2022-07-19 | Stop reason: HOSPADM

## 2022-07-19 RX ORDER — HYDROCODONE BITARTRATE AND ACETAMINOPHEN 5; 325 MG/1; MG/1
1 TABLET ORAL EVERY 4 HOURS PRN
Qty: 12 TABLET | Refills: 0 | Status: SHIPPED | OUTPATIENT
Start: 2022-07-19 | End: 2022-07-21

## 2022-07-19 RX ORDER — CEFAZOLIN SODIUM 1 G/3ML
INJECTION, POWDER, FOR SOLUTION INTRAMUSCULAR; INTRAVENOUS PRN
Status: DISCONTINUED | OUTPATIENT
Start: 2022-07-19 | End: 2022-07-19 | Stop reason: SURG

## 2022-07-19 RX ORDER — MIDAZOLAM HYDROCHLORIDE 1 MG/ML
INJECTION INTRAMUSCULAR; INTRAVENOUS PRN
Status: DISCONTINUED | OUTPATIENT
Start: 2022-07-19 | End: 2022-07-19 | Stop reason: SURG

## 2022-07-19 RX ADMIN — Medication 100 MCG: at 15:12

## 2022-07-19 RX ADMIN — OXYCODONE HYDROCHLORIDE 10 MG: 5 SOLUTION ORAL at 15:46

## 2022-07-19 RX ADMIN — EPHEDRINE SULFATE 10 MG: 50 INJECTION, SOLUTION INTRAVENOUS at 14:53

## 2022-07-19 RX ADMIN — DEXAMETHASONE SODIUM PHOSPHATE 8 MG: 4 INJECTION, SOLUTION INTRA-ARTICULAR; INTRALESIONAL; INTRAMUSCULAR; INTRAVENOUS; SOFT TISSUE at 14:35

## 2022-07-19 RX ADMIN — Medication 100 MCG: at 15:00

## 2022-07-19 RX ADMIN — Medication 200 MCG: at 14:53

## 2022-07-19 RX ADMIN — FENTANYL CITRATE 50 MCG: 50 INJECTION, SOLUTION INTRAMUSCULAR; INTRAVENOUS at 15:57

## 2022-07-19 RX ADMIN — ACETAMINOPHEN 1000 MG: 325 TABLET, FILM COATED ORAL at 13:48

## 2022-07-19 RX ADMIN — ALPRAZOLAM 0.5 MG: 0.25 TABLET ORAL at 10:02

## 2022-07-19 RX ADMIN — MIDAZOLAM HYDROCHLORIDE 2 MG: 1 INJECTION, SOLUTION INTRAMUSCULAR; INTRAVENOUS at 14:13

## 2022-07-19 RX ADMIN — EPHEDRINE SULFATE 15 MG: 50 INJECTION, SOLUTION INTRAVENOUS at 14:47

## 2022-07-19 RX ADMIN — CELECOXIB 200 MG: 100 CAPSULE ORAL at 13:48

## 2022-07-19 RX ADMIN — FENTANYL CITRATE 50 MCG: 50 INJECTION, SOLUTION INTRAMUSCULAR; INTRAVENOUS at 14:27

## 2022-07-19 RX ADMIN — SODIUM CHLORIDE, POTASSIUM CHLORIDE, SODIUM LACTATE AND CALCIUM CHLORIDE: 600; 310; 30; 20 INJECTION, SOLUTION INTRAVENOUS at 14:18

## 2022-07-19 RX ADMIN — ONDANSETRON 4 MG: 2 INJECTION INTRAMUSCULAR; INTRAVENOUS at 15:20

## 2022-07-19 RX ADMIN — FENTANYL CITRATE 25 MCG: 50 INJECTION, SOLUTION INTRAMUSCULAR; INTRAVENOUS at 15:46

## 2022-07-19 RX ADMIN — CEFAZOLIN 2 G: 330 INJECTION, POWDER, FOR SOLUTION INTRAMUSCULAR; INTRAVENOUS at 14:27

## 2022-07-19 RX ADMIN — EPHEDRINE SULFATE 10 MG: 50 INJECTION, SOLUTION INTRAVENOUS at 15:00

## 2022-07-19 RX ADMIN — FENTANYL CITRATE 25 MCG: 50 INJECTION, SOLUTION INTRAMUSCULAR; INTRAVENOUS at 16:14

## 2022-07-19 RX ADMIN — FENTANYL CITRATE 50 MCG: 50 INJECTION, SOLUTION INTRAMUSCULAR; INTRAVENOUS at 15:10

## 2022-07-19 RX ADMIN — PROPOFOL 150 MG: 10 INJECTION, EMULSION INTRAVENOUS at 14:27

## 2022-07-19 RX ADMIN — FENTANYL CITRATE 50 MCG: 50 INJECTION, SOLUTION INTRAMUSCULAR; INTRAVENOUS at 14:44

## 2022-07-19 RX ADMIN — LIDOCAINE HYDROCHLORIDE 60 MG: 20 INJECTION, SOLUTION EPIDURAL; INFILTRATION; INTRACAUDAL at 14:27

## 2022-07-19 ASSESSMENT — PAIN DESCRIPTION - PAIN TYPE
TYPE: SURGICAL PAIN

## 2022-07-19 ASSESSMENT — FIBROSIS 4 INDEX: FIB4 SCORE: 0.39

## 2022-07-19 ASSESSMENT — PAIN SCALES - GENERAL: PAIN_LEVEL: 2

## 2022-07-19 NOTE — DISCHARGE INSTRUCTIONS
May shower but no baths for four weeks.  Activity otherwise as tolerated.  Compression bra for 3 weeks.  Walk regularly.  I discussed the need to avoid mixing her other chronic narcotic medications with Norco,  with her mother present, pt assures me she understands.  Pt has [facility] DC instructions which also includes pain medication management.    If any questions arise, call your provider.  If your provider is not available, please feel free to call the Surgical Center at [phone].    MEDICATIONS: Resume taking daily medication.  Take prescribed pain medication with food.  If no medication is prescribed, you may take non-aspirin pain medication if needed.  PAIN MEDICATION CAN BE VERY CONSTIPATING.  Take a stool softener or laxative such as senokot, pericolace, or milk of magnesia if needed.    Last pain medication given at 3:50 pm, you may have next dose at 7:50 pm.    What to Expect Post Anesthesia    Rest and take it easy for the first 24 hours.  A responsible adult is recommended to remain with you during that time.  It is normal to feel sleepy.  We encourage you to not do anything that requires balance, judgment or coordination.    FOR 24 HOURS DO NOT:  Drive, operate machinery or run household appliances.  Drink beer or alcoholic beverages.  Make important decisions or sign legal documents.    To avoid nausea, slowly advance diet as tolerated, avoiding spicy or greasy foods for the first day.  Add more substantial food to your diet according to your provider's instructions.  Babies can be fed formula or breast milk as soon as they are hungry.  INCREASE FLUIDS AND FIBER TO AVOID CONSTIPATION.    MILD FLU-LIKE SYMPTOMS ARE NORMAL.  YOU MAY EXPERIENCE GENERALIZED MUSCLE ACHES, THROAT IRRITATION, HEADACHE AND/OR SOME NAUSEA.

## 2022-07-19 NOTE — ANESTHESIA POSTPROCEDURE EVALUATION
Patient: Carie Santiago    Procedure Summary     Date: 07/19/22 Room / Location: [facility] ROOM 28 / SURGERY SAME DAY [facility]    Anesthesia Start: 1418 Anesthesia Stop: 1534    Procedure: WIRE LOCALIZED LEFT PARTIAL MASTECTOMY (Left Breast) Diagnosis: (DCIS LEFT BREAST)    Surgeons: Hiral Rock M.D. Responsible Provider: Shan Walker M.D.    Anesthesia Type: general ASA Status: 2          Final Anesthesia Type: general  Last vitals  BP   Blood Pressure: (!) 97/68    Temp   37.1 °C (98.8 °F)    Pulse   (!) 58   Resp   16    SpO2   98 %      Anesthesia Post Evaluation    Patient location during evaluation: PACU  Patient participation: complete - patient participated  Level of consciousness: awake and alert  Pain score: 2    Airway patency: patent  Anesthetic complications: no  Cardiovascular status: hemodynamically stable  Respiratory status: acceptable  Hydration status: euvolemic    PONV: none          No complications documented.     Nurse Pain Score: 2 (NPRS)

## 2022-07-19 NOTE — OR NURSING
1532 Pt to PACU from OR. Report from anesthesia and OR RN. On 5L O2 via mask. Respirations even and unlabored. VSS.     1606 Discharge instructions discussed with patient's mother. All questions answered. Verbalized understanding.    1608 Phase 2.    1623 Patient up getting dressed.    1638 PIV removed with tip intact.    1640 Patient to bathroom to void.    1646 Pt escorted out of department in wheelchair with all belongings. Discharged home to responsible adult.

## 2022-07-19 NOTE — ANESTHESIA PROCEDURE NOTES
Airway    Date/Time: 7/19/2022 2:28 PM  Performed by: Shan Walker M.D.  Authorized by: Shan Walker M.D.     Location:  OR  Urgency:  Elective  Indications for Airway Management:  Anesthesia      Spontaneous Ventilation: absent    Sedation Level:  Deep  Preoxygenated: Yes    Mask Difficulty Assessment:  1 - vent by mask  Final Airway Type:  Supraglottic airway  Final Supraglottic Airway:  Standard LMA    SGA Size:  3  Number of Attempts at Approach:  1

## 2022-07-19 NOTE — ANESTHESIA PREPROCEDURE EVALUATION
Case: [id number] Date/Time: 07/19/22 1415    Procedure: WIRE LOCALIZED LEFT PARTIAL MASTECTOMY (Left )    Pre-op diagnosis: DCIS LEFT BREAST    Location: [facility] ROOM 28 / SURGERY SAME DAY [facility]    Surgeons: Hiral Rock M.D.          Relevant Problems   NEURO   (positive) History of iron deficiency      ENDO   (positive) Hypothyroid       Physical Exam    Airway   Mallampati: II  TM distance: >3 FB  Neck ROM: full       Cardiovascular - normal exam  Rhythm: regular  Rate: normal  (-) murmur     Dental - normal exam           Pulmonary - normal exam  Breath sounds clear to auscultation     Abdominal    Neurological - normal exam                 Anesthesia Plan    ASA 2       Plan - general       Airway plan will be LMA          Induction: intravenous    Postoperative Plan: Postoperative administration of opioids is intended.    Pertinent diagnostic labs and testing reviewed    Informed Consent:    Anesthetic plan and risks discussed with patient.

## 2022-07-19 NOTE — OP REPORT
Pre op diagnosis:  DCIS left breast  Post op diagnosis:  Same    Procedure:  Wire localized left partial mastectomy with lattice with local tissue rearrangement     Surgeon:  Hiral Rock MD  Anesthesiologist:  Shan Walker MD    Anesthesia:  General  Pre op meds:  Ancef  ASA 2    Indications:  This is a 52 year old female with subpectoral silicone implants.  She was diagnosed with IG/HG DCIS (Stage cTis cN0 M0) and after discussing risks and benefits of her options, she is ready to proceed with surgery.  We had an extensive discussion in Pre-Op regarding options for incision, expectations regarding contour defect, and possibility of positive margins which we would not know until after surgery, when the pathologist is able to evaluated under the microscope.  I explained that we target excision of residual calcifications, but that DCIS is not always directly associated with calcifications.  I also discussed incision options with her plastic surgeon and incorporated those suggestions into the final approach.      Findings:  Post-loc mammo shows a 5 x 2cm area of residual calcs, with biopsy clip.  Specimen mammogram shows diffuse calcs within the specimen.  Anterior margin is skin, posterior margin is pectoralis.  No injury to underlying implant.      Summary:  The patient underwent wire localization by Radiology, then was anesthetized, prepped, and draped in sterile fashion.  Time out was confirmed.  I marked the areas that we discussed keeping the incision within, and also outlined her previous scars.  Local anesthetic was injected, then I incised in the lower outer areolar scar and extended this medially, inferior to the level that she requested keeping the scar below, for best hidden location.  I located the wire tip which was almost directly below the lateral edge of the incision, but central to the residual calcs.  I dissected along the anterior mammary fascial plane medially and laterally, then superiorly and  inferiorly.  The pectoralis fascia was included, exposing the pectoralis.  I resected the area estimated to contain the residual calcs, then placed usual orienting sutures.  Specimen mammogram was performed, confirming the biopsy clip and calcifications.    I irrigated, then ensured hemostasis.  There is minimal distance between her skin and pectoralis, but the thin layer of parenchyma has now been excised.  An indentation would be expected without additional work.  Small marking clips were placed at the pectoralis to fran the excision cavity.      I mobilized tissue superiorly, slightly medially to the level of the nipple, and inferiorly.  No dissection was performed in the superior medial breast, or inferiorly at the level of her previous scar (inferior dissection stayed lateral).  I released the focal dimple from the biopsy entry site.  Total defect measured 24 cm2.  I placed a 2-0 PDS to advance the parenchyma into the defect and also create a small lattice, to minimize contour defect.  Dimpling was avoided.  I then closed the deep dermal layer and 4-0 Stratafix was used to close the skin.  Dressings were placed and I was informed all counts were correct.    CC: MD Herbie Penny MD

## 2022-07-19 NOTE — ANESTHESIA TIME REPORT
Anesthesia Start and Stop Event Times     Date Time Event    7/19/2022 1345 Ready for Procedure     1418 Anesthesia Start     1534 Anesthesia Stop        Responsible Staff  07/19/22    Name Role Begin End    Shan Walker M.D. Anesth 1418 1534        Overtime Reason:  no overtime (within assigned shift)    Comments:

## 2022-07-27 ENCOUNTER — HOSPITAL ENCOUNTER (OUTPATIENT)
Dept: HEMATOLOGY ONCOLOGY | Facility: MEDICAL CENTER | Age: 52
End: 2022-07-27
Attending: INTERNAL MEDICINE
Payer: COMMERCIAL

## 2022-07-27 VITALS
BODY MASS INDEX: 20.86 KG/M2 | SYSTOLIC BLOOD PRESSURE: 101 MMHG | DIASTOLIC BLOOD PRESSURE: 70 MMHG | RESPIRATION RATE: 18 BRPM | WEIGHT: 145.72 LBS | TEMPERATURE: 98.1 F | OXYGEN SATURATION: 98 % | HEIGHT: 70 IN | HEART RATE: 71 BPM

## 2022-07-27 DIAGNOSIS — Z17.0 MALIGNANT NEOPLASM OF UPPER-OUTER QUADRANT OF LEFT BREAST IN FEMALE, ESTROGEN RECEPTOR POSITIVE (HCC): ICD-10-CM

## 2022-07-27 DIAGNOSIS — C50.412 MALIGNANT NEOPLASM OF UPPER-OUTER QUADRANT OF LEFT BREAST IN FEMALE, ESTROGEN RECEPTOR POSITIVE (HCC): ICD-10-CM

## 2022-07-27 PROCEDURE — 99204 OFFICE O/P NEW MOD 45 MIN: CPT | Performed by: INTERNAL MEDICINE

## 2022-07-27 PROCEDURE — 99212 OFFICE O/P EST SF 10 MIN: CPT | Performed by: INTERNAL MEDICINE

## 2022-07-27 RX ORDER — OXYBUTYNIN CHLORIDE 10 MG/1
10 TABLET, EXTENDED RELEASE ORAL DAILY
Qty: 30 TABLET | Refills: 3 | Status: ON HOLD | OUTPATIENT
Start: 2022-07-27 | End: 2022-11-03

## 2022-07-27 ASSESSMENT — ENCOUNTER SYMPTOMS
PSYCHIATRIC NEGATIVE: 1
GASTROINTESTINAL NEGATIVE: 1
NEUROLOGICAL NEGATIVE: 1
MUSCULOSKELETAL NEGATIVE: 1
CONSTITUTIONAL NEGATIVE: 1
RESPIRATORY NEGATIVE: 1
CARDIOVASCULAR NEGATIVE: 1
EYES NEGATIVE: 1

## 2022-07-27 ASSESSMENT — FIBROSIS 4 INDEX: FIB4 SCORE: 0.39

## 2022-07-27 ASSESSMENT — PAIN SCALES - GENERAL: PAINLEVEL: 3=SLIGHT PAIN

## 2022-07-27 NOTE — PROGRESS NOTES
Subjective   Consultation Medical Oncology: 7/27/2022  PCP: Herbie Joshi  Breast Surgeon: Hiral Rock MD    CC: Newly diagnosed left breast cancer , DCIS and IDC    Carie Santiago is a 52 y.o. female who presents with New Patient (Breast Cancer )            HPI: Yolanda Calderon is a delightful 52-year-old recently postmenopausal white female sent for consultation regarding newly diagnosed left breast cancer, DCIS and IDC.  She gets yearly routine mammogram with new grouped microcalcifications in the upper outer left breast.  6/23/2022 she underwent a stereotactic biopsy of the breast which demonstrated DCIS intermediate grade.  She saw Dr. Rock and had a left partial mastectomy 7/19/2022.  Pathology demonstrated as of ductal carcinoma, grade 2 arising in a background of extensive high-grade DCIS.  The invasive carcinoma measured up to 8 mm in greatest dimension.  The DCIS involves the anterior superior and inferior margins.  Invasive component was free by greater than 2 mm.  Lymph nodes were not performed.  Invasive tumor was ER positive greater than 90%, OK positive greater than 90%, HER2 0 and Ki-67 10%.  She met with Dr. Rock and elected to have a left mastectomy with immediate instruction by Dr. Travis.  Of note she has bilateral subpectoral implants for many years which were revised 1 to 2 years ago.  She has also had multiple hernia operations and at the time of her mastectomy she will have a sentinel lymph node biopsy as well as abdominal muscular reconstruction by Dr. Travis.  There is no family history of breast cancer, she is undergoing evaluation for genetic testing.  She went through menopause about 3 years ago and was immediately put on estrogen and progesterone hormone replacement therapy, because of vasomotor symptoms.  She stopped this therapy several weeks ago at the time of her diagnosis.  Since then she is noted moderate hot flashes that do cause her some discomfort about 5/day including  "at night.  She also has some brain fog associated with it and has had some trouble completing tasks.    Review of Systems   Constitutional: Negative.    HENT: Negative.    Eyes: Negative.    Respiratory: Negative.    Cardiovascular: Negative.    Gastrointestinal: Negative.    Genitourinary: Negative.    Musculoskeletal: Negative.    Skin: Negative.    Neurological: Negative.    Endo/Heme/Allergies: Negative.    Psychiatric/Behavioral: Negative.               Objective     /70   Pulse 71   Temp 36.7 °C (98.1 °F) (Temporal)   Resp 18   Ht 1.778 m (5' 10\")   Wt 66.1 kg (145 lb 11.6 oz)   SpO2 98%   BMI 20.91 kg/m²      Physical Exam     Not performed today                   Assessment & Plan     1.  Invasive ductal carcinoma of the left breast, grade 2, arising in a background of extensive high-grade DCIS, stage Ia (pT1b, PN X) ER positive greater than 90%, TX positive greater than 90%, HER2 0, Ki-67 10%.  She is scheduled to have a completion mastectomy because of multiple positive margins from the DCIS.  She will have a sentinel lymph node biopsy at the same time.  2.  Postmenopausal vasomotor symptoms on HRT, now discontinued with recurrence of symptoms, especially hot flashes    Plan: We are going to start her on oxybutynin 5 mg twice daily for hot flashes.  I will see her back after her mastectomy to make final decision about adjuvant systemic therapy.  Based on her findings currently she will require endocrine therapy with an AI to start for 5 years.              "

## 2022-07-27 NOTE — OR NURSING
COVID-19 Pre-Surgery Screenin. Do you have an undiagnosed respiratory illness or symptoms such as coughing or sneezing? NO     • Onset of Sx: -    • Acute vs. chronic respiratory illness: -     2. Do you have an unexplained fever greater than 100.4 degrees Fahrenheit or 38 degrees Celsius? NO  ?  3. Have you had direct exposure to a patient who tested positive for Covid-19? NO    4. Patient informed of current visitation and mask policies by this RN.   Quality 110: Preventive Care And Screening: Influenza Immunization: Influenza Immunization Administered during Influenza season Detail Level: Detailed

## 2022-07-28 ENCOUNTER — PATIENT OUTREACH (OUTPATIENT)
Dept: OTHER | Facility: MEDICAL CENTER | Age: 52
End: 2022-07-28
Payer: COMMERCIAL

## 2022-07-28 NOTE — PROGRESS NOTES
"Phoned pt for follow up on distress.  Pt states she is healing well and feels \"like nothing happened\".  She is due to have a mastectomy/ SNB and immediate reconstruction on 8/17/22.  She is waiting to schedule an appointment for genetic testing.  Pt denies any barriers at this time.  Will continue to be available.    "

## 2022-07-31 DIAGNOSIS — F32.A ANXIETY AND DEPRESSION: ICD-10-CM

## 2022-07-31 DIAGNOSIS — F41.9 ANXIETY AND DEPRESSION: ICD-10-CM

## 2022-07-31 RX ORDER — ALPRAZOLAM 0.5 MG/1
TABLET ORAL
Qty: 60 TABLET | Refills: 0 | Status: SHIPPED | OUTPATIENT
Start: 2022-07-31 | End: 2022-08-29 | Stop reason: SDUPTHER

## 2022-07-31 RX ORDER — ALBUTEROL SULFATE 90 UG/1
2 AEROSOL, METERED RESPIRATORY (INHALATION) EVERY 4 HOURS PRN
Qty: 1 EACH | Refills: 3 | Status: ON HOLD | OUTPATIENT
Start: 2022-07-31 | End: 2022-11-03

## 2022-08-01 ENCOUNTER — NON-PROVIDER VISIT (OUTPATIENT)
Dept: GENETICS | Facility: MEDICAL CENTER | Age: 52
End: 2022-08-01
Payer: COMMERCIAL

## 2022-08-01 DIAGNOSIS — C50.011 MALIGNANT NEOPLASM OF NIPPLE OF RIGHT BREAST IN FEMALE, UNSPECIFIED ESTROGEN RECEPTOR STATUS (HCC): ICD-10-CM

## 2022-08-09 ENCOUNTER — PRE-ADMISSION TESTING (OUTPATIENT)
Dept: ADMISSIONS | Facility: MEDICAL CENTER | Age: 52
End: 2022-08-09
Attending: PLASTIC SURGERY
Payer: COMMERCIAL

## 2022-08-09 DIAGNOSIS — Z01.812 PRE-OPERATIVE LABORATORY EXAMINATION: ICD-10-CM

## 2022-08-09 DIAGNOSIS — Z01.810 PRE-OPERATIVE CARDIOVASCULAR EXAMINATION: ICD-10-CM

## 2022-08-09 LAB
BASOPHILS # BLD AUTO: 0.5 % (ref 0–1.8)
BASOPHILS # BLD: 0.05 K/UL (ref 0–0.12)
EKG IMPRESSION: NORMAL
EOSINOPHIL # BLD AUTO: 0.63 K/UL (ref 0–0.51)
EOSINOPHIL NFR BLD: 6.5 % (ref 0–6.9)
ERYTHROCYTE [DISTWIDTH] IN BLOOD BY AUTOMATED COUNT: 47.2 FL (ref 35.9–50)
HCT VFR BLD AUTO: 46.3 % (ref 37–47)
HGB BLD-MCNC: 15.4 G/DL (ref 12–16)
IMM GRANULOCYTES # BLD AUTO: 0.02 K/UL (ref 0–0.11)
IMM GRANULOCYTES NFR BLD AUTO: 0.2 % (ref 0–0.9)
LYMPHOCYTES # BLD AUTO: 4.3 K/UL (ref 1–4.8)
LYMPHOCYTES NFR BLD: 44.7 % (ref 22–41)
MCH RBC QN AUTO: 31.4 PG (ref 27–33)
MCHC RBC AUTO-ENTMCNC: 33.3 G/DL (ref 33.6–35)
MCV RBC AUTO: 94.5 FL (ref 81.4–97.8)
MONOCYTES # BLD AUTO: 0.8 K/UL (ref 0–0.85)
MONOCYTES NFR BLD AUTO: 8.3 % (ref 0–13.4)
NEUTROPHILS # BLD AUTO: 3.83 K/UL (ref 2–7.15)
NEUTROPHILS NFR BLD: 39.8 % (ref 44–72)
NRBC # BLD AUTO: 0 K/UL
NRBC BLD-RTO: 0 /100 WBC
PLATELET # BLD AUTO: 335 K/UL (ref 164–446)
PMV BLD AUTO: 9.2 FL (ref 9–12.9)
RBC # BLD AUTO: 4.9 M/UL (ref 4.2–5.4)
WBC # BLD AUTO: 9.6 K/UL (ref 4.8–10.8)

## 2022-08-09 PROCEDURE — 93010 ELECTROCARDIOGRAM REPORT: CPT | Performed by: INTERNAL MEDICINE

## 2022-08-09 PROCEDURE — 93005 ELECTROCARDIOGRAM TRACING: CPT

## 2022-08-09 PROCEDURE — 85025 COMPLETE CBC W/AUTO DIFF WBC: CPT

## 2022-08-09 PROCEDURE — 36415 COLL VENOUS BLD VENIPUNCTURE: CPT

## 2022-08-09 ASSESSMENT — FIBROSIS 4 INDEX: FIB4 SCORE: 0.39

## 2022-08-10 DIAGNOSIS — G47.00 INSOMNIA, UNSPECIFIED TYPE: ICD-10-CM

## 2022-08-10 DIAGNOSIS — G89.29 CHRONIC RIGHT SHOULDER PAIN: ICD-10-CM

## 2022-08-10 DIAGNOSIS — M25.511 CHRONIC RIGHT SHOULDER PAIN: ICD-10-CM

## 2022-08-10 RX ORDER — OXYCODONE HYDROCHLORIDE AND ACETAMINOPHEN 5; 325 MG/1; MG/1
1 TABLET ORAL NIGHTLY PRN
Qty: 20 TABLET | Refills: 0 | Status: SHIPPED | OUTPATIENT
Start: 2022-08-10 | End: 2022-08-29 | Stop reason: SDUPTHER

## 2022-08-10 RX ORDER — TRAZODONE HYDROCHLORIDE 50 MG/1
50 TABLET ORAL
Qty: 90 TABLET | Refills: 3 | Status: SHIPPED | OUTPATIENT
Start: 2022-08-10 | End: 2023-07-17 | Stop reason: SDUPTHER

## 2022-08-16 ENCOUNTER — HOSPITAL ENCOUNTER (EMERGENCY)
Facility: MEDICAL CENTER | Age: 52
End: 2022-08-16
Attending: EMERGENCY MEDICINE
Payer: COMMERCIAL

## 2022-08-16 VITALS
HEIGHT: 70 IN | RESPIRATION RATE: 16 BRPM | HEART RATE: 79 BPM | DIASTOLIC BLOOD PRESSURE: 76 MMHG | WEIGHT: 148.59 LBS | SYSTOLIC BLOOD PRESSURE: 116 MMHG | BODY MASS INDEX: 21.27 KG/M2 | TEMPERATURE: 97.9 F | OXYGEN SATURATION: 97 %

## 2022-08-16 DIAGNOSIS — T30.0 BURN: ICD-10-CM

## 2022-08-16 PROCEDURE — 700101 HCHG RX REV CODE 250: Performed by: EMERGENCY MEDICINE

## 2022-08-16 PROCEDURE — 96372 THER/PROPH/DIAG INJ SC/IM: CPT

## 2022-08-16 PROCEDURE — 99283 EMERGENCY DEPT VISIT LOW MDM: CPT

## 2022-08-16 PROCEDURE — 16020 DRESS/DEBRID P-THICK BURN S: CPT

## 2022-08-16 PROCEDURE — 700111 HCHG RX REV CODE 636 W/ 250 OVERRIDE (IP): Performed by: EMERGENCY MEDICINE

## 2022-08-16 RX ORDER — CEFTRIAXONE 1 G/1
1000 INJECTION, POWDER, FOR SOLUTION INTRAMUSCULAR; INTRAVENOUS ONCE
Status: COMPLETED | OUTPATIENT
Start: 2022-08-16 | End: 2022-08-16

## 2022-08-16 RX ADMIN — MUPIROCIN 1 APPLICATION: 20 OINTMENT TOPICAL at 19:30

## 2022-08-16 RX ADMIN — LIDOCAINE HYDROCHLORIDE 1 ML: 10 INJECTION, SOLUTION INFILTRATION; PERINEURAL at 19:30

## 2022-08-16 RX ADMIN — CEFTRIAXONE SODIUM 1000 MG: 1 INJECTION, POWDER, FOR SOLUTION INTRAMUSCULAR; INTRAVENOUS at 19:45

## 2022-08-16 ASSESSMENT — FIBROSIS 4 INDEX: FIB4 SCORE: 0.39

## 2022-08-17 ENCOUNTER — ANESTHESIA EVENT (OUTPATIENT)
Dept: SURGERY | Facility: MEDICAL CENTER | Age: 52
End: 2022-08-17
Payer: COMMERCIAL

## 2022-08-17 ENCOUNTER — HOSPITAL ENCOUNTER (OUTPATIENT)
Dept: RADIOLOGY | Facility: MEDICAL CENTER | Age: 52
End: 2022-08-17
Attending: SURGERY
Payer: COMMERCIAL

## 2022-08-17 DIAGNOSIS — D05.12 INTRADUCTAL CARCINOMA IN SITU OF LEFT BREAST: ICD-10-CM

## 2022-08-17 DIAGNOSIS — C50.812 MALIGNANT NEOPLASM OF OVERLAPPING SITES OF LEFT FEMALE BREAST, UNSPECIFIED ESTROGEN RECEPTOR STATUS (HCC): ICD-10-CM

## 2022-08-17 PROCEDURE — 38792 RA TRACER ID OF SENTINL NODE: CPT | Mod: LT

## 2022-08-17 NOTE — ED TRIAGE NOTES
Patient presents with reports of abscess on LLQ abdomen with scant amount of drainage. Denies fever or chills. Is supposed to have surgery in two days.

## 2022-08-17 NOTE — ED PROVIDER NOTES
ED Provider Note    CHIEF COMPLAINT   Chief Complaint   Patient presents with    Abscess     LLQ abscess after multiple recent surgeries to repair hernia.       HPI   Carie Santiago is a 52 y.o. female who presents with abdominal blister she was concerned about possible infection.  Patient has mastectomy scheduled in the next 2 days.  No fever or chills.  The area is mildly painful.  No distention, no vomiting.  Patient states she has been sleeping with a heating pad on her abdomen, states it is possible this caused a burn.  No difficulty with eating, no vomiting, no change in bowel habits    REVIEW OF SYSTEMS   Constitutional: No fever  Skin abdominal blister, likely burn    PAST MEDICAL HISTORY   Past Medical History:   Diagnosis Date    Anxiety and depression 01/23/2019    Breast cancer DCIS, left (June 2022) 06/23/2022    Colon polyp 03/29/2019    Colonoscopy March 2019: 3mm descending colon polyp    COVID-19     H/O    COVID-19     H/O 2nd time 2-2022    Diverticulosis of colon 01/23/2019    MRI abdomen Jan. 2019    Environmental and seasonal allergies     Generalized anxiety disorder     H/O cervical fracture     x2    Humerus fracture     Right    Hypertriglyceridemia 01/23/2019    Hypothyroid     Low serum HDL 01/23/2019    Menopause     Ovarian cyst     Pain 08/09/2022    right shoulder, left breast, 8/10    Umbilical hernia     X3    Vitamin B12 deficiency 01/23/2019       FAMILY HISTORY  Family History   Problem Relation Age of Onset    Heart Disease Mother         Atrial fibrillation    Thyroid Mother     Cancer Mother         Melanoma    Heart Disease Brother     Diabetes Son     Dementia Neg Hx     Colon Cancer Neg Hx     Breast Cancer Neg Hx        SOCIAL HISTORY  Social History     Socioeconomic History    Marital status: Single    Highest education level: Bachelor's degree (e.g., BA, AB, BS)   Tobacco Use    Smoking status: Former     Packs/day: 0.50     Years: 25.00     Pack years: 12.50      Types: Cigarettes     Quit date: 3/1/2022     Years since quittin.4    Smokeless tobacco: Never   Vaping Use    Vaping Use: Never used   Substance and Sexual Activity    Alcohol use: Not Currently     Comment: Pt. states quit drinking 2021    Drug use: No     Social Determinants of Health     Financial Resource Strain: Low Risk     Difficulty of Paying Living Expenses: Not very hard   Food Insecurity: No Food Insecurity    Worried About Running Out of Food in the Last Year: Never true    Ran Out of Food in the Last Year: Never true   Transportation Needs: No Transportation Needs    Lack of Transportation (Medical): No    Lack of Transportation (Non-Medical): No   Physical Activity: Insufficiently Active    Days of Exercise per Week: 2 days    Minutes of Exercise per Session: 20 min   Stress: No Stress Concern Present    Feeling of Stress : Not at all   Social Connections: Moderately Integrated    Frequency of Communication with Friends and Family: More than three times a week    Frequency of Social Gatherings with Friends and Family: More than three times a week    Attends Buddhism Services: More than 4 times per year    Active Member of Clubs or Organizations: Yes    Attends Club or Organization Meetings: More than 4 times per year    Marital Status:    Housing Stability: Low Risk     Unable to Pay for Housing in the Last Year: No    Number of Places Lived in the Last Year: 2    Unstable Housing in the Last Year: No        SURGICAL HISTORY  Past Surgical History:   Procedure Laterality Date    PB MASTECTOMY, PARTIAL Left 2022    Procedure: WIRE LOCALIZED LEFT PARTIAL MASTECTOMY;  Surgeon: Hiral Rock M.D.;  Location: SURGERY SAME DAY [facility];  Service: General    ABDOMINOPLASTY  2022    Procedure: ABDOMINOPLASTY;  Surgeon: Paolo Travis M.D.;  Location: SURGERY SAME DAY [facility];  Service: Plastics    EAR RECONSTRUCTION Bilateral 2022    Procedure: RECONSTRUCTION, EAR;   Surgeon: Paolo Travis M.D.;  [facility];  Service: Plastics    SCAR REVISION  4/14/2022    Procedure: REVISION, SCAR - TO CHIN;  Surgeon: Paolo Travis M.D.;  [facility];  Service: Plastics    VENTRAL HERNIA REPAIR  4/14/2022    Procedure: REPAIR, HERNIA, VENTRAL;  Surgeon: Hiral Rock M.D.;  [facility];  Service: Plastics    ID LAP,DIAGNOSTIC ABDOMEN  8/3/2021    Procedure: PELVISCOPY - WITH PELVIC WASHINGS, EXCISION OF RIGHT OVARIAN  MASS;  Surgeon: Fabrice Camejo D.O.;  Location: [facility];  Service: Obstetrics    SALPINGO OOPHORECTOMY Bilateral 8/3/2021    Procedure: SALPINGO-OOPHORECTOMY.;  Surgeon: Fabrice Camejo D.O.;  Location: [facility];  Service: Obstetrics    ID UPPER GI ENDOSCOPY,DIAGNOSIS N/A 2/12/2021    Procedure: GASTROSCOPY;  Surgeon: Kasi Chan M.D.;  [facility];  Service: Gastroenterology    ID UPPER GI ENDOSCOPY,BIOPSY N/A 2/12/2021    Procedure: GASTROSCOPY, WITH BIOPSY;  Surgeon: Kasi Chan M.D.;  Location: [facility];  Service: Gastroenterology    WOUND CLOSURE ORTHO Left 9/24/2019    Procedure: CLOSURE, WOUND, ORTHO - LEG W/WOUND VAC REMOVAL;  Surgeon: Paolo Odell M.D.;  Location: [facility];  Service: Orthopedics    IRRIGATION & DEBRIDEMENT ORTHO Left 9/21/2019    Procedure: IRRIGATION AND DEBRIDEMENT, WOUND - FOR PROXIMAL TIBIA HEMATOMA EVACUATION AND WOUND VAC APPLICATION;  Surgeon: Paolo Odell M.D.;  Location: [facility];  Service: Orthopedics    ROBOTIC LAPAROSCOPIC CHOLECYSTECTOMY  2017    OTHER ORTHOPEDIC SURGERY Right 2017    kNEE    UMBILICAL HERNIA REPAIR  2017    INGUINAL HERNIA REPAIR BILATERAL  1999    with Mesh    TONSILLECTOMY AND ADENOIDECTOMY  1976    APPENDECTOMY      BONE GRAFT      CHOLECYSTECTOMY      MAMMOPLASTY AUGMENTATION Bilateral     MAMMOPLASTY REDUCTION       "UMBILICAL HERNIA REPAIR      VENTRAL HERNIA REPAIR         CURRENT MEDICATIONS   Home Medications       Reviewed by Esha De La Cruz R.N. (Registered Nurse) on 08/16/22 at 1817  Med List Status: Not Addressed     Medication Last Dose Status   albuterol (PROVENTIL) 2 MG tablet  Active   albuterol 108 (90 Base) MCG/ACT Aero Soln inhalation aerosol  Active   ALPRAZolam (XANAX) 0.5 MG Tab  Active   benzonatate (TESSALON) 200 MG capsule  Active   ferrous sulfate 325 (65 Fe) MG tablet  Active   FLUoxetine (PROZAC) 10 MG Cap  Active   lidocaine (LIDODERM) 5 % Patch  Active   Mometasone Furo-Formoterol Fum (DULERA) 100-5 MCG/ACT Aerosol  Active   Multiple Vitamins-Minerals (OCUVITE-LUTEIN) Tab  Active   nitrofurantoin (MACROBID) 100 MG Cap  Active   oxybutynin SR (DITROPAN-XL) 10 MG CR tablet  Active   oxyCODONE-acetaminophen (PERCOCET) 5-325 MG Tab  Active   SYNTHROID 88 MCG Tab  Active   traZODone (DESYREL) 50 MG Tab  Active                    ALLERGIES   Allergies   Allergen Reactions    Meloxicam Diarrhea and Vomiting     Severe      Sulfa Drugs Anaphylaxis    Zofran [Dextrose-Ondansetron] Unspecified     Migraine tolerates Injection/ IV    Lorazepam Anxiety and Unspecified     \"IT MAKES ME CRY\"       PHYSICAL EXAM  VITAL SIGNS: /76   Pulse 79   Temp 36.6 °C (97.9 °F) (Temporal)   Resp 16   Ht 1.778 m (5' 10\")   Wt 67.4 kg (148 lb 9.4 oz)   LMP  (LMP Unknown)   SpO2 97%   BMI 21.32 kg/m²   Constitutional: Well developed, Well nourished, No acute distress, Non-toxic appearance.   Cardiac: Normal heart rate, Normal rhythm   GI: No abdominal distention  Skin:  approximately 5 x 8 cm thin-walled blister to the midline abdomen, right aspect of the lesion has right ankle to the erythematous change and border of the blister.  The erythematous penumbra surrounding the thin-walled blister is erythematous.  No underlying fluctuant mass..  Vascular: Normal capillary refill,.     Procedure note: Debridement burn " abdomen  Thin-walled blister to the abdomen was prepped with Betadine.  Using sterile forceps and scalpel, the thin-walled blister skin was removed.  Bactroban applied to the wound with dressing by nursing staff.  Patient tolerated the procedure well      COURSE & MEDICAL DECISION MAKING  Pertinent Labs & Imaging studies reviewed. (See chart for details)  Lesion to the patient's abdomen is consistent with a burn, partial-thickness.  Likely secondary to the patient sleeping with a heating pad on her abdomen.  Patient requested debridement of the blistering, the dead skin was removed with forcep and sterile scalpel after Betadine prep.  The surrounding erythematous change I believe secondary to the burn although cannot completely rule out small area of skin infection.  Patient requested antibiotics, treated with IM Rocephin in the emergency department.  She states she already has prescription for Keflex which she is encouraged to start.  No evidence of toxicity, she has normal vital signs.  The blister had no purulence, it was serous fluid within the blister consistent with a burn.  Patient prescribed Bactroban for treatment of the burn after daily cleaning    FINAL IMPRESSION  1. Burn  mupirocin (BACTROBAN) 2 % Ointment    Abdomen: Less than 1% total body surface area, partial-thickness              Electronically signed by: Main Crespo M.D., 8/17/2022 12:16 AM

## 2022-08-17 NOTE — DISCHARGE INSTRUCTIONS
For worsening redness, fever, see your doctor or return to the emergency department.  Wash the burn daily, apply Bactroban.

## 2022-08-17 NOTE — ED NOTES
Pt to ED today for evaluation of blisters that developed on her abd this am. Pt small amount of clear drainage earlier but tonight blister is filled with clear fluid  And not draining. Redness noted to the right of blister.

## 2022-08-18 ENCOUNTER — ANESTHESIA (OUTPATIENT)
Dept: SURGERY | Facility: MEDICAL CENTER | Age: 52
End: 2022-08-18
Payer: COMMERCIAL

## 2022-08-18 ENCOUNTER — HOSPITAL ENCOUNTER (OUTPATIENT)
Facility: MEDICAL CENTER | Age: 52
End: 2022-08-18
Attending: PLASTIC SURGERY | Admitting: PLASTIC SURGERY
Payer: COMMERCIAL

## 2022-08-18 VITALS
RESPIRATION RATE: 18 BRPM | WEIGHT: 145.28 LBS | TEMPERATURE: 97.2 F | SYSTOLIC BLOOD PRESSURE: 121 MMHG | OXYGEN SATURATION: 96 % | DIASTOLIC BLOOD PRESSURE: 76 MMHG | HEIGHT: 70 IN | HEART RATE: 75 BPM | BODY MASS INDEX: 20.8 KG/M2

## 2022-08-18 LAB — PATHOLOGY CONSULT NOTE: NORMAL

## 2022-08-18 PROCEDURE — 700111 HCHG RX REV CODE 636 W/ 250 OVERRIDE (IP): Performed by: SURGERY

## 2022-08-18 PROCEDURE — 700111 HCHG RX REV CODE 636 W/ 250 OVERRIDE (IP): Performed by: PLASTIC SURGERY

## 2022-08-18 PROCEDURE — 700111 HCHG RX REV CODE 636 W/ 250 OVERRIDE (IP): Performed by: ANESTHESIOLOGY

## 2022-08-18 PROCEDURE — 160025 RECOVERY II MINUTES (STATS): Performed by: PLASTIC SURGERY

## 2022-08-18 PROCEDURE — 00402 ANES INTEG SYS RCNSTV BREAST: CPT | Performed by: ANESTHESIOLOGY

## 2022-08-18 PROCEDURE — 88307 TISSUE EXAM BY PATHOLOGIST: CPT | Mod: 59

## 2022-08-18 PROCEDURE — C1889 IMPLANT/INSERT DEVICE, NOC: HCPCS | Performed by: PLASTIC SURGERY

## 2022-08-18 PROCEDURE — 160002 HCHG RECOVERY MINUTES (STAT): Performed by: PLASTIC SURGERY

## 2022-08-18 PROCEDURE — 110371 HCHG SHELL REV 272: Performed by: PLASTIC SURGERY

## 2022-08-18 PROCEDURE — 160041 HCHG SURGERY MINUTES - EA ADDL 1 MIN LEVEL 4: Performed by: PLASTIC SURGERY

## 2022-08-18 PROCEDURE — 160009 HCHG ANES TIME/MIN: Performed by: PLASTIC SURGERY

## 2022-08-18 PROCEDURE — 160048 HCHG OR STATISTICAL LEVEL 1-5: Performed by: PLASTIC SURGERY

## 2022-08-18 PROCEDURE — 160046 HCHG PACU - 1ST 60 MINS PHASE II: Performed by: PLASTIC SURGERY

## 2022-08-18 PROCEDURE — 700102 HCHG RX REV CODE 250 W/ 637 OVERRIDE(OP): Performed by: ANESTHESIOLOGY

## 2022-08-18 PROCEDURE — 700105 HCHG RX REV CODE 258: Performed by: PLASTIC SURGERY

## 2022-08-18 PROCEDURE — 700101 HCHG RX REV CODE 250: Performed by: PLASTIC SURGERY

## 2022-08-18 PROCEDURE — 160035 HCHG PACU - 1ST 60 MINS PHASE I: Performed by: PLASTIC SURGERY

## 2022-08-18 PROCEDURE — 160029 HCHG SURGERY MINUTES - 1ST 30 MINS LEVEL 4: Performed by: PLASTIC SURGERY

## 2022-08-18 PROCEDURE — 88331 PATH CONSLTJ SURG 1 BLK 1SPC: CPT

## 2022-08-18 PROCEDURE — 88305 TISSUE EXAM BY PATHOLOGIST: CPT

## 2022-08-18 PROCEDURE — A9270 NON-COVERED ITEM OR SERVICE: HCPCS | Performed by: ANESTHESIOLOGY

## 2022-08-18 DEVICE — GRAFT MESH ADM SHAPED ALLOMEND 10CM X 18CM 1.0-2.0MM (1EA): Type: IMPLANTABLE DEVICE | Site: BREAST | Status: FUNCTIONAL

## 2022-08-18 DEVICE — IMPLANTABLE DEVICE: Type: IMPLANTABLE DEVICE | Site: BREAST | Status: FUNCTIONAL

## 2022-08-18 RX ORDER — LIDOCAINE AND PRILOCAINE 25; 25 MG/G; MG/G
CREAM TOPICAL ONCE
Status: DISCONTINUED | OUTPATIENT
Start: 2022-08-18 | End: 2022-08-18 | Stop reason: HOSPADM

## 2022-08-18 RX ORDER — SODIUM CHLORIDE, SODIUM LACTATE, POTASSIUM CHLORIDE, CALCIUM CHLORIDE 600; 310; 30; 20 MG/100ML; MG/100ML; MG/100ML; MG/100ML
INJECTION, SOLUTION INTRAVENOUS CONTINUOUS
Status: DISCONTINUED | OUTPATIENT
Start: 2022-08-18 | End: 2022-08-18 | Stop reason: HOSPADM

## 2022-08-18 RX ORDER — ALBUTEROL SULFATE 2.5 MG/3ML
2.5 SOLUTION RESPIRATORY (INHALATION)
Status: DISCONTINUED | OUTPATIENT
Start: 2022-08-18 | End: 2022-08-18 | Stop reason: HOSPADM

## 2022-08-18 RX ORDER — LABETALOL HYDROCHLORIDE 5 MG/ML
5 INJECTION, SOLUTION INTRAVENOUS
Status: DISCONTINUED | OUTPATIENT
Start: 2022-08-18 | End: 2022-08-18 | Stop reason: HOSPADM

## 2022-08-18 RX ORDER — SCOLOPAMINE TRANSDERMAL SYSTEM 1 MG/1
1 PATCH, EXTENDED RELEASE TRANSDERMAL
Status: DISCONTINUED | OUTPATIENT
Start: 2022-08-18 | End: 2022-08-18 | Stop reason: HOSPADM

## 2022-08-18 RX ORDER — HYDROMORPHONE HYDROCHLORIDE 1 MG/ML
0.2 INJECTION, SOLUTION INTRAMUSCULAR; INTRAVENOUS; SUBCUTANEOUS
Status: DISCONTINUED | OUTPATIENT
Start: 2022-08-18 | End: 2022-08-18 | Stop reason: HOSPADM

## 2022-08-18 RX ORDER — ISOSULFAN BLUE 50 MG/5ML
INJECTION, SOLUTION SUBCUTANEOUS
Status: DISCONTINUED
Start: 2022-08-18 | End: 2022-08-18 | Stop reason: HOSPADM

## 2022-08-18 RX ORDER — BUPIVACAINE HYDROCHLORIDE AND EPINEPHRINE 5; 5 MG/ML; UG/ML
INJECTION, SOLUTION EPIDURAL; INTRACAUDAL; PERINEURAL
Status: DISCONTINUED
Start: 2022-08-18 | End: 2022-08-18 | Stop reason: HOSPADM

## 2022-08-18 RX ORDER — SODIUM CHLORIDE, SODIUM LACTATE, POTASSIUM CHLORIDE, CALCIUM CHLORIDE 600; 310; 30; 20 MG/100ML; MG/100ML; MG/100ML; MG/100ML
INJECTION, SOLUTION INTRAVENOUS CONTINUOUS
Status: ACTIVE | OUTPATIENT
Start: 2022-08-18 | End: 2022-08-18

## 2022-08-18 RX ORDER — CEFAZOLIN SODIUM 1 G/3ML
INJECTION, POWDER, FOR SOLUTION INTRAMUSCULAR; INTRAVENOUS
Status: DISCONTINUED
Start: 2022-08-18 | End: 2022-08-18 | Stop reason: HOSPADM

## 2022-08-18 RX ORDER — ONDANSETRON 2 MG/ML
4 INJECTION INTRAMUSCULAR; INTRAVENOUS
Status: DISCONTINUED | OUTPATIENT
Start: 2022-08-18 | End: 2022-08-18 | Stop reason: HOSPADM

## 2022-08-18 RX ORDER — DEXAMETHASONE SODIUM PHOSPHATE 4 MG/ML
INJECTION, SOLUTION INTRA-ARTICULAR; INTRALESIONAL; INTRAMUSCULAR; INTRAVENOUS; SOFT TISSUE PRN
Status: DISCONTINUED | OUTPATIENT
Start: 2022-08-18 | End: 2022-08-18 | Stop reason: SURG

## 2022-08-18 RX ORDER — OXYCODONE HCL 5 MG/5 ML
10 SOLUTION, ORAL ORAL
Status: COMPLETED | OUTPATIENT
Start: 2022-08-18 | End: 2022-08-18

## 2022-08-18 RX ORDER — DIPHENHYDRAMINE HYDROCHLORIDE 50 MG/ML
12.5 INJECTION INTRAMUSCULAR; INTRAVENOUS
Status: DISCONTINUED | OUTPATIENT
Start: 2022-08-18 | End: 2022-08-18 | Stop reason: HOSPADM

## 2022-08-18 RX ORDER — OXYCODONE HCL 5 MG/5 ML
5 SOLUTION, ORAL ORAL
Status: COMPLETED | OUTPATIENT
Start: 2022-08-18 | End: 2022-08-18

## 2022-08-18 RX ORDER — ALPRAZOLAM 0.25 MG/1
.25-.5 TABLET ORAL PRN
Status: DISCONTINUED | OUTPATIENT
Start: 2022-08-18 | End: 2022-08-18 | Stop reason: HOSPADM

## 2022-08-18 RX ORDER — BUPIVACAINE HYDROCHLORIDE AND EPINEPHRINE 5; 5 MG/ML; UG/ML
INJECTION, SOLUTION EPIDURAL; INTRACAUDAL; PERINEURAL
Status: DISCONTINUED | OUTPATIENT
Start: 2022-08-18 | End: 2022-08-18 | Stop reason: HOSPADM

## 2022-08-18 RX ORDER — CEFAZOLIN SODIUM 1 G/3ML
INJECTION, POWDER, FOR SOLUTION INTRAMUSCULAR; INTRAVENOUS PRN
Status: DISCONTINUED | OUTPATIENT
Start: 2022-08-18 | End: 2022-08-18 | Stop reason: SURG

## 2022-08-18 RX ORDER — HALOPERIDOL 5 MG/ML
1 INJECTION INTRAMUSCULAR
Status: DISCONTINUED | OUTPATIENT
Start: 2022-08-18 | End: 2022-08-18 | Stop reason: HOSPADM

## 2022-08-18 RX ORDER — ONDANSETRON 2 MG/ML
INJECTION INTRAMUSCULAR; INTRAVENOUS PRN
Status: DISCONTINUED | OUTPATIENT
Start: 2022-08-18 | End: 2022-08-18 | Stop reason: SURG

## 2022-08-18 RX ORDER — MEPERIDINE HYDROCHLORIDE 25 MG/ML
12.5 INJECTION INTRAMUSCULAR; INTRAVENOUS; SUBCUTANEOUS
Status: DISCONTINUED | OUTPATIENT
Start: 2022-08-18 | End: 2022-08-18 | Stop reason: HOSPADM

## 2022-08-18 RX ORDER — HYDRALAZINE HYDROCHLORIDE 20 MG/ML
5 INJECTION INTRAMUSCULAR; INTRAVENOUS
Status: DISCONTINUED | OUTPATIENT
Start: 2022-08-18 | End: 2022-08-18 | Stop reason: HOSPADM

## 2022-08-18 RX ORDER — HYDROMORPHONE HYDROCHLORIDE 1 MG/ML
0.1 INJECTION, SOLUTION INTRAMUSCULAR; INTRAVENOUS; SUBCUTANEOUS
Status: DISCONTINUED | OUTPATIENT
Start: 2022-08-18 | End: 2022-08-18 | Stop reason: HOSPADM

## 2022-08-18 RX ORDER — HYDROMORPHONE HYDROCHLORIDE 1 MG/ML
0.4 INJECTION, SOLUTION INTRAMUSCULAR; INTRAVENOUS; SUBCUTANEOUS
Status: DISCONTINUED | OUTPATIENT
Start: 2022-08-18 | End: 2022-08-18 | Stop reason: HOSPADM

## 2022-08-18 RX ORDER — ISOSULFAN BLUE 50 MG/5ML
INJECTION, SOLUTION SUBCUTANEOUS
Status: DISCONTINUED | OUTPATIENT
Start: 2022-08-18 | End: 2022-08-18 | Stop reason: HOSPADM

## 2022-08-18 RX ADMIN — SCOPOLAMINE 1 PATCH: 1.5 PATCH, EXTENDED RELEASE TRANSDERMAL at 06:35

## 2022-08-18 RX ADMIN — FENTANYL CITRATE 150 MCG: 50 INJECTION, SOLUTION INTRAMUSCULAR; INTRAVENOUS at 08:43

## 2022-08-18 RX ADMIN — DEXAMETHASONE SODIUM PHOSPHATE 4 MG: 4 INJECTION, SOLUTION INTRA-ARTICULAR; INTRALESIONAL; INTRAMUSCULAR; INTRAVENOUS; SOFT TISSUE at 07:59

## 2022-08-18 RX ADMIN — OXYCODONE HYDROCHLORIDE 10 MG: 5 SOLUTION ORAL at 10:03

## 2022-08-18 RX ADMIN — FENTANYL CITRATE 100 MCG: 50 INJECTION, SOLUTION INTRAMUSCULAR; INTRAVENOUS at 07:57

## 2022-08-18 RX ADMIN — CEFAZOLIN 2 G: 330 INJECTION, POWDER, FOR SOLUTION INTRAMUSCULAR; INTRAVENOUS at 07:59

## 2022-08-18 RX ADMIN — ONDANSETRON 4 MG: 2 INJECTION INTRAMUSCULAR; INTRAVENOUS at 09:43

## 2022-08-18 RX ADMIN — FENTANYL CITRATE 50 MCG: 50 INJECTION, SOLUTION INTRAMUSCULAR; INTRAVENOUS at 10:33

## 2022-08-18 RX ADMIN — FENTANYL CITRATE 50 MCG: 50 INJECTION, SOLUTION INTRAMUSCULAR; INTRAVENOUS at 10:02

## 2022-08-18 RX ADMIN — HYDROMORPHONE HYDROCHLORIDE 0.2 MG: 1 INJECTION, SOLUTION INTRAMUSCULAR; INTRAVENOUS; SUBCUTANEOUS at 11:01

## 2022-08-18 RX ADMIN — HYDROMORPHONE HYDROCHLORIDE 0.2 MG: 1 INJECTION, SOLUTION INTRAMUSCULAR; INTRAVENOUS; SUBCUTANEOUS at 11:19

## 2022-08-18 RX ADMIN — PROPOFOL 200 MG: 10 INJECTION, EMULSION INTRAVENOUS at 07:57

## 2022-08-18 RX ADMIN — SODIUM CHLORIDE, POTASSIUM CHLORIDE, SODIUM LACTATE AND CALCIUM CHLORIDE: 600; 310; 30; 20 INJECTION, SOLUTION INTRAVENOUS at 06:36

## 2022-08-18 ASSESSMENT — PAIN DESCRIPTION - PAIN TYPE
TYPE: SURGICAL PAIN

## 2022-08-18 ASSESSMENT — FIBROSIS 4 INDEX: FIB4 SCORE: 0.39

## 2022-08-18 NOTE — OR NURSING
1142 - Pt escorted out of department in wheelchair with all belongings. Discharged home to responsible adult. PIV removed.

## 2022-08-18 NOTE — OR SURGEON
Immediate Post OP Note    PreOp Diagnosis: left breast DCIS      PostOp Diagnosis: same      Procedure(s):  LEFT BREAST RECONSTRUCTION WITH PLACEMENT OF TISSUE EXPANDER WITH USE OF ACELLULAR DERMAL MATRIX, BREAST IMPLANT PLACEMENT WITH USE OF ACELLULAR DERMAL MATRIX  INSERTION OR REMOVAL, TISSUE EXPANDER  INSERTION, IMPLANT, BREAST  LEFT TOTAL MASTECTOMY LEFT SENTINEL LYMOH NODE INJECTION WITH EXCISION OF AXILLARY NODES  BIOPSY, LYMPH NODE, SENTINEL  IDENTIFICATION,LYMPH NODE,SENTINEL,INTRAOPERATIVE,USING DYE INJECTION    Surgeon(s):  MAEGAN Montiel M.D.    Anesthesiologist/Type of Anesthesia:  Anesthesiologist: Dante Sims M.D./* No anesthesia type entered *    Surgical Staff:  Assistant: MARGUERITE Ford  Circulator: Bere Apodaca R.N.  Relief Circulator: Slim Katz R.N.  Relief Scrub: Juliana Petersen  Scrub Person: Yifan Earl    Specimens removed if any:  ID Type Source Tests Collected by Time Destination   A : LEFT TOTAL MASTECTOMY, SHORT STITCH SUPERIOR, LONG STITCH LATERAL Tissue Breast PATHOLOGY SPECIMEN Hiral Rock M.D. 8/18/2022  8:50 AM    B : LEFT SENTINEL LYMPH NODE #1 Tissue Lymph Node PATHOLOGY SPECIMEN Hiral Rock M.D. 8/18/2022  8:52 AM    C : LEFT LOWER INNER BREAST TISSUE Tissue Breast PATHOLOGY SPECIMEN Hiral Rock M.D. 8/18/2022  8:59 AM    D : LEFT SENTINEL LYMPH NODE #2 Tissue Lymph Node PATHOLOGY SPECIMEN Hiral Rock M.D. 8/18/2022  9:19 AM        Estimated Blood Loss: minimal    Findings: see operative note    Complications: no apparent    #[id number]        8/18/2022 9:51 AM Paolo Travis M.D.

## 2022-08-18 NOTE — ANESTHESIA PROCEDURE NOTES
Airway    Date/Time: 8/18/2022 7:58 AM  Performed by: Dante Sims M.D.  Authorized by: Dante Sims M.D.     Location:  OR  Urgency:  Elective  Difficult Airway: No    Indications for Airway Management:  Anesthesia      Spontaneous Ventilation: absent    Sedation Level:  Deep  Preoxygenated: Yes    Mask Difficulty Assessment:  1 - vent by mask  Final Airway Type:  Supraglottic airway  Final Supraglottic Airway:  Standard LMA    SGA Size:  3  Number of Attempts at Approach:  1  Ventilation Between Attempts:  None

## 2022-08-18 NOTE — ANESTHESIA TIME REPORT
Anesthesia Start and Stop Event Times     Date Time Event    8/18/2022 0716 Ready for Procedure     0752 Anesthesia Start     0957 Anesthesia Stop        Responsible Staff  08/18/22    Name Role Begin End    August W FRANCIS Sims. Anesth 0752 [id number]        Overtime Reason:  no overtime (within assigned shift)    Comments:

## 2022-08-18 NOTE — ANESTHESIA PREPROCEDURE EVALUATION
Case: [id number] Date/Time: 08/18/22 0715    Procedures:       LEFT BREAST RECONSTRUCTION WITH PLACEMENT OF TISSUE EXPANDER WITH USE OF ACELLULAR DERMAL MATRIX, BREAST IMPLANT PLACEMENT WITH USE OF ACELLULAR DERMAL MATRIX      INSERTION OR REMOVAL, TISSUE EXPANDER      INSERTION, IMPLANT, BREAST      LEFT TOTAL MASTECTOMY LEFT SENTINEL LYMOH NODE INJECTION WITH EXCISION OF AXILLARY NODES      BIOPSY, LYMPH NODE, SENTINEL      IDENTIFICATION,LYMPH NODE,SENTINEL,INTRAOPERATIVE,USING DYE INJECTION    Pre-op diagnosis: LEFT BREAST CANCER DCIS LEFT BREAST    Location: [facility] ROOM 28 / SURGERY SAME DAY [facility]    Surgeons: Paolo Travis M.D.; Hiral Rock M.D.          Relevant Problems   NEURO   (positive) History of iron deficiency      ENDO   (positive) Hypothyroid       Physical Exam    Airway   Mallampati: I  TM distance: >3 FB  Neck ROM: full       Cardiovascular - normal exam  Rhythm: regular  Rate: normal  (-) murmur     Dental - normal exam           Pulmonary - normal exam  Breath sounds clear to auscultation     Abdominal    Neurological - normal exam                 Anesthesia Plan    ASA 2       Plan - general       Airway plan will be LMA          Induction: intravenous    Postoperative Plan: Postoperative administration of opioids is intended.    Pertinent diagnostic labs and testing reviewed    Informed Consent:    Anesthetic plan and risks discussed with patient.    Use of blood products discussed with: patient whom consented to blood products.

## 2022-08-18 NOTE — OP REPORT
Pre op diagnosis:  Malignant neoplasm left breast, overlapping sites  Post op diagnosis:  Same    Procedure:    Left total mastectomy with removal of implant  [place] lymph node mapping with excision of axillary nodes    Surgeon:  Hiral Rock MD  Assistant:  Anel Morris CFA  Anesthesiologist:  Dante Sims MD    Anesthesia:  General  Pre op meds:  Ancef  ASA 2    Indications:  This is a 52 year old female with left breast cancer, pT1 pNx M0.  She underwent partial mastectomy for HG DCIS, but a focus of IDC was identified along with extensive DCIS and multiple close or positive margins.  After discussing risks and benefits of her options, she is ready to proceed with surgery.  I discussed orientation of incision with her plastic surgeon and we are planning to resect the anterior margin of her prior cavity.    Of note, this was a delayed start due to questions about the consent which were not raised to me until 7:45am.     Findings:  2 benign sentinel nodes.  Counts to 300  Skin excised over prior lumpectomy cavity  Subpectoral TE and ACDM by Dr. Travis.  Prevena given history of smoking.    Summary:  The patient underwent radiotracer injection by Radiology.  She was anesthetized, prepped, and draped in sterile fashion.  Time out was confirmed.      There were minimal counts in the axilla, so I injected 5cc of isosulfan blue with 20cc of saline into the subareolar region and also in the upper outer breast.  Lymphatic massage was performed.    I made a horizontal ellipse including her NAC and the anterior aspect of her previous excisional cavity.  I dissected along the anterior mammary fascial plane to the infraclavicular region, the sternum, the latissimus dorsi, and the inframammary fold.  The implant was removed along with portion of ACDM and the entire pectoralis fascia was also included in the specimen.  Usual orienting sutures were placed.  Additional tissue was excised from the lower inner aspect and  submitted.  I irrigated and ensured hemostasis.      I incised the clavipectoral fascia and the gamma probe was used to locate the sentinel nodes.  These were also blue.  Harmonic scalpel was used to excise these.  There were no palpable suspicious nodes and background counts were less than 10%.  I irrigated, ensured hemostasis, then Dr. Travis completed his portion of the procedure which is dictated separately.  Assistance was required for retraction.      CC: MD Paolo Rodríguez MD Lee Schwartzberg, MD

## 2022-08-18 NOTE — OR NURSING
0954   pt arrives from OR to PACU 5, report received from RN and anesthesia. Pt place on monitor. VSS,  NAD noted.   O2 4L via mask.    Breast binder noted, CDI, KE drain with minimal output and provena wound vac noted.     1020-pt assisted to BR by MARA Sheehan  Voids with out difficulty    1035- medicated per MAR, tolerating otter pops  Denies other needs at this time.     1047- pt transported to Phase II, all belongings accounted for.

## 2022-08-18 NOTE — DISCHARGE INSTRUCTIONS
HOME CARE INSTRUCTIONS    ACTIVITY: Rest and take it easy for the first 24 hours.  A responsible adult is recommended to remain with you during that time.  It is normal to feel sleepy.  We encourage you to not do anything that requires balance, judgment or coordination.    FOR 24 HOURS DO NOT:  Drive, operate machinery or run household appliances.  Drink beer or alcoholic beverages.  Make important decisions or sign legal documents.    SPECIAL INSTRUCTIONS: PLEASE REFER TO [facility] DISCHARGE INSTRUCTIONS FROM DR GILMAN  Empty and record drain output 2-3 times day    DIET: To avoid nausea, slowly advance diet as tolerated, avoiding spicy or greasy foods for the first day.  Add more substantial food to your diet according to your physician's instructions.  Babies can be fed formula or breast milk as soon as they are hungry.  INCREASE FLUIDS AND FIBER TO AVOID CONSTIPATION.    SURGICAL DRESSING/BATHING: DRESSING TO REMAIN ON UNTIL FOLLOW UP WITH DR GILMAN    MEDICATIONS: Resume taking daily medication.  Take prescribed pain medication with food.  If no medication is prescribed, you may take non-aspirin pain medication if needed.  PAIN MEDICATION CAN BE VERY CONSTIPATING.  Take a stool softener or laxative such as senokot, pericolace, or milk of magnesia if needed.     Last pain medication given OXYCODONE at 10am.    A follow-up appointment should be arranged with your doctor, call to schedule, if appointment not already made.     You should CALL YOUR PHYSICIAN if you develop:  Fever greater than 101 degrees F.  Pain not relieved by medication, or persistent nausea or vomiting.  Excessive bleeding (blood soaking through dressing) or unexpected drainage from the wound.  Extreme redness or swelling around the incision site, drainage of pus or foul smelling drainage.  Inability to urinate or empty your bladder within 8 hours.  Problems with breathing or chest pain.    You should call 911 if you develop problems with breathing or  chest pain.  If you are unable to contact your doctor or surgical center, you should go to the nearest emergency room or urgent care center.  Physician's telephone #: Dr Travis [phone]  Dr Rock [phone]    MILD FLU-LIKE SYMPTOMS ARE NORMAL.  YOU MAY EXPERIENCE GENERALIZED MUSCLE ACHES, THROAT IRRITATION, HEADACHE AND/OR SOME NAUSEA.    If any questions arise, call your doctor.  If your doctor is not available, please feel free to call the Surgical Center at [phone].  The Center is open Monday through Friday from 7AM to 7PM.      A registered nurse may call you a few days after your surgery to see how you are doing after your procedure.    You may also receive a survey in the mail within the next two weeks and we ask that you take a few moments to complete the survey and return it to us.  Our goal is to provide you with very good care and we value your comments.     Depression / Suicide Risk    As you are discharged from this [facility] facility, it is important to learn how to keep safe from harming yourself.    Recognize the warning signs:  Abrupt changes in personality, positive or negative- including increase in energy   Giving away possessions  Change in eating patterns- significant weight changes-  positive or negative  Change in sleeping patterns- unable to sleep or sleeping all the time   Unwillingness or inability to communicate  Depression  Unusual sadness, discouragement and loneliness  Talk of wanting to die  Neglect of personal appearance   Rebelliousness- reckless behavior  Withdrawal from people/activities they love  Confusion- inability to concentrate     If you or a loved one observes any of these behaviors or has concerns about self-harm, here's what you can do:  Talk about it- your feelings and reasons for harming yourself  Remove any means that you might use to hurt yourself (examples: pills, rope, extension cords, firearm)  Get professional help from the community (Mental  Health, Substance Abuse, psychological counseling)  Do not be alone:Call your Safe Contact- someone whom you trust who will be there for you.  Call your local CRISIS HOTLINE 022-9895 or 854-263-6731  Call your local Children's Mobile Crisis Response Team [place] (397) 968-5311 or www.[employer]  Call the toll free National Suicide Prevention Hotlines   National Suicide Prevention Lifeline 503-140-GGBE (5727)  [facility] Line Network 800-SUICIDE (132-6845)    I acknowledge receipt and understanding of these Home Care instructions.

## 2022-08-18 NOTE — OP REPORT
DATE OF SERVICE:  08/18/2022     PREOPERATIVE DIAGNOSIS:  Left breast ductal carcinoma in situ.     POSTOPERATIVE DIAGNOSIS:  Left breast ductal carcinoma in situ.     PROCEDURES:  1.  Left breast reconstruction with partial subpectoral placement of a tissue   expander and use of acellular dermal matrix.  2.  Placement of a Prevena incision management dressing.     ATTENDING SURGEON:  Paolo Travis MD     ASSISTANT:  Hiral Rock MD     ANESTHESIOLOGIST:  Dante Sims MD     ESTIMATED BLOOD LOSS:  Minimal.     COMPLICATIONS:  No apparent.     SPECIMENS:  None for my portion of the operation.     INDICATIONS FOR PROCEDURE:  The patient is a 52-year-old woman is well known   to me who has had a number of different breast surgeries and in 08/2021 had an   implant exchange, capsulectomy, breast lift and use of acellular dermal   matrix.  She currently has 385 mL moderate plus profile Sientra implants.  The   patient then was diagnosed with a left breast ductal carcinoma in situ and   had a lumpectomy, but still had positive margins.  At that point, the patient   had recommendations to have a mastectomy.  The patient desires reconstruction.    She now presents for this operation.     INTRAOPERATIVE FINDINGS:  An XY Mobilean style 133- mL tissue expander was   placed.  The serial #[id number].  Intraoperatively 200 mL was placed.  The left   Sientra implant was removed.     PROCEDURE:  After the operative and nonoperative options were discussed   including risks, benefits and alternatives, which included, but was not   limited to bleeding, infection, damage to surrounding structure, need for   further surgery, reaction to anesthetic agent, scarring, breast asymmetry,   contour irregularities, wound healing difficulties, need for revisional   surgery, implant failure, implant rupture, tissue expander failure, tissue   expander rupture, mastectomy, skin flap necrosis, need for explantation,   dissatisfaction  with results, hypertrophic keloid scarring, pneumothorax, deep   venous thrombosis, pulmonary embolus, myocardial infarction, stroke,   unsatisfactory result and/or death, informed consent was obtained.    Preoperatively, the patient was identified.  In sitting upright position, the   patient's sternal notch, midline and inframammary folds were marked.  The   planned mastectomy incision lines were drawn out in conjunction with Dr. Rock   to try to remove the area where there was likely cutaneous involvement.  This   was drawn in an elliptical fashion in a horizontal orientation going around   the nipple areolar complex.  The patient had previous Wise pattern breast   scars.  Antibiotics were given and sequential compression devices were placed.    The patient was brought back to the operating room and general anesthesia   was induced.  Her chest was prepped and draped in the usual sterile fashion.    Please see Dr. Hiral Rock's note for the mastectomy, sentinel nodes and the   implant removal.  Following completion of this, the cavity was inspected.  The   patient already had a pocket from her previous implant that was partially   subpectoral.  I felt this to be the best pocket given the patient?s very thin   mastectomy skin flaps.  At this point, a 10x18 piece of acellular dermal   matrix was then sewn into the chest wall and partially underneath the   pectoralis major along the inframammary fold and superiorly and then along the   lateral chest wall with horizontal mattress 2-0 Vicryl sutures.  Following   this, then the chest wall was then measured and marked out.  A 550 mL tissue   expander was then decided.  The 15 flat drain was placed and secured with 3-0   nylon suture.  Intercostal blocks and pecs 1 and pecs 2 blocks were placed   with 0.5% Marcaine with epinephrine.  The pocket was then irrigated with a   liter of antibiotic irrigation, removing any loose tissue.  Then, using new   gloves and minimal  touch technique, A 550 mL smooth tissue expander was then   placed in the pocket.  Four of the tabs were secured with 2-0 Ethibond and 2   of the tabs with 2-0 Vicryl sutures.  Following this, then the acellular   dermal matrix was then tucked underneath the pectoralis major muscle and   secured down with horizontal mattress 2-0 Vicryl sutures.  Then, the   pectoralis major was then retracted inferiorly and laterally to try to give   coverage over as much of the dermal matrix as possible.  This was then secured   with 2-0 Vicryl sutures.  Then, a 200 mL was then placed into the expander.    Then, the mastectomy skin flaps were closed with 2-0 Vicryl sutures, 3-0 deep   dermal Monocryl sutures and running 4-0 Monocryl subcuticular stitch to close   the overlying skin.  The 15 round drain was secured with 3-0 nylon suture.  A   13 cm Prevena dressing was then placed over the incision site.  The patient   was then washed.  Biopatch and Tegaderm was then placed over the drain site.    A compressive dressing was then placed.  She was then awakened, extubated and   transferred to PACU in stable condition.  At the end of the procedure, all   sponge, instrument and needle counts were correct.        ______________________________  MD JELANI CHANDLER/ISMAEL    DD:  08/18/2022 09:57  DT:  08/18/2022 10:59    Job#:  [id number]    CC:MD Herbie Hernandez MD

## 2022-08-18 NOTE — OR NURSING
1101 Dilaudid 0.2mg for 7/10 sharp pain left breast    1115 Discharge instructions discussed and provided with mother    1122 Dilaudid 0.2mg 6/10 pain left breast    1130 getting dressed with assist of mother. Pain 5/10.States she will take percocet at home when she arrives

## 2022-08-18 NOTE — ANESTHESIA POSTPROCEDURE EVALUATION
Patient: Carie Santiago    Procedure Summary     Date: 08/18/22 Room / Location: [facility] ROOM 28 / SURGERY SAME DAY [facility]    Anesthesia Start: 0752 Anesthesia Stop: 0957    Procedures:       LEFT BREAST RECONSTRUCTION WITH PLACEMENT OF TISSUE EXPANDER WITH USE OF ACELLULAR DERMAL MATRIX, BREAST IMPLANT PLACEMENT WITH USE OF ACELLULAR DERMAL MATRIX      INSERTION OR REMOVAL, TISSUE EXPANDER      INSERTION, IMPLANT, BREAST      LEFT TOTAL MASTECTOMY LEFT SENTINEL LYMOH NODE INJECTION WITH EXCISION OF AXILLARY NODES      BIOPSY, LYMPH NODE, SENTINEL      IDENTIFICATION,LYMPH NODE,SENTINEL,INTRAOPERATIVE,USING DYE INJECTION Diagnosis: (LEFT BREAST CANCER DCIS LEFT BREAST)    Surgeons: Paolo Travis M.D.; Hiral Rock M.D. Responsible Provider: Dante Sims M.D.    Anesthesia Type: general ASA Status: 2          Final Anesthesia Type: general  Last vitals  BP   Blood Pressure: (!) 96/59    Temp   36.5 °C (97.7 °F)    Pulse   72   Resp   18    SpO2   97 %      Anesthesia Post Evaluation    Patient location during evaluation: PACU  Patient participation: waiting for patient participation  Level of consciousness: lethargic    Airway patency: patent  Anesthetic complications: no  Cardiovascular status: hemodynamically stable  Respiratory status: acceptable and face mask  Hydration status: euvolemic    PONV: none          No notable events documented.     Nurse Pain Score: 3 (NPRS)

## 2022-08-26 ENCOUNTER — HOSPITAL ENCOUNTER (OUTPATIENT)
Dept: RADIOLOGY | Facility: MEDICAL CENTER | Age: 52
End: 2022-08-26
Attending: PLASTIC SURGERY
Payer: COMMERCIAL

## 2022-08-26 DIAGNOSIS — I82.451 DEEP VENOUS THROMBOSIS (DVT) OF RIGHT PERONEAL VEIN, UNSPECIFIED CHRONICITY (HCC): ICD-10-CM

## 2022-08-26 PROCEDURE — 93971 EXTREMITY STUDY: CPT | Mod: LT

## 2022-08-29 DIAGNOSIS — F32.A ANXIETY AND DEPRESSION: ICD-10-CM

## 2022-08-29 DIAGNOSIS — F41.9 ANXIETY AND DEPRESSION: ICD-10-CM

## 2022-08-29 DIAGNOSIS — G89.29 CHRONIC RIGHT SHOULDER PAIN: ICD-10-CM

## 2022-08-29 DIAGNOSIS — M25.511 CHRONIC RIGHT SHOULDER PAIN: ICD-10-CM

## 2022-08-29 RX ORDER — ALPRAZOLAM 0.5 MG/1
TABLET ORAL
Qty: 60 TABLET | Refills: 0 | Status: SHIPPED | OUTPATIENT
Start: 2022-08-29 | End: 2022-10-03 | Stop reason: SDUPTHER

## 2022-08-29 RX ORDER — FLUCONAZOLE 150 MG/1
150 TABLET ORAL ONCE
Qty: 3 TABLET | Refills: 0 | Status: SHIPPED | OUTPATIENT
Start: 2022-08-29 | End: 2022-08-29

## 2022-08-29 RX ORDER — OXYCODONE HYDROCHLORIDE AND ACETAMINOPHEN 5; 325 MG/1; MG/1
1 TABLET ORAL NIGHTLY PRN
Qty: 20 TABLET | Refills: 0 | Status: SHIPPED | OUTPATIENT
Start: 2022-08-29 | End: 2022-11-15 | Stop reason: SDUPTHER

## 2022-09-08 ENCOUNTER — HOSPITAL ENCOUNTER (OUTPATIENT)
Dept: HEMATOLOGY ONCOLOGY | Facility: MEDICAL CENTER | Age: 52
End: 2022-09-08
Attending: INTERNAL MEDICINE
Payer: COMMERCIAL

## 2022-09-08 VITALS
WEIGHT: 148.48 LBS | HEART RATE: 82 BPM | SYSTOLIC BLOOD PRESSURE: 102 MMHG | OXYGEN SATURATION: 100 % | HEIGHT: 70 IN | BODY MASS INDEX: 21.26 KG/M2 | TEMPERATURE: 98.3 F | DIASTOLIC BLOOD PRESSURE: 62 MMHG

## 2022-09-08 DIAGNOSIS — Z17.0 MALIGNANT NEOPLASM OF LEFT BREAST IN FEMALE, ESTROGEN RECEPTOR POSITIVE, UNSPECIFIED SITE OF BREAST (HCC): ICD-10-CM

## 2022-09-08 DIAGNOSIS — C50.912 MALIGNANT NEOPLASM OF LEFT BREAST IN FEMALE, ESTROGEN RECEPTOR POSITIVE, UNSPECIFIED SITE OF BREAST (HCC): ICD-10-CM

## 2022-09-08 PROCEDURE — 99214 OFFICE O/P EST MOD 30 MIN: CPT | Performed by: INTERNAL MEDICINE

## 2022-09-08 PROCEDURE — 99212 OFFICE O/P EST SF 10 MIN: CPT | Performed by: INTERNAL MEDICINE

## 2022-09-08 ASSESSMENT — ENCOUNTER SYMPTOMS
MUSCULOSKELETAL NEGATIVE: 1
GASTROINTESTINAL NEGATIVE: 1
RESPIRATORY NEGATIVE: 1
CONSTITUTIONAL NEGATIVE: 1
CARDIOVASCULAR NEGATIVE: 1
NEUROLOGICAL NEGATIVE: 1
EYES NEGATIVE: 1
PSYCHIATRIC NEGATIVE: 1

## 2022-09-08 ASSESSMENT — FIBROSIS 4 INDEX: FIB4 SCORE: 0.39

## 2022-09-08 NOTE — ADDENDUM NOTE
Encounter addended by: Sangeetha Torre, Med Ass't on: 9/8/2022 10:03 AM   Actions taken: Charge Capture section accepted

## 2022-09-08 NOTE — PROGRESS NOTES
Subjective   Consultation Medical Oncology: 7/27/2022  PCP: Herbie Joshi  Breast Surgeon: Hiral Rock MD    CC: Newly diagnosed left breast cancer , DCIS and IDC    Carie Santiago is a 52 y.o. female who presents with Breast Cancer ([facility]/ [facility]/ 2 mth fv)            HPI: Yolanda Calderon is a delightful 52-year-old recently postmenopausal white female sent for consultation regarding newly diagnosed left breast cancer, DCIS and IDC.  She gets yearly routine mammogram with new grouped microcalcifications in the upper outer left breast.  6/23/2022 she underwent a stereotactic biopsy of the breast which demonstrated DCIS intermediate grade.  She saw Dr. Rock and had a left partial mastectomy 7/19/2022.  Pathology demonstrated as of ductal carcinoma, grade 2 arising in a background of extensive high-grade DCIS.  The invasive carcinoma measured up to 8 mm in greatest dimension.  The DCIS involves the anterior superior and inferior margins.  Invasive component was free by greater than 2 mm.  Lymph nodes were not performed.  Invasive tumor was ER positive greater than 90%, [place] positive greater than 90%, HER2 0 and Ki-67 10%.  She met with Dr. Rock and elected to have a left mastectomy with immediate instruction by Dr. Travis.  Of note she has bilateral subpectoral implants for many years which were revised 1 to 2 years ago.  She has also had multiple hernia operations and at the time of her mastectomy she will have a sentinel lymph node biopsy as well as abdominal muscular reconstruction by Dr. Travis.  There is no family history of breast cancer, she is undergoing evaluation for genetic testing.  She went through menopause about 3 years ago and was immediately put on estrogen and progesterone hormone replacement therapy, because of vasomotor symptoms.  She stopped this therapy several weeks ago at the time of her diagnosis.  Since then she is noted moderate hot flashes that do cause her some discomfort about  "5/day including at night.  She also has some brain fog associated with it and has had some trouble completing tasks.    Interval history 9/8/2022: She underwent a left total mastectomy and sentinel lymph node biopsy on 8/18/2022.  Pathology demonstrated underwent residual intermediate to high-grade DCIS.  The margins were clear.  2 sentinel lymph nodes were negative.  She had immediate reconstruction with tissue expander and has received 2 fills today.  She had a lot of pain postop but is getting better.  She has had a reduction in her hot flashes but is still having several the day.  She got the prescription for oxybutynin but only used it once or twice and her hot flashes have gotten better on their own.  Brain fog is cleared.  She is willing to try endocrine therapy now.    Review of Systems   Constitutional: Negative.    HENT: Negative.     Eyes: Negative.    Respiratory: Negative.     Cardiovascular: Negative.    Gastrointestinal: Negative.    Genitourinary: Negative.    Musculoskeletal: Negative.    Skin: Negative.    Neurological: Negative.    Endo/Heme/Allergies: Negative.    Psychiatric/Behavioral: Negative.              Objective     /62   Pulse 82   Temp 36.8 °C (98.3 °F) (Temporal)   Ht 1.778 m (5' 10\")   Wt 67.4 kg (148 lb 7.7 oz)   LMP  (LMP Unknown)   SpO2 100%   BMI 21.30 kg/m²      Physical Exam  Constitutional:       Appearance: Normal appearance.   HENT:      Head: Normocephalic.      Mouth/Throat:      Mouth: Mucous membranes are moist.      Pharynx: Oropharynx is clear.   Eyes:      Extraocular Movements: Extraocular movements intact.      Conjunctiva/sclera: Conjunctivae normal.      Pupils: Pupils are equal, round, and reactive to light.   Cardiovascular:      Rate and Rhythm: Normal rate and regular rhythm.      Pulses: Normal pulses.   Pulmonary:      Effort: Pulmonary effort is normal.      Breath sounds: Normal breath sounds.   Chest:      Comments: Left mastectomy and " reconstruction well-healed with tissue expander in place.  No erythema noted.  Left axilla without seroma.  Right breast is normal  Abdominal:      General: Abdomen is flat. Bowel sounds are normal.      Palpations: Abdomen is soft. There is no mass.   Musculoskeletal:         General: Normal range of motion.   Skin:     General: Skin is warm.   Neurological:      General: No focal deficit present.      Mental Status: She is alert and oriented to person, place, and time.   Psychiatric:         Mood and Affect: Mood normal.         Behavior: Behavior normal.            Assessment & Plan     1.  Invasive ductal carcinoma of the left breast, grade 2, arising in a background of extensive high-grade DCIS, stage Ia (pT1b, PN X) ER positive greater than 90%, W[place] positive greater than 90%, HER2 0, Ki-67 10%.  That is post left total mastectomy with residual DCIS completely resected and negative sentinel lymph nodes.  Tissue expander reconstruction in progress  2.  Postmenopausal vasomotor symptoms on HRT, now discontinued with improvement in recurrent symptoms of hot flashes and resolution of brain fog    Plan: We are going to start her on anastrozole 1 mg daily and plan for 5 years of therapy.  Risks and benefits were discussed in detail.  She understands and agrees.  We will see her back in 8 weeks to assess her initial tolerance of therapy.

## 2022-09-09 ENCOUNTER — TELEPHONE (OUTPATIENT)
Dept: HEMATOLOGY ONCOLOGY | Facility: MEDICAL CENTER | Age: 52
End: 2022-09-09

## 2022-09-09 DIAGNOSIS — C50.912 MALIGNANT NEOPLASM OF LEFT BREAST IN FEMALE, ESTROGEN RECEPTOR POSITIVE, UNSPECIFIED SITE OF BREAST (HCC): ICD-10-CM

## 2022-09-09 DIAGNOSIS — Z17.0 MALIGNANT NEOPLASM OF LEFT BREAST IN FEMALE, ESTROGEN RECEPTOR POSITIVE, UNSPECIFIED SITE OF BREAST (HCC): ICD-10-CM

## 2022-09-09 RX ORDER — ANASTROZOLE 1 MG/1
1 TABLET ORAL DAILY
Qty: 90 TABLET | Refills: 1 | Status: ON HOLD | OUTPATIENT
Start: 2022-09-09 | End: 2022-11-03

## 2022-09-09 NOTE — TELEPHONE ENCOUNTER
Patient is calling, asking about medication that was discussed yesterday at her appointment.  It was an oral medication to be taken 1/day to annihilate the estrogen, and she wanted to start it right away.    The pharmacy told her it has not been called in yet.

## 2022-09-13 ENCOUNTER — TELEPHONE (OUTPATIENT)
Dept: INTERNAL MEDICINE | Facility: IMAGING CENTER | Age: 52
End: 2022-09-13
Payer: COMMERCIAL

## 2022-09-13 RX ORDER — AZITHROMYCIN 500 MG/1
500 TABLET, FILM COATED ORAL DAILY
Qty: 6 TABLET | Refills: 0 | Status: SHIPPED | OUTPATIENT
Start: 2022-09-13 | End: 2022-09-19

## 2022-09-13 RX ORDER — FLUCONAZOLE 100 MG/1
100 TABLET ORAL DAILY
Qty: 7 TABLET | Refills: 0 | Status: SHIPPED | OUTPATIENT
Start: 2022-09-13 | End: 2023-09-08 | Stop reason: SDUPTHER

## 2022-09-13 NOTE — TELEPHONE ENCOUNTER
"Problem #1 - Patient c/o persistent oral thrush especially on roof of mouth and throat, following antibiotics for mastectomy/reconstruction on Aug 18.  Patient was treated with oral Diflucanon aug 29 with only partial improvement.  She is taking probiotics daily.  Problem #2 - cough & nasal congestion x 2 weeks.  Sister with similar symptoms. Patient reports gray-green sputum and yellow nasal discharge as well as \"teeth hurt\" and sinus headaches.  Afebrile. Home COVID test negative.  "

## 2022-10-03 DIAGNOSIS — N95.1 MENOPAUSAL STATE: ICD-10-CM

## 2022-10-03 DIAGNOSIS — T45.1X5A FEMALE HYPOGONADISM DUE TO AROMATASE INHIBITOR THERAPY: ICD-10-CM

## 2022-10-03 DIAGNOSIS — E28.39 FEMALE HYPOGONADISM DUE TO AROMATASE INHIBITOR THERAPY: ICD-10-CM

## 2022-10-03 DIAGNOSIS — F32.A ANXIETY AND DEPRESSION: ICD-10-CM

## 2022-10-03 DIAGNOSIS — F41.9 ANXIETY AND DEPRESSION: ICD-10-CM

## 2022-10-03 RX ORDER — ALPRAZOLAM 0.5 MG/1
TABLET ORAL
Qty: 60 TABLET | Refills: 0 | Status: SHIPPED | OUTPATIENT
Start: 2022-10-03 | End: 2022-11-15 | Stop reason: SDUPTHER

## 2022-10-06 ENCOUNTER — TELEPHONE (OUTPATIENT)
Dept: HEMATOLOGY ONCOLOGY | Facility: MEDICAL CENTER | Age: 52
End: 2022-10-06
Payer: COMMERCIAL

## 2022-10-06 NOTE — TELEPHONE ENCOUNTER
"Pt returned call to RN.  Pt states she doesn't feel manic, but more like a \"spaz.\"  Pt states she experiences an increase in energy & begins reorganizing everything around the house like she has \"ADHD.\"  Pt also reports she is having a hard time falling asleep (\"awake until 2am\") & then has a hard time waking up in the morning.  Pt states family has noticed a change in her behavior & asking her why she is \"so busy lately.\"  Pt reports feeling a little depressed & anti-social as well.  Pt also reports some visual changes when driving (\"peripheral visual changes\") & has limited her driving since.  As for diarrhea, pt reports she notices she has diarrhea sometimes shortly after experiencing a hot flash, but not all of the time.  Pt is attending a dinner for a cancer organization tonight with family & would appreciate a call back tonight with recommendations as to what to do (pt states that provider may leave her a voicemail).    Encounter sent to APRN for further assist.  "

## 2022-10-06 NOTE — TELEPHONE ENCOUNTER
"Returned call to pt to further inquire about symptoms of manic & diarrhea since starting anastrozole.  Left  message for pt to return call at earliest convenience.    Staff:  Transfer to RN (or APRN if RN unavailable)    RN:  Ask pt to elaborate on her feeling \"manic,\" such as is it like anxiety, insomnia, etc?  As well as inquire if diarrhea may be r/t recent change in diet or r/t anxiety.  Review with APRN after pt returns call.  "

## 2022-10-06 NOTE — TELEPHONE ENCOUNTER
She started anastrozole 3 wks ago.  She feels like she is more manic and also has diarrhea.  She doesn't feel like herself.

## 2022-10-07 NOTE — TELEPHONE ENCOUNTER
Spoke with pt regarding APRN's recommendations.  Per pt, she will stop taking Arimidex today & will monitor her symptoms over the next week or two.  Pt is scheduled on 11/3 (3 weeks away) & plans to discuss other options then or will call office sooner if symptoms persist after stopping the Arimidex to be seen in office sooner.

## 2022-10-07 NOTE — TELEPHONE ENCOUNTER
Contacted the patient this evening at the request of her earlier today.  She spoke with the office nurse, MARA Veronica with regards to her symptoms.  The symptoms essentially began when she started taking her aromatase inhibitor, Arimidex approximately 1 month ago.  Please see Glenys's note with full detail.    Patient requested a call back this evening for us to tell her what to do.  I did leave her a lengthy message this evening at her request stating that it is difficult to tell her what to do over voicemail.  However I did mention that if her symptoms are so severe and quite bothersome she can discontinue the aromatase inhibitor medication at this time for at least the next 2 weeks and see if she has any improvement on the medication.  We can then see her in the clinic in 2 weeks to reevaluate.  The other option I did provide to patient would be to continue on the medication as prescribed and I can see her next week in the clinic and have a face-to-face conversation and really understand her symptoms at that time.  I do have some availability on Monday, 10/10 if she would like to be seen in the clinic at that time.    I did leave her this information and asked her to call back tomorrow and speak to the office RN as to what she would like to do.

## 2022-10-31 ENCOUNTER — PRE-ADMISSION TESTING (OUTPATIENT)
Dept: ADMISSIONS | Facility: MEDICAL CENTER | Age: 52
End: 2022-10-31
Attending: PLASTIC SURGERY
Payer: COMMERCIAL

## 2022-10-31 DIAGNOSIS — Z01.812 PRE-OPERATIVE LABORATORY EXAMINATION: ICD-10-CM

## 2022-10-31 DIAGNOSIS — Z01.810 PRE-OPERATIVE CARDIOVASCULAR EXAMINATION: ICD-10-CM

## 2022-10-31 LAB
ANION GAP SERPL CALC-SCNC: 11 MMOL/L (ref 7–16)
BASOPHILS # BLD AUTO: 0.7 % (ref 0–1.8)
BASOPHILS # BLD: 0.07 K/UL (ref 0–0.12)
BUN SERPL-MCNC: 12 MG/DL (ref 8–22)
CALCIUM SERPL-MCNC: 10.2 MG/DL (ref 8.5–10.5)
CHLORIDE SERPL-SCNC: 101 MMOL/L (ref 96–112)
CO2 SERPL-SCNC: 26 MMOL/L (ref 20–33)
CREAT SERPL-MCNC: 0.51 MG/DL (ref 0.5–1.4)
EKG IMPRESSION: NORMAL
EOSINOPHIL # BLD AUTO: 0.07 K/UL (ref 0–0.51)
EOSINOPHIL NFR BLD: 0.7 % (ref 0–6.9)
ERYTHROCYTE [DISTWIDTH] IN BLOOD BY AUTOMATED COUNT: 46.6 FL (ref 35.9–50)
GFR SERPLBLD CREATININE-BSD FMLA CKD-EPI: 110 ML/MIN/1.73 M 2
GLUCOSE SERPL-MCNC: 83 MG/DL (ref 65–99)
HCT VFR BLD AUTO: 49.7 % (ref 37–47)
HGB BLD-MCNC: 16 G/DL (ref 12–16)
IMM GRANULOCYTES # BLD AUTO: 0.03 K/UL (ref 0–0.11)
IMM GRANULOCYTES NFR BLD AUTO: 0.3 % (ref 0–0.9)
LYMPHOCYTES # BLD AUTO: 3.25 K/UL (ref 1–4.8)
LYMPHOCYTES NFR BLD: 33.9 % (ref 22–41)
MCH RBC QN AUTO: 30.9 PG (ref 27–33)
MCHC RBC AUTO-ENTMCNC: 32.2 G/DL (ref 33.6–35)
MCV RBC AUTO: 96.1 FL (ref 81.4–97.8)
MONOCYTES # BLD AUTO: 0.44 K/UL (ref 0–0.85)
MONOCYTES NFR BLD AUTO: 4.6 % (ref 0–13.4)
NEUTROPHILS # BLD AUTO: 5.74 K/UL (ref 2–7.15)
NEUTROPHILS NFR BLD: 59.8 % (ref 44–72)
NRBC # BLD AUTO: 0 K/UL
NRBC BLD-RTO: 0 /100 WBC
PLATELET # BLD AUTO: 314 K/UL (ref 164–446)
PMV BLD AUTO: 9 FL (ref 9–12.9)
POTASSIUM SERPL-SCNC: 4.4 MMOL/L (ref 3.6–5.5)
RBC # BLD AUTO: 5.17 M/UL (ref 4.2–5.4)
SODIUM SERPL-SCNC: 138 MMOL/L (ref 135–145)
WBC # BLD AUTO: 9.6 K/UL (ref 4.8–10.8)

## 2022-10-31 PROCEDURE — 85025 COMPLETE CBC W/AUTO DIFF WBC: CPT

## 2022-10-31 PROCEDURE — 93010 ELECTROCARDIOGRAM REPORT: CPT | Performed by: INTERNAL MEDICINE

## 2022-10-31 PROCEDURE — 93005 ELECTROCARDIOGRAM TRACING: CPT

## 2022-10-31 PROCEDURE — 80048 BASIC METABOLIC PNL TOTAL CA: CPT

## 2022-10-31 PROCEDURE — 36415 COLL VENOUS BLD VENIPUNCTURE: CPT

## 2022-10-31 RX ORDER — ALPRAZOLAM 0.5 MG/1
0.5 TABLET ORAL 2 TIMES DAILY PRN
Status: ON HOLD | COMMUNITY
End: 2022-11-03

## 2022-10-31 ASSESSMENT — FIBROSIS 4 INDEX: FIB4 SCORE: 0.39

## 2022-11-02 ENCOUNTER — ANESTHESIA EVENT (OUTPATIENT)
Dept: SURGERY | Facility: MEDICAL CENTER | Age: 52
End: 2022-11-02
Payer: COMMERCIAL

## 2022-11-03 ENCOUNTER — HOSPITAL ENCOUNTER (OUTPATIENT)
Facility: MEDICAL CENTER | Age: 52
End: 2022-11-03
Attending: PLASTIC SURGERY | Admitting: PLASTIC SURGERY
Payer: COMMERCIAL

## 2022-11-03 ENCOUNTER — ANESTHESIA (OUTPATIENT)
Dept: SURGERY | Facility: MEDICAL CENTER | Age: 52
End: 2022-11-03
Payer: COMMERCIAL

## 2022-11-03 VITALS
TEMPERATURE: 98 F | RESPIRATION RATE: 16 BRPM | HEIGHT: 70 IN | BODY MASS INDEX: 20.99 KG/M2 | HEART RATE: 53 BPM | OXYGEN SATURATION: 95 % | WEIGHT: 146.61 LBS | SYSTOLIC BLOOD PRESSURE: 142 MMHG | DIASTOLIC BLOOD PRESSURE: 72 MMHG

## 2022-11-03 PROCEDURE — 00400 ANES INTEGUMENTARY SYS NOS: CPT | Performed by: STUDENT IN AN ORGANIZED HEALTH CARE EDUCATION/TRAINING PROGRAM

## 2022-11-03 PROCEDURE — 700111 HCHG RX REV CODE 636 W/ 250 OVERRIDE (IP): Performed by: STUDENT IN AN ORGANIZED HEALTH CARE EDUCATION/TRAINING PROGRAM

## 2022-11-03 PROCEDURE — 160048 HCHG OR STATISTICAL LEVEL 1-5: Performed by: PLASTIC SURGERY

## 2022-11-03 PROCEDURE — C1789 PROSTHESIS, BREAST, IMP: HCPCS | Performed by: PLASTIC SURGERY

## 2022-11-03 PROCEDURE — 160041 HCHG SURGERY MINUTES - EA ADDL 1 MIN LEVEL 4: Performed by: PLASTIC SURGERY

## 2022-11-03 PROCEDURE — 700102 HCHG RX REV CODE 250 W/ 637 OVERRIDE(OP): Performed by: STUDENT IN AN ORGANIZED HEALTH CARE EDUCATION/TRAINING PROGRAM

## 2022-11-03 PROCEDURE — 160025 RECOVERY II MINUTES (STATS): Performed by: PLASTIC SURGERY

## 2022-11-03 PROCEDURE — 160046 HCHG PACU - 1ST 60 MINS PHASE II: Performed by: PLASTIC SURGERY

## 2022-11-03 PROCEDURE — 700105 HCHG RX REV CODE 258: Performed by: PLASTIC SURGERY

## 2022-11-03 PROCEDURE — 700101 HCHG RX REV CODE 250: Performed by: STUDENT IN AN ORGANIZED HEALTH CARE EDUCATION/TRAINING PROGRAM

## 2022-11-03 PROCEDURE — 700111 HCHG RX REV CODE 636 W/ 250 OVERRIDE (IP): Performed by: PLASTIC SURGERY

## 2022-11-03 PROCEDURE — 160029 HCHG SURGERY MINUTES - 1ST 30 MINS LEVEL 4: Performed by: PLASTIC SURGERY

## 2022-11-03 PROCEDURE — 700101 HCHG RX REV CODE 250: Performed by: PLASTIC SURGERY

## 2022-11-03 PROCEDURE — A9270 NON-COVERED ITEM OR SERVICE: HCPCS | Performed by: STUDENT IN AN ORGANIZED HEALTH CARE EDUCATION/TRAINING PROGRAM

## 2022-11-03 PROCEDURE — 160002 HCHG RECOVERY MINUTES (STAT): Performed by: PLASTIC SURGERY

## 2022-11-03 PROCEDURE — 160035 HCHG PACU - 1ST 60 MINS PHASE I: Performed by: PLASTIC SURGERY

## 2022-11-03 PROCEDURE — 160009 HCHG ANES TIME/MIN: Performed by: PLASTIC SURGERY

## 2022-11-03 DEVICE — IMPLANT BREAST NATRELLE INSPIRA SOFTTOUCH FULL PROFILE 650 CC SMOOTH: Type: IMPLANTABLE DEVICE | Site: BREAST | Status: FUNCTIONAL

## 2022-11-03 DEVICE — IMPLANTABLE DEVICE: Type: IMPLANTABLE DEVICE | Site: BREAST | Status: FUNCTIONAL

## 2022-11-03 RX ORDER — OXYCODONE HCL 5 MG/5 ML
5 SOLUTION, ORAL ORAL
Status: COMPLETED | OUTPATIENT
Start: 2022-11-03 | End: 2022-11-03

## 2022-11-03 RX ORDER — OXYCODONE HCL 5 MG/5 ML
10 SOLUTION, ORAL ORAL
Status: COMPLETED | OUTPATIENT
Start: 2022-11-03 | End: 2022-11-03

## 2022-11-03 RX ORDER — SODIUM CHLORIDE, SODIUM LACTATE, POTASSIUM CHLORIDE, CALCIUM CHLORIDE 600; 310; 30; 20 MG/100ML; MG/100ML; MG/100ML; MG/100ML
INJECTION, SOLUTION INTRAVENOUS CONTINUOUS
Status: DISCONTINUED | OUTPATIENT
Start: 2022-11-03 | End: 2022-11-03 | Stop reason: HOSPADM

## 2022-11-03 RX ORDER — HYDROMORPHONE HYDROCHLORIDE 1 MG/ML
0.2 INJECTION, SOLUTION INTRAMUSCULAR; INTRAVENOUS; SUBCUTANEOUS
Status: DISCONTINUED | OUTPATIENT
Start: 2022-11-03 | End: 2022-11-03 | Stop reason: HOSPADM

## 2022-11-03 RX ORDER — CEFAZOLIN SODIUM 1 G/3ML
INJECTION, POWDER, FOR SOLUTION INTRAMUSCULAR; INTRAVENOUS
Status: DISCONTINUED
Start: 2022-11-03 | End: 2022-11-03 | Stop reason: HOSPADM

## 2022-11-03 RX ORDER — BUPIVACAINE HYDROCHLORIDE AND EPINEPHRINE 5; 5 MG/ML; UG/ML
INJECTION, SOLUTION EPIDURAL; INTRACAUDAL; PERINEURAL
Status: DISCONTINUED
Start: 2022-11-03 | End: 2022-11-03 | Stop reason: HOSPADM

## 2022-11-03 RX ORDER — BUPIVACAINE HYDROCHLORIDE AND EPINEPHRINE 5; 5 MG/ML; UG/ML
INJECTION, SOLUTION EPIDURAL; INTRACAUDAL; PERINEURAL
Status: DISCONTINUED | OUTPATIENT
Start: 2022-11-03 | End: 2022-11-03 | Stop reason: HOSPADM

## 2022-11-03 RX ORDER — GENTAMICIN SULFATE 40 MG/ML
INJECTION, SOLUTION INTRAMUSCULAR; INTRAVENOUS
Status: DISCONTINUED
Start: 2022-11-03 | End: 2022-11-03 | Stop reason: HOSPADM

## 2022-11-03 RX ORDER — DEXAMETHASONE SODIUM PHOSPHATE 4 MG/ML
INJECTION, SOLUTION INTRA-ARTICULAR; INTRALESIONAL; INTRAMUSCULAR; INTRAVENOUS; SOFT TISSUE PRN
Status: DISCONTINUED | OUTPATIENT
Start: 2022-11-03 | End: 2022-11-03 | Stop reason: SURG

## 2022-11-03 RX ORDER — HYDROMORPHONE HYDROCHLORIDE 2 MG/ML
INJECTION, SOLUTION INTRAMUSCULAR; INTRAVENOUS; SUBCUTANEOUS PRN
Status: DISCONTINUED | OUTPATIENT
Start: 2022-11-03 | End: 2022-11-03 | Stop reason: SURG

## 2022-11-03 RX ORDER — LIDOCAINE HYDROCHLORIDE 20 MG/ML
INJECTION, SOLUTION EPIDURAL; INFILTRATION; INTRACAUDAL; PERINEURAL PRN
Status: DISCONTINUED | OUTPATIENT
Start: 2022-11-03 | End: 2022-11-03 | Stop reason: SURG

## 2022-11-03 RX ORDER — ACETAMINOPHEN 500 MG
1000 TABLET ORAL ONCE
Status: COMPLETED | OUTPATIENT
Start: 2022-11-03 | End: 2022-11-03

## 2022-11-03 RX ORDER — HYDRALAZINE HYDROCHLORIDE 20 MG/ML
5 INJECTION INTRAMUSCULAR; INTRAVENOUS
Status: DISCONTINUED | OUTPATIENT
Start: 2022-11-03 | End: 2022-11-03 | Stop reason: HOSPADM

## 2022-11-03 RX ORDER — CEFAZOLIN SODIUM 1 G/3ML
INJECTION, POWDER, FOR SOLUTION INTRAMUSCULAR; INTRAVENOUS PRN
Status: DISCONTINUED | OUTPATIENT
Start: 2022-11-03 | End: 2022-11-03 | Stop reason: SURG

## 2022-11-03 RX ORDER — HALOPERIDOL 5 MG/ML
1 INJECTION INTRAMUSCULAR
Status: DISCONTINUED | OUTPATIENT
Start: 2022-11-03 | End: 2022-11-03 | Stop reason: HOSPADM

## 2022-11-03 RX ORDER — LABETALOL HYDROCHLORIDE 5 MG/ML
5 INJECTION, SOLUTION INTRAVENOUS
Status: DISCONTINUED | OUTPATIENT
Start: 2022-11-03 | End: 2022-11-03 | Stop reason: HOSPADM

## 2022-11-03 RX ORDER — HYDROMORPHONE HYDROCHLORIDE 1 MG/ML
0.1 INJECTION, SOLUTION INTRAMUSCULAR; INTRAVENOUS; SUBCUTANEOUS
Status: DISCONTINUED | OUTPATIENT
Start: 2022-11-03 | End: 2022-11-03 | Stop reason: HOSPADM

## 2022-11-03 RX ORDER — TRANEXAMIC ACID 100 MG/ML
INJECTION, SOLUTION INTRAVENOUS PRN
Status: DISCONTINUED | OUTPATIENT
Start: 2022-11-03 | End: 2022-11-03 | Stop reason: SURG

## 2022-11-03 RX ORDER — MIDAZOLAM HYDROCHLORIDE 1 MG/ML
INJECTION INTRAMUSCULAR; INTRAVENOUS PRN
Status: DISCONTINUED | OUTPATIENT
Start: 2022-11-03 | End: 2022-11-03 | Stop reason: SURG

## 2022-11-03 RX ORDER — SCOLOPAMINE TRANSDERMAL SYSTEM 1 MG/1
1 PATCH, EXTENDED RELEASE TRANSDERMAL
Status: DISCONTINUED | OUTPATIENT
Start: 2022-11-03 | End: 2022-11-03 | Stop reason: HOSPADM

## 2022-11-03 RX ORDER — EPINEPHRINE 1 MG/ML(1)
AMPUL (ML) INJECTION
Status: DISCONTINUED
Start: 2022-11-03 | End: 2022-11-03 | Stop reason: HOSPADM

## 2022-11-03 RX ORDER — DIPHENHYDRAMINE HYDROCHLORIDE 50 MG/ML
12.5 INJECTION INTRAMUSCULAR; INTRAVENOUS
Status: DISCONTINUED | OUTPATIENT
Start: 2022-11-03 | End: 2022-11-03 | Stop reason: HOSPADM

## 2022-11-03 RX ORDER — HYDROMORPHONE HYDROCHLORIDE 1 MG/ML
0.4 INJECTION, SOLUTION INTRAMUSCULAR; INTRAVENOUS; SUBCUTANEOUS
Status: DISCONTINUED | OUTPATIENT
Start: 2022-11-03 | End: 2022-11-03 | Stop reason: HOSPADM

## 2022-11-03 RX ORDER — ONDANSETRON 2 MG/ML
4 INJECTION INTRAMUSCULAR; INTRAVENOUS
Status: DISCONTINUED | OUTPATIENT
Start: 2022-11-03 | End: 2022-11-03 | Stop reason: HOSPADM

## 2022-11-03 RX ORDER — LIDOCAINE HYDROCHLORIDE 10 MG/ML
INJECTION, SOLUTION EPIDURAL; INFILTRATION; INTRACAUDAL; PERINEURAL
Status: DISCONTINUED
Start: 2022-11-03 | End: 2022-11-03 | Stop reason: HOSPADM

## 2022-11-03 RX ORDER — ALPRAZOLAM 0.5 MG/1
0.5 TABLET ORAL 2 TIMES DAILY PRN
COMMUNITY
End: 2023-01-16

## 2022-11-03 RX ADMIN — SCOPALAMINE 1 PATCH: 1 PATCH, EXTENDED RELEASE TRANSDERMAL at 12:01

## 2022-11-03 RX ADMIN — FENTANYL CITRATE 100 MCG: 50 INJECTION, SOLUTION INTRAMUSCULAR; INTRAVENOUS at 13:18

## 2022-11-03 RX ADMIN — MIDAZOLAM HYDROCHLORIDE 2 MG: 1 INJECTION, SOLUTION INTRAMUSCULAR; INTRAVENOUS at 13:07

## 2022-11-03 RX ADMIN — TRANEXAMIC ACID 1000 MG: 100 INJECTION, SOLUTION INTRAVENOUS at 14:08

## 2022-11-03 RX ADMIN — SODIUM CHLORIDE, POTASSIUM CHLORIDE, SODIUM LACTATE AND CALCIUM CHLORIDE: 600; 310; 30; 20 INJECTION, SOLUTION INTRAVENOUS at 13:09

## 2022-11-03 RX ADMIN — PROPOFOL 150 MG: 10 INJECTION, EMULSION INTRAVENOUS at 13:18

## 2022-11-03 RX ADMIN — DEXAMETHASONE SODIUM PHOSPHATE 8 MG: 4 INJECTION, SOLUTION INTRA-ARTICULAR; INTRALESIONAL; INTRAMUSCULAR; INTRAVENOUS; SOFT TISSUE at 13:22

## 2022-11-03 RX ADMIN — OXYCODONE HYDROCHLORIDE 10 MG: 5 SOLUTION ORAL at 15:11

## 2022-11-03 RX ADMIN — ACETAMINOPHEN 1000 MG: 500 TABLET ORAL at 12:02

## 2022-11-03 RX ADMIN — LIDOCAINE HYDROCHLORIDE 70 MG: 20 INJECTION, SOLUTION EPIDURAL; INFILTRATION; INTRACAUDAL at 13:18

## 2022-11-03 RX ADMIN — FENTANYL CITRATE 50 MCG: 50 INJECTION, SOLUTION INTRAMUSCULAR; INTRAVENOUS at 15:04

## 2022-11-03 RX ADMIN — EPHEDRINE SULFATE 10 MG: 50 INJECTION, SOLUTION INTRAVENOUS at 13:25

## 2022-11-03 RX ADMIN — PROPOFOL 50 MG: 10 INJECTION, EMULSION INTRAVENOUS at 14:19

## 2022-11-03 RX ADMIN — FENTANYL CITRATE 50 MCG: 50 INJECTION, SOLUTION INTRAMUSCULAR; INTRAVENOUS at 15:39

## 2022-11-03 RX ADMIN — HYDROMORPHONE HYDROCHLORIDE 0.5 MG: 2 INJECTION INTRAMUSCULAR; INTRAVENOUS; SUBCUTANEOUS at 14:19

## 2022-11-03 RX ADMIN — FENTANYL CITRATE 50 MCG: 50 INJECTION, SOLUTION INTRAMUSCULAR; INTRAVENOUS at 15:14

## 2022-11-03 RX ADMIN — EPHEDRINE SULFATE 10 MG: 50 INJECTION, SOLUTION INTRAVENOUS at 13:22

## 2022-11-03 RX ADMIN — HYDROMORPHONE HYDROCHLORIDE 0.5 MG: 2 INJECTION INTRAMUSCULAR; INTRAVENOUS; SUBCUTANEOUS at 13:32

## 2022-11-03 RX ADMIN — CEFAZOLIN 2 G: 330 INJECTION, POWDER, FOR SOLUTION INTRAMUSCULAR; INTRAVENOUS at 13:22

## 2022-11-03 ASSESSMENT — PAIN DESCRIPTION - PAIN TYPE
TYPE: SURGICAL PAIN

## 2022-11-03 ASSESSMENT — FIBROSIS 4 INDEX: FIB4 SCORE: 0.41

## 2022-11-03 ASSESSMENT — PAIN SCALES - GENERAL: PAIN_LEVEL: 5

## 2022-11-03 NOTE — DISCHARGE INSTRUCTIONS
Keep dressing dry and clean for 3 days then okay to shower.  Cool compresses as needed.  Minimize arm motion.    If any questions arise, call your provider.  If your provider is not available, please feel free to call the [facility] Center at [phone].    MEDICATIONS: Resume taking daily medication.  Take prescribed pain medication with food.  If no medication is prescribed, you may take non-aspirin pain medication if needed.  PAIN MEDICATION CAN BE VERY CONSTIPATING.  Take a stool softener or laxative such as senokot, pericolace, or milk of magnesia if needed.    Last pain medication given Oxycodone at 3:11pm    What to Expect Post Anesthesia    Rest and take it easy for the first 24 hours.  A responsible adult is recommended to remain with you during that time.  It is normal to feel sleepy.  We encourage you to not do anything that requires balance, judgment or coordination.    FOR 24 HOURS DO NOT:  Drive, operate machinery or run household appliances.  Drink beer or alcoholic beverages.  Make important decisions or sign legal documents.    To avoid nausea, slowly advance diet as tolerated, avoiding spicy or greasy foods for the first day.  Add more substantial food to your diet according to your provider's instructions.  Babies can be fed formula or breast milk as soon as they are hungry.  INCREASE FLUIDS AND FIBER TO AVOID CONSTIPATION.    MILD FLU-LIKE SYMPTOMS ARE NORMAL.  YOU MAY EXPERIENCE GENERALIZED MUSCLE ACHES, THROAT IRRITATION, HEADACHE AND/OR SOME NAUSEA.    What to Expect Post Anesthesia    Rest and take it easy for the first 24 hours.  A responsible adult is recommended to remain with you during that time.  It is normal to feel sleepy.  We encourage you to not do anything that requires balance, judgment or coordination.    FOR 24 HOURS DO NOT:  Drive, operate machinery or run household appliances.  Drink beer or alcoholic beverages.  Make important decisions or sign legal documents.    To avoid  nausea, slowly advance diet as tolerated, avoiding spicy or greasy foods for the first day.  Add more substantial food to your diet according to your provider's instructions.  Babies can be fed formula or breast milk as soon as they are hungry.  INCREASE FLUIDS AND FIBER TO AVOID CONSTIPATION.    MILD FLU-LIKE SYMPTOMS ARE NORMAL.  YOU MAY EXPERIENCE GENERALIZED MUSCLE ACHES, THROAT IRRITATION, HEADACHE AND/OR SOME NAUSEA.

## 2022-11-03 NOTE — ANESTHESIA POSTPROCEDURE EVALUATION
Patient: Carie Santiago    Procedure Summary     Date: 11/03/22 Room / Location: [facility] ROOM 28 / SURGERY SAME DAY [facility]    Anesthesia Start: 1309 Anesthesia Stop: 1516    Procedures:       LEFT BREAST RECONSTRUCTION WITH REMOVAL OF TISSUE EXPANDER AND PLACEMENT OF IMPLANT. REVISION WITH DERMAL GLADULAR FLAPS AND CAPSULORRHAPHY.  RIGHT BREAST IMPLANT EXCHANGE WITH MASTOPEXY AND CAPSULORRHAPHY (Bilateral: Breast)      REMOVAL, TISSUE EXPANDER (Left: Breast)      INSERTION, IMPLANT, BREAST (Bilateral: Breast)      MASTOPEXY (Right: Breast) Diagnosis: (PERSONAL HISTORY OF MALIGNANT NEOPLASM OF BREAST, [id number] CARCINOMA INSITU OF LEFT BREAST, INTRADUCTAL CARCINOMA INSITU OF RIGHT BREAST)    Surgeons: Paolo Travis M.D. Responsible Provider: Yohana Monique M.D.    Anesthesia Type: general ASA Status: 2          Final Anesthesia Type: general  Last vitals  BP   Blood Pressure: 126/72    Temp   36.7 °C (98 °F)    Pulse   64   Resp   15    SpO2   99 %      Anesthesia Post Evaluation    Patient location during evaluation: PACU  Patient participation: complete - patient participated  Level of consciousness: awake and alert  Pain score: 5    Airway patency: patent  Anesthetic complications: no  Cardiovascular status: hemodynamically stable  Respiratory status: acceptable and face mask  Hydration status: euvolemic    PONV: none          No notable events documented.     Nurse Pain Score: 5 (NPRS)

## 2022-11-03 NOTE — OR SURGEON
Immediate Post OP Note    PreOp Diagnosis: history of left breast cancer, breast asymmetry      PostOp Diagnosis: same      Procedure(s):  LEFT BREAST RECONSRTUCTION WITH REMOVAL OF TISSUE EXPANDER AND PLACEMENT OF IMPLANT, BREAST RECONSTRUCTION AND REVISION WITH DERMAL GLADULAR FLAPS AND CAPSULECTOMY, RIGHT BREAST IMPLANT EXCHANGE WITH MASTOPEXY AND CAPSULORRHAPHY  REMOVAL, TISSUE EXPANDER  INSERTION, IMPLANT, BREAST  CAPSULECTOMY, BREAST  MASTOPEXY    Surgeon(s):  Paolo Travis M.D.    Anesthesiologist/Type of Anesthesia:  Anesthesiologist: Yohana Monique M.D./* No anesthesia type entered *    Surgical Staff:  Circulator: Sally Hoffmann R.N.; Jenniffer Macias R.N.  Scrub Person: Matt Bobby    Specimens removed if any:  * No specimens in log *    Estimated Blood Loss: minimal    Findings: see operative note    Complications: no apparent    #[id number]        11/3/2022 1:01 PM Paolo Travis M.D.

## 2022-11-03 NOTE — ANESTHESIA PREPROCEDURE EVALUATION
Case: [id number] Anesthesia Start Date/Time: 11/03/22 8166    Procedures:       LEFT BREAST RECONSRTUCTION WITH REMOVAL OF TISSUE EXPANDER AND PLACEMENT OF IMPLANT, BREAST RECONSTRUCTION AND REVISION WITH DERMAL GLADULAR FLAPS AND CAPSULECTOMY, RIGHT BREAST IMPLANT EXCHANGE WITH MASTOPEXY AND CAPSULORRHAPHY      REMOVAL, TISSUE EXPANDER      INSERTION, IMPLANT, BREAST      CAPSULECTOMY, BREAST      MASTOPEXY    Pre-op diagnosis: PERSONAL HISTORY OF MALIGNANT NEOPLASM OF BREAST, [id number] CARCINOMA INSITU OF LEFT BREAST, INTRADUCTAL CARCINOMA INSITU OF RIGHT BREAST    Location: [facility] ROOM 28 / SURGERY SAME DAY [facility]    Surgeons: Paolo Travis M.D.        Denies MI, CVA, SOB or chest pain on exertion or at rest. METS>4. NPO>8hrs.     Relevant Problems   NEURO   (positive) History of iron deficiency      ENDO   (positive) Hypothyroid       Physical Exam    Airway   Mallampati: II  TM distance: >3 FB  Neck ROM: full       Cardiovascular - normal exam  Rhythm: regular  Rate: normal  (-) murmur     Dental - normal exam           Pulmonary - normal exam  Breath sounds clear to auscultation     Abdominal    Neurological - normal exam         Other findings: No loose teeth             Anesthesia Plan    ASA 2       Plan - general       Airway plan will be LMA          Induction: intravenous    Postoperative Plan: Postoperative administration of opioids is intended.    Pertinent diagnostic labs and testing reviewed    Informed Consent:    Anesthetic plan and risks discussed with patient.    Use of blood products discussed with: patient whom consented to blood products.

## 2022-11-03 NOTE — ANESTHESIA TIME REPORT
Anesthesia Start and Stop Event Times     Date Time Event    11/3/2022 1231 Ready for Procedure     1309 Anesthesia Start     1516 Anesthesia Stop        Responsible Staff  11/03/22    Name Role Begin End    Yohana Monique M.D. Anesth 1309 1516        Overtime Reason:  no overtime (within assigned shift)    Comments:

## 2022-11-03 NOTE — OR NURSING
1455 Patient arrived to PACU. Report received from anesthesia and OR RN. Patient on room air. Placed on monitor.  Patient awake.     1504 Patient medicated for pain.     1514 Subsequent dose of pain medication given. Patient tolerating PO. Ice pack provided    1539 Pain medication provided.     1608 Patient discharged with RN. Discharge education provided and questions answered. Educated on medications sent to pharmacy. PIV removed. All belongings gathered and sent with patient.

## 2022-11-04 NOTE — OP REPORT
DATE OF SERVICE:  11/03/2022     PREOPERATIVE DIAGNOSES:  1.  History of left breast cancer.  2.  History of left mastectomy and placement of tissue expander.  3.  Deformity of left reconstructed breast.  4.  Asymmetry between left reconstructed breast and right native breast.     POSTOPERATIVE DIAGNOSES:  1.  History of left breast cancer.  2.  History of left mastectomy and placement of tissue expander.  3.  Deformity of left reconstructed breast.  4.  Asymmetry between left reconstructed breast and right native breast.     PROCEDURES:  1.  Left tissue expander removal and gel implant placement.  2.  Left medial capsulotomy and lateral capsulorrhaphy.  3.  An inferiorly based dermoglandular flap for breast reconstruction, 35 cm2.  4.  Removal of the intact silicone gel implant and replacement with a silicone   gel implant.  5.  Right lateral capsulorrhaphy.  6.  Right mastopexy for symmetry.     ATTENDING SURGEON:  Paolo Travis MD     ANESTHESIOLOGIST:  Yohana Monique MD     ASSISTANT:  None.     ESTIMATED BLOOD LOSS:  Minimal.     COMPLICATIONS:  No apparent.     INDICATION FOR PROCEDURE:  The patient is a 52-year-old woman who is well   known to me who had a number of different cosmetic breast surgery and had   developed capsular contracture and had a bilateral implant exchange with a   breast lift and placement of acellular dermal matrix.  The patient then   developed breast cancer on the left hand side and required a mastectomy and   had reconstruction with an expander and acellular dermal matrix.  The patient   went on to successful tissue expansion and now presents for the above   operation.     INTRAOPERATIVE FINDINGS:  On the right hand side, the implant that was placed   was an Allergan style SSF Natrelle Inspira SoftTouch breast implant 385 mL,   reference #[id number], serial #[id number].  On the left hand side, the implant that   was placed was an Allergan style SSF Natrelle Inspira SoftTouch breast    implant, a 650 mL, reference #[id number], serial #[id number].     DESCRIPTION OF PROCEDURE:  After the operative and nonoperative options were   discussed including risks, benefits and alternatives, which included but was   not limited to bleeding, infection, damage to surrounding structure, need for   further surgery, reaction to anesthetic agent, scarring, breast asymmetry,   contour irregularities, wound healing difficulties, need for revisional   surgery, implant failure, implant rupture, capsular contracture development,   breast implant illness, breast implant associated anaplastic large cell   lymphoma, change in nipple sensation, loss of nipple sensation, loss of   nipple, breakdown at the inverted T junction and the base of the areola,   hypertrophic keloid scarring, pneumothorax, dissatisfaction with results, need   for explantation, deep venous thrombosis, pulmonary embolus, myocardial   infarction, stroke, unsatisfactory result and/or death, informed consent was   obtained.  Preoperatively, the patient was identified.  In sitting upright   position, the patient's sternal notch, midline and inframammary folds were   marked.  The Wise pattern markings were drawn on the right breast to include   the patient's old Wise pattern scars.  The inferiorly based dermoglandular   flap was marked on the left reconstructed breast.  Antibiotics were given.    Sequential compression devices were placed.  The patient was brought to the   operating room.  General anesthesia was induced.  Her chest was prepped and   draped in the usual sterile fashion.  Starting on the right hand side, a 38 mm   areolar sizer was used.  An incision was made around the areola.  The   mastopexy incisions were then deepithelialized.  The mastopexy flaps were then   elevated off the breast envelope in a superomedial and superolateral   direction.  Once these were then fully mobilized, cautery was then used to   dissect down along the lateral  inframammary fold down to the underlying breast   implant capsule.  The capsule was opened up.  The implant was grasped and   removed.  It was a 385 mL Sientra implant that was intact.  The pocket was   then irrigated with antibiotic irrigation.  At this point, then a capsulotomy   was performed medially and conservative capsulorrhaphy was performed out   laterally with a running locking 2-0 Vicryl sutures.  Cautery was then used to   remove a small amount of tissue along the inferolateral and inferior medial   aspect of the breast.  The patient was then put in upright position.  The   mastopexy flaps were then temporarily tailor tacked shut.  Ultimately I felt   that the Allergan Style  mL implant will be most appropriate.  The   patient was put back down in supine position.  The pocket was then copiously   irrigated with triple antibiotic irrigation and Betadine.  Then, using new   gloves and minimal touch technique, the implant was then placed in the pocket.    The deep tissue was then closed with 2-0 Vicryl sutures.  Then, the inverted   T junction and the base areola were brought together with 2-0 Vicryl sutures.    The remainder of the mastopexy flap incisions were then closed with 3-0 deep   dermal Monocryl sutures and running 4-0 Monocryl subcuticular stitches to   close the overlying skin.     We then turned our attention to the left breast.  An incision was made along   the inframammary fold.  Cautery was used to dissect down through the   underlying tissue down to underlying capsule, which was opened up.  The tissue   expander was then deflated and removed.  The pocket was then copiously   irrigated with antibiotic irrigation.  Extensive capsulotomy was then   performed along the entire inferior, medial and superior medial aspect of the   pocket.  Then, out laterally, a capsulorrhaphy was performed with a running   locking 2-0 Ethibond suture.  The patient was then sat upright.  While in an    upright position, different sizers were tried out.  Ultimately I felt that the   650 mL implant will be most appropriate.  While in upright position, an   inferiorly based dermoglandular flap was then marked out along the entire   inframammary fold.  This was then incised with a 10 blade.  A portion of the   tissue was then deepithelialized.  An inferiorly based dermoglandular flap was   then created with Bovie electrocautery.  This was mobilized and the   mastectomy flaps were then elevated off the breast envelope in a superior   direction.  The inferiorly based dermoglandular flap was then tacked down to   the breast implant capsule in a pants-over-vest fashion with 2-0 Vicryl   sutures.  The standing cutaneous deformities were excised medially and   laterally.  Once the patient had a well-defined inframammary fold, the pocket   was then again irrigated.  I then using new gloves and minimal touch   technique, the 650 mL implant was then placed in the pocket.  The deep tissue   out laterally was then closed with 2-0 Vicryl sutures.  The dermoglandular   flaps were then further secured with 2-0 Vicryl sutures.  The dermis was then   closed with 3-0 deep dermal Monocryl sutures and running 4-0 Monocryl   subcuticular stitches were used to close the overlying skin.  The patient was   then washed.  Steri-Strips and compressive dressings were then placed.  The   patient was then awakened, extubated and transferred to PACU in stable   condition.  At the end of the procedure, all sponge, instrument and needle   counts were correct.        ______________________________  MD JELANI CHANDLER/FABIOLA    DD:  11/03/2022 15:01  DT:  11/03/2022 18:13    Job#:  [id number]

## 2022-11-10 ENCOUNTER — APPOINTMENT (OUTPATIENT)
Dept: HEMATOLOGY ONCOLOGY | Facility: MEDICAL CENTER | Age: 52
End: 2022-11-10
Payer: COMMERCIAL

## 2022-11-15 ENCOUNTER — PATIENT MESSAGE (OUTPATIENT)
Dept: INTERNAL MEDICINE | Facility: IMAGING CENTER | Age: 52
End: 2022-11-15
Payer: COMMERCIAL

## 2022-11-15 DIAGNOSIS — F41.9 ANXIETY AND DEPRESSION: ICD-10-CM

## 2022-11-15 DIAGNOSIS — M25.511 CHRONIC RIGHT SHOULDER PAIN: ICD-10-CM

## 2022-11-15 DIAGNOSIS — F32.A ANXIETY AND DEPRESSION: ICD-10-CM

## 2022-11-15 DIAGNOSIS — G89.29 CHRONIC RIGHT SHOULDER PAIN: ICD-10-CM

## 2022-11-16 RX ORDER — ALPRAZOLAM 0.5 MG/1
TABLET ORAL
Qty: 60 TABLET | Refills: 0 | Status: SHIPPED | OUTPATIENT
Start: 2022-11-16 | End: 2023-01-11 | Stop reason: SDUPTHER

## 2022-11-16 RX ORDER — FLUOXETINE 10 MG/1
10 CAPSULE ORAL DAILY
Qty: 90 CAPSULE | Refills: 3 | Status: SHIPPED | OUTPATIENT
Start: 2022-11-16 | End: 2023-01-16

## 2022-11-16 RX ORDER — OXYCODONE HYDROCHLORIDE AND ACETAMINOPHEN 5; 325 MG/1; MG/1
1 TABLET ORAL NIGHTLY PRN
Qty: 20 TABLET | Refills: 0 | Status: SHIPPED | OUTPATIENT
Start: 2022-11-16 | End: 2023-01-11 | Stop reason: SDUPTHER

## 2022-11-28 ENCOUNTER — APPOINTMENT (OUTPATIENT)
Dept: PHYSICAL THERAPY | Facility: REHABILITATION | Age: 52
End: 2022-11-28
Payer: COMMERCIAL

## 2022-12-06 ENCOUNTER — APPOINTMENT (OUTPATIENT)
Dept: PHYSICAL THERAPY | Facility: REHABILITATION | Age: 52
End: 2022-12-06
Payer: COMMERCIAL

## 2022-12-12 ENCOUNTER — APPOINTMENT (OUTPATIENT)
Dept: PHYSICAL THERAPY | Facility: REHABILITATION | Age: 52
End: 2022-12-12
Payer: COMMERCIAL

## 2022-12-13 ENCOUNTER — TELEPHONE (OUTPATIENT)
Dept: HEMATOLOGY ONCOLOGY | Facility: MEDICAL CENTER | Age: 52
End: 2022-12-13

## 2022-12-19 ENCOUNTER — APPOINTMENT (OUTPATIENT)
Dept: PHYSICAL THERAPY | Facility: REHABILITATION | Age: 52
End: 2022-12-19
Payer: COMMERCIAL

## 2023-01-03 ENCOUNTER — APPOINTMENT (OUTPATIENT)
Dept: PHYSICAL THERAPY | Facility: REHABILITATION | Age: 53
End: 2023-01-03
Payer: COMMERCIAL

## 2023-01-11 ENCOUNTER — NON-PROVIDER VISIT (OUTPATIENT)
Dept: INTERNAL MEDICINE | Facility: IMAGING CENTER | Age: 53
End: 2023-01-11
Payer: COMMERCIAL

## 2023-01-11 ENCOUNTER — HOSPITAL ENCOUNTER (OUTPATIENT)
Facility: MEDICAL CENTER | Age: 53
End: 2023-01-11
Attending: INTERNAL MEDICINE
Payer: COMMERCIAL

## 2023-01-11 DIAGNOSIS — G89.29 CHRONIC RIGHT SHOULDER PAIN: ICD-10-CM

## 2023-01-11 DIAGNOSIS — E03.9 HYPOTHYROIDISM (ACQUIRED): ICD-10-CM

## 2023-01-11 DIAGNOSIS — F32.A ANXIETY AND DEPRESSION: ICD-10-CM

## 2023-01-11 DIAGNOSIS — Z01.89 ENCOUNTER FOR ROUTINE LABORATORY TESTING: ICD-10-CM

## 2023-01-11 DIAGNOSIS — F41.9 ANXIETY AND DEPRESSION: ICD-10-CM

## 2023-01-11 DIAGNOSIS — M25.511 CHRONIC RIGHT SHOULDER PAIN: ICD-10-CM

## 2023-01-11 DIAGNOSIS — Z00.00 WELLNESS EXAMINATION: ICD-10-CM

## 2023-01-11 LAB
25(OH)D3 SERPL-MCNC: 38 NG/ML (ref 30–100)
ALBUMIN SERPL BCP-MCNC: 4.5 G/DL (ref 3.2–4.9)
ALBUMIN/GLOB SERPL: 1.8 G/DL
ALP SERPL-CCNC: 89 U/L (ref 30–99)
ALT SERPL-CCNC: 17 U/L (ref 2–50)
ANION GAP SERPL CALC-SCNC: 9 MMOL/L (ref 7–16)
APPEARANCE UR: CLEAR
AST SERPL-CCNC: 26 U/L (ref 12–45)
BACTERIA #/AREA URNS HPF: ABNORMAL /HPF
BILIRUB SERPL-MCNC: 0.3 MG/DL (ref 0.1–1.5)
BILIRUB UR QL STRIP.AUTO: NEGATIVE
BUN SERPL-MCNC: 22 MG/DL (ref 8–22)
CALCIUM ALBUM COR SERPL-MCNC: 9.3 MG/DL (ref 8.5–10.5)
CALCIUM SERPL-MCNC: 9.7 MG/DL (ref 8.5–10.5)
CHLORIDE SERPL-SCNC: 106 MMOL/L (ref 96–112)
CHOLEST SERPL-MCNC: 192 MG/DL (ref 100–199)
CO2 SERPL-SCNC: 25 MMOL/L (ref 20–33)
COLOR UR: YELLOW
CREAT SERPL-MCNC: 0.55 MG/DL (ref 0.5–1.4)
EPI CELLS #/AREA URNS HPF: NEGATIVE /HPF
EST. AVERAGE GLUCOSE BLD GHB EST-MCNC: 108 MG/DL
GFR SERPLBLD CREATININE-BSD FMLA CKD-EPI: 108 ML/MIN/1.73 M 2
GLOBULIN SER CALC-MCNC: 2.5 G/DL (ref 1.9–3.5)
GLUCOSE SERPL-MCNC: 95 MG/DL (ref 65–99)
GLUCOSE UR STRIP.AUTO-MCNC: NEGATIVE MG/DL
HBA1C MFR BLD: 5.4 % (ref 4–5.6)
HDLC SERPL-MCNC: 60 MG/DL
HYALINE CASTS #/AREA URNS LPF: ABNORMAL /LPF
KETONES UR STRIP.AUTO-MCNC: NEGATIVE MG/DL
LDLC SERPL CALC-MCNC: 119 MG/DL
LEUKOCYTE ESTERASE UR QL STRIP.AUTO: NEGATIVE
MICRO URNS: ABNORMAL
NITRITE UR QL STRIP.AUTO: POSITIVE
PH UR STRIP.AUTO: 5.5 [PH] (ref 5–8)
POTASSIUM SERPL-SCNC: 4 MMOL/L (ref 3.6–5.5)
PROT SERPL-MCNC: 7 G/DL (ref 6–8.2)
PROT UR QL STRIP: NEGATIVE MG/DL
RBC # URNS HPF: ABNORMAL /HPF
RBC UR QL AUTO: ABNORMAL
SODIUM SERPL-SCNC: 140 MMOL/L (ref 135–145)
SP GR UR STRIP.AUTO: 1.02
T3 SERPL-MCNC: 106 NG/DL (ref 60–181)
TRIGL SERPL-MCNC: 66 MG/DL (ref 0–149)
TSH SERPL DL<=0.005 MIU/L-ACNC: 2.21 UIU/ML (ref 0.38–5.33)
UROBILINOGEN UR STRIP.AUTO-MCNC: 0.2 MG/DL
VIT B12 SERPL-MCNC: 808 PG/ML (ref 211–911)
WBC #/AREA URNS HPF: ABNORMAL /HPF

## 2023-01-11 PROCEDURE — 81001 URINALYSIS AUTO W/SCOPE: CPT

## 2023-01-11 PROCEDURE — 82607 VITAMIN B-12: CPT

## 2023-01-11 PROCEDURE — 80053 COMPREHEN METABOLIC PANEL: CPT

## 2023-01-11 PROCEDURE — 84443 ASSAY THYROID STIM HORMONE: CPT

## 2023-01-11 PROCEDURE — 80061 LIPID PANEL: CPT

## 2023-01-11 PROCEDURE — 84480 ASSAY TRIIODOTHYRONINE (T3): CPT

## 2023-01-11 PROCEDURE — 82306 VITAMIN D 25 HYDROXY: CPT

## 2023-01-11 PROCEDURE — 83036 HEMOGLOBIN GLYCOSYLATED A1C: CPT

## 2023-01-11 RX ORDER — ALPRAZOLAM 0.5 MG/1
TABLET ORAL
Qty: 60 TABLET | Refills: 0 | Status: SHIPPED | OUTPATIENT
Start: 2023-01-11 | End: 2023-02-05

## 2023-01-11 RX ORDER — OXYCODONE HYDROCHLORIDE AND ACETAMINOPHEN 5; 325 MG/1; MG/1
1 TABLET ORAL NIGHTLY PRN
Qty: 20 TABLET | Refills: 0 | Status: SHIPPED | OUTPATIENT
Start: 2023-01-11 | End: 2023-01-31

## 2023-01-16 ENCOUNTER — OFFICE VISIT (OUTPATIENT)
Dept: INTERNAL MEDICINE | Facility: IMAGING CENTER | Age: 53
End: 2023-01-16
Payer: COMMERCIAL

## 2023-01-16 ENCOUNTER — HOSPITAL ENCOUNTER (OUTPATIENT)
Facility: MEDICAL CENTER | Age: 53
End: 2023-01-16
Attending: INTERNAL MEDICINE
Payer: COMMERCIAL

## 2023-01-16 VITALS
OXYGEN SATURATION: 96 % | BODY MASS INDEX: 20.33 KG/M2 | HEART RATE: 72 BPM | WEIGHT: 142 LBS | HEIGHT: 70 IN | RESPIRATION RATE: 14 BRPM | TEMPERATURE: 98.4 F | DIASTOLIC BLOOD PRESSURE: 60 MMHG | SYSTOLIC BLOOD PRESSURE: 90 MMHG

## 2023-01-16 DIAGNOSIS — R82.90 ABNORMAL URINALYSIS: ICD-10-CM

## 2023-01-16 DIAGNOSIS — Z85.3 HISTORY OF BREAST CANCER: ICD-10-CM

## 2023-01-16 DIAGNOSIS — M25.511 CHRONIC RIGHT SHOULDER PAIN: ICD-10-CM

## 2023-01-16 DIAGNOSIS — G89.29 CHRONIC RIGHT SHOULDER PAIN: ICD-10-CM

## 2023-01-16 DIAGNOSIS — E03.9 HYPOTHYROIDISM (ACQUIRED): ICD-10-CM

## 2023-01-16 DIAGNOSIS — R19.8 CHANGE IN BOWEL FUNCTION: ICD-10-CM

## 2023-01-16 PROBLEM — R74.8 ELEVATED LIVER ENZYMES: Status: RESOLVED | Noted: 2019-01-17 | Resolved: 2023-01-16

## 2023-01-16 PROBLEM — F10.939 ALCOHOL WITHDRAWAL SYNDROME WITH COMPLICATION (HCC): Status: RESOLVED | Noted: 2021-02-09 | Resolved: 2023-01-16

## 2023-01-16 PROBLEM — F10.11 HISTORY OF ALCOHOL ABUSE: Status: ACTIVE | Noted: 2019-01-17

## 2023-01-16 PROBLEM — R74.8 LOW SERUM HDL: Status: RESOLVED | Noted: 2019-01-23 | Resolved: 2023-01-16

## 2023-01-16 PROBLEM — R79.0 LOW SERUM PHOSPHORUS FOR AGE: Status: RESOLVED | Noted: 2021-02-10 | Resolved: 2023-01-16

## 2023-01-16 PROBLEM — E78.1 HYPERTRIGLYCERIDEMIA: Status: RESOLVED | Noted: 2019-01-23 | Resolved: 2023-01-16

## 2023-01-16 LAB
APPEARANCE UR: CLEAR
BILIRUB UR STRIP-MCNC: NEGATIVE MG/DL
COLOR UR AUTO: YELLOW
GLUCOSE UR STRIP.AUTO-MCNC: NEGATIVE MG/DL
KETONES UR STRIP.AUTO-MCNC: NEGATIVE MG/DL
LEUKOCYTE ESTERASE UR QL STRIP.AUTO: NEGATIVE
NITRITE UR QL STRIP.AUTO: POSITIVE
PH UR STRIP.AUTO: 5.5 [PH] (ref 5–8)
PROT UR QL STRIP: NEGATIVE MG/DL
RBC UR QL AUTO: NORMAL
SP GR UR STRIP.AUTO: 1.02
UROBILINOGEN UR STRIP-MCNC: 0.2 MG/DL

## 2023-01-16 PROCEDURE — 81002 URINALYSIS NONAUTO W/O SCOPE: CPT | Performed by: INTERNAL MEDICINE

## 2023-01-16 PROCEDURE — 99214 OFFICE O/P EST MOD 30 MIN: CPT | Performed by: INTERNAL MEDICINE

## 2023-01-16 PROCEDURE — 87086 URINE CULTURE/COLONY COUNT: CPT

## 2023-01-16 PROCEDURE — 87186 SC STD MICRODIL/AGAR DIL: CPT

## 2023-01-16 PROCEDURE — 87077 CULTURE AEROBIC IDENTIFY: CPT

## 2023-01-16 RX ORDER — LEVOTHYROXINE SODIUM 100 MCG
100 TABLET ORAL
Qty: 90 TABLET | Refills: 1 | Status: SHIPPED | OUTPATIENT
Start: 2023-01-16 | End: 2023-07-17 | Stop reason: SDUPTHER

## 2023-01-16 ASSESSMENT — PATIENT HEALTH QUESTIONNAIRE - PHQ9: CLINICAL INTERPRETATION OF PHQ2 SCORE: 0

## 2023-01-16 ASSESSMENT — FIBROSIS 4 INDEX: FIB4 SCORE: 1.08

## 2023-01-16 NOTE — PROGRESS NOTES
Established Patient Note   HPI:        Carie returns today to follow up hypothyroid; she has done recent lab work. She states since her abdominal hernia surgery in March of 2022 her bowel movements have been skinny and long and somewhat difficult. She denies constipation.    Past Medical History:   Diagnosis Date          GARRETT (acute kidney injury) (HCC)     Anesthesia Always    Awaken very quickly patricia medication is administered.    Anxiety and depression 01/23/2019    Atrial fibrillation (HCC)     Awareness under anesthesia 17 yrs ago    Woke up on table just after procedure    Breast cancer DCIS, left (June 2022) 06/23/2022    Total mastectomy left August 2022    Colon polyp 03/29/2019    Colonoscopy March 2019: 3mm descending colon polyp    COVID-19     H/O    COVID-19     H/O 2nd time 2-2022    Diverticulosis of colon 01/23/2019    MRI abdomen Jan. 2019    Environmental and seasonal allergies     Generalized anxiety disorder     H/O cervical fracture     x2    History of alcohol abuse 1/17/2019    Humerus fracture     Right    Hypothyroid     Menopause     Ovarian cyst     Pain 08/09/2022    right shoulder, left breast, 8/10    Thrombocytopenia (HCC)     Umbilical hernia     X3    Vitamin B12 deficiency 01/23/2019       Current Outpatient Medications   Medication Sig Dispense Refill    SYNTHROID 100 MCG Tab Take 1 Tablet by mouth every morning on an empty stomach. 90 Tablet 1    ALPRAZolam (XANAX) 0.5 MG Tab TAKE 1 TABLET BY MOUTH 2 TIMES A DAY AS NEEDED FOR SLEEP OR ANXIETY FOR UP TO 30 DAYS.F41.9 60 Tablet 0    oxyCODONE-acetaminophen (PERCOCET) 5-325 MG Tab Take 1 Tablet by mouth at bedtime as needed (shoulder pain) for up to 20 days. 20 Tablet 0    traZODone (DESYREL) 50 MG Tab Take 1 Tablet by mouth at bedtime as needed for Sleep. 90 Tablet 3    Multiple Vitamins-Minerals (OCUVITE-LUTEIN) Tab Take 1 tablet by mouth every day.       No current facility-administered medications for this visit.  "        Allergies   Allergen Reactions    Meloxicam Diarrhea and Vomiting     Severe      Sulfa Drugs Anaphylaxis    Zofran [Dextrose-Ondansetron] Unspecified     Migraine with PO and SL, tolerates Injection/ IV    Lorazepam Anxiety and Unspecified     \"IT MAKES ME CRY\"         Social History     Tobacco Use    Smoking status: Former     Packs/day: 0.00     Years: 1.00     Pack years: 0.00     Types: Cigarettes     Quit date: 3/1/2022     Years since quittin.8    Smokeless tobacco: Never    Tobacco comments:     Quit 6 months ago   Vaping Use    Vaping Use: Never used   Substance Use Topics    Alcohol use: Not Currently     Comment: Pt. states quit drinking 2021    Drug use: Never       Past Surgical History:   Procedure Laterality Date    PA SUSPENSION OF BREAST Right 11/3/2022    Procedure: MASTOPEXY;  Surgeon: Paolo Travis M.D.;  Location: SURGERY SAME DAY [facility];  Service: Plastics    BREAST RECONSTRUCTION Bilateral 11/3/2022    Procedure: LEFT BREAST RECONSTRUCTION WITH REMOVAL OF TISSUE EXPANDER AND PLACEMENT OF IMPLANT. REVISION WITH DERMAL GLADULAR FLAPS AND CAPSULORRHAPHY.  RIGHT BREAST IMPLANT EXCHANGE WITH MASTOPEXY AND CAPSULORRHAPHY;  Surgeon: Paolo Travis M.D.;  Location: SURGERY SAME DAY [facility];  Service: Plastics    TISSUE EXPANDER PLACE/REMOVE Left 11/3/2022    Procedure: REMOVAL, TISSUE EXPANDER;  Surgeon: Paolo Travis M.D.;  Location: SURGERY SAME DAY [facility];  Service: Plastics    BREAST IMPLANT REVISION Bilateral 11/3/2022    Procedure: INSERTION, IMPLANT, BREAST;  Surgeon: Paolo Travis M.D.;  Location: SURGERY SAME DAY [facility];  Service: Plastics    PA INTRAOP ID SENTINEL NODE Left 2022    Procedure: IDENTIFICATION,LYMPH NODE,SENTINEL,INTRAOPERATIVE,USING DYE INJECTION;  Surgeon: Hiral Rock M.D.;  Location: SURGERY SAME DAY [facility];  Service: General    BREAST RECONSTRUCTION Left 2022    Procedure: LEFT BREAST RECONSTRUCTION WITH PLACEMENT " OF TISSUE EXPANDER WITH USE OF ACELLULAR DERMAL MATRIX;  Surgeon: Paolo Travis M.D.;  Location: SURGERY SAME DAY [facility];  Service: General    TISSUE EXPANDER PLACE/REMOVE Left 08/18/2022    Procedure: INSERTION OR REMOVAL, TISSUE EXPANDER;  Surgeon: Paolo Travis M.D.;  Location: SURGERY SAME DAY [facility];  Service: General    MASTECTOMY Left 08/18/2022    Procedure: LEFT TOTAL MASTECTOMY LEFT SENTINEL LYMOH NODE INJECTION WITH EXCISION OF AXILLARY NODES;  Surgeon: Hiral Rock M.D.;  Location: SURGERY SAME DAY [facility];  Service: General    NODE BIOPSY SENTINEL Left 08/18/2022    Procedure: BIOPSY, LYMPH NODE, SENTINEL;  Surgeon: Hiral Rock M.D.;  Location: SURGERY SAME DAY [facility];  Service: General    PB MASTECTOMY, PARTIAL Left 07/19/2022    Procedure: WIRE LOCALIZED LEFT PARTIAL MASTECTOMY;  Surgeon: Hiral Rock M.D.;  Location: SURGERY SAME DAY [facility];  Service: General    ABDOMINOPLASTY  04/14/2022    Procedure: ABDOMINOPLASTY;  Surgeon: Paolo Travis M.D.;  Location: SURGERY SAME DAY [facility];  Service: Plastics    EAR RECONSTRUCTION Bilateral 04/14/2022    Procedure: RECONSTRUCTION, EAR;  Surgeon: Paolo Travis M.D.;  Location: SURGERY SAME DAY [facility];  Service: Plastics    SCAR REVISION  04/14/2022    Procedure: REVISION, SCAR - TO CHIN;  Surgeon: Paolo Travis M.D.;  Location: SURGERY SAME DAY [facility];  Service: Plastics    VENTRAL HERNIA REPAIR  04/14/2022    Procedure: REPAIR, HERNIA, VENTRAL;  Surgeon: Hiral Rock M.D.;  Location: SURGERY SAME DAY [facility];  Service: Plastics    WA LAP,DIAGNOSTIC ABDOMEN  08/03/2021    Procedure: PELVISCOPY - WITH PELVIC WASHINGS, EXCISION OF RIGHT OVARIAN  MASS;  Surgeon: Fabrice Camejo D.O.;  Location: SURGERY SAME DAY [facility];  Service: Obstetrics    SALPINGO OOPHORECTOMY Bilateral 08/03/2021    Procedure: SALPINGO-OOPHORECTOMY.;  Surgeon: Fabrice Camejo D.O.;  Location: SURGERY SAME DAY [facility];  Service:  "Obstetrics    WI UPPER GI ENDOSCOPY,DIAGNOSIS N/A 02/12/2021    Procedure: GASTROSCOPY;  Surgeon: Kasi Chan M.D.;  Location: SURGERY SAME DAY [facility];  Service: Gastroenterology    WI UPPER GI ENDOSCOPY,BIOPSY N/A 02/12/2021    Procedure: GASTROSCOPY, WITH BIOPSY;  Surgeon: Kasi Chan M.D.;  Location: SURGERY SAME DAY [facility];  Service: Gastroenterology    WOUND CLOSURE ORTHO Left 09/24/2019    Procedure: CLOSURE, WOUND, ORTHO - LEG W/WOUND VAC REMOVAL;  Surgeon: Paolo Odell M.D.;  Location: SURGERY [facility];  Service: Orthopedics    IRRIGATION & DEBRIDEMENT ORTHO Left 09/21/2019    Procedure: IRRIGATION AND DEBRIDEMENT, WOUND - FOR PROXIMAL TIBIA HEMATOMA EVACUATION AND WOUND VAC APPLICATION;  Surgeon: Paolo Odell M.D.;  Location: SURGERY [facility];  Service: Orthopedics    ROBOTIC LAPAROSCOPIC CHOLECYSTECTOMY  2017    OTHER ORTHOPEDIC SURGERY Right 2017    kNEE    UMBILICAL HERNIA REPAIR  2017    INGUINAL HERNIA REPAIR BILATERAL  1999    with Mesh    TONSILLECTOMY AND ADENOIDECTOMY  1976    APPENDECTOMY      BONE GRAFT      CHOLECYSTECTOMY      GYN SURGERY  1 yr    Tubes and ovaries    MAMMOPLASTY AUGMENTATION Bilateral     MAMMOPLASTY REDUCTION      OTHER  1 yr    Augmentation    UMBILICAL HERNIA REPAIR      VENTRAL HERNIA REPAIR          Review of Systems   Endo/Heme/Allergies:         She has noticed some thinning of hair. Energy level is fair.     Ambulatory Vitals  BP 90/60 (BP Location: Left arm, Patient Position: Sitting, BP Cuff Size: Adult)   Pulse 72   Temp 36.9 °C (98.4 °F) (Temporal)   Resp 14   Ht 1.778 m (5' 10\")   Wt 64.4 kg (142 lb)   LMP  (LMP Unknown)   SpO2 96%   BMI 20.37 kg/m²     Physical Exam  Constitutional:       Appearance: Normal appearance.   Cardiovascular:      Rate and Rhythm: Normal rate and regular rhythm.      Heart sounds: Normal heart sounds. No murmur heard.    No friction rub. No gallop.   Pulmonary:      Effort: Pulmonary effort is " normal.      Breath sounds: Normal breath sounds. No wheezing or rales.   Neurological:      General: No focal deficit present.       Component      Latest Ref Rng 1/11/2023   Sodium      135 - 145 mmol/L 140    Potassium      3.6 - 5.5 mmol/L 4.0    Chloride      96 - 112 mmol/L 106    Co2      20 - 33 mmol/L 25    Anion Gap      7.0 - 16.0  9.0    Glucose      65 - 99 mg/dL 95    Bun      8 - 22 mg/dL 22    Creatinine      0.50 - 1.40 mg/dL 0.55    Calcium      8.5 - 10.5 mg/dL 9.7    AST(SGOT)      12 - 45 U/L 26    ALT(SGPT)      2 - 50 U/L 17    Alkaline Phosphatase      30 - 99 U/L 89    Total Bilirubin      0.1 - 1.5 mg/dL 0.3    Albumin      3.2 - 4.9 g/dL 4.5    Total Protein      6.0 - 8.2 g/dL 7.0    Globulin      1.9 - 3.5 g/dL 2.5    A-G Ratio      g/dL 1.8    Color Yellow    Character Clear    Specific Gravity      <1.035  1.016    Ph      5.0 - 8.0  5.5    Glucose      Negative mg/dL Negative    Ketones      Negative mg/dL Negative    Protein      Negative mg/dL Negative    Bilirubin      Negative  Negative    Urobilinogen, Urine      Negative  0.2    Nitrite      Negative  Positive !    Leukocyte Esterase      Negative  Negative    Occult Blood      Negative  Small !    Micro Urine Req Microscopic    WBC      /hpf 0-2    RBC      /hpf 0-2    Bacteria      None /hpf Many !    Epithelial Cells      /hpf Negative    Hyaline Cast      /lpf 0-2    Cholesterol,Tot      100 - 199 mg/dL 192    Triglycerides      0 - 149 mg/dL 66    HDL      >=40 mg/dL 60    LDL      <100 mg/dL 119 (H)    Glycohemoglobin      4.0 - 5.6 % 5.4    Estim. Avg Glu      mg/dL 108    25-Hydroxy Vitamin D 25      30 - 100 ng/mL 38    Vitamin B12 -True Cobalamin      211 - 911 pg/mL 808    TSH      0.380 - 5.330 uIU/mL 2.210    T3      60.0 - 181.0 ng/dL 106.0    Correct Calcium      8.5 - 10.5 mg/dL 9.3    GFR (CKD-EPI)      >60 mL/min/1.73 m 2 108       ! Abnormal  (H) High  Component Ref Range & Units 5 d ago 1 yr ago 2 yr ago 4 yr  ago   TSH 0.380 - 5.330 uIU/mL 2.210  1.330 CM  0.786 CM  2.360 CM      Assessment and Plan:     1. Hypothyroidism (acquired)  SYNTHROID 100 MCG Tab    TSH    TRIIDOTHYRONINE    TSH 2.210 uIU/ml with T3 106.0 ng/dl taking synthroid 88 mcg qam      2. Abnormal urinalysis  POCT Urinalysis    URINE CULTURE(NEW)      3. Chronic right shoulder pain  Controlled Substance Treatment Agreement      4. Change in bowel function  Referral to Gastroenterology      5. History of breast cancer, left breast June 2022      She was unable to tolerate arimidex for more than a couple months        Increase synthroid from 88 mcg qam to 100 mcg qam. Refer to GI to evaluate her change in bowel function.     Herbie Joshi M.D.

## 2023-01-18 ENCOUNTER — TELEPHONE (OUTPATIENT)
Dept: INTERNAL MEDICINE | Facility: IMAGING CENTER | Age: 53
End: 2023-01-18
Payer: COMMERCIAL

## 2023-01-18 LAB
BACTERIA UR CULT: ABNORMAL
BACTERIA UR CULT: ABNORMAL
SIGNIFICANT IND 70042: ABNORMAL
SITE SITE: ABNORMAL
SOURCE SOURCE: ABNORMAL

## 2023-01-18 RX ORDER — NITROFURANTOIN 25; 75 MG/1; MG/1
100 CAPSULE ORAL 2 TIMES DAILY
Qty: 14 CAPSULE | Refills: 0 | Status: SHIPPED | OUTPATIENT
Start: 2023-01-18 | End: 2023-01-25

## 2023-01-31 ENCOUNTER — APPOINTMENT (OUTPATIENT)
Dept: INTERNAL MEDICINE | Facility: IMAGING CENTER | Age: 53
End: 2023-01-31
Payer: COMMERCIAL

## 2023-02-14 ENCOUNTER — HOSPITAL ENCOUNTER (OUTPATIENT)
Facility: MEDICAL CENTER | Age: 53
End: 2023-02-14
Attending: FAMILY MEDICINE
Payer: COMMERCIAL

## 2023-02-14 ENCOUNTER — OFFICE VISIT (OUTPATIENT)
Dept: INTERNAL MEDICINE | Facility: IMAGING CENTER | Age: 53
End: 2023-02-14
Payer: COMMERCIAL

## 2023-02-14 VITALS
BODY MASS INDEX: 20.2 KG/M2 | HEART RATE: 77 BPM | TEMPERATURE: 98.1 F | SYSTOLIC BLOOD PRESSURE: 106 MMHG | WEIGHT: 141.09 LBS | HEIGHT: 70 IN | RESPIRATION RATE: 14 BRPM | OXYGEN SATURATION: 97 % | DIASTOLIC BLOOD PRESSURE: 64 MMHG

## 2023-02-14 DIAGNOSIS — Z79.899 CONTROLLED SUBSTANCE AGREEMENT SIGNED: ICD-10-CM

## 2023-02-14 DIAGNOSIS — Z79.899 CHRONIC PRESCRIPTION BENZODIAZEPINE USE: ICD-10-CM

## 2023-02-14 DIAGNOSIS — Z76.89 ESTABLISHING CARE WITH NEW DOCTOR, ENCOUNTER FOR: ICD-10-CM

## 2023-02-14 DIAGNOSIS — F41.9 ANXIETY: ICD-10-CM

## 2023-02-14 DIAGNOSIS — F10.21 ALCOHOL DEPENDENCE IN SUSTAINED FULL REMISSION (HCC): ICD-10-CM

## 2023-02-14 PROCEDURE — G0481 DRUG TEST DEF 8-14 CLASSES: HCPCS

## 2023-02-14 PROCEDURE — 99215 OFFICE O/P EST HI 40 MIN: CPT | Performed by: FAMILY MEDICINE

## 2023-02-14 RX ORDER — ALPRAZOLAM 0.5 MG/1
0.5 TABLET ORAL 2 TIMES DAILY PRN
Qty: 30 TABLET | Refills: 1 | Status: SHIPPED | OUTPATIENT
Start: 2023-02-14 | End: 2023-03-24 | Stop reason: SDUPTHER

## 2023-02-14 ASSESSMENT — FIBROSIS 4 INDEX: FIB4 SCORE: 1.1

## 2023-02-14 NOTE — PROGRESS NOTES
"Chief Complaint   Patient presents with    Medication Refill       Subjective:     HPI:   Carie Santiago is a 53 y.o. female here  to establish care and discuss the evaluation and management of:    Anxiety and depression  She has been taking Xanax daily and is due for a prescription    She has a history of breast cancer status postmastectomy           Problem   Alcohol Dependence in Sustained Full Remission (Hcc)   X2 years         Objective:     /64   Pulse 77   Temp 36.7 °C (98.1 °F)   Resp 14   Ht 1.778 m (5' 10\")   Wt 64 kg (141 lb 1.5 oz)   SpO2 97%  Body mass index is 20.24 kg/m².    Physical Exam:  Physical Exam  Constitutional: Well-developed and well-nourished. Not diaphoretic. No distress.   Skin: Skin is warm and dry. No rash noted.  Head: Atraumatic without lesions.  Eyes: Conjunctivae and extraocular motions are normal.   Ears:  External ears unremarkable.    Nose: Nares patent. Mucosa without edema or erythema. No discharge. No facial tenderness.     Neck: Supple   Cardiovascular: Regular rate and rhythm.   Chest: Effort normal.       Neurological: Alert and oriented x 3.    Psychiatric:  Behavior, mood, and affect are appropriate     Assessment and Plan:     The following treatment plan was discussed/researched:  No problem-specific Assessment & Plan notes found for this encounter.            1. Anxiety-refilled Xanax    2. Chronic prescription benzodiazepine use  Obtained and reviewed patient utilization report from [facility] pharmacy database on 2/14/2023 1:49 PM  prior to writing prescription for controlled substance II, III or IV per [place] bill . Based on assessment of the report, the prescription is medically necessary.     - Controlled Substance Treatment Agreement  - PAIN MANAGEMENT SCRN, UR; Future    3. Controlled substance agreement signed    4. Alcohol dependence in sustained full remission (HCC)    5.   History of breast cancer, left breast June 2022   Total " left mastectomy August 2022.      She was unable to tolerate arimidex for more than a couple months         6. Establishing care with new doctor, encounter for                                                                                                                                                                                    Any change or worsening of signs or symptoms, patient encouraged to follow-up or report to emergency room for further evaluation. Patient verbalizes understanding and agrees.    Follow-Up:  3 m.      PLEASE NOTE: This dictation was created using voice recognition software. I have made every reasonable attempt to correct obvious errors, but I expect that there are errors of grammar and possibly content that I did not discover before finalizing the note.      My total time spent caring for the patient on the day of the encounter was  greater than 40 minutes.   This includes obtaining history, reviewing chart, physical exam, patient education, reviewing outside records, placing orders, interpreting tests and coordinating care.

## 2023-02-17 DIAGNOSIS — G89.29 CHRONIC RIGHT SHOULDER PAIN: ICD-10-CM

## 2023-02-17 DIAGNOSIS — M25.511 CHRONIC RIGHT SHOULDER PAIN: ICD-10-CM

## 2023-02-17 RX ORDER — OXYCODONE HYDROCHLORIDE AND ACETAMINOPHEN 5; 325 MG/1; MG/1
1 TABLET ORAL NIGHTLY PRN
Qty: 20 TABLET | Refills: 0 | Status: SHIPPED | OUTPATIENT
Start: 2023-02-17 | End: 2023-06-26 | Stop reason: SDUPTHER

## 2023-02-19 LAB
1OH-MIDAZOLAM UR QL SCN: NOT DETECTED
6MAM UR QL: NOT DETECTED
7AMINOCLONAZEPAM UR QL: NOT DETECTED
A-OH ALPRAZ UR QL: PRESENT
ALPRAZ UR QL: PRESENT
AMPHET UR QL SCN: NOT DETECTED
ANNOTATION COMMENT IMP: NORMAL
ANNOTATION COMMENT IMP: NORMAL
BARBITURATES UR QL: NOT DETECTED
BUPRENORPHINE UR QL: NOT DETECTED
BZE UR QL: NOT DETECTED
CARBOXYTHC UR QL: NOT DETECTED
CARISOPRODOL UR QL: NOT DETECTED
CLONAZEPAM UR QL: NOT DETECTED
CODEINE UR QL: NOT DETECTED
DIAZEPAM UR QL: NOT DETECTED
ETHYL GLUCURONIDE UR QL: NOT DETECTED
FENTANYL UR QL: NOT DETECTED
GABAPENTIN UR QL: NOT DETECTED
HYDROCODONE UR QL: NOT DETECTED
HYDROMORPHONE UR QL: NOT DETECTED
LORAZEPAM UR QL: NOT DETECTED
MDA UR QL: NOT DETECTED
MDEA UR QL: NOT DETECTED
MDMA UR QL: NOT DETECTED
MEPERIDINE UR QL: NOT DETECTED
METHADONE UR QL: NOT DETECTED
METHAMPHET UR QL: NOT DETECTED
MIDAZOLAM UR QL SCN: NOT DETECTED
MORPHINE UR QL: NOT DETECTED
NALOXONE UR QL SCN: NOT DETECTED
NORBUPRENORPHINE UR QL CFM: NOT DETECTED
NORDIAZEPAM UR QL: NOT DETECTED
NORFENTANYL UR QL: NOT DETECTED
NORHYDROCODONE UR QL CFM: NOT DETECTED
NOROXYCODONE UR QL CFM: NOT DETECTED
NOROXYMORPH CO100 Q0458: NOT DETECTED
OXAZEPAM UR QL: NOT DETECTED
OXYCODONE UR QL: NOT DETECTED
OXYMORPHONE UR QL: NOT DETECTED
PATHOLOGY STUDY: NORMAL
PCP UR QL: NOT DETECTED
PHENTERMINE UR QL: PRESENT
PPAA UR QL: NOT DETECTED
PREGABALIN UR QL SCN: NOT DETECTED
SERVICE CMNT-IMP: NORMAL
TAPENADOL OSULF CO200 Q0473: NOT DETECTED
TAPENTADOL UR QL SCN: NOT DETECTED
TEMAZEPAM UR QL: NOT DETECTED
TRAMADOL UR QL: PRESENT
ZOLPIDEM PHENYL-4-CARB UR QL SCN: NOT DETECTED
ZOLPIDEM UR QL: NOT DETECTED

## 2023-03-17 DIAGNOSIS — F41.9 ANXIETY: ICD-10-CM

## 2023-03-23 DIAGNOSIS — E03.9 HYPOTHYROIDISM (ACQUIRED): ICD-10-CM

## 2023-03-24 ENCOUNTER — NON-PROVIDER VISIT (OUTPATIENT)
Dept: INTERNAL MEDICINE | Facility: IMAGING CENTER | Age: 53
End: 2023-03-24
Payer: COMMERCIAL

## 2023-03-24 ENCOUNTER — HOSPITAL ENCOUNTER (OUTPATIENT)
Dept: RADIOLOGY | Facility: MEDICAL CENTER | Age: 53
End: 2023-03-24
Attending: INTERNAL MEDICINE
Payer: COMMERCIAL

## 2023-03-24 ENCOUNTER — HOSPITAL ENCOUNTER (OUTPATIENT)
Facility: MEDICAL CENTER | Age: 53
End: 2023-03-24
Attending: FAMILY MEDICINE
Payer: COMMERCIAL

## 2023-03-24 DIAGNOSIS — E28.39 FEMALE HYPOGONADISM DUE TO AROMATASE INHIBITOR THERAPY: ICD-10-CM

## 2023-03-24 DIAGNOSIS — T45.1X5A FEMALE HYPOGONADISM DUE TO AROMATASE INHIBITOR THERAPY: ICD-10-CM

## 2023-03-24 DIAGNOSIS — N95.1 MENOPAUSAL STATE: ICD-10-CM

## 2023-03-24 DIAGNOSIS — E03.9 HYPOTHYROIDISM (ACQUIRED): ICD-10-CM

## 2023-03-24 DIAGNOSIS — F41.9 ANXIETY: ICD-10-CM

## 2023-03-24 LAB
T3 SERPL-MCNC: 83 NG/DL (ref 60–181)
T4 FREE SERPL-MCNC: 1.22 NG/DL (ref 0.93–1.7)
TSH SERPL DL<=0.005 MIU/L-ACNC: 0.65 UIU/ML (ref 0.38–5.33)

## 2023-03-24 PROCEDURE — 84443 ASSAY THYROID STIM HORMONE: CPT

## 2023-03-24 PROCEDURE — 77080 DXA BONE DENSITY AXIAL: CPT

## 2023-03-24 PROCEDURE — 84480 ASSAY TRIIODOTHYRONINE (T3): CPT

## 2023-03-24 PROCEDURE — 84439 ASSAY OF FREE THYROXINE: CPT

## 2023-03-24 RX ORDER — ALPRAZOLAM 0.5 MG/1
0.5 TABLET ORAL 2 TIMES DAILY PRN
Qty: 60 TABLET | Refills: 1 | Status: SHIPPED | OUTPATIENT
Start: 2023-03-24 | End: 2023-05-01 | Stop reason: SDUPTHER

## 2023-04-28 ENCOUNTER — PRE-ADMISSION TESTING (OUTPATIENT)
Dept: ADMISSIONS | Facility: MEDICAL CENTER | Age: 53
End: 2023-04-28
Attending: PLASTIC SURGERY
Payer: COMMERCIAL

## 2023-04-28 RX ORDER — CHLORAL HYDRATE 500 MG
1000 CAPSULE ORAL DAILY
COMMUNITY

## 2023-05-01 DIAGNOSIS — F41.9 ANXIETY: ICD-10-CM

## 2023-05-01 RX ORDER — ALPRAZOLAM 0.5 MG/1
0.5 TABLET ORAL 2 TIMES DAILY PRN
Qty: 60 TABLET | Refills: 1 | Status: SHIPPED | OUTPATIENT
Start: 2023-05-01 | End: 2023-06-02 | Stop reason: SDUPTHER

## 2023-05-03 ENCOUNTER — ANESTHESIA EVENT (OUTPATIENT)
Dept: SURGERY | Facility: MEDICAL CENTER | Age: 53
End: 2023-05-03
Payer: COMMERCIAL

## 2023-05-04 ENCOUNTER — ANESTHESIA (OUTPATIENT)
Dept: SURGERY | Facility: MEDICAL CENTER | Age: 53
End: 2023-05-04
Payer: COMMERCIAL

## 2023-05-04 ENCOUNTER — HOSPITAL ENCOUNTER (OUTPATIENT)
Facility: MEDICAL CENTER | Age: 53
End: 2023-05-04
Attending: PLASTIC SURGERY | Admitting: PLASTIC SURGERY
Payer: COMMERCIAL

## 2023-05-04 VITALS
SYSTOLIC BLOOD PRESSURE: 121 MMHG | HEIGHT: 70 IN | DIASTOLIC BLOOD PRESSURE: 57 MMHG | WEIGHT: 143.08 LBS | BODY MASS INDEX: 20.48 KG/M2 | HEART RATE: 58 BPM | RESPIRATION RATE: 18 BRPM | TEMPERATURE: 97.9 F | OXYGEN SATURATION: 96 %

## 2023-05-04 LAB
ANION GAP SERPL CALC-SCNC: 10 MMOL/L (ref 7–16)
BUN SERPL-MCNC: 15 MG/DL (ref 8–22)
CALCIUM SERPL-MCNC: 9.4 MG/DL (ref 8.5–10.5)
CHLORIDE SERPL-SCNC: 104 MMOL/L (ref 96–112)
CO2 SERPL-SCNC: 26 MMOL/L (ref 20–33)
CREAT SERPL-MCNC: 0.55 MG/DL (ref 0.5–1.4)
ERYTHROCYTE [DISTWIDTH] IN BLOOD BY AUTOMATED COUNT: 46.5 FL (ref 35.9–50)
GFR SERPLBLD CREATININE-BSD FMLA CKD-EPI: 108 ML/MIN/1.73 M 2
GLUCOSE SERPL-MCNC: 76 MG/DL (ref 65–99)
HCT VFR BLD AUTO: 42.9 % (ref 37–47)
HGB BLD-MCNC: 14.5 G/DL (ref 12–16)
MCH RBC QN AUTO: 31.2 PG (ref 27–33)
MCHC RBC AUTO-ENTMCNC: 33.8 G/DL (ref 33.6–35)
MCV RBC AUTO: 92.3 FL (ref 81.4–97.8)
PLATELET # BLD AUTO: 306 K/UL (ref 164–446)
PMV BLD AUTO: 9 FL (ref 9–12.9)
POTASSIUM SERPL-SCNC: 4.7 MMOL/L (ref 3.6–5.5)
RBC # BLD AUTO: 4.65 M/UL (ref 4.2–5.4)
SODIUM SERPL-SCNC: 140 MMOL/L (ref 135–145)
WBC # BLD AUTO: 9.7 K/UL (ref 4.8–10.8)

## 2023-05-04 PROCEDURE — 00402 ANES INTEG SYS RCNSTV BREAST: CPT | Performed by: STUDENT IN AN ORGANIZED HEALTH CARE EDUCATION/TRAINING PROGRAM

## 2023-05-04 PROCEDURE — 160048 HCHG OR STATISTICAL LEVEL 1-5: Performed by: PLASTIC SURGERY

## 2023-05-04 PROCEDURE — 85027 COMPLETE CBC AUTOMATED: CPT

## 2023-05-04 PROCEDURE — 160002 HCHG RECOVERY MINUTES (STAT): Performed by: PLASTIC SURGERY

## 2023-05-04 PROCEDURE — C1789 PROSTHESIS, BREAST, IMP: HCPCS | Performed by: PLASTIC SURGERY

## 2023-05-04 PROCEDURE — 160029 HCHG SURGERY MINUTES - 1ST 30 MINS LEVEL 4: Performed by: PLASTIC SURGERY

## 2023-05-04 PROCEDURE — 80048 BASIC METABOLIC PNL TOTAL CA: CPT

## 2023-05-04 PROCEDURE — 502000 HCHG MISC OR IMPLANTS RC 0278: Performed by: PLASTIC SURGERY

## 2023-05-04 PROCEDURE — 160046 HCHG PACU - 1ST 60 MINS PHASE II: Performed by: PLASTIC SURGERY

## 2023-05-04 PROCEDURE — 700102 HCHG RX REV CODE 250 W/ 637 OVERRIDE(OP): Performed by: STUDENT IN AN ORGANIZED HEALTH CARE EDUCATION/TRAINING PROGRAM

## 2023-05-04 PROCEDURE — 160009 HCHG ANES TIME/MIN: Performed by: PLASTIC SURGERY

## 2023-05-04 PROCEDURE — 700111 HCHG RX REV CODE 636 W/ 250 OVERRIDE (IP): Performed by: STUDENT IN AN ORGANIZED HEALTH CARE EDUCATION/TRAINING PROGRAM

## 2023-05-04 PROCEDURE — 700101 HCHG RX REV CODE 250: Performed by: STUDENT IN AN ORGANIZED HEALTH CARE EDUCATION/TRAINING PROGRAM

## 2023-05-04 PROCEDURE — 160041 HCHG SURGERY MINUTES - EA ADDL 1 MIN LEVEL 4: Performed by: PLASTIC SURGERY

## 2023-05-04 PROCEDURE — 700105 HCHG RX REV CODE 258: Performed by: PLASTIC SURGERY

## 2023-05-04 PROCEDURE — 160035 HCHG PACU - 1ST 60 MINS PHASE I: Performed by: PLASTIC SURGERY

## 2023-05-04 PROCEDURE — 700101 HCHG RX REV CODE 250: Performed by: PLASTIC SURGERY

## 2023-05-04 PROCEDURE — 700111 HCHG RX REV CODE 636 W/ 250 OVERRIDE (IP): Performed by: PLASTIC SURGERY

## 2023-05-04 PROCEDURE — A9270 NON-COVERED ITEM OR SERVICE: HCPCS | Performed by: STUDENT IN AN ORGANIZED HEALTH CARE EDUCATION/TRAINING PROGRAM

## 2023-05-04 PROCEDURE — 160025 RECOVERY II MINUTES (STATS): Performed by: PLASTIC SURGERY

## 2023-05-04 DEVICE — IMPLANTABLE DEVICE: Type: IMPLANTABLE DEVICE | Site: BREAST | Status: FUNCTIONAL

## 2023-05-04 RX ORDER — SODIUM CHLORIDE, SODIUM LACTATE, POTASSIUM CHLORIDE, CALCIUM CHLORIDE 600; 310; 30; 20 MG/100ML; MG/100ML; MG/100ML; MG/100ML
INJECTION, SOLUTION INTRAVENOUS CONTINUOUS
Status: DISCONTINUED | OUTPATIENT
Start: 2023-05-04 | End: 2023-05-04 | Stop reason: HOSPADM

## 2023-05-04 RX ORDER — DIPHENHYDRAMINE HYDROCHLORIDE 50 MG/ML
12.5 INJECTION INTRAMUSCULAR; INTRAVENOUS
Status: DISCONTINUED | OUTPATIENT
Start: 2023-05-04 | End: 2023-05-04 | Stop reason: HOSPADM

## 2023-05-04 RX ORDER — CEFAZOLIN SODIUM 1 G/3ML
INJECTION, POWDER, FOR SOLUTION INTRAMUSCULAR; INTRAVENOUS
Status: DISCONTINUED
Start: 2023-05-04 | End: 2023-05-04 | Stop reason: HOSPADM

## 2023-05-04 RX ORDER — ONDANSETRON 2 MG/ML
4 INJECTION INTRAMUSCULAR; INTRAVENOUS
Status: DISCONTINUED | OUTPATIENT
Start: 2023-05-04 | End: 2023-05-04 | Stop reason: HOSPADM

## 2023-05-04 RX ORDER — ONDANSETRON 2 MG/ML
INJECTION INTRAMUSCULAR; INTRAVENOUS PRN
Status: DISCONTINUED | OUTPATIENT
Start: 2023-05-04 | End: 2023-05-04 | Stop reason: SURG

## 2023-05-04 RX ORDER — LIDOCAINE HYDROCHLORIDE 20 MG/ML
INJECTION, SOLUTION EPIDURAL; INFILTRATION; INTRACAUDAL; PERINEURAL PRN
Status: DISCONTINUED | OUTPATIENT
Start: 2023-05-04 | End: 2023-05-04 | Stop reason: HOSPADM

## 2023-05-04 RX ORDER — HYDROMORPHONE HYDROCHLORIDE 1 MG/ML
0.4 INJECTION, SOLUTION INTRAMUSCULAR; INTRAVENOUS; SUBCUTANEOUS
Status: DISCONTINUED | OUTPATIENT
Start: 2023-05-04 | End: 2023-05-04 | Stop reason: HOSPADM

## 2023-05-04 RX ORDER — MIDAZOLAM HYDROCHLORIDE 1 MG/ML
INJECTION INTRAMUSCULAR; INTRAVENOUS PRN
Status: DISCONTINUED | OUTPATIENT
Start: 2023-05-04 | End: 2023-05-04 | Stop reason: HOSPADM

## 2023-05-04 RX ORDER — BUPIVACAINE HYDROCHLORIDE AND EPINEPHRINE 5; 5 MG/ML; UG/ML
INJECTION, SOLUTION EPIDURAL; INTRACAUDAL; PERINEURAL
Status: DISCONTINUED
Start: 2023-05-04 | End: 2023-05-04 | Stop reason: HOSPADM

## 2023-05-04 RX ORDER — CEFAZOLIN SODIUM 1 G/3ML
INJECTION, POWDER, FOR SOLUTION INTRAMUSCULAR; INTRAVENOUS PRN
Status: DISCONTINUED | OUTPATIENT
Start: 2023-05-04 | End: 2023-05-04 | Stop reason: SURG

## 2023-05-04 RX ORDER — HYDROMORPHONE HYDROCHLORIDE 1 MG/ML
0.1 INJECTION, SOLUTION INTRAMUSCULAR; INTRAVENOUS; SUBCUTANEOUS
Status: DISCONTINUED | OUTPATIENT
Start: 2023-05-04 | End: 2023-05-04 | Stop reason: HOSPADM

## 2023-05-04 RX ORDER — ACETAMINOPHEN 325 MG/1
TABLET ORAL PRN
Status: DISCONTINUED | OUTPATIENT
Start: 2023-05-04 | End: 2023-05-04 | Stop reason: SURG

## 2023-05-04 RX ORDER — EPHEDRINE SULFATE 50 MG/ML
INJECTION, SOLUTION INTRAVENOUS PRN
Status: DISCONTINUED | OUTPATIENT
Start: 2023-05-04 | End: 2023-05-04 | Stop reason: SURG

## 2023-05-04 RX ORDER — HYDROMORPHONE HYDROCHLORIDE 1 MG/ML
0.2 INJECTION, SOLUTION INTRAMUSCULAR; INTRAVENOUS; SUBCUTANEOUS
Status: DISCONTINUED | OUTPATIENT
Start: 2023-05-04 | End: 2023-05-04 | Stop reason: HOSPADM

## 2023-05-04 RX ORDER — ACETAMINOPHEN 500 MG
TABLET ORAL
Status: COMPLETED
Start: 2023-05-04 | End: 2023-05-04

## 2023-05-04 RX ORDER — BUPIVACAINE HYDROCHLORIDE AND EPINEPHRINE 5; 5 MG/ML; UG/ML
INJECTION, SOLUTION EPIDURAL; INTRACAUDAL; PERINEURAL
Status: DISCONTINUED | OUTPATIENT
Start: 2023-05-04 | End: 2023-05-04 | Stop reason: HOSPADM

## 2023-05-04 RX ORDER — HALOPERIDOL 5 MG/ML
1 INJECTION INTRAMUSCULAR
Status: DISCONTINUED | OUTPATIENT
Start: 2023-05-04 | End: 2023-05-04 | Stop reason: HOSPADM

## 2023-05-04 RX ORDER — OXYCODONE HCL 5 MG/5 ML
10 SOLUTION, ORAL ORAL
Status: COMPLETED | OUTPATIENT
Start: 2023-05-04 | End: 2023-05-04

## 2023-05-04 RX ORDER — DEXAMETHASONE SODIUM PHOSPHATE 4 MG/ML
INJECTION, SOLUTION INTRA-ARTICULAR; INTRALESIONAL; INTRAMUSCULAR; INTRAVENOUS; SOFT TISSUE PRN
Status: DISCONTINUED | OUTPATIENT
Start: 2023-05-04 | End: 2023-05-04 | Stop reason: SURG

## 2023-05-04 RX ORDER — MAGNESIUM OXIDE 400 MG/1
400 TABLET ORAL DAILY
COMMUNITY
End: 2023-08-29

## 2023-05-04 RX ORDER — GENTAMICIN SULFATE 40 MG/ML
INJECTION, SOLUTION INTRAMUSCULAR; INTRAVENOUS
Status: DISCONTINUED
Start: 2023-05-04 | End: 2023-05-04 | Stop reason: HOSPADM

## 2023-05-04 RX ORDER — FERROUS SULFATE 325(65) MG
325 TABLET ORAL DAILY
COMMUNITY

## 2023-05-04 RX ORDER — OXYCODONE HCL 5 MG/5 ML
5 SOLUTION, ORAL ORAL
Status: COMPLETED | OUTPATIENT
Start: 2023-05-04 | End: 2023-05-04

## 2023-05-04 RX ADMIN — ACETAMINOPHEN 1000 MG: 325 TABLET ORAL at 11:00

## 2023-05-04 RX ADMIN — ONDANSETRON 4 MG: 2 INJECTION INTRAMUSCULAR; INTRAVENOUS at 11:43

## 2023-05-04 RX ADMIN — SODIUM CHLORIDE, POTASSIUM CHLORIDE, SODIUM LACTATE AND CALCIUM CHLORIDE: 600; 310; 30; 20 INJECTION, SOLUTION INTRAVENOUS at 11:21

## 2023-05-04 RX ADMIN — EPHEDRINE SULFATE 10 MG: 50 INJECTION, SOLUTION INTRAVENOUS at 11:39

## 2023-05-04 RX ADMIN — EPHEDRINE SULFATE 10 MG: 50 INJECTION, SOLUTION INTRAVENOUS at 12:07

## 2023-05-04 RX ADMIN — EPHEDRINE SULFATE 20 MG: 50 INJECTION, SOLUTION INTRAVENOUS at 11:34

## 2023-05-04 RX ADMIN — FENTANYL CITRATE 25 MCG: 50 INJECTION, SOLUTION INTRAMUSCULAR; INTRAVENOUS at 11:49

## 2023-05-04 RX ADMIN — DEXAMETHASONE SODIUM PHOSPHATE 4 MG: 4 INJECTION, SOLUTION INTRA-ARTICULAR; INTRALESIONAL; INTRAMUSCULAR; INTRAVENOUS; SOFT TISSUE at 11:43

## 2023-05-04 RX ADMIN — OXYCODONE HYDROCHLORIDE 10 MG: 5 SOLUTION ORAL at 13:27

## 2023-05-04 RX ADMIN — FENTANYL CITRATE 25 MCG: 50 INJECTION, SOLUTION INTRAMUSCULAR; INTRAVENOUS at 11:58

## 2023-05-04 RX ADMIN — FENTANYL CITRATE 25 MCG: 50 INJECTION, SOLUTION INTRAMUSCULAR; INTRAVENOUS at 11:57

## 2023-05-04 RX ADMIN — PROPOFOL 180 MG: 10 INJECTION, EMULSION INTRAVENOUS at 11:30

## 2023-05-04 RX ADMIN — FENTANYL CITRATE 50 MCG: 50 INJECTION, SOLUTION INTRAMUSCULAR; INTRAVENOUS at 13:26

## 2023-05-04 RX ADMIN — CEFAZOLIN 2 G: 330 INJECTION, POWDER, FOR SOLUTION INTRAMUSCULAR; INTRAVENOUS at 11:30

## 2023-05-04 RX ADMIN — FENTANYL CITRATE 25 MCG: 50 INJECTION, SOLUTION INTRAMUSCULAR; INTRAVENOUS at 12:41

## 2023-05-04 RX ADMIN — FENTANYL CITRATE 100 MCG: 50 INJECTION, SOLUTION INTRAMUSCULAR; INTRAVENOUS at 11:30

## 2023-05-04 RX ADMIN — MIDAZOLAM HYDROCHLORIDE 2 MG: 1 INJECTION, SOLUTION INTRAMUSCULAR; INTRAVENOUS at 11:18

## 2023-05-04 RX ADMIN — EPHEDRINE SULFATE 10 MG: 50 INJECTION, SOLUTION INTRAVENOUS at 11:36

## 2023-05-04 RX ADMIN — LIDOCAINE HYDROCHLORIDE 70 MG: 20 INJECTION, SOLUTION EPIDURAL; INFILTRATION; INTRACAUDAL at 11:30

## 2023-05-04 ASSESSMENT — PAIN DESCRIPTION - PAIN TYPE
TYPE: SURGICAL PAIN
TYPE: SURGICAL PAIN

## 2023-05-04 ASSESSMENT — FIBROSIS 4 INDEX: FIB4 SCORE: 1.1

## 2023-05-04 NOTE — OR SURGEON
Immediate Post OP Note    PreOp Diagnosis: history of left breast cancer      PostOp Diagnosis: same      Procedure(s):  LEFT NIPPLE AREOLAR RECONSTRUCTION WITH A FULL THICKNESS SKIN GRAFT, DERMAL GRAFTS, REVISION OF RECONSTRUCTED BREASTS, LEFT BREAST IMPLANT EXCHANGE WITH REVISION AND CAPSULORRHAPHY  APPLICATION, GRAFT, SKIN, FULL-THICKNESS  RECONSTRUCTION, BREAST  INSERTION, IMPLANT, BREAST    Surgeon(s):  Paolo Travis M.D.    Anesthesiologist/Type of Anesthesia:  Anesthesiologist: Osman Mix M.D./* No anesthesia type entered *    Surgical Staff:  Circulator: Misty Mirza R.N.  Scrub Person: Yadi Hoang    Specimens removed if any:  * No specimens in log *    Estimated Blood Loss: minimal    Findings: see operative note    Complications: no apparent    #[id number]        5/4/2023 10:53 AM Paolo Travis M.D.

## 2023-05-04 NOTE — ANESTHESIA POSTPROCEDURE EVALUATION
Patient: Carie Santiago    Procedure Summary     Date: 05/04/23 Room / Location: [facility] ROOM 28 / SURGERY SAME DAY [facility]    Anesthesia Start: 1121 Anesthesia Stop: 1254    Procedures:       LEFT NIPPLE AREOLAR RECONSTRUCTION WITH A FULL THICKNESS SKIN GRAFT, DERMAL GRAFTS, REVISION OF RECONSTRUCTED BREASTS, LEFT BREAST IMPLANT EXCHANGE WITH REVISION AND CAPSULORRHAPHY (Breast)      APPLICATION, GRAFT, SKIN, FULL-THICKNESS (Breast)      RECONSTRUCTION, BREAST (Breast)      INSERTION, IMPLANT, BREAST (Breast) Diagnosis: (PERSONAL HISTORY OF MALIGNANT NEOPLASM OF LEFT BREAST)    Surgeons: Paolo Travis M.D. Responsible Provider: Osman Mix M.D.    Anesthesia Type: general ASA Status: 2          Final Anesthesia Type: general  Last vitals  BP   Blood Pressure: 117/69    Temp   36.6 °C (97.9 °F)    Pulse   (!) 58   Resp   (!) 41    SpO2   96 %      Anesthesia Post Evaluation    Patient location during evaluation: PACU  Patient participation: complete - patient participated  Level of consciousness: awake and alert    Airway patency: patent  Anesthetic complications: no  Cardiovascular status: hemodynamically stable  Respiratory status: acceptable  Hydration status: euvolemic    PONV: none          No notable events documented.     Nurse Pain Score: 0 (NPRS)

## 2023-05-04 NOTE — DISCHARGE INSTRUCTIONS
HOME CARE INSTRUCTIONS    ACTIVITY: Rest and take it easy for the first 24 hours.  A responsible adult is recommended to remain with you during that time.  It is normal to feel sleepy.  We encourage you to not do anything that requires balance, judgment or coordination.    FOR 24 HOURS DO NOT:  Drive, operate machinery or run household appliances.  Drink beer or alcoholic beverages.  Make important decisions or sign legal documents.    SPECIAL INSTRUCTIONS:     DIET: To avoid nausea, slowly advance diet as tolerated, avoiding spicy or greasy foods for the first day.  Add more substantial food to your diet according to your physician's instructions.  Babies can be fed formula or breast milk as soon as they are hungry.  INCREASE FLUIDS AND FIBER TO AVOID CONSTIPATION.    SURGICAL DRESSING/BATHING:   Keep dressing dry and clean until follow-up in clinic.  Minimize left arm motion.  Cool compresses as needed.       MEDICATIONS: Resume taking daily medication.  Take prescribed pain medication with food.  If no medication is prescribed, you may take non-aspirin pain medication if needed.  PAIN MEDICATION CAN BE VERY CONSTIPATING.  Take a stool softener or laxative such as senokot, pericolace, or milk of magnesia if needed.    Prescription given for PATIENT HAS AT HOME.  Last pain medication given at 1:30 PM.    A follow-up appointment should be arranged with your doctor in FOLLOW UP WITH DR. TRIANA; call to schedule.    You should CALL YOUR PHYSICIAN if you develop:  Fever greater than 101 degrees F.  Pain not relieved by medication, or persistent nausea or vomiting.  Excessive bleeding (blood soaking through dressing) or unexpected drainage from the wound.  Extreme redness or swelling around the incision site, drainage of pus or foul smelling drainage.  Inability to urinate or empty your bladder within 8 hours.  Problems with breathing or chest pain.    You should call 911 if you develop problems with breathing or chest  pain.  If you are unable to contact your doctor or surgical center, you should go to the nearest emergency room or urgent care center.  Physician's telephone #: DR. TRIANA [phone]    MILD FLU-LIKE SYMPTOMS ARE NORMAL.  YOU MAY EXPERIENCE GENERALIZED MUSCLE ACHES, THROAT IRRITATION, HEADACHE AND/OR SOME NAUSEA.    If any questions arise, call your doctor.  If your doctor is not available, please feel free to call the Surgical Center at [phone].  The Center is open Monday through Friday from 7AM to 7PM.      A registered nurse may call you a few days after your surgery to see how you are doing after your procedure.    You may also receive a survey in the mail within the next two weeks and we ask that you take a few moments to complete the survey and return it to us.  Our goal is to provide you with very good care and we value your comments.     Depression / Suicide Risk    As you are discharged from this [facility] facility, it is important to learn how to keep safe from harming yourself.    Recognize the warning signs:  Abrupt changes in personality, positive or negative- including increase in energy   Giving away possessions  Change in eating patterns- significant weight changes-  positive or negative  Change in sleeping patterns- unable to sleep or sleeping all the time   Unwillingness or inability to communicate  Depression  Unusual sadness, discouragement and loneliness  Talk of wanting to die  Neglect of personal appearance   Rebelliousness- reckless behavior  Withdrawal from people/activities they love  Confusion- inability to concentrate     If you or a loved one observes any of these behaviors or has concerns about self-harm, here's what you can do:  Talk about it- your feelings and reasons for harming yourself  Remove any means that you might use to hurt yourself (examples: pills, rope, extension cords, firearm)  Get professional help from the community (Mental Health, Substance Abuse,  psychological counseling)  Do not be alone:Call your Safe Contact- someone whom you trust who will be there for you.  Call your local CRISIS HOTLINE 584-5837 or 648-092-6443  Call your local Children's Mobile Crisis Response Team [place] (186) 597-1569 or www.[employer]  Call the toll free National Suicide Prevention Hotlines   National Suicide Prevention Lifeline 675-832-HOXW (8422)  [place] [facility] 800-SUICIDE (019-2945)    I acknowledge receipt and understanding of these Home Care instructions.

## 2023-05-04 NOTE — ANESTHESIA PREPROCEDURE EVALUATION
Case: [id number] Date/Time: 05/04/23 1145    Procedures:       LEFT NIPPLE AREOLAR RECONSTRUCTION WITH A FULL THICKNESS SKIN GRAFT, DERMAL GRAFTS, REVISION OF RECONSTRUCTED BREASTS, LEFT BREAST IMPLANT EXCHANGE WITH REVISION AND CAPSULORRHAPHY (Breast)      APPLICATION, GRAFT, SKIN, FULL-THICKNESS (Breast)      RECONSTRUCTION, BREAST (Breast)      INSERTION, IMPLANT, BREAST (Breast)    Anesthesia type: General    Pre-op diagnosis: PERSONAL HISTORY OF MALIGNANT NEOPLASM OF BREAST    Location: [facility] ROOM 28 / SURGERY SAME DAY [facility]    Surgeons: Paolo Travis M.D.      54 yo F w/ hypothyroid, PAF, smoker, R humerus fx. NPO. METS >4. Took synthroid today.    Relevant Problems   ENDO   (positive) Hypothyroid       Physical Exam    Airway   Mallampati: II  TM distance: >3 FB  Neck ROM: full       Cardiovascular - normal exam  Rhythm: regular  Rate: normal  (-) murmur     Dental - normal exam           Pulmonary - normal exam  Breath sounds clear to auscultation     Abdominal    Neurological - normal exam                 Anesthesia Plan    ASA 2       Plan - general       Airway plan will be LMA          Induction: intravenous    Postoperative Plan: Postoperative administration of opioids is intended.    Pertinent diagnostic labs and testing reviewed    Informed Consent:    Anesthetic plan and risks discussed with patient.    Use of blood products discussed with: patient whom consented to blood products.

## 2023-05-04 NOTE — OP REPORT
DATE OF SERVICE:  05/04/2023     PREOPERATIVE DIAGNOSES:  1.  History of left breast cancer.  2.  Deformity of left reconstructed breast.  3.  Asymmetry between left reconstructed breast and right native breast.     POSTOPERATIVE DIAGNOSES:  1.  History of left breast cancer.  2.  Deformity of left reconstructed breast.  3.  Asymmetry between left reconstructed breast and right native breast.     PROCEDURES:  1.  Removal of intact left silicone implant.  2.  Placement of left silicone implant for breast reconstruction.  3.  Left extensive lateral and inframammary fold capsulorrhaphy.  4.  Left inferiorly based dermoglandular flap, 35 cm2 for breast   reconstruction.  5.  Left nipple areolar reconstruction using a modified skate flap technique   and a full-thickness skin graft.  6.  Left dermal graft to increase nipple projection.  7.  Complex closure of abdominal wound, 14 cm.     ATTENDING SURGEON:  Paolo Travis MD     ANESTHESIOLOGIST:  Osman Mix MD     ASSISTANT:  None.     ESTIMATED BLOOD LOSS:  Minimal.     COMPLICATIONS:  No apparent.     SPECIMENS:  None.     INDICATIONS FOR PROCEDURE:  The patient is a 53-year-old woman who is well   known to me who previously underwent a left mastectomy and reconstruction of   the tissue expander and implant.  The patient desires nipple reconstruction.    In addition, she felt like her left breast is too large and has bottomed out.    We discussed doing the above operation.  The patient has very minimal excess   skin.  She has had a previous abdominoplasty and we discussed harvesting the   skin from her lower abdomen.  She now presents for the above operation.     INTRAOPERATIVE FINDINGS:  The implant that was removed was an Allergan 650 mL   silicone implant that was intact.  The implant that was placed was an Allergan   Style [facility] SoftTouch breast implant 560 mL, reference #   [id number], serial #[id number].     DESCRIPTION OF PROCEDURE:  After the  operative and nonoperative options were   discussed including risks, benefits and alternatives, which included, but was   not limited to bleeding, infection, damage to surrounding structures, need for   further surgery, reaction to anesthetic agent, scarring, breast asymmetry,   contour irregularities, wound healing difficulties, need for revisional   surgery, implant failure, implant rupture, capsular contracture development,   breast implant associated anaplastic large cell lymphoma and squamous cell   carcinoma, breast implant illness, need for explantation, dissatisfaction with   results, hypertrophic keloid scarring, pneumothorax, deep venous thrombosis,   pulmonary embolus, myocardial infarction, stroke, unsatisfactory result and/or   death, informed consent was obtained.  Preoperatively, the patient was   identified.  In a sitting upright position, the patient's sternal notch,   midline and inframammary folds were marked.  The patient's left inframammary   fold had bottomed out about 3.5-4 cm.  The areas for the capsulorrhaphy were   marked out.  Areas for the dermoglandular flap along the inframammary folds   were marked.  Areas for skin graft harvest were marked in the abdomen.  The   patient overall had minimal excess skin and so I decided to take a small   ellipse of skin in the central aspect of her abdominoplasty scar.  The new   location of nipple areolar complex was then marked out, trying to mirror the   contralateral breast.  Antibiotics were given and sequential compression   devices were placed.  The patient was brought to the operating room and   general anesthesia was induced.  Her chest, abdomen and flanks were prepped   and draped in the usual sterile fashion.  An incision was made through the old   mastectomy scar.  Cautery was used to dissect down through the underlying   tissue down to underlying capsule.  The capsule was opened up.  The implant   was grasped and removed.  It was a 650 mL  Allergan implant that was intact.    The pocket was inspected.  There were no periprosthetic fluid collections or   other suspicious findings.  At this point, a very extensive capsulorrhaphy was   then performed with a horizontal mattress 2-0 Ethibond sutures going along   the entire lateral aspect of the chest wall and then extending into the   inframammary fold into the medial inframammary fold.  After doing this, the   patient was then put in upright position.  Different sizers were tried out and   ultimately felt that the 560 mL implant would be most appropriate.  While in   an upright position, I then confirmed the capsulorrhaphy.  After I was then   happy with this, the patient was put back down the supine position and the   sizer was removed.  A running locking 2-0 Ethibond suture was then used to   further reinforce the capsulorrhaphy.     The patient was then put back in an upright position.  While in an upright   position, then the inferiorly based dermoglandular flap was then marked out.    This was then initially tailor tacked and then once I was happy with the   markings, it was incised with a 10 blade.  This tissue was then   deepithelialized.  Bovie electrocautery was used to elevate and mobilize the   dermoglandular flap.  Dissection was then carried down on the breast implant   capsule down to the inframammary fold.  The dermoglandular flap was then   tacked down, elevating the skin on the abdomen upward.  In doing so, this   created a more well-defined inframammary fold.  This tissue was then further   reinforced in a pants-over-vest fashion with 2-0 Vicryl sutures, going from   the dermis down to the underlying chest wall fascia.  The superior skin was   then brought down and this was then tailor tacked.  The standing cutaneous   deformities were excised.  The wound was then temporarily stapled shut.     The patient was put back down in supine position.  The sizer was removed.  The   pocket was  then copiously irrigated with triple antibiotic irrigation and   dilute Betadine.  Then, using new gloves and minimal touch technique, the 560   mL implant was placed in the pocket.  The deep tissue was then closed with 2-0   Vicryl sutures.  All cutaneous incisions were then closed with 3-0 deep   dermal Monocryl sutures and running 4-0 Monocryl subcuticular stitches to   close the overlying skin.     We then turned our attention to the nipple reconstruction.  A modified skate   flap was then drawn out.  A 36 mm areolar sizer was used.  The tissue was then   deepithelialized with a 15 blade.  The arms of the skate flap were then   elevated and rotated onto themselves with iris scissors and a 15 blade.  These   were secured with interrupted 5-0 Monocryl sutures.     We then turned our attention down to the lower abdomen.  A small amount of   skin was then deepithelialized with a 15 blade.  A small amount of the   reticular dermis was then harvested.  The reticular dermis was then rolled on   itself and secured with interrupted 5-0 fast absorbing suture.  This dermal   graft was then placed into the nipple to increase nipple projection.  It was   then further secured with interrupted 5-0 fast absorbing suture.     We then went back down to the abdomen.  A 36 mm areolar sizer was used.  A   full-thickness skin graft was then harvested at the level of the reticular   dermis.  An opening was then made in the center aspect of the nipple or the   skin graft.  This was then placed over the reconstructed nipple and secured   with interrupted 4-0 silk sutures in the periphery and then running 5-0 fast   absorbing suture at the base of the areola and on the outside border.  Then,   Xeroform gauze bolster dressing was then formed.     We then turned attention down to the abdomen.  A 10 blade was used to excise   down around the dermal graft and a full-thickness skin graft harvest sites.    Cautery was then used to remove the  old scar.  The tissue overall was fairly   tight given the patient's previous abdominoplasty and so undermining was   carried out for about 15 cm up to the level of the umbilicus.  This then   created an advancement flap.  The advancement flap was then brought down   inferiorly.  Nils's fascia was then closed with 2-0 Vicryl sutures.  The   dermis was then closed with 3-0 deep dermal Monocryl sutures.  The standing   cutaneous deformities were excised laterally and then the skin was then closed   with running 3-0 Monocryl subcuticular stitch.  Local anesthetic was then   placed in the surgical sites and 0.5% Marcaine with epinephrine.  The patient   was then washed.  Steri-Strips and compressive dressing was then placed.  The   patient was then awakened, extubated and transferred to PACU in stable   condition.  At the end of the procedure, all sponge, instrument and needle   counts were correct.        ______________________________  MD JELANI CHANDLER/ISMAEL    DD:  05/04/2023 12:56  DT:  05/04/2023 14:20    Job#:  [id number]

## 2023-05-04 NOTE — OR NURSING
1251  RECEIVED PATIENT FROM OR.  REPORT FROM DR. AGUILERA.  NO AIRWAY IN PLACE.  RESPIRATIONS ARE EVEN AND UNLABORED.  BREAST BINDER IN PLACE, CDI.  INCISION TO LOWER ABDOMEN COVERED WITH STERI STRIPS WITH SMALL AMOUNT OF BLOODY DRAINAGE NOTED.    1326  MEDICATED WITH IV FENTANYL.    1327  MEDICATED WITH PO OXYCODONE.    1330  PHASE 2.    1345  UP TO THE BATHROOM.    1409  DISCHARGED.  DISCHARGE INSTRUCTIONS GIVEN TO PATIENT AND HER FRIEND.  A VERBAL UNDERSTANDING OF ALL INSTRUCTIONS WAS STATED.  PATIENT TAKING PO, VOIDING AND AMBULATING WITHOUT DIFFICULTY.  PATIENT STATES SHE IS READY TO GO HOME

## 2023-05-04 NOTE — ANESTHESIA TIME REPORT
Anesthesia Start and Stop Event Times     Date Time Event    5/4/2023 1112 Ready for Procedure     1121 Anesthesia Start     1254 Anesthesia Stop        Responsible Staff  05/04/23    Name Role Begin End    Min FREDRICK Mix M.D. Anesth 1121 1254        Overtime Reason:  no overtime (within assigned shift)    Comments:

## 2023-05-04 NOTE — ANESTHESIA PROCEDURE NOTES
Airway    Date/Time: 5/4/2023 11:32 AM  Performed by: Osman Mix M.D.  Authorized by: Osman Mix M.D.     Location:  OR  Urgency:  Elective  Indications for Airway Management:  Anesthesia      Spontaneous Ventilation: absent    Sedation Level:  Deep  Preoxygenated: Yes    Mask Difficulty Assessment:  0 - not attempted  Final Airway Type:  Supraglottic airway  Final Supraglottic Airway:  Standard LMA    SGA Size:  4  Number of Attempts at Approach:  1

## 2023-06-02 DIAGNOSIS — F41.9 ANXIETY: ICD-10-CM

## 2023-06-03 RX ORDER — ALPRAZOLAM 0.5 MG/1
0.5 TABLET ORAL 2 TIMES DAILY PRN
Qty: 60 TABLET | Refills: 1 | Status: SHIPPED | OUTPATIENT
Start: 2023-06-03 | End: 2023-06-26 | Stop reason: SDUPTHER

## 2023-06-05 ENCOUNTER — HOSPITAL ENCOUNTER (OUTPATIENT)
Dept: RADIOLOGY | Facility: MEDICAL CENTER | Age: 53
End: 2023-06-05
Attending: FAMILY MEDICINE
Payer: COMMERCIAL

## 2023-06-05 DIAGNOSIS — Z12.31 VISIT FOR SCREENING MAMMOGRAM: ICD-10-CM

## 2023-06-05 PROCEDURE — 77063 BREAST TOMOSYNTHESIS BI: CPT | Mod: 52

## 2023-06-13 NOTE — ASSESSMENT & PLAN NOTE
"Pre Assessment Chart Review      Scheduling Recommendations:    Sedation:   MAC/Deep Sedation    Location:   No facility restrictions     Pre op required?   No    Bowel prep recommendation:   Extended prep Golytely      Medication HOLDING Reminders (if applicable):  N/A    Additional information needed to verify with patient (if applicable)  N/A    ----------------------------------------------------------------------------------------------------------    Order Review:    Procedure ordered: colonoscopy/upper endoscopy    Sedation ordered: MAC/Deep Sedation    Ordering provider: Tomasa Medina PA-C    Additional comments/instructions: N/A    ----------------------------------------------------------------------------------------------------------    Cardiac Review:  No cardiac issues upon review      Pulmonary Review:  No pulmonary issues upon review.      Sleep apnea/DARRIN Review  No sleep apnea document upon review.       Stroke/TIA Review  No documentation of recent TIA/Stroke in the last 6 months.      Hepatology Review  No cirrhosis of liver or recent variceal bleed noted based on chart review only.      Renal Review:  No renal issues based on chart review only.      Diabetes?  No     Medication Review:  Medications reviewed.       Bowel Prep:  BMI: Estimated body mass index is 27.59 kg/m  as calculated from the following:    Height as of 6/9/23: 1.622 m (5' 3.86\").    Weight as of 6/9/23: 72.6 kg (160 lb).     Bowel prep recommendation: Extended prep Golytely     Reason for bowel prep recommendation: Chronic pain medication noted in chart.       Coby Rios RN  Endoscopy Procedure Pre Assessment RN         " Replete

## 2023-06-25 ENCOUNTER — PATIENT MESSAGE (OUTPATIENT)
Dept: INTERNAL MEDICINE | Facility: IMAGING CENTER | Age: 53
End: 2023-06-25
Payer: COMMERCIAL

## 2023-06-25 DIAGNOSIS — R92.2 DENSE BREASTS: ICD-10-CM

## 2023-06-25 DIAGNOSIS — R92.30 DENSE BREASTS: ICD-10-CM

## 2023-06-26 DIAGNOSIS — M25.511 CHRONIC RIGHT SHOULDER PAIN: ICD-10-CM

## 2023-06-26 DIAGNOSIS — G89.29 CHRONIC RIGHT SHOULDER PAIN: ICD-10-CM

## 2023-06-26 RX ORDER — OXYCODONE HYDROCHLORIDE AND ACETAMINOPHEN 5; 325 MG/1; MG/1
1 TABLET ORAL NIGHTLY PRN
Qty: 20 TABLET | Refills: 0 | Status: SHIPPED | OUTPATIENT
Start: 2023-06-26 | End: 2023-07-16

## 2023-07-14 ENCOUNTER — APPOINTMENT (OUTPATIENT)
Dept: INTERNAL MEDICINE | Facility: IMAGING CENTER | Age: 53
End: 2023-07-14
Payer: COMMERCIAL

## 2023-07-17 ENCOUNTER — HOSPITAL ENCOUNTER (OUTPATIENT)
Facility: MEDICAL CENTER | Age: 53
End: 2023-07-17
Attending: OBSTETRICS & GYNECOLOGY
Payer: COMMERCIAL

## 2023-07-17 DIAGNOSIS — G47.00 INSOMNIA, UNSPECIFIED TYPE: ICD-10-CM

## 2023-07-17 DIAGNOSIS — E03.9 HYPOTHYROIDISM (ACQUIRED): ICD-10-CM

## 2023-07-17 PROCEDURE — 87086 URINE CULTURE/COLONY COUNT: CPT

## 2023-07-17 PROCEDURE — 87077 CULTURE AEROBIC IDENTIFY: CPT

## 2023-07-17 PROCEDURE — 87186 SC STD MICRODIL/AGAR DIL: CPT

## 2023-07-17 RX ORDER — TRAZODONE HYDROCHLORIDE 50 MG/1
50 TABLET ORAL
Qty: 90 TABLET | Refills: 3 | Status: SHIPPED | OUTPATIENT
Start: 2023-07-17

## 2023-07-17 RX ORDER — LEVOTHYROXINE SODIUM 100 MCG
100 TABLET ORAL
Qty: 90 TABLET | Refills: 1 | Status: SHIPPED | OUTPATIENT
Start: 2023-07-17 | End: 2023-12-04

## 2023-07-28 DIAGNOSIS — F41.9 ANXIETY: ICD-10-CM

## 2023-07-28 RX ORDER — ALPRAZOLAM 0.5 MG/1
0.5 TABLET ORAL 2 TIMES DAILY PRN
Qty: 60 TABLET | Refills: 1 | Status: SHIPPED | OUTPATIENT
Start: 2023-07-28 | End: 2023-08-29 | Stop reason: SDUPTHER

## 2023-08-16 DIAGNOSIS — M25.511 CHRONIC RIGHT SHOULDER PAIN: ICD-10-CM

## 2023-08-16 DIAGNOSIS — G89.29 CHRONIC RIGHT SHOULDER PAIN: ICD-10-CM

## 2023-08-16 PROBLEM — K43.2 RECURRENT VENTRAL INCISIONAL HERNIA: Status: ACTIVE | Noted: 2022-02-08

## 2023-08-16 RX ORDER — OXYCODONE HYDROCHLORIDE AND ACETAMINOPHEN 5; 325 MG/1; MG/1
1 TABLET ORAL NIGHTLY PRN
Qty: 20 TABLET | Refills: 0 | Status: SHIPPED | OUTPATIENT
Start: 2023-08-16 | End: 2023-10-20 | Stop reason: SDUPTHER

## 2023-08-16 RX ORDER — SUCRALFATE ORAL 1 G/10ML
SUSPENSION ORAL
COMMUNITY
End: 2024-01-08

## 2023-08-24 ENCOUNTER — HOSPITAL ENCOUNTER (OUTPATIENT)
Dept: RADIOLOGY | Facility: MEDICAL CENTER | Age: 53
End: 2023-08-24
Attending: SURGERY
Payer: COMMERCIAL

## 2023-08-24 DIAGNOSIS — K43.2 RECURRENT VENTRAL HERNIA: ICD-10-CM

## 2023-08-24 PROCEDURE — 700117 HCHG RX CONTRAST REV CODE 255: Performed by: SURGERY

## 2023-08-24 PROCEDURE — 74176 CT ABD & PELVIS W/O CONTRAST: CPT

## 2023-08-24 RX ADMIN — IOHEXOL 25 ML: 240 INJECTION, SOLUTION INTRATHECAL; INTRAVASCULAR; INTRAVENOUS; ORAL at 18:28

## 2023-08-29 ENCOUNTER — OFFICE VISIT (OUTPATIENT)
Dept: INTERNAL MEDICINE | Facility: IMAGING CENTER | Age: 53
End: 2023-08-29
Payer: COMMERCIAL

## 2023-08-29 VITALS
SYSTOLIC BLOOD PRESSURE: 106 MMHG | DIASTOLIC BLOOD PRESSURE: 74 MMHG | OXYGEN SATURATION: 94 % | BODY MASS INDEX: 20.53 KG/M2 | HEIGHT: 70 IN | RESPIRATION RATE: 14 BRPM | HEART RATE: 72 BPM | TEMPERATURE: 97.3 F

## 2023-08-29 DIAGNOSIS — I48.91 ATRIAL FIBRILLATION, UNSPECIFIED TYPE (HCC): ICD-10-CM

## 2023-08-29 DIAGNOSIS — F33.42 RECURRENT MAJOR DEPRESSIVE DISORDER, IN FULL REMISSION (HCC): ICD-10-CM

## 2023-08-29 DIAGNOSIS — F13.20 SEDATIVE, HYPNOTIC OR ANXIOLYTIC DEPENDENCE, UNCOMPLICATED (HCC): ICD-10-CM

## 2023-08-29 DIAGNOSIS — F41.9 ANXIETY: ICD-10-CM

## 2023-08-29 DIAGNOSIS — R49.9 PATHOLOGIC CHANGE OF VOICE: ICD-10-CM

## 2023-08-29 PROCEDURE — 3074F SYST BP LT 130 MM HG: CPT | Performed by: FAMILY MEDICINE

## 2023-08-29 PROCEDURE — 3078F DIAST BP <80 MM HG: CPT | Performed by: FAMILY MEDICINE

## 2023-08-29 PROCEDURE — 99215 OFFICE O/P EST HI 40 MIN: CPT | Performed by: FAMILY MEDICINE

## 2023-08-29 RX ORDER — OMEPRAZOLE 40 MG/1
1 CAPSULE, DELAYED RELEASE ORAL 2 TIMES DAILY
Status: ON HOLD | COMMUNITY
End: 2023-11-27

## 2023-08-29 RX ORDER — ALPRAZOLAM 0.5 MG/1
0.5 TABLET ORAL 2 TIMES DAILY PRN
Qty: 60 TABLET | Refills: 1 | Status: SHIPPED | OUTPATIENT
Start: 2023-08-29 | End: 2023-09-29 | Stop reason: SDUPTHER

## 2023-08-29 RX ORDER — PROGESTERONE 100 MG/1
1 CAPSULE ORAL
Status: ON HOLD | COMMUNITY
End: 2023-11-27

## 2023-08-29 RX ORDER — NALOXONE HYDROCHLORIDE 4 MG/.1ML
1 SPRAY NASAL PRN
Qty: 1 EACH | Refills: 0 | Status: SHIPPED | OUTPATIENT
Start: 2023-08-29 | End: 2024-01-10

## 2023-08-30 PROBLEM — F33.42 RECURRENT MAJOR DEPRESSIVE DISORDER, IN FULL REMISSION (HCC): Status: ACTIVE | Noted: 2023-08-30

## 2023-08-30 PROBLEM — F13.20 SEDATIVE, HYPNOTIC OR ANXIOLYTIC DEPENDENCE, UNCOMPLICATED (HCC): Status: ACTIVE | Noted: 2023-08-30

## 2023-08-30 NOTE — PROGRESS NOTES
"  Subjective:     HPI:   Carie Santiago is a 53 y.o. female here  to discuss the evaluation and management of:   Chief Complaint   Patient presents with    Medication Follow-up     She reports no mood issues, no palpitations, is feeling well  She is consistent with her Xanax twice a day to help with anxiety    After her most recent surgery he reports her voice change and has not been the same  It has been several months now         Objective:     /74   Pulse 72   Temp 36.3 °C (97.3 °F)   Resp 14   Ht 1.778 m (5' 10\")   SpO2 94%   BMI 20.53 kg/m²     Physical Exam:  Physical Exam  Constitutional:       General: She is not in acute distress.     Appearance: Normal appearance. She is normal weight.   HENT:      Head: Normocephalic and atraumatic.   Eyes:      Conjunctiva/sclera: Conjunctivae normal.   Cardiovascular:      Rate and Rhythm: Normal rate.   Pulmonary:      Effort: Pulmonary effort is normal. No respiratory distress.      Breath sounds: No stridor. No wheezing, rhonchi or rales.   Musculoskeletal:      Right lower leg: No edema.      Left lower leg: No edema.   Skin:     General: Skin is warm and dry.   Neurological:      General: No focal deficit present.      Mental Status: She is alert.   Psychiatric:         Mood and Affect: Mood normal.         Behavior: Behavior normal.            Assessment and Plan:     The following treatment plan was discussed/researched:  1. Anxiety  Obtained and reviewed patient utilization report from [facility] pharmacy database    prior to writing prescription for controlled substance II, III or IV per [place] bill . Based on assessment of the report, the prescription is medically necessary.     - ALPRAZolam (XANAX) 0.5 MG Tab; Take 1 Tablet by mouth 2 times a day as needed for Anxiety for up to 30 days.  Dispense: 60 Tablet; Refill: 1    2. Pathologic change of voice     - Referral to ENT    3. Recurrent major depressive disorder, in full " remission (HCC)   Stable     4. Sedative, hypnotic or anxiolytic dependence, uncomplicated (HCC)   Stable     5. Atrial fibrillation, unspecified type (HCC)  Episodic, likely resolved          Medical Decision Making: Today's Assessment/Status/Plan:            HCC Gap Form    Diagnosis to address: F33.9 - Major depressive disorder, recurrent, unspecified (HCC)  Assessment and plan: Chronic, stable. Continue with current defined treatment plan: mood is stable. Follow-up at least annually.  Diagnosis: I48.91 - Unspecified atrial fibrillation (HCC)  Assessment and plan: episodic, resolved. Continue with current defined treatment plan: monitor for sx. Follow-up at least annually.  Diagnosis: F13.20 - Sedative, hypnotic or anxiolytic dependence, uncomplicated (HCC)  Assessment and plan: Chronic, stable. Continue with current defined treatment plan: uses xanax bid . Follow-up at least annually.  Last edited 08/30/23 12:39 PDT by Catia Kelley M.D.                                                                                                                                                                             Any change or worsening of signs or symptoms, patient encouraged to follow-up or report to emergency room for further evaluation. Patient verbalizes understanding and agrees.    Follow-Up: No follow-ups on file.      PLEASE NOTE: This dictation was created using voice recognition software. I have made every reasonable attempt to correct obvious errors, but I expect that there are errors of grammar and possibly content that I did not discover before finalizing the note.      My total time spent caring for the patient on the day of the encounter was  greater than 40 minutes.   This includes obtaining history, reviewing chart, physical exam, patient education, reviewing outside records, placing orders, interpreting tests and coordinating care.

## 2023-09-08 ENCOUNTER — TELEPHONE (OUTPATIENT)
Dept: INTERNAL MEDICINE | Facility: IMAGING CENTER | Age: 53
End: 2023-09-08
Payer: COMMERCIAL

## 2023-09-08 DIAGNOSIS — R05.9 COUGH: ICD-10-CM

## 2023-09-08 RX ORDER — FLUCONAZOLE 100 MG/1
100 TABLET ORAL DAILY
Qty: 7 TABLET | Refills: 0 | Status: SHIPPED | OUTPATIENT
Start: 2023-09-08 | End: 2023-09-15

## 2023-09-08 RX ORDER — BENZONATATE 200 MG/1
200 CAPSULE ORAL 3 TIMES DAILY PRN
Qty: 30 CAPSULE | Refills: 0 | Status: SHIPPED | OUTPATIENT
Start: 2023-09-08 | End: 2024-01-10

## 2023-09-08 RX ORDER — AMOXICILLIN AND CLAVULANATE POTASSIUM 875; 125 MG/1; MG/1
1 TABLET, FILM COATED ORAL 2 TIMES DAILY
Qty: 20 TABLET | Refills: 0 | Status: SHIPPED | OUTPATIENT
Start: 2023-09-08 | End: 2023-09-18

## 2023-09-13 ENCOUNTER — TELEPHONE (OUTPATIENT)
Dept: INTERNAL MEDICINE | Facility: IMAGING CENTER | Age: 53
End: 2023-09-13
Payer: COMMERCIAL

## 2023-09-13 NOTE — TELEPHONE ENCOUNTER
Patient is on day #5 of Augmentin for sinusitis.  She reports that her cough & sore throat have resolved significantly but she still has lots of head congestion, green/brown nasal discharge, sinus headaches, and bilateral ear pain L > R.  She feels that she needs to switch antibiotics.  She has an antibiotic allergy to Sulfa.

## 2023-09-29 DIAGNOSIS — F41.9 ANXIETY: ICD-10-CM

## 2023-09-29 RX ORDER — ALPRAZOLAM 0.5 MG/1
0.5 TABLET ORAL 2 TIMES DAILY PRN
Qty: 60 TABLET | Refills: 1 | Status: SHIPPED | OUTPATIENT
Start: 2023-09-29 | End: 2023-11-09 | Stop reason: SDUPTHER

## 2023-09-29 RX ORDER — ALPRAZOLAM 0.5 MG/1
0.5 TABLET ORAL 2 TIMES DAILY PRN
Qty: 60 TABLET | Refills: 1 | Status: SHIPPED | OUTPATIENT
Start: 2023-09-29 | End: 2023-09-29 | Stop reason: SDUPTHER

## 2023-10-20 ENCOUNTER — PATIENT MESSAGE (OUTPATIENT)
Dept: INTERNAL MEDICINE | Facility: IMAGING CENTER | Age: 53
End: 2023-10-20
Payer: COMMERCIAL

## 2023-10-20 DIAGNOSIS — M25.511 CHRONIC RIGHT SHOULDER PAIN: ICD-10-CM

## 2023-10-20 DIAGNOSIS — G89.29 CHRONIC RIGHT SHOULDER PAIN: ICD-10-CM

## 2023-10-21 RX ORDER — OXYCODONE HYDROCHLORIDE AND ACETAMINOPHEN 5; 325 MG/1; MG/1
1 TABLET ORAL NIGHTLY PRN
Qty: 20 TABLET | Refills: 0 | Status: SHIPPED | OUTPATIENT
Start: 2023-10-21 | End: 2024-02-23 | Stop reason: SDUPTHER

## 2023-11-09 DIAGNOSIS — F41.9 ANXIETY: ICD-10-CM

## 2023-11-09 DIAGNOSIS — Z87.891 FORMER TOBACCO USE: ICD-10-CM

## 2023-11-09 RX ORDER — ALPRAZOLAM 0.5 MG/1
0.5 TABLET ORAL 2 TIMES DAILY PRN
Qty: 60 TABLET | Refills: 1 | Status: SHIPPED | OUTPATIENT
Start: 2023-11-09 | End: 2023-12-09

## 2023-11-09 RX ORDER — NITROFURANTOIN 25; 75 MG/1; MG/1
100 CAPSULE ORAL
Qty: 30 CAPSULE | Refills: 1 | Status: SHIPPED | OUTPATIENT
Start: 2023-11-09 | End: 2024-01-06 | Stop reason: SDUPTHER

## 2023-11-22 ENCOUNTER — PRE-ADMISSION TESTING (OUTPATIENT)
Dept: ADMISSIONS | Facility: MEDICAL CENTER | Age: 53
End: 2023-11-22
Attending: SURGERY
Payer: COMMERCIAL

## 2023-11-27 ENCOUNTER — PHARMACY VISIT (OUTPATIENT)
Dept: PHARMACY | Facility: MEDICAL CENTER | Age: 53
End: 2023-11-27
Payer: COMMERCIAL

## 2023-11-27 ENCOUNTER — ANESTHESIA (OUTPATIENT)
Dept: SURGERY | Facility: MEDICAL CENTER | Age: 53
End: 2023-11-27
Payer: COMMERCIAL

## 2023-11-27 ENCOUNTER — HOSPITAL ENCOUNTER (OUTPATIENT)
Facility: MEDICAL CENTER | Age: 53
End: 2023-11-27
Attending: SURGERY | Admitting: SURGERY
Payer: COMMERCIAL

## 2023-11-27 ENCOUNTER — ANESTHESIA EVENT (OUTPATIENT)
Dept: SURGERY | Facility: MEDICAL CENTER | Age: 53
End: 2023-11-27
Payer: COMMERCIAL

## 2023-11-27 VITALS
OXYGEN SATURATION: 97 % | HEIGHT: 70 IN | DIASTOLIC BLOOD PRESSURE: 67 MMHG | BODY MASS INDEX: 19.69 KG/M2 | RESPIRATION RATE: 18 BRPM | HEART RATE: 65 BPM | WEIGHT: 137.57 LBS | TEMPERATURE: 97.4 F | SYSTOLIC BLOOD PRESSURE: 115 MMHG

## 2023-11-27 DIAGNOSIS — G89.18 ACUTE POST-OPERATIVE PAIN: ICD-10-CM

## 2023-11-27 PROBLEM — D17.9 LIPOMA: Status: ACTIVE | Noted: 2023-11-27

## 2023-11-27 PROCEDURE — 160009 HCHG ANES TIME/MIN: Performed by: SURGERY

## 2023-11-27 PROCEDURE — 700101 HCHG RX REV CODE 250: Performed by: SURGERY

## 2023-11-27 PROCEDURE — 700105 HCHG RX REV CODE 258: Performed by: SURGERY

## 2023-11-27 PROCEDURE — A9270 NON-COVERED ITEM OR SERVICE: HCPCS | Performed by: ANESTHESIOLOGY

## 2023-11-27 PROCEDURE — 160025 RECOVERY II MINUTES (STATS): Performed by: SURGERY

## 2023-11-27 PROCEDURE — 700102 HCHG RX REV CODE 250 W/ 637 OVERRIDE(OP): Performed by: ANESTHESIOLOGY

## 2023-11-27 PROCEDURE — 160048 HCHG OR STATISTICAL LEVEL 1-5: Performed by: SURGERY

## 2023-11-27 PROCEDURE — 700101 HCHG RX REV CODE 250: Performed by: ANESTHESIOLOGY

## 2023-11-27 PROCEDURE — 160028 HCHG SURGERY MINUTES - 1ST 30 MINS LEVEL 3: Performed by: SURGERY

## 2023-11-27 PROCEDURE — 88304 TISSUE EXAM BY PATHOLOGIST: CPT

## 2023-11-27 PROCEDURE — 160039 HCHG SURGERY MINUTES - EA ADDL 1 MIN LEVEL 3: Performed by: SURGERY

## 2023-11-27 PROCEDURE — 160002 HCHG RECOVERY MINUTES (STAT): Performed by: SURGERY

## 2023-11-27 PROCEDURE — RXMED WILLOW AMBULATORY MEDICATION CHARGE: Performed by: SURGERY

## 2023-11-27 PROCEDURE — 160046 HCHG PACU - 1ST 60 MINS PHASE II: Performed by: SURGERY

## 2023-11-27 PROCEDURE — 160035 HCHG PACU - 1ST 60 MINS PHASE I: Performed by: SURGERY

## 2023-11-27 PROCEDURE — 700111 HCHG RX REV CODE 636 W/ 250 OVERRIDE (IP): Performed by: ANESTHESIOLOGY

## 2023-11-27 RX ORDER — DEXAMETHASONE SODIUM PHOSPHATE 4 MG/ML
INJECTION, SOLUTION INTRA-ARTICULAR; INTRALESIONAL; INTRAMUSCULAR; INTRAVENOUS; SOFT TISSUE PRN
Status: DISCONTINUED | OUTPATIENT
Start: 2023-11-27 | End: 2023-11-27 | Stop reason: SURG

## 2023-11-27 RX ORDER — EPHEDRINE SULFATE 50 MG/ML
INJECTION, SOLUTION INTRAVENOUS PRN
Status: DISCONTINUED | OUTPATIENT
Start: 2023-11-27 | End: 2023-11-27 | Stop reason: SURG

## 2023-11-27 RX ORDER — HALOPERIDOL 5 MG/ML
1 INJECTION INTRAMUSCULAR
Status: DISCONTINUED | OUTPATIENT
Start: 2023-11-27 | End: 2023-11-27 | Stop reason: HOSPADM

## 2023-11-27 RX ORDER — OXYCODONE HYDROCHLORIDE 5 MG/1
5 TABLET ORAL EVERY 4 HOURS PRN
Qty: 18 TABLET | Refills: 0 | Status: SHIPPED | OUTPATIENT
Start: 2023-11-27 | End: 2023-11-30

## 2023-11-27 RX ORDER — ONDANSETRON 2 MG/ML
INJECTION INTRAMUSCULAR; INTRAVENOUS PRN
Status: DISCONTINUED | OUTPATIENT
Start: 2023-11-27 | End: 2023-11-27 | Stop reason: SURG

## 2023-11-27 RX ORDER — DIPHENHYDRAMINE HYDROCHLORIDE 50 MG/ML
12.5 INJECTION INTRAMUSCULAR; INTRAVENOUS
Status: DISCONTINUED | OUTPATIENT
Start: 2023-11-27 | End: 2023-11-27 | Stop reason: HOSPADM

## 2023-11-27 RX ORDER — MIDAZOLAM HYDROCHLORIDE 1 MG/ML
INJECTION INTRAMUSCULAR; INTRAVENOUS PRN
Status: DISCONTINUED | OUTPATIENT
Start: 2023-11-27 | End: 2023-11-27 | Stop reason: SURG

## 2023-11-27 RX ORDER — SODIUM CHLORIDE, SODIUM LACTATE, POTASSIUM CHLORIDE, CALCIUM CHLORIDE 600; 310; 30; 20 MG/100ML; MG/100ML; MG/100ML; MG/100ML
INJECTION, SOLUTION INTRAVENOUS CONTINUOUS
Status: DISCONTINUED | OUTPATIENT
Start: 2023-11-27 | End: 2023-11-27

## 2023-11-27 RX ORDER — SODIUM CHLORIDE, SODIUM LACTATE, POTASSIUM CHLORIDE, CALCIUM CHLORIDE 600; 310; 30; 20 MG/100ML; MG/100ML; MG/100ML; MG/100ML
INJECTION, SOLUTION INTRAVENOUS CONTINUOUS
Status: DISCONTINUED | OUTPATIENT
Start: 2023-11-27 | End: 2023-11-27 | Stop reason: HOSPADM

## 2023-11-27 RX ORDER — LIDOCAINE HYDROCHLORIDE 20 MG/ML
INJECTION, SOLUTION EPIDURAL; INFILTRATION; INTRACAUDAL; PERINEURAL PRN
Status: DISCONTINUED | OUTPATIENT
Start: 2023-11-27 | End: 2023-11-27 | Stop reason: SURG

## 2023-11-27 RX ORDER — OXYCODONE HCL 5 MG/5 ML
5 SOLUTION, ORAL ORAL
Status: COMPLETED | OUTPATIENT
Start: 2023-11-27 | End: 2023-11-27

## 2023-11-27 RX ORDER — BUPIVACAINE HYDROCHLORIDE AND EPINEPHRINE 5; 5 MG/ML; UG/ML
INJECTION, SOLUTION EPIDURAL; INTRACAUDAL; PERINEURAL
Status: DISCONTINUED | OUTPATIENT
Start: 2023-11-27 | End: 2023-11-27 | Stop reason: HOSPADM

## 2023-11-27 RX ORDER — OXYCODONE HCL 5 MG/5 ML
10 SOLUTION, ORAL ORAL
Status: COMPLETED | OUTPATIENT
Start: 2023-11-27 | End: 2023-11-27

## 2023-11-27 RX ORDER — CEFAZOLIN SODIUM 1 G/3ML
INJECTION, POWDER, FOR SOLUTION INTRAMUSCULAR; INTRAVENOUS PRN
Status: DISCONTINUED | OUTPATIENT
Start: 2023-11-27 | End: 2023-11-27 | Stop reason: SURG

## 2023-11-27 RX ADMIN — FENTANYL CITRATE 50 MCG: 50 INJECTION, SOLUTION INTRAMUSCULAR; INTRAVENOUS at 13:46

## 2023-11-27 RX ADMIN — CEFAZOLIN 2 G: 1 INJECTION, POWDER, FOR SOLUTION INTRAMUSCULAR; INTRAVENOUS at 13:50

## 2023-11-27 RX ADMIN — ONDANSETRON 2 MG: 2 INJECTION INTRAMUSCULAR; INTRAVENOUS at 14:14

## 2023-11-27 RX ADMIN — PROPOFOL 50 MG: 10 INJECTION, EMULSION INTRAVENOUS at 13:48

## 2023-11-27 RX ADMIN — DEXAMETHASONE SODIUM PHOSPHATE 8 MG: 4 INJECTION INTRA-ARTICULAR; INTRALESIONAL; INTRAMUSCULAR; INTRAVENOUS; SOFT TISSUE at 14:13

## 2023-11-27 RX ADMIN — LIDOCAINE HYDROCHLORIDE 80 MG: 20 INJECTION, SOLUTION EPIDURAL; INFILTRATION; INTRACAUDAL at 13:48

## 2023-11-27 RX ADMIN — MIDAZOLAM 2 MG: 1 INJECTION, SOLUTION INTRAMUSCULAR; INTRAVENOUS at 13:46

## 2023-11-27 RX ADMIN — PROPOFOL 200 MG: 10 INJECTION, EMULSION INTRAVENOUS at 13:46

## 2023-11-27 RX ADMIN — SODIUM CHLORIDE, POTASSIUM CHLORIDE, SODIUM LACTATE AND CALCIUM CHLORIDE: 600; 310; 30; 20 INJECTION, SOLUTION INTRAVENOUS at 13:42

## 2023-11-27 RX ADMIN — EPHEDRINE SULFATE 25 MG: 50 INJECTION INTRAVENOUS at 13:54

## 2023-11-27 RX ADMIN — OXYCODONE HYDROCHLORIDE 5 MG: 5 SOLUTION ORAL at 15:39

## 2023-11-27 ASSESSMENT — PAIN DESCRIPTION - PAIN TYPE
TYPE: SURGICAL PAIN

## 2023-11-27 ASSESSMENT — FIBROSIS 4 INDEX: FIB4 SCORE: 1.13

## 2023-11-27 ASSESSMENT — PAIN SCALES - GENERAL: PAIN_LEVEL: 2

## 2023-11-27 NOTE — ANESTHESIA TIME REPORT
Anesthesia Start and Stop Event Times       Date Time Event    11/27/2023 1341 Ready for Procedure     1342 Anesthesia Start     1433 Anesthesia Stop          Responsible Staff  11/27/23      Name Role Begin End    Ivanna Walker M.D. Anesth 1342 1433          Overtime Reason:  no overtime (within assigned shift)    Comments:

## 2023-11-27 NOTE — DISCHARGE INSTRUCTIONS
HOME CARE INSTRUCTIONS    ACTIVITY: Rest and take it easy for the first 24 hours.  A responsible adult is recommended to remain with you during that time.  It is normal to feel sleepy.  We encourage you to not do anything that requires balance, judgment or coordination.    FOR 24 HOURS DO NOT:  Drive, operate machinery or run household appliances.  Drink beer or alcoholic beverages.  Make important decisions or sign legal documents.    SPECIAL INSTRUCTIONS: 1. DIET: Upon discharge from the hospital you may resume your normal preoperative diet. Depending on how you are feeling and whether you have nausea or not, you may wish to stay with a bland diet for the first few days. However, you can advance this as quickly as you feel ready.     2. ACTIVITIES: After discharge from the hospital, you may resume full routine activities. However, there should be no heavy lifting (greater than 15 pounds) and no strenuous activities until after your follow-up visit. Otherwise, routine activities of daily living are acceptable.     3. DRIVING: You may drive whenever you are off pain medications and are able to perform the activities needed to drive, i.e. turning, bending, twisting, etc.     4. BATHING: You may get the wound wet at any time after leaving the hospital. You may shower, but do not submerge in a bath for at least a week. Dressings may come off after 48 hours, but will peel off by themselves in the next 7-10 days.     5. BOWEL FUNCTION: Constipation is common after an operation, especially with pain medications. The combination of pain medication and decreased activity level can cause constipation in otherwise normal patients. If you feel this is occurring, take a laxative (Milk of Magnesia, Ex-Lax, Senokot, etc.) until the problem has resolved.     6. PAIN MEDICATION: You will be given a prescription for pain medication at discharge. Please take these as directed. It is important to remember not to take medications on an  empty stomach as this may cause nausea.     7. LAPAROSCOPIC SURGERY: Many people have pain in the neck or shoulder after laparoscopic surgery from the pressure of the gas used in your abdomen during the procedure. This can last 2-3 days after surgery. Going for short walks around the house can help with this pain, but narcotic pain medication doesn't usually help very much.     8.CALL IF YOU HAVE: (1) Fevers to more than 101F, (2) Unusual chest or leg pain, (3) Drainage or fluid from incision that may be foul smelling, increased tenderness or soreness at the wound or the wound edges are no longer together, redness or swelling at the incision site. Please do not hesitate to call with any other questions.     9. APPOINTMENT: Contact our office at [phone] for a follow-up appointment in 1 week following your procedure. If you have any additional questions, please do not hesitate to call the office and speak to either myself or the physician on call. Office address: [address] [place] [address] B Nancie Kemp MD [facility] [phone] NOTICE: This order will  in 24 hours.     DIET: To avoid nausea, slowly advance diet as tolerated, avoiding spicy or greasy foods for the first day.  Add more substantial food to your diet according to your physician's instructions.  Babies can be fed formula or breast milk as soon as they are hungry.  INCREASE FLUIDS AND FIBER TO AVOID CONSTIPATION.    SURGICAL DRESSING/BATHING: SEE ABOVE    MEDICATIONS: Resume taking daily medication.  Take prescribed pain medication with food.  If no medication is prescribed, you may take non-aspirin pain medication if needed.  PAIN MEDICATION CAN BE VERY CONSTIPATING.  Take a stool softener or laxative such as senokot, pericolace, or milk of magnesia if needed.    Prescription given for OXYCODONE.  Last pain medication given at MAY TAKE ANYTIME.    A follow-up appointment should be arranged with your doctor in 1-2 WEEKS; call  to schedule.    You should CALL YOUR PHYSICIAN if you develop:  Fever greater than 101 degrees F.  Pain not relieved by medication, or persistent nausea or vomiting.  Excessive bleeding (blood soaking through dressing) or unexpected drainage from the wound.  Extreme redness or swelling around the incision site, drainage of pus or foul smelling drainage.  Inability to urinate or empty your bladder within 8 hours.  Problems with breathing or chest pain.    You should call 911 if you develop problems with breathing or chest pain.  If you are unable to contact your doctor or surgical center, you should go to the nearest emergency room or urgent care center.  Physician's telephone #: [phone]    MILD FLU-LIKE SYMPTOMS ARE NORMAL.  YOU MAY EXPERIENCE GENERALIZED MUSCLE ACHES, THROAT IRRITATION, HEADACHE AND/OR SOME NAUSEA.    If any questions arise, call your doctor.  If your doctor is not available, please feel free to call the Surgical Center at [phone].  The Center is open Monday through Friday from 7AM to 7PM.      A registered nurse may call you a few days after your surgery to see how you are doing after your procedure.    You may also receive a survey in the mail within the next two weeks and we ask that you take a few moments to complete the survey and return it to us.  Our goal is to provide you with very good care and we value your comments.     Depression / Suicide Risk    As you are discharged from this [facility] facility, it is important to learn how to keep safe from harming yourself.    Recognize the warning signs:  Abrupt changes in personality, positive or negative- including increase in energy   Giving away possessions  Change in eating patterns- significant weight changes-  positive or negative  Change in sleeping patterns- unable to sleep or sleeping all the time   Unwillingness or inability to communicate  Depression  Unusual sadness, discouragement and loneliness  Talk of wanting to  die  Neglect of personal appearance   Rebelliousness- reckless behavior  Withdrawal from people/activities they love  Confusion- inability to concentrate     If you or a loved one observes any of these behaviors or has concerns about self-harm, here's what you can do:  Talk about it- your feelings and reasons for harming yourself  Remove any means that you might use to hurt yourself (examples: pills, rope, extension cords, firearm)  Get professional help from the community (Mental Health, Substance Abuse, psychological counseling)  Do not be alone:Call your Safe Contact- someone whom you trust who will be there for you.  Call your local CRISIS HOTLINE 331-7217 or 290-997-6186  Call your local Children's Mobile Crisis Response Team [place] (126) 497-9357 or www.[facility]  Call the toll free National Suicide Prevention Hotlines   National Suicide Prevention Lifeline 790-376-BBAZ (8293)  [employer] 800-SUICIDE (468-6874)    I acknowledge receipt and understanding of these Home Care instructions.

## 2023-11-27 NOTE — ANESTHESIA PREPROCEDURE EVALUATION
Case: [id number] Date/Time: 11/27/23 1415    Procedure: EXCISION OF LEFT LOWER ABDOMINAL WALL MASS 8CM    Pre-op diagnosis: Lipoma of skin and subcutaneous tissue of trun    Location: [facility] OR 04 / SURGERY [facility]    Surgeons: Nancie Watkins M.D.            Relevant Problems   CARDIAC   (positive) Atrial fibrillation, unspecified type (HCC)      ENDO   (positive) Hypothyroid       Physical Exam    Airway   Mallampati: I  TM distance: >3 FB       Cardiovascular   Rhythm: regular  Rate: normal     Dental - normal exam           Pulmonary   Breath sounds clear to auscultation     Abdominal - normal exam     Neurological                Anesthesia Plan    ASA 2       Plan - general       Airway plan will be LMA          Induction: intravenous    Postoperative Plan: Postoperative administration of opioids is intended.    Pertinent diagnostic labs and testing reviewed    Informed Consent:    Anesthetic plan and risks discussed with patient.    Use of blood products discussed with: whom consented to blood products.

## 2023-11-27 NOTE — ANESTHESIA PROCEDURE NOTES
Airway    Date/Time: 11/27/2023 1:50 PM    Performed by: Ivanna Walker M.D.  Authorized by: Ivanna Walker M.D.    Location:  OR  Urgency:  Elective  Difficult Airway: No    Indications for Airway Management:  Anesthesia      Spontaneous Ventilation: absent    Sedation Level:  Deep  Preoxygenated: Yes    Patient Position:  Sniffing  MILS Maintained Throughout: No    Mask Difficulty Assessment:  0 - not attempted  Final Airway Type:  Supraglottic airway  Final Supraglottic Airway:  Standard LMA    SGA Size:  3  Number of Attempts at Approach:  1

## 2023-11-27 NOTE — H&P
Surgery General History & Physical Note    Date  11/27/2023    Primary Care Physician  Catia Kelley M.D.    CC  Left abdominal wall lipoma    HPI  This is a 53 y.o. female who presented with a left abdominal wall mass. It is painful and she desires excision.     Past Medical History:   Diagnosis Date          GARRETT (acute kidney injury) ([facility])     Anesthesia Always    Awaken very quickly patricia medication is administered.    Anxiety and depression 01/23/2019    Atrial fibrillation (HCC)     1x episode with COVID    Awareness under anesthesia 17 yrs ago    Woke up on table just after procedure    Breast cancer DCIS, left (June 2022) 06/23/2022    Total mastectomy left August 2022    Cancer (HCC) July 2022    Breast cancer    Colon polyp 03/29/2019    Colonoscopy March 2019: 3mm descending colon polyp    COVID-19     H/O    COVID-19     H/O 2nd time 2-2022    Diverticulosis of colon 01/23/2019    MRI abdomen Jan. 2019    Environmental and seasonal allergies     Generalized anxiety disorder     H/O cervical fracture     x2    History of alcohol abuse 01/17/2019    Humerus fracture     Right    Hypothyroid     Menopause     Ovarian cyst     Pain 08/09/2022    right shoulder, left breast, 8/10    Thrombocytopenia (HCC)     Umbilical hernia     X3    Vitamin B12 deficiency 01/23/2019       Past Surgical History:   Procedure Laterality Date    NIPPLE RECONSTRUCTION  5/4/2023    Procedure: LEFT NIPPLE AREOLAR RECONSTRUCTION WITH A FULL THICKNESS SKIN GRAFT, DERMAL GRAFTS, REVISION OF RECONSTRUCTED BREASTS, LEFT BREAST IMPLANT EXCHANGE WITH REVISION AND CAPSULORRHAPHY;  Surgeon: Paolo Travis M.D.;  Location: SURGERY SAME DAY [facility];  Service: Plastics    FULL THICKNESS SKIN GRAFT  5/4/2023    Procedure: APPLICATION, GRAFT, SKIN, FULL-THICKNESS;  Surgeon: Paolo Travis M.D.;  Location: SURGERY SAME DAY [facility];  Service: Plastics    BREAST RECONSTRUCTION  5/4/2023    Procedure: RECONSTRUCTION, BREAST;  Surgeon:  Paolo Travis M.D.;  Location: SURGERY SAME DAY [facility];  Service: Plastics    BREAST IMPLANT REVISION  5/4/2023    Procedure: INSERTION, IMPLANT, BREAST;  Surgeon: Paolo Travis M.D.;  Location: SURGERY SAME DAY [facility];  Service: Plastics    SD SUSPENSION OF BREAST Right 11/3/2022    Procedure: MASTOPEXY;  Surgeon: Paolo Travis M.D.;  Location: SURGERY SAME DAY [facility];  Service: Plastics    BREAST RECONSTRUCTION Bilateral 11/3/2022    Procedure: LEFT BREAST RECONSTRUCTION WITH REMOVAL OF TISSUE EXPANDER AND PLACEMENT OF IMPLANT. REVISION WITH DERMAL GLADULAR FLAPS AND CAPSULORRHAPHY.  RIGHT BREAST IMPLANT EXCHANGE WITH MASTOPEXY AND CAPSULORRHAPHY;  Surgeon: Paolo Travis M.D.;  Location: SURGERY SAME DAY [facility];  Service: Plastics    TISSUE EXPANDER PLACE/REMOVE Left 11/3/2022    Procedure: REMOVAL, TISSUE EXPANDER;  Surgeon: Paolo Travis M.D.;  Location: SURGERY SAME DAY [facility];  Service: Plastics    BREAST IMPLANT REVISION Bilateral 11/3/2022    Procedure: INSERTION, IMPLANT, BREAST;  Surgeon: Paolo Travis M.D.;  Location: SURGERY SAME DAY [facility];  Service: Plastics    SD INTRAOP ID SENTINEL NODE Left 08/18/2022    Procedure: IDENTIFICATION,LYMPH NODE,SENTINEL,INTRAOPERATIVE,USING DYE INJECTION;  Surgeon: Hiral Rock M.D.;  Location: SURGERY SAME DAY [facility];  Service: General    BREAST RECONSTRUCTION Left 08/18/2022    Procedure: LEFT BREAST RECONSTRUCTION WITH PLACEMENT OF TISSUE EXPANDER WITH USE OF ACELLULAR DERMAL MATRIX;  Surgeon: Paolo Travis M.D.;  Location: SURGERY SAME DAY [facility];  Service: General    TISSUE EXPANDER PLACE/REMOVE Left 08/18/2022    Procedure: INSERTION OR REMOVAL, TISSUE EXPANDER;  Surgeon: Paolo Travis M.D.;  Location: SURGERY SAME DAY [facility];  Service: General    MASTECTOMY Left 08/18/2022    Procedure: LEFT TOTAL MASTECTOMY LEFT SENTINEL LYMOH NODE INJECTION WITH EXCISION OF AXILLARY NODES;  Surgeon: Hiral Rock  M.D.;  Location: SURGERY SAME DAY [facility];  Service: General    NODE BIOPSY SENTINEL Left 08/18/2022    Procedure: BIOPSY, LYMPH NODE, SENTINEL;  Surgeon: Hiral Rock M.D.;  Location: SURGERY SAME DAY [facility];  Service: General    PB MASTECTOMY, PARTIAL Left 07/19/2022    Procedure: WIRE LOCALIZED LEFT PARTIAL MASTECTOMY;  Surgeon: Hiral Rock M.D.;  Location: SURGERY SAME DAY [facility];  Service: General    ABDOMINOPLASTY  04/14/2022    Procedure: ABDOMINOPLASTY;  Surgeon: Paolo Travis M.D.;  Location: SURGERY SAME DAY [facility];  Service: Plastics    EAR RECONSTRUCTION Bilateral 04/14/2022    Procedure: RECONSTRUCTION, EAR;  Surgeon: Paolo Travis M.D.;  Location: SURGERY SAME DAY [facility];  Service: Plastics    SCAR REVISION  04/14/2022    Procedure: REVISION, SCAR - TO CHIN;  Surgeon: Paolo Travis M.D.;  Location: SURGERY SAME DAY [facility];  Service: Plastics    VENTRAL HERNIA REPAIR  04/14/2022    Procedure: REPAIR, HERNIA, VENTRAL;  Surgeon: Hiral Rock M.D.;  Location: SURGERY SAME DAY [facility];  Service: Plastics    NJ LAP,DIAGNOSTIC ABDOMEN  08/03/2021    Procedure: PELVISCOPY - WITH PELVIC WASHINGS, EXCISION OF RIGHT OVARIAN  MASS;  Surgeon: Fabrice Camejo D.O.;  Location: SURGERY SAME DAY [facility];  Service: Obstetrics    SALPINGO OOPHORECTOMY Bilateral 08/03/2021    Procedure: SALPINGO-OOPHORECTOMY.;  Surgeon: Fabrice Camejo D.O.;  Location: SURGERY SAME DAY [facility];  Service: Obstetrics    NJ UPPER GI ENDOSCOPY,DIAGNOSIS N/A 02/12/2021    Procedure: GASTROSCOPY;  Surgeon: Kasi Chan M.D.;  Location: SURGERY SAME DAY [facility];  Service: Gastroenterology    NJ UPPER GI ENDOSCOPY,BIOPSY N/A 02/12/2021    Procedure: GASTROSCOPY, WITH BIOPSY;  Surgeon: Kasi Chan M.D.;  Location: SURGERY SAME DAY [facility];  Service: Gastroenterology    WOUND CLOSURE ORTHO Left 09/24/2019    Procedure: CLOSURE, WOUND, ORTHO - LEG W/WOUND VAC REMOVAL;  Surgeon: Paolo Odell M.D.;   Location: SURGERY [facility];  Service: Orthopedics    IRRIGATION & DEBRIDEMENT ORTHO Left 2019    Procedure: IRRIGATION AND DEBRIDEMENT, WOUND - FOR PROXIMAL TIBIA HEMATOMA EVACUATION AND WOUND VAC APPLICATION;  Surgeon: Paolo Odell M.D.;  Location: SURGERY [facility];  Service: Orthopedics    ROBOTIC LAPAROSCOPIC CHOLECYSTECTOMY  2017    OTHER ORTHOPEDIC SURGERY Right 2017    kNEE    UMBILICAL HERNIA REPAIR  2017    INGUINAL HERNIA REPAIR BILATERAL  1999    with Mesh    TONSILLECTOMY AND ADENOIDECTOMY      APPENDECTOMY      BONE GRAFT      CHOLECYSTECTOMY      GYN SURGERY  1 yr    Tubes and ovaries    MAMMOPLASTY AUGMENTATION Bilateral     MAMMOPLASTY REDUCTION      OTHER  1 yr    Augmentation    UMBILICAL HERNIA REPAIR      VENTRAL HERNIA REPAIR         No current facility-administered medications for this encounter.       Social History     Socioeconomic History    Marital status:      Spouse name: Not on file    Number of children: Not on file    Years of education: Not on file    Highest education level: Bachelor's degree (e.g., BA, AB, BS)   Occupational History    Not on file   Tobacco Use    Smoking status: Former     Current packs/day: 0.00     Types: Cigarettes     Quit date: 3/1/2021     Years since quittin.7    Smokeless tobacco: Never   Vaping Use    Vaping Use: Never used   Substance and Sexual Activity    Alcohol use: Not Currently     Comment: Pt. states quit drinking 2021    Drug use: Never    Sexual activity: Not on file   Other Topics Concern    Not on file   Social History Narrative    No longer drinks  alcohol    Lives alone    With a purse  w/ travel to [place]     for 20 years    2 grown sons     Social Determinants of Health     Financial Resource Strain: Low Risk  (2022)    Overall Financial Resource Strain (CARDIA)     Difficulty of Paying Living Expenses: Not very hard   Food Insecurity: No Food Insecurity (2022)    Hunger  Vital Sign     Worried About Running Out of Food in the Last Year: Never true     Ran Out of Food in the Last Year: Never true   Transportation Needs: No Transportation Needs (4/11/2022)    PRAPARE - Transportation     Lack of Transportation (Medical): No     Lack of Transportation (Non-Medical): No   Physical Activity: Insufficiently Active (4/11/2022)    Exercise Vital Sign     Days of Exercise per Week: 2 days     Minutes of Exercise per Session: 20 min   Stress: No Stress Concern Present (4/11/2022)    Citizen of the [place] [employer] Occup[employer] - Occupational Stress Questionnaire     Feeling of Stress : Not at all   Social Connections: Moderately Integrated (4/11/2022)    Social Connection and Isolation Panel [NHANES]     Frequency of Communication with Friends and Family: More than three times a week     Frequency of Social Gatherings with Friends and Family: More than three times a week     Attends Caodaism Services: More than 4 times per year     Active Member of Clubs or Organizations: Yes     Attends Club or Organization Meetings: More than 4 times per year     Marital Status:    Intimate Partner Violence: Not on file   Housing Stability: Low Risk  (4/11/2022)    Housing Stability Vital Sign     Unable to Pay for Housing in the Last Year: No     Number of Places Lived in the Last Year: 2     Unstable Housing in the Last Year: No       Family History   Problem Relation Age of Onset    Heart Disease Mother         A-Fib    Thyroid Mother     Cancer Mother         Melanoma    Heart Disease Brother     Diabetes Son     Diabetes Son         type 1 for 22 yrs    Dementia Neg Hx     Colon Cancer Neg Hx     Breast Cancer Neg Hx        Allergies  Meloxicam, Sulfa drugs, and Zofran [dextrose-ondansetron]    Review of Systems  General: No weight changes or fatigue   HEENT: No changes in vision or trouble swallowing  CV: No chest pain or palpitations  Pulm: No shortness of breath or cough  GI: As above in HPI,  no melena or hematochezia, no hematemesis  : No dysuria or hematuria  MSK: No joint or muscle pain  Skin: No new rashes or lesions  Lymph/Heme: No swelling or easy bruising, no lymphadenopathy  Neuro: No headaches or seizures  Psych: No SI/HI      Physical Exam  Constitutional:       Appearance: Normal appearance.   HENT:      Head: Normocephalic and atraumatic.      Right Ear: External ear normal.      Left Ear: External ear normal.      Mouth/Throat:      Mouth: Mucous membranes are moist.   Eyes:      Extraocular Movements: Extraocular movements intact.   Cardiovascular:      Rate and Rhythm: Normal rate and regular rhythm.   Pulmonary:      Effort: Pulmonary effort is normal.   Abdominal:      General: Abdomen is flat. There is no distension.      Comments: Left lower quadrant asymmetry without signs of hernia   Skin:     General: Skin is warm and dry.      Capillary Refill: Capillary refill takes less than 2 seconds.   Neurological:      General: No focal deficit present.      Mental Status: She is alert and oriented to person, place, and time.   Psychiatric:         Mood and Affect: Mood normal.         Behavior: Behavior normal.         Vital Signs  Blood Pressure: 110/68   Temperature: 36.1 °C (97 °F)   Pulse: 70   Respiration: 18   Pulse Oximetry: 99 %       Labs:                    Radiology:  No orders to display         Assessment/Plan:  Left lower quadrant abdominal wall lipoma  Procedure(s):  EXCISION OF LEFT LOWER ABDOMINAL WALL MASS 8CM  We discussed R/B/A including, but not limited to, bleeding, infection, seroma, difficulty healing, need for further surgeries, continued pain. The patient understood and agreed to proceed.

## 2023-11-27 NOTE — OP REPORT
PreOp Diagnosis: Left lower abdominal wall lipoma      PostOp Diagnosis: Left lower abdominal wall lipoma, 4.5cm      Procedure(s):  EXCISION OF LEFT LOWER ABDOMINAL WALL MASS 4.5cm- Wound Class: Clean    Surgeon(s):  Nancie Watkins M.D.    Anesthesiologist/Type of Anesthesia:  Anesthesiologist: Ivanna Walker M.D./General    Surgical Staff:  Circulator: Bessy Turner R.N.  Scrub Person: Shan Nails; Cuba Huffman    Specimens removed if any:  ID Type Source Tests Collected by Time Destination   A : Abdominal Wall Lipoma Other Other PATHOLOGY SPECIMEN Nancie Watkins M.D. 11/27/2023  2:11 PM        Estimated Blood Loss: 3mL    Findings: Poorly circumscribed subcutaneous lipoma, no     Complications: none apparent    Indications:   This is a 53-year-old woman who has pain and a lump in her left lower abdominal wall.  CT scan did not reveal a recurrent hernia.  This is more consistent with a lipoma.  We discussed excision with risk including, but not limited to, bleeding, infection, damage surrounding structures including muscle and fat, continued pain, need for further procedures, poor cosmesis.  The patient understood and agreed proceed.    Description of procedure:   The patient was brought to the operating room and placed in comfortable supine position on the operating room table with all appropriate points padded.  Her right arm was kept at her side due to poor mobility of her right shoulder.  General anesthesia was induced without complication.  Timeout was COMPLETED confirming correct patient, correct site, correct procedure and SCDs on and functioning.  She received antibiotics prior to incision.  Her abdomen was prepped with ChloraPrep and draped in the usual sterile fashion.    I made a diagonal 5 cm incision over the area of palpable asymmetry.  This was taken down to the fat and I found a fat- fat plane which I dissected around.  There was multilobulated fat within this.  I dissected  this off of the fascia until I had removed satisfactorily.  This measured about 4.5 cm.  After infiltration of quarter percent Marcaine with epi I also used a 15 blade to cut into the muscle fascia and there was no evidence of any other lipoma in the muscle compartment.     I then closed this in multiple layers with a interrupted 2-0 Vicryl layer deep in the subcutaneous fat to bring that together to hopefully prevent seroma formation, and then closed the skin in layers using interrupted 3-0 Vicryl in the dermis and a running subicular 4-0 Monocryl in the epidermis.  This was dressed with Dermabond.    All counts were correct at the end of the case x 2.  The patient was then awoken from anesthesia in good condition having tolerated the procedure well.      11/27/2023 2:31 PM Nancie Watkins M.D.

## 2023-11-28 LAB — PATHOLOGY CONSULT NOTE: NORMAL

## 2023-11-28 NOTE — OR NURSING
Assume care for pt in pre-op. Patient allergies and NPO status verified. Belongings secured / at bedside, will lock-up belongings prior going to sx. Patient verbalizes understanding of pain scale, expected course of stay and plan of care. Surgical procedure verified with patient. IV access established.  Call light within reach. No further needs at this time. Hourly rounding in place.   
Late Entry    1430  Patient aroused spontaneously; LMA discontinued    Patient requesting Zofran; states in case she gets nauseated later.  Patient eating a popsicle, states has slight nausea, requested her to stop eating popsicle and patient stated it was ok, she wanted to continue to eat the popcycle; she then declined to having a complaint of nausea  Patient on room air  Patient requested and assisted to the bathroom ;voided qs; tolerated activity well  No complaints of pain or discomfort    1525   Transferred to Phase II via [facility]  Pertinent post op orders reviewed with receiving RN  
Pt's VSS; denies N/V; states pain is at moderate level, Patient medicated per MAR. Dressing CDI to Left side of abdomen, pt requested incision be covered with gauze and Tegaderm. D/c orders received. IV dc'd. Pt changed into clothing with assistance. Pt up and ambulated to BR, steady gait, voided adequately. Discharge instructions given as well as pain management handout; pt and family verbalized understanding and questions answered. Patient states ready to d/c home. Prescriptions e-sent to preferred pharmacy. Pt dc'd in w/c with RN in stable condition.    
30-Mar-2022

## 2023-11-28 NOTE — ANESTHESIA POSTPROCEDURE EVALUATION
Patient: Carie Santiago    Procedure Summary       Date: 11/27/23 Room / Location: [facility] 04 / SURGERY [facility]    Anesthesia Start: 1342 Anesthesia Stop: 1433    Procedure: EXCISION OF LEFT LOWER ABDOMINAL WALL MASS 8CM (Left: Abdomen) Diagnosis: (Lipoma of skin and subcutaneous tissue of trun)    Surgeons: Nancie Watkins M.D. Responsible Provider: Ivanna Walker M.D.    Anesthesia Type: general ASA Status: 2            Final Anesthesia Type: general  Last vitals  BP   Blood Pressure: 115/67    Temp   36.3 °C (97.4 °F)    Pulse   65   Resp   18    SpO2   97 %      Anesthesia Post Evaluation    Patient location during evaluation: PACU  Patient participation: complete - patient participated  Level of consciousness: awake and alert  Pain score: 2    Airway patency: patent  Anesthetic complications: no  Cardiovascular status: adequate  Respiratory status: acceptable  Hydration status: acceptable    PONV: none          There were no known notable events for this encounter.     Nurse Pain Score: 2 (NPRS)

## 2023-12-04 DIAGNOSIS — E03.9 HYPOTHYROIDISM (ACQUIRED): ICD-10-CM

## 2023-12-04 RX ORDER — LEVOTHYROXINE SODIUM 100 MCG
100 TABLET ORAL
Qty: 90 TABLET | Refills: 1 | Status: SHIPPED | OUTPATIENT
Start: 2023-12-04

## 2023-12-07 ENCOUNTER — HOSPITAL ENCOUNTER (OUTPATIENT)
Dept: RADIOLOGY | Facility: MEDICAL CENTER | Age: 53
End: 2023-12-07
Attending: FAMILY MEDICINE
Payer: COMMERCIAL

## 2024-01-06 RX ORDER — NITROFURANTOIN 25; 75 MG/1; MG/1
100 CAPSULE ORAL
Qty: 30 CAPSULE | Refills: 1 | Status: SHIPPED | OUTPATIENT
Start: 2024-01-06

## 2024-01-06 NOTE — ANESTHESIA PROCEDURE NOTES
Airway    Date/Time: 4/14/2022 2:59 PM  Performed by: Jaime Christopher M.D.  Authorized by: Jaime Christopher M.D.     Location:  OR  Urgency:  Elective  Indications for Airway Management:  Anesthesia      Spontaneous Ventilation: absent    Sedation Level:  Deep  Preoxygenated: Yes    Patient Position:  Sniffing  Final Airway Type:  Endotracheal airway  Final Endotracheal Airway:  ETT  Cuffed: Yes    Technique Used for Successful ETT Placement:  Direct laryngoscopy    Insertion Site:  Oral  Blade Type:  Graham  Laryngoscope Blade/Videolaryngoscope Blade Size:  2  ETT Size (mm):  7.5  Measured from:  Teeth  ETT to Teeth (cm):  21  Placement Verified by: auscultation and capnometry    Cormack-Lehane Classification:  Grade I - full view of glottis  Number of Attempts at Approach:  1  Ventilation Between Attempts:  None  Number of Other Approaches Attempted:  0          
Pt reporting to the ED for subjective seizure activity. EMS reports group home staff denied seizure activity. EMs arrived pt AOX4 talking. Pt arrives to triage lethargic reporting eye pain and dizziness since yesterday at 5 pm. Pt reports not taking Depakote X 2 days. Complex care note documented. Pt denies drug, ETOH use, S/I, H/I. respirations even and unlabored, spo2 100%. FS documented.

## 2024-01-10 ENCOUNTER — OFFICE VISIT (OUTPATIENT)
Dept: SURGERY | Facility: MEDICAL CENTER | Age: 54
End: 2024-01-10
Payer: COMMERCIAL

## 2024-01-10 VITALS
BODY MASS INDEX: 21.24 KG/M2 | OXYGEN SATURATION: 99 % | DIASTOLIC BLOOD PRESSURE: 68 MMHG | HEART RATE: 75 BPM | HEIGHT: 70 IN | WEIGHT: 148.4 LBS | SYSTOLIC BLOOD PRESSURE: 106 MMHG | TEMPERATURE: 97.3 F

## 2024-01-10 DIAGNOSIS — C50.812 CANCER OF OVERLAPPING SITES OF LEFT BREAST (HCC): ICD-10-CM

## 2024-01-10 PROCEDURE — 99215 OFFICE O/P EST HI 40 MIN: CPT | Performed by: SURGERY

## 2024-01-10 ASSESSMENT — FIBROSIS 4 INDEX: FIB4 SCORE: 1.13

## 2024-01-10 ASSESSMENT — ENCOUNTER SYMPTOMS
ABDOMINAL PAIN: 1
CONSTIPATION: 1

## 2024-01-10 NOTE — PROGRESS NOTES
1/10/2024  7:22 AM    Primary care provider:  Catia Kelley M.D.  Gynecologist:  Dr. Camejo  Medical oncologist:  Dr. Yao  Plastic surgeon:  Dr. Travis  Imaging facility:  [facility]    CC:   Chief Complaint   Patient presents with    Follow-Up     08/2022 Left total mastectomy         HPI: This interesting and delightful  53 y.o. female returns for routine breast cancer follow up.  I last saw her in 2022 for routine post up appt following a LEFT total mastectomy with reconstruction.  NAC reconstruction was completed 5/4/23 and apparently she also had the implant downsized.  She reports intermittent soreness and a numb-like feeling under her left armpit, though no focal changes.  She thinks her breasts are lumpy in general. She had her routine RIGHT screening mammogram in June, 2023, then a screening breast US in July, 2023.  No suspicious findings were identified.     She was seen by Dr. Watkins for an abdominal wall lump, and CT was obtained which did not show any recurrent hernia.  This was felt to instead represent a LLQ abdominal wall lipoma which was excised by Dr. Watkins 11/27/23.      LABS:  Lab Results   Component Value Date/Time    HBA1C 5.4 01/11/2023 09:00 AM        IMAGING ([facility]):    MAMMOGRAMS:  6/5/2023 2:14 PM     HISTORY/REASON FOR EXAM:  Routine Mammographic Screening.  History of left breast cancer with mastectomy in 2022.     TECHNIQUE/EXAM DESCRIPTION AND NUMBER OF VIEWS:  Right tomosynthesis screening mammography with implants was performed with standard mammographic images generated from the data set. Images were reviewed and interpreted with CAD.     COMPARISON:   6/1/2022     FINDINGS:  Right CC, MLO and implant displaced views were obtained. Subpectoral implant decrease sensitivity of the study for detection of malignancy. The visible implant contours are intact.     Right breast is unchanged in appearance with heterogeneously dense breast tissue. There is a  biopsy clip in the medial aspect of the right breast. There are benign calcifications.        IMPRESSION:     1.  1. The right breast is heterogeneously dense with no radiographic evidence of malignancy.  2.  2.  Screening mammogram in one year is recommended.     R2 - CATEGORY 2: BENIGN FINDING(S      ULTRASOUND:  US-BREAST COMPLETE-RIGHT     Screening breast US 7/14/23 RDC:   Right breast without suspicious findings.   Declined left breast assessment due to recent surgery.      Allergies   Allergen Reactions    Meloxicam Diarrhea and Vomiting     Severe      Sulfa Drugs Anaphylaxis    Zofran [Dextrose-Ondansetron] Unspecified     Migraine with PO and SL, tolerates Injection/ IV     Current Outpatient Medications   Medication Sig    ALPRAZolam (XANAX) 0.5 MG Tab Take 1 Tablet by mouth 2 times a day as needed for Anxiety for up to 30 days.    furosemide (LASIX) 20 MG Tab Take 1 Tablet by mouth every day.    nitrofurantoin (MACROBID) 100 MG Cap Take 1 Capsule by mouth 1 time a day as needed (post coital).    SYNTHROID 100 MCG Tab TAKE 1 TABLET BY MOUTH EVERY DAY IN THE MORNING ON AN EMPTY STOMACH    traZODone (DESYREL) 50 MG Tab Take 1 Tablet by mouth at bedtime as needed for Sleep.    ferrous sulfate 325 (65 Fe) MG tablet Take 325 mg by mouth every day.    Misc Natural Products (CORTISOL PO) Take  by mouth.    Omega-3 Fatty Acids (FISH OIL) 1000 MG Cap capsule Take 1,000 mg by mouth every day.    VITAMIN D PO Take  by mouth every day.    Multiple Vitamins-Minerals (OCUVITE-LUTEIN) Tab Take 1 tablet by mouth every day.    benzonatate (TESSALON) 200 MG capsule Take 1 Capsule by mouth 3 times a day as needed for Cough (max 3 capsules in 24 hours). (Patient not taking: Reported on 1/10/2024)    Naloxone (NARCAN) 4 MG/0.1ML Liquid Administer 4 mg into affected nostril(S) as needed (For severe sleepiness or difficulty breathing from possible overdose. Call 911 after administration.).       Patient Active Problem List    Diagnosis    History of alcohol abuse    Tobacco use, cigarette smoker    Hypothyroid    Diverticulosis of colon    Anxiety and depression    Vitamin B12 deficiency    Colon polyp    History of bilateral breast implants    Vitamin D deficiency    Atrial fibrillation, unspecified type (HCC)    Ovarian mass, right    Easy bruising    History of iron deficiency    Breast cancer DCIS and grade 2 invasive ductal carcinoma, left (June 2022)    Alcohol dependence in sustained full remission (HCC)    Recurrent ventral incisional hernia    Recurrent major depressive disorder, in full remission (HCC)    Sedative, hypnotic or anxiolytic dependence, uncomplicated (HCC)    Lipoma     Past Surgical History:   Procedure Laterality Date    LIPOMA EXCISION Left 11/27/2023    Procedure: EXCISION OF LEFT LOWER ABDOMINAL WALL MASS 8CM;  Surgeon: Nancie Watkins M.D.;  Location: [facility]GERY [facility];  Service: General    NIPPLE RECONSTRUCTION  5/4/2023    Procedure: LEFT NIPPLE AREOLAR RECONSTRUCTION WITH A FULL THICKNESS SKIN GRAFT, DERMAL GRAFTS, REVISION OF RECONSTRUCTED BREASTS, LEFT BREAST IMPLANT EXCHANGE WITH REVISION AND CAPSULORRHAPHY;  Surgeon: Paolo Travis M.D.;  Location: SURGERY SAME [facility];  Service: Plastics    FULL THICKNESS SKIN GRAFT  5/4/2023    Procedure: APPLICATION, GRAFT, SKIN, FULL-THICKNESS;  Surgeon: Paolo Travis M.D.;  Location: SURGERY SAME DAY [facility];  Service: Plastics    BREAST RECONSTRUCTION  5/4/2023    Procedure: RECONSTRUCTION, BREAST;  Surgeon: Paolo Travis M.D.;  Location: SURGERY SAME DAY [facility];  Service: Plastics    BREAST IMPLANT REVISION  5/4/2023    Procedure: INSERTION, IMPLANT, BREAST;  Surgeon: Paolo Travis M.D.;  Location: SURGERY SAME DAY [facility];  Service: Plastics    MA SUSPENSION OF BREAST Right 11/3/2022    Procedure: MASTOPEXY;  Surgeon: Paolo Travis M.D.;  Location: SURGERY SAME DAY [facility];  Service: Plastics    BREAST RECONSTRUCTION  Bilateral 11/3/2022    Procedure: LEFT BREAST RECONSTRUCTION WITH REMOVAL OF TISSUE EXPANDER AND PLACEMENT OF IMPLANT. REVISION WITH DERMAL GLADULAR FLAPS AND CAPSULORRHAPHY.  RIGHT BREAST IMPLANT EXCHANGE WITH MASTOPEXY AND CAPSULORRHAPHY;  Surgeon: Paolo Travis M.D.;  Location: SURGERY SAME DAY [facility];  Service: Plastics    TISSUE EXPANDER PLACE/REMOVE Left 11/3/2022    Procedure: REMOVAL, TISSUE EXPANDER;  Surgeon: Paolo Travis M.D.;  Location: SURGERY SAME DAY [facility];  Service: Plastics    BREAST IMPLANT REVISION Bilateral 11/3/2022    Procedure: INSERTION, IMPLANT, BREAST;  Surgeon: Paolo Travis M.D.;  Location: SURGERY SAME DAY [facility];  Service: Plastics    WA INTRAOP ID SENTINEL NODE Left 08/18/2022    Procedure: IDENTIFICATION,LYMPH NODE,SENTINEL,INTRAOPERATIVE,USING DYE INJECTION;  Surgeon: Hiral Rock M.D.;  Location: SURGERY SAME DAY [facility];  Service: General    BREAST RECONSTRUCTION Left 08/18/2022    Procedure: LEFT BREAST RECONSTRUCTION WITH PLACEMENT OF TISSUE EXPANDER WITH USE OF ACELLULAR DERMAL MATRIX;  Surgeon: Paolo Travis M.D.;  Location: SURGERY SAME DAY [facility];  Service: General    TISSUE EXPANDER PLACE/REMOVE Left 08/18/2022    Procedure: INSERTION OR REMOVAL, TISSUE EXPANDER;  Surgeon: Paolo Travis M.D.;  Location: SURGERY SAME DAY [facility];  Service: General    MASTECTOMY Left 08/18/2022    Procedure: LEFT TOTAL MASTECTOMY LEFT SENTINEL LYMOH NODE INJECTION WITH EXCISION OF AXILLARY NODES;  Surgeon: Hiral Rock M.D.;  Location: SURGERY SAME DAY [facility];  Service: General    NODE BIOPSY SENTINEL Left 08/18/2022    Procedure: BIOPSY, LYMPH NODE, SENTINEL;  Surgeon: Hirla Rock M.D.;  Location: SURGERY SAME DAY [facility];  Service: General    PB MASTECTOMY, PARTIAL Left 07/19/2022    Procedure: WIRE LOCALIZED LEFT PARTIAL MASTECTOMY;  Surgeon: Hiral Rock M.D.;  Location: SURGERY SAME DAY [facility];  Service: General    ABDOMINOPLASTY   04/14/2022    Procedure: ABDOMINOPLASTY;  Surgeon: Paolo Travis M.D.;  Location: SURGERY SAME DAY [facility];  Service: Plastics    EAR RECONSTRUCTION Bilateral 04/14/2022    Procedure: RECONSTRUCTION, EAR;  Surgeon: Paolo Travis M.D.;  Location: SURGERY SAME DAY [facility];  Service: Plastics    SCAR REVISION  04/14/2022    Procedure: REVISION, SCAR - TO CHIN;  Surgeon: Paolo Travis M.D.;  Location: SURGERY SAME DAY [facility];  Service: Plastics    VENTRAL HERNIA REPAIR  04/14/2022    Procedure: REPAIR, HERNIA, VENTRAL;  Surgeon: Hiral Rock M.D.;  Location: SURGERY SAME DAY [facility];  Service: Plastics    DE LAP,DIAGNOSTIC ABDOMEN  08/03/2021    Procedure: PELVISCOPY - WITH PELVIC WASHINGS, EXCISION OF RIGHT OVARIAN  MASS;  Surgeon: Fabrice Camejo D.O.;  Location: SURGERY SAME DAY [facility];  Service: Obstetrics    SALPINGO OOPHORECTOMY Bilateral 08/03/2021    Procedure: SALPINGO-OOPHORECTOMY.;  Surgeon: Fabrice Camejo D.O.;  Location: SURGERY SAME DAY [facility];  Service: Obstetrics    DE UPPER GI ENDOSCOPY,DIAGNOSIS N/A 02/12/2021    Procedure: GASTROSCOPY;  Surgeon: Kasi Chan M.D.;  Location: SURGERY SAME DAY [facility];  Service: Gastroenterology    DE UPPER GI ENDOSCOPY,BIOPSY N/A 02/12/2021    Procedure: GASTROSCOPY, WITH BIOPSY;  Surgeon: Kasi Chan M.D.;  Location: SURGERY SAME DAY [facility];  Service: Gastroenterology    WOUND CLOSURE ORTHO Left 09/24/2019    Procedure: CLOSURE, WOUND, ORTHO - LEG W/WOUND VAC REMOVAL;  Surgeon: Paolo Odell M.D.;  Location: SURGERY [facility];  Service: Orthopedics    IRRIGATION & DEBRIDEMENT ORTHO Left 09/21/2019    Procedure: IRRIGATION AND DEBRIDEMENT, WOUND - FOR PROXIMAL TIBIA HEMATOMA EVACUATION AND WOUND VAC APPLICATION;  Surgeon: Paolo Odell M.D.;  Location: [facility];  Service: Orthopedics    ROBOTIC LAPAROSCOPIC CHOLECYSTECTOMY  2017    OTHER ORTHOPEDIC SURGERY Right 2017    kNEE    UMBILICAL HERNIA REPAIR   "2017    INGUINAL HERNIA REPAIR BILATERAL  1999    with Mesh    TONSILLECTOMY AND ADENOIDECTOMY      APPENDECTOMY      BONE GRAFT      CHOLECYSTECTOMY      GYN SURGERY  1 yr    Tubes and ovaries    MAMMOPLASTY AUGMENTATION Bilateral     MAMMOPLASTY REDUCTION      OTHER  1 yr    Augmentation    UMBILICAL HERNIA REPAIR      VENTRAL HERNIA REPAIR         Social History     Tobacco Use    Smoking status: Former     Current packs/day: 0.00     Types: Cigarettes     Quit date: 3/1/2021     Years since quittin.8    Smokeless tobacco: Never   Vaping Use    Vaping Use: Never used   Substance Use Topics    Alcohol use: Not Currently     Comment: Pt. states quit drinking 2021    Drug use: Never     Family and Occupational History     Socioeconomic History    Marital status:      Spouse name: Not on file    Number of children: Not on file    Years of education: Not on file    Highest education level: Bachelor's degree (e.g., BA, AB, BS)   Occupational History    Not on file     OB History    Para Term  AB Living   2 2 0 0 0 0   SAB IAB Ectopic Molar Multiple Live Births   0 0 0 0 0 0   Obstetric Comments   Age of first delivery: 24       Family History   Problem Relation Age of Onset    Heart Disease Mother         A-Fib    Thyroid Mother     Cancer Mother         Melanoma    Heart Disease Brother     Diabetes Son     Diabetes Son         type 1 for 22 yrs    Dementia Neg Hx     Colon Cancer Neg Hx     Breast Cancer Neg Hx          Review of Systems   Gastrointestinal:  Positive for abdominal pain and constipation.   Skin:         Left breast/axillary tail pain and numbness       Objective:  /68 (BP Location: Left arm, Patient Position: Sitting, BP Cuff Size: Large adult)   Pulse 75   Temp 36.3 °C (97.3 °F) (Temporal)   Ht 1.778 m (5' 10\")   Wt 67.3 kg (148 lb 6.4 oz)   LMP 01/10/2000 (Approximate) Comment: Age of first period: 10, Postmenopausal HRT: Patch, used for 6-7 years  SpO2 " 99%   BMI 21.29 kg/m²     Physical Exam    General: Usual animated and pleasant demeanor, in good spirits.    Breast exam:  Wise pattern on right, extended incision pattern on the left.  No suspicious findings on exam.   See scanned diagram    Diagnosis:  No diagnosis found.    ASSESSMENT:  LEFT lateral IDC and extensive HG DCIS.  Pathologic Stage IA (pT1 pN0 M0).  Stereo biopsy [facility] 6/22/22 (“greater than 90% of calcs removed”).  Wire loc partial mastectomy with lattice and LTR 7/20/22.  Total mastectomy with removal of implant, SLNB, subpectoral TE/ACDM (Dr. Travis) 8/18/22.  Discontinued tamoxifen after 16 weeks due to side effects (Dr. Yao).  Radiation therapy not felt to be indicated.     8mm IDC on partial mastectomy, margins clear.   Extensive HG DCIS present throughout initial partial mastectomy.  Abundant residual IG/HG DCIS in mastectomy specimen.  Final margins clear by over 2mm.     ER/NE >90% (excisional specimen, IDC)   Ki-67 10%.   HER2 negative.     0 of 2 sentinel nodes.       Screening mammo 6/1/22 [facility]:  Scattered FGD.  Prior subpectoral silicone implants.      Mother with melanoma.  No other relevant cancer history.  No AJ heritage.  Genetic panel negative 8/2/22 ([employer]).      Quit smoking 3/2022, but continues to vape nicotine.  Quit alcohol 2020, lost 75 pounds.  BMI 22.  Prior chronic pain medication agreement with Dr. Ballard, though now established with Dr. Kelley.    DISCUSSED/PLAN:    She is actually doing very well overall.  She feels she eats plenty of protein and just ordered a weight set so that she can start weight training.  Her son is a physical therapist. I advised her to continue self breast monitoring and emphasized the importance of awareness for changes.  She will resume annual mammograms and regular FU with her PCP, and I will be happy to see her back as needed.      Total time spent today, including review of outside records, face to face time (20min for  this alone), chart documentation, and  was 30 minutes

## 2024-01-22 ENCOUNTER — TELEPHONE (OUTPATIENT)
Dept: INTERNAL MEDICINE | Facility: IMAGING CENTER | Age: 54
End: 2024-01-22
Payer: COMMERCIAL

## 2024-01-22 RX ORDER — DOXYCYCLINE HYCLATE 100 MG
100 TABLET ORAL 2 TIMES DAILY
Qty: 20 TABLET | Refills: 0 | Status: SHIPPED | OUTPATIENT
Start: 2024-01-22

## 2024-01-22 NOTE — TELEPHONE ENCOUNTER
Carie started COVID on December 24th. Since that time she has been struggling with a sinus infection for a couple of weeks now. She has HA, green mucus, mild fevers off and on. She is currently using OTC medications She would like an antibiotic.

## 2024-02-05 RX ORDER — FUROSEMIDE 20 MG/1
20 TABLET ORAL DAILY
Qty: 90 TABLET | Refills: 1 | Status: SHIPPED | OUTPATIENT
Start: 2024-02-05

## 2024-02-23 DIAGNOSIS — G89.29 CHRONIC RIGHT SHOULDER PAIN: ICD-10-CM

## 2024-02-23 DIAGNOSIS — M25.511 CHRONIC RIGHT SHOULDER PAIN: ICD-10-CM

## 2024-02-23 RX ORDER — OXYCODONE HYDROCHLORIDE AND ACETAMINOPHEN 5; 325 MG/1; MG/1
1 TABLET ORAL NIGHTLY PRN
Qty: 20 TABLET | Refills: 0 | Status: SHIPPED | OUTPATIENT
Start: 2024-02-23 | End: 2024-03-14

## 2024-03-08 ENCOUNTER — PATIENT MESSAGE (OUTPATIENT)
Dept: INTERNAL MEDICINE | Facility: IMAGING CENTER | Age: 54
End: 2024-03-08
Payer: COMMERCIAL

## 2024-03-08 DIAGNOSIS — Z79.899 MEDICATION MANAGEMENT: ICD-10-CM

## 2024-03-08 DIAGNOSIS — F41.9 ANXIETY: ICD-10-CM

## 2024-03-09 RX ORDER — ALPRAZOLAM 0.25 MG/1
0.25 TABLET ORAL 2 TIMES DAILY
Qty: 30 TABLET | Refills: 0 | Status: SHIPPED | OUTPATIENT
Start: 2024-03-09 | End: 2024-03-11 | Stop reason: CLARIF

## 2024-03-21 ENCOUNTER — HOSPITAL ENCOUNTER (OUTPATIENT)
Facility: MEDICAL CENTER | Age: 54
End: 2024-03-21
Attending: FAMILY MEDICINE
Payer: COMMERCIAL

## 2024-03-21 ENCOUNTER — NON-PROVIDER VISIT (OUTPATIENT)
Dept: INTERNAL MEDICINE | Facility: IMAGING CENTER | Age: 54
End: 2024-03-21
Payer: COMMERCIAL

## 2024-03-21 DIAGNOSIS — F41.9 ANXIETY: ICD-10-CM

## 2024-03-21 DIAGNOSIS — Z79.899 MEDICATION MANAGEMENT: ICD-10-CM

## 2024-03-21 LAB
25(OH)D3 SERPL-MCNC: 55 NG/ML (ref 30–100)
ALBUMIN SERPL BCP-MCNC: 4.4 G/DL (ref 3.2–4.9)
ALBUMIN/GLOB SERPL: 1.6 G/DL
ALP SERPL-CCNC: 80 U/L (ref 30–99)
ALT SERPL-CCNC: 13 U/L (ref 2–50)
ANION GAP SERPL CALC-SCNC: 10 MMOL/L (ref 7–16)
AST SERPL-CCNC: 18 U/L (ref 12–45)
BASOPHILS # BLD AUTO: 0.8 % (ref 0–1.8)
BASOPHILS # BLD: 0.07 K/UL (ref 0–0.12)
BILIRUB SERPL-MCNC: 0.3 MG/DL (ref 0.1–1.5)
BUN SERPL-MCNC: 14 MG/DL (ref 8–22)
CALCIUM ALBUM COR SERPL-MCNC: 9.2 MG/DL (ref 8.5–10.5)
CALCIUM SERPL-MCNC: 9.5 MG/DL (ref 8.5–10.5)
CHLORIDE SERPL-SCNC: 100 MMOL/L (ref 96–112)
CHOLEST SERPL-MCNC: 195 MG/DL (ref 100–199)
CO2 SERPL-SCNC: 27 MMOL/L (ref 20–33)
CREAT SERPL-MCNC: 0.57 MG/DL (ref 0.5–1.4)
EOSINOPHIL # BLD AUTO: 0.66 K/UL (ref 0–0.51)
EOSINOPHIL NFR BLD: 7.1 % (ref 0–6.9)
ERYTHROCYTE [DISTWIDTH] IN BLOOD BY AUTOMATED COUNT: 48 FL (ref 35.9–50)
EST. AVERAGE GLUCOSE BLD GHB EST-MCNC: 120 MG/DL
GFR SERPLBLD CREATININE-BSD FMLA CKD-EPI: 107 ML/MIN/1.73 M 2
GLOBULIN SER CALC-MCNC: 2.8 G/DL (ref 1.9–3.5)
GLUCOSE SERPL-MCNC: 95 MG/DL (ref 65–99)
HBA1C MFR BLD: 5.8 % (ref 4–5.6)
HCT VFR BLD AUTO: 43.7 % (ref 37–47)
HDLC SERPL-MCNC: 72 MG/DL
HGB BLD-MCNC: 14.5 G/DL (ref 12–16)
IMM GRANULOCYTES # BLD AUTO: 0.02 K/UL (ref 0–0.11)
IMM GRANULOCYTES NFR BLD AUTO: 0.2 % (ref 0–0.9)
LDLC SERPL CALC-MCNC: 110 MG/DL
LYMPHOCYTES # BLD AUTO: 3.22 K/UL (ref 1–4.8)
LYMPHOCYTES NFR BLD: 34.7 % (ref 22–41)
MCH RBC QN AUTO: 31.3 PG (ref 27–33)
MCHC RBC AUTO-ENTMCNC: 33.2 G/DL (ref 32.2–35.5)
MCV RBC AUTO: 94.4 FL (ref 81.4–97.8)
MONOCYTES # BLD AUTO: 0.58 K/UL (ref 0–0.85)
MONOCYTES NFR BLD AUTO: 6.3 % (ref 0–13.4)
NEUTROPHILS # BLD AUTO: 4.72 K/UL (ref 1.82–7.42)
NEUTROPHILS NFR BLD: 50.9 % (ref 44–72)
NRBC # BLD AUTO: 0 K/UL
NRBC BLD-RTO: 0 /100 WBC (ref 0–0.2)
PLATELET # BLD AUTO: 360 K/UL (ref 164–446)
PMV BLD AUTO: 9.2 FL (ref 9–12.9)
POTASSIUM SERPL-SCNC: 4.3 MMOL/L (ref 3.6–5.5)
PROT SERPL-MCNC: 7.2 G/DL (ref 6–8.2)
RBC # BLD AUTO: 4.63 M/UL (ref 4.2–5.4)
SODIUM SERPL-SCNC: 137 MMOL/L (ref 135–145)
TRIGL SERPL-MCNC: 67 MG/DL (ref 0–149)
TSH SERPL DL<=0.005 MIU/L-ACNC: 2.16 UIU/ML (ref 0.38–5.33)
VIT B12 SERPL-MCNC: 1097 PG/ML (ref 211–911)
WBC # BLD AUTO: 9.3 K/UL (ref 4.8–10.8)

## 2024-03-21 PROCEDURE — 84443 ASSAY THYROID STIM HORMONE: CPT

## 2024-03-21 PROCEDURE — 80061 LIPID PANEL: CPT

## 2024-03-21 PROCEDURE — 80053 COMPREHEN METABOLIC PANEL: CPT

## 2024-03-21 PROCEDURE — 85025 COMPLETE CBC W/AUTO DIFF WBC: CPT

## 2024-03-21 PROCEDURE — 99999 PR NO CHARGE: CPT

## 2024-03-21 PROCEDURE — 82306 VITAMIN D 25 HYDROXY: CPT

## 2024-03-21 PROCEDURE — 83036 HEMOGLOBIN GLYCOSYLATED A1C: CPT

## 2024-03-21 PROCEDURE — 82607 VITAMIN B-12: CPT

## 2024-03-21 RX ORDER — ALPRAZOLAM 0.5 MG/1
0.5 TABLET ORAL 2 TIMES DAILY PRN
Qty: 60 TABLET | Refills: 1 | Status: SHIPPED | OUTPATIENT
Start: 2024-03-24 | End: 2024-04-23

## 2024-04-04 ENCOUNTER — NON-PROVIDER VISIT (OUTPATIENT)
Dept: INTERNAL MEDICINE | Facility: IMAGING CENTER | Age: 54
End: 2024-04-04
Payer: COMMERCIAL

## 2024-04-04 PROCEDURE — 99999 PR NO CHARGE: CPT

## 2024-04-12 DIAGNOSIS — M25.511 CHRONIC RIGHT SHOULDER PAIN: ICD-10-CM

## 2024-04-12 DIAGNOSIS — G89.29 CHRONIC RIGHT SHOULDER PAIN: ICD-10-CM

## 2024-04-12 RX ORDER — OXYCODONE HYDROCHLORIDE AND ACETAMINOPHEN 5; 325 MG/1; MG/1
1 TABLET ORAL NIGHTLY PRN
Qty: 20 TABLET | Refills: 0 | Status: SHIPPED | OUTPATIENT
Start: 2024-04-12 | End: 2024-05-02

## 2024-04-13 DIAGNOSIS — E03.9 HYPOTHYROIDISM (ACQUIRED): ICD-10-CM

## 2024-04-15 RX ORDER — LEVOTHYROXINE SODIUM 100 MCG
100 TABLET ORAL
Qty: 90 TABLET | Refills: 1 | Status: SHIPPED | OUTPATIENT
Start: 2024-04-15

## 2024-04-29 RX ORDER — NITROFURANTOIN 25; 75 MG/1; MG/1
100 CAPSULE ORAL
Qty: 30 CAPSULE | Refills: 1 | Status: SHIPPED | OUTPATIENT
Start: 2024-04-29

## 2024-05-22 ENCOUNTER — PATIENT MESSAGE (OUTPATIENT)
Dept: INTERNAL MEDICINE | Facility: IMAGING CENTER | Age: 54
End: 2024-05-22
Payer: COMMERCIAL

## 2024-05-22 DIAGNOSIS — F41.9 ANXIETY: ICD-10-CM

## 2024-05-22 DIAGNOSIS — K63.5 POLYP OF COLON, UNSPECIFIED PART OF COLON, UNSPECIFIED TYPE: ICD-10-CM

## 2024-05-22 DIAGNOSIS — Z12.11 COLON CANCER SCREENING: ICD-10-CM

## 2024-05-22 RX ORDER — ALPRAZOLAM 0.5 MG/1
0.5 TABLET ORAL 2 TIMES DAILY PRN
Qty: 60 TABLET | Refills: 1 | Status: SHIPPED | OUTPATIENT
Start: 2024-05-22 | End: 2024-06-21

## 2024-06-07 ENCOUNTER — OFFICE VISIT (OUTPATIENT)
Dept: INTERNAL MEDICINE | Facility: IMAGING CENTER | Age: 54
End: 2024-06-07
Payer: COMMERCIAL

## 2024-06-07 ENCOUNTER — HOSPITAL ENCOUNTER (OUTPATIENT)
Dept: RADIOLOGY | Facility: MEDICAL CENTER | Age: 54
End: 2024-06-07
Attending: FAMILY MEDICINE
Payer: COMMERCIAL

## 2024-06-07 VITALS
OXYGEN SATURATION: 97 % | RESPIRATION RATE: 14 BRPM | SYSTOLIC BLOOD PRESSURE: 122 MMHG | DIASTOLIC BLOOD PRESSURE: 74 MMHG | HEART RATE: 77 BPM | TEMPERATURE: 98.4 F

## 2024-06-07 DIAGNOSIS — R30.0 DYSURIA: ICD-10-CM

## 2024-06-07 DIAGNOSIS — Z12.31 VISIT FOR SCREENING MAMMOGRAM: ICD-10-CM

## 2024-06-07 DIAGNOSIS — J30.2 SEASONAL ALLERGIES: ICD-10-CM

## 2024-06-07 DIAGNOSIS — I83.93 VARICOSE VEINS OF BOTH LOWER EXTREMITIES, UNSPECIFIED WHETHER COMPLICATED: ICD-10-CM

## 2024-06-07 LAB
APPEARANCE UR: CLEAR
BILIRUB UR STRIP-MCNC: NEGATIVE MG/DL
COLOR UR AUTO: YELLOW
GLUCOSE UR STRIP.AUTO-MCNC: NEGATIVE MG/DL
KETONES UR STRIP.AUTO-MCNC: NEGATIVE MG/DL
LEUKOCYTE ESTERASE UR QL STRIP.AUTO: NEGATIVE
NITRITE UR QL STRIP.AUTO: NEGATIVE
PH UR STRIP.AUTO: 7 [PH] (ref 5–8)
PROT UR QL STRIP: NEGATIVE MG/DL
RBC UR QL AUTO: NORMAL
SP GR UR STRIP.AUTO: 1.01
UROBILINOGEN UR STRIP-MCNC: 0.2 MG/DL

## 2024-06-07 PROCEDURE — 96372 THER/PROPH/DIAG INJ SC/IM: CPT | Performed by: FAMILY MEDICINE

## 2024-06-07 PROCEDURE — 81002 URINALYSIS NONAUTO W/O SCOPE: CPT | Performed by: FAMILY MEDICINE

## 2024-06-07 PROCEDURE — 77063 BREAST TOMOSYNTHESIS BI: CPT | Mod: 52

## 2024-06-07 PROCEDURE — 3074F SYST BP LT 130 MM HG: CPT | Performed by: FAMILY MEDICINE

## 2024-06-07 PROCEDURE — 3078F DIAST BP <80 MM HG: CPT | Performed by: FAMILY MEDICINE

## 2024-06-07 PROCEDURE — 99213 OFFICE O/P EST LOW 20 MIN: CPT | Mod: 25 | Performed by: FAMILY MEDICINE

## 2024-06-07 RX ORDER — TRIAMCINOLONE ACETONIDE 40 MG/ML
40 INJECTION, SUSPENSION INTRA-ARTICULAR; INTRAMUSCULAR ONCE
Status: COMPLETED | OUTPATIENT
Start: 2024-06-07 | End: 2024-06-07

## 2024-06-07 RX ORDER — AMOXICILLIN AND CLAVULANATE POTASSIUM 500; 125 MG/1; MG/1
TABLET, FILM COATED ORAL
COMMUNITY
Start: 2024-05-07 | End: 2024-06-20

## 2024-06-07 RX ADMIN — TRIAMCINOLONE ACETONIDE 40 MG: 40 INJECTION, SUSPENSION INTRA-ARTICULAR; INTRAMUSCULAR at 15:25

## 2024-06-11 ENCOUNTER — PATIENT MESSAGE (OUTPATIENT)
Dept: INTERNAL MEDICINE | Facility: IMAGING CENTER | Age: 54
End: 2024-06-11
Payer: COMMERCIAL

## 2024-06-11 DIAGNOSIS — R92.30 DENSE BREASTS: ICD-10-CM

## 2024-06-11 NOTE — PROGRESS NOTES
Verbal consent was acquired by the patient to use [employer] ambient listening note generation during this visit     Patient is a 54 y.o.female.established patient      History of Present Illness  The patient presents for evaluation of multiple medical concerns.    The patient reports experiencing an ear issue, which she first noticed approximately a week and a half ago. She describes the sensation as excruciating, particularly when attempting to clear it while driving. She recalls an incident during a flight to [place] where she experienced an audible pop in her ear and tongue, a symptom she has previously encountered. She denies any instances of water leakage, but notes that she frequently swims. She also reports congestion, which she attributes to allergies, and has never received an injection. She describes a scratchy sensation in her throat.    The patient expresses concern about a potential parasite infection in her blood, a condition she had previously encountered during a hospital stay for an unrelated issue. She is uncertain if she has experienced weight loss.    Supplemental Information  She had COVID-19. She had slurred speech. She was in atrial fibrillation. She quit drinking on her own. She titrated for a month down from what she was drinking. She had no drinking for 3 days. She had a break in her ankle tripping over her giant dog. She passed out and down in the shower. Her friend called the ambulance. They got her up and got her in the stretcher. She could not speak. She went downtown the hospital and they told her that she was going to die because of her heart rate and atrial fibrillation. She thinks it was COVID-19 related and not drinking related. She had a bad trauma in her leg. She was in the ICU for 4 to 5 days on 02/10/2021.      Her shoulder is bothering her. She had a surgery set up  with ortho twice for surgery but had o cancel due to family issues.   She is not in any lorenzana to do another  surgery right now. She keeps her shoulder tucked in. She does not put it in a sling. She can float and move it. She can move it up and get it straight up above her head and stretch it and move it floating in the pool.   She quit drinking on her own.            /74   Pulse 77   Temp 36.9 °C (98.4 °F)   Resp 14   SpO2 97% , There is no height or weight on file to calculate BMI.    Physical Exam      Physical Exam  Constitutional:       General: She is not in acute distress.     Appearance: Normal appearance. She is not ill-appearing, toxic-appearing or diaphoretic.   HENT:      Head: Normocephalic and atraumatic.      Right Ear: Tympanic membrane, ear canal and external ear normal.      Left Ear: Tympanic membrane, ear canal and external ear normal.   Eyes:      Conjunctiva/sclera: Conjunctivae normal.   Cardiovascular:      Rate and Rhythm: Normal rate.   Pulmonary:      Effort: Pulmonary effort is normal.   Musculoskeletal:      Cervical back: Neck supple.   Neurological:      Mental Status: She is alert and oriented to person, place, and time. Mental status is at baseline.   Psychiatric:         Mood and Affect: Mood normal.         Behavior: Behavior normal.         Thought Content: Thought content normal.         Judgment: Judgment normal.             Results            Assessment & Plan  1. Seasonal allergies w/Ear pain- ETD  An injection will be administered today.    2. Bilateral leg pain-varices.  A referral to the [facility] will be made.       3. Dysuria- r/o UTI        Orders Placed This Encounter    Referral to Vascular Medicine    POCT Urinalysis    triamcinolone acetonide (Kenalog-40) injection 40 mg    amoxicillin-clavulanate (AUGMENTIN) 500-125 MG Tab      HCC Gap Form    Diagnosis: F10.21 - Alcohol dependence in sustained full remission (HCC)  Assessment and plan: Chronic, stable. Continue with  avoidance. Follow-up at least annually.  Last edited 06/11/24 08:18 PDT by Catia GAGNON  FRANCIS Kelley.             This note was created using voice recognition software (Dragon). The accuracy of the dictation is limited by the abilities of the software. I have reviewed the note prior to signing, however some errors in grammar and context are still possible. If you have any questions related to this note please do not hesitate to contact our office.

## 2024-06-21 ENCOUNTER — APPOINTMENT (OUTPATIENT)
Dept: INTERNAL MEDICINE | Facility: IMAGING CENTER | Age: 54
End: 2024-06-21
Payer: COMMERCIAL

## 2024-06-24 ENCOUNTER — HOSPITAL ENCOUNTER (OUTPATIENT)
Facility: MEDICAL CENTER | Age: 54
End: 2024-06-24
Attending: FAMILY MEDICINE
Payer: COMMERCIAL

## 2024-06-24 ENCOUNTER — OFFICE VISIT (OUTPATIENT)
Dept: INTERNAL MEDICINE | Facility: IMAGING CENTER | Age: 54
End: 2024-06-24
Payer: COMMERCIAL

## 2024-06-24 VITALS
BODY MASS INDEX: 21.9 KG/M2 | HEART RATE: 72 BPM | WEIGHT: 153 LBS | HEIGHT: 70 IN | SYSTOLIC BLOOD PRESSURE: 110 MMHG | OXYGEN SATURATION: 94 % | DIASTOLIC BLOOD PRESSURE: 72 MMHG | TEMPERATURE: 98.1 F | RESPIRATION RATE: 14 BRPM

## 2024-06-24 DIAGNOSIS — K63.5 POLYP OF COLON, UNSPECIFIED PART OF COLON, UNSPECIFIED TYPE: ICD-10-CM

## 2024-06-24 DIAGNOSIS — E03.9 HYPOTHYROIDISM (ACQUIRED): ICD-10-CM

## 2024-06-24 DIAGNOSIS — Z79.899 CHRONIC PRESCRIPTION BENZODIAZEPINE USE: ICD-10-CM

## 2024-06-24 DIAGNOSIS — Z13.6 SCREENING FOR CARDIOVASCULAR CONDITION: ICD-10-CM

## 2024-06-24 DIAGNOSIS — G47.00 INSOMNIA, UNSPECIFIED TYPE: ICD-10-CM

## 2024-06-24 DIAGNOSIS — F13.20 SEDATIVE, HYPNOTIC OR ANXIOLYTIC DEPENDENCE, UNCOMPLICATED (HCC): ICD-10-CM

## 2024-06-24 PROCEDURE — 99214 OFFICE O/P EST MOD 30 MIN: CPT | Performed by: FAMILY MEDICINE

## 2024-06-24 PROCEDURE — 3078F DIAST BP <80 MM HG: CPT | Performed by: FAMILY MEDICINE

## 2024-06-24 PROCEDURE — 3074F SYST BP LT 130 MM HG: CPT | Performed by: FAMILY MEDICINE

## 2024-06-24 PROCEDURE — 80307 DRUG TEST PRSMV CHEM ANLYZR: CPT

## 2024-06-24 RX ORDER — LEVOTHYROXINE SODIUM 100 MCG
100 TABLET ORAL
Qty: 90 TABLET | Refills: 1 | Status: SHIPPED | OUTPATIENT
Start: 2024-06-24

## 2024-06-24 RX ORDER — POTASSIUM CHLORIDE 750 MG/1
10 TABLET, FILM COATED, EXTENDED RELEASE ORAL
Qty: 90 TABLET | Refills: 1 | Status: SHIPPED | OUTPATIENT
Start: 2024-06-24

## 2024-06-24 RX ORDER — TRAZODONE HYDROCHLORIDE 50 MG/1
50 TABLET ORAL
Qty: 90 TABLET | Refills: 3 | Status: SHIPPED | OUTPATIENT
Start: 2024-06-24

## 2024-06-24 ASSESSMENT — PATIENT HEALTH QUESTIONNAIRE - PHQ9
SUM OF ALL RESPONSES TO PHQ QUESTIONS 1-9: 0
3. TROUBLE FALLING OR STAYING ASLEEP OR SLEEPING TOO MUCH: NOT AT ALL
4. FEELING TIRED OR HAVING LITTLE ENERGY: NOT AT ALL
SUM OF ALL RESPONSES TO PHQ9 QUESTIONS 1 AND 2: 0
9. THOUGHTS THAT YOU WOULD BE BETTER OFF DEAD, OR OF HURTING YOURSELF: NOT AT ALL
1. LITTLE INTEREST OR PLEASURE IN DOING THINGS: NOT AT ALL
8. MOVING OR SPEAKING SO SLOWLY THAT OTHER PEOPLE COULD HAVE NOTICED. OR THE OPPOSITE, BEING SO FIGETY OR RESTLESS THAT YOU HAVE BEEN MOVING AROUND A LOT MORE THAN USUAL: NOT AT ALL
5. POOR APPETITE OR OVEREATING: NOT AT ALL
6. FEELING BAD ABOUT YOURSELF - OR THAT YOU ARE A FAILURE OR HAVE LET YOURSELF OR YOUR FAMILY DOWN: NOT AL ALL
7. TROUBLE CONCENTRATING ON THINGS, SUCH AS READING THE NEWSPAPER OR WATCHING TELEVISION: NOT AT ALL
2. FEELING DOWN, DEPRESSED, IRRITABLE, OR HOPELESS: NOT AT ALL

## 2024-06-24 ASSESSMENT — FIBROSIS 4 INDEX: FIB4 SCORE: 0.78

## 2024-06-26 LAB
AMPHET CTO UR CFM-MCNC: NEGATIVE NG/ML
BARBITURATES CTO UR CFM-MCNC: NEGATIVE NG/ML
BENZODIAZ CTO UR CFM-MCNC: NORMAL NG/ML
BUPRENORPHINE UR-MCNC: NEGATIVE NG/ML
CANNABINOIDS CTO UR CFM-MCNC: NEGATIVE NG/ML
CARISOPRODOL UR-MCNC: NEGATIVE NG/ML
COCAINE CTO UR CFM-MCNC: NORMAL NG/ML
CREAT UR-MCNC: 48 MG/DL (ref 20–400)
DRUG SCREEN COMMENT UR-IMP: NORMAL
ETHYL GLUCURONIDE UR QL SCN: NEGATIVE NG/ML
FENTANYL UR-MCNC: NEGATIVE NG/ML
MEPERIDINE CTO UR SCN-MCNC: NEGATIVE NG/ML
METHADONE CTO UR CFM-MCNC: NEGATIVE NG/ML
OPIATES UR QL SCN: NEGATIVE NG/ML
OXYCDOXYM URSCRN 2005102: NEGATIVE NG/ML
PCP CTO UR CFM-MCNC: NEGATIVE NG/ML
PROPOXYPH CTO UR CFM-MCNC: NEGATIVE NG/ML
TAPENTADOL UR-MCNC: NEGATIVE NG/ML
TRAMADOL CTO UR SCN-MCNC: NEGATIVE NG/ML
ZOLPIDEM UR-MCNC: NEGATIVE NG/ML

## 2024-06-28 LAB
1OH-MIDAZOLAM UR CFM-MCNC: <20 NG/ML
7AMINOCLONAZEPAM UR CFM-MCNC: <5 NG/ML
A-OH ALPRAZ UR CFM-MCNC: 207 NG/ML
ALPRAZ UR CFM-MCNC: 64 NG/ML
CHLORDIAZEP UR CFM-MCNC: <20 NG/ML
CLONAZEPAM UR CFM-MCNC: <5 NG/ML
DIAZEPAM UR CFM-MCNC: <20 NG/ML
LORAZEPAM UR CFM-MCNC: <20 NG/ML
MIDAZOLAM UR CFM-MCNC: <20 NG/ML
NORDIAZEPAM UR CFM-MCNC: 316 NG/ML
OXAZEPAM UR CFM-MCNC: 448 NG/ML
TEMAZEPAM UR CFM-MCNC: 637 NG/ML

## 2024-07-01 LAB — BZE UR-MCNC: >2000 NG/ML

## 2024-07-08 ENCOUNTER — HOSPITAL ENCOUNTER (OUTPATIENT)
Dept: RADIOLOGY | Facility: MEDICAL CENTER | Age: 54
End: 2024-07-08
Attending: FAMILY MEDICINE
Payer: COMMERCIAL

## 2024-07-08 PROCEDURE — 76641 ULTRASOUND BREAST COMPLETE: CPT

## 2024-07-11 ENCOUNTER — HOSPITAL ENCOUNTER (OUTPATIENT)
Facility: MEDICAL CENTER | Age: 54
End: 2024-07-11
Attending: OBSTETRICS & GYNECOLOGY
Payer: COMMERCIAL

## 2024-07-11 LAB
APPEARANCE UR: CLEAR
BACTERIA #/AREA URNS HPF: ABNORMAL /HPF
BILIRUB UR QL STRIP.AUTO: NEGATIVE
COLOR UR: YELLOW
EPI CELLS #/AREA URNS HPF: NEGATIVE /HPF
GLUCOSE UR STRIP.AUTO-MCNC: NEGATIVE MG/DL
HYALINE CASTS #/AREA URNS LPF: ABNORMAL /LPF
KETONES UR STRIP.AUTO-MCNC: NEGATIVE MG/DL
LEUKOCYTE ESTERASE UR QL STRIP.AUTO: ABNORMAL
MICRO URNS: ABNORMAL
NITRITE UR QL STRIP.AUTO: POSITIVE
PH UR STRIP.AUTO: 6 [PH] (ref 5–8)
PROT UR QL STRIP: NEGATIVE MG/DL
RBC # URNS HPF: ABNORMAL /HPF
RBC UR QL AUTO: ABNORMAL
SP GR UR STRIP.AUTO: 1.01
UROBILINOGEN UR STRIP.AUTO-MCNC: 0.2 MG/DL
WBC #/AREA URNS HPF: ABNORMAL /HPF

## 2024-07-11 PROCEDURE — 87086 URINE CULTURE/COLONY COUNT: CPT

## 2024-07-11 PROCEDURE — 87077 CULTURE AEROBIC IDENTIFY: CPT

## 2024-07-11 PROCEDURE — 87186 SC STD MICRODIL/AGAR DIL: CPT

## 2024-07-11 PROCEDURE — 81001 URINALYSIS AUTO W/SCOPE: CPT

## 2024-07-16 ENCOUNTER — HOSPITAL ENCOUNTER (OUTPATIENT)
Dept: RADIOLOGY | Facility: MEDICAL CENTER | Age: 54
End: 2024-07-16
Attending: FAMILY MEDICINE
Payer: COMMERCIAL

## 2024-07-16 DIAGNOSIS — Z13.6 SCREENING FOR CARDIOVASCULAR CONDITION: ICD-10-CM

## 2024-07-16 PROCEDURE — 4410556 CT-CARDIAC SCORING (SELF PAY ONLY)

## 2024-07-22 RX ORDER — FUROSEMIDE 20 MG/1
20 TABLET ORAL DAILY
Qty: 90 TABLET | Refills: 1 | Status: SHIPPED | OUTPATIENT
Start: 2024-07-22

## 2024-08-08 ASSESSMENT — ENCOUNTER SYMPTOMS: GENERAL WELL-BEING: GOOD

## 2024-08-08 ASSESSMENT — PATIENT HEALTH QUESTIONNAIRE - PHQ9
1. LITTLE INTEREST OR PLEASURE IN DOING THINGS: NOT AT ALL
2. FEELING DOWN, DEPRESSED, IRRITABLE, OR HOPELESS: SEVERAL DAYS

## 2024-08-08 ASSESSMENT — ACTIVITIES OF DAILY LIVING (ADL): BATHING_REQUIRES_ASSISTANCE: 0

## 2024-08-08 NOTE — ASSESSMENT & PLAN NOTE
Chronic, stable. PHQ today x/x. Pt maintains off pharamcotherapy and is doing well. Follow up with PCP.

## 2024-08-08 NOTE — ASSESSMENT & PLAN NOTE
Chronic, stable. Pt maintains on levothyroxine 100mcg daily with TSH wnl as of 3/2024. Continue current treatment regime. Follow up with PCP at least annually for continued monitoring and management.      Latest Reference Range & Units 03/21/24 10:00   TSH 0.380 - 5.330 uIU/mL 2.160

## 2024-08-09 ENCOUNTER — APPOINTMENT (OUTPATIENT)
Dept: FAMILY PLANNING/WOMEN'S HEALTH CLINIC | Facility: PHYSICIAN GROUP | Age: 54
End: 2024-08-09
Attending: FAMILY MEDICINE
Payer: COMMERCIAL

## 2024-08-09 DIAGNOSIS — F10.21 ALCOHOL DEPENDENCE IN SUSTAINED FULL REMISSION (HCC): ICD-10-CM

## 2024-08-09 DIAGNOSIS — I48.91 ATRIAL FIBRILLATION, UNSPECIFIED TYPE (HCC): ICD-10-CM

## 2024-08-09 DIAGNOSIS — F13.20 SEDATIVE, HYPNOTIC OR ANXIOLYTIC DEPENDENCE, UNCOMPLICATED (HCC): ICD-10-CM

## 2024-08-09 DIAGNOSIS — F33.42 RECURRENT MAJOR DEPRESSIVE DISORDER, IN FULL REMISSION (HCC): ICD-10-CM

## 2024-08-09 DIAGNOSIS — E03.9 HYPOTHYROIDISM, UNSPECIFIED TYPE: ICD-10-CM

## 2024-08-09 NOTE — ASSESSMENT & PLAN NOTE
Pt with one episode of atrial fibrillation following illness. Pt reportedly has not suffered any recurrence. Denies palpitatios. Follow up with PCP per routine for continued monitoring and management.

## 2024-08-20 NOTE — ED PROVIDER NOTES
ED Provider Note    CHIEF COMPLAINT  Chief Complaint   Patient presents with   • Dog Bite     dog bite to R wrist last night        HPI  Carie Santiago is a 49 y.o. female who presents with no significant past medical history, she was bitten on the right wrist by her boyfriend's dog last night.  There is soft tissue swelling of the wrist with tingling of the fingers.  The patient reports she is not up-to-date with tetanus.  She reports the dog was not acting abnormally.  She describes moderate pain.    REVIEW OF SYSTEMS  See HPI for further details. All other systems are negative.     PAST MEDICAL HISTORY   has a past medical history of Anxiety and depression (1/23/2019), Colon polyp (3/29/2019), Disorder of thyroid, Diverticulosis of colon (1/23/2019), Generalized anxiety disorder, Hemochromatosis, Hypertriglyceridemia (1/23/2019), Low serum HDL (1/23/2019), Menopause, and Vitamin B12 deficiency (1/23/2019).    SOCIAL HISTORY  Social History     Tobacco Use   • Smoking status: Current Every Day Smoker     Packs/day: 0.50     Years: 30.00     Pack years: 15.00     Types: Cigarettes   • Smokeless tobacco: Never Used   Substance and Sexual Activity   • Alcohol use: Yes     Comment: a glass of wine per day   • Drug use: No   • Sexual activity: Not on file       SURGICAL HISTORY   has a past surgical history that includes other orthopedic surgery (Right, 2017); robotic laparoscopic cholecystectomy (2017); umbilical hernia repair (2017); appendectomy; tonsillectomy and adenoidectomy (1976); mammoplasty augmentation (Bilateral); inguinal hernia repair bilateral (1999); irrigation & debridement ortho (Left, 9/21/2019); and wound closure ortho (Left, 9/24/2019).    CURRENT MEDICATIONS  Home Medications     Reviewed by Kelsie Kidd R.N. (Registered Nurse) on 01/25/20 at 1520  Med List Status: Partial   Medication Last Dose Status   albuterol 108 (90 Base) MCG/ACT Aero Soln inhalation aerosol prn Active  "  Cyanocobalamin (VITAMIN B-12 INJ) 1/25/2020 Active   NABOR 0.0375 MG/24HR patch 1/25/2020 Active   FLUoxetine (PROZAC) 20 MG Cap 1/25/2020 Active   ibuprofen (MOTRIN) 800 MG Tab  Active   Multiple Vitamins-Minerals (OCUVITE-LUTEIN) Tab 1/25/2020 Active   Omega-3 Fatty Acids (FISH OIL) 1000 MG Cap capsule 1/25/2020 Active   progesterone (PROMETRIUM) 100 MG Cap 1/24/2020 Active   promethazine (PHENERGAN) 25 MG Tab  Active   SYNTHROID 88 MCG Tab 1/25/2020 Active                ALLERGIES  Allergies   Allergen Reactions   • Norco [Apap-Fd&C Yellow #10 [place]-Hydrocodone]      itching   • Sulfa Drugs Anaphylaxis   • Zofran [Dextrose-Ondansetron] Unspecified     Migraine   • Lorazepam Anxiety and Unspecified     \"IT MAKES ME CRY\"       PHYSICAL EXAM  VITAL SIGNS: /92   Pulse 85   Temp 36.4 °C (97.6 °F) (Temporal)   Resp 16   Ht 1.765 m (5' 9.5\")   Wt 78.7 kg (173 lb 8 oz)   SpO2 98%   BMI 25.25 kg/m²  @JOSE[[id number]::@   Pulse ox interpretation: I interpret this pulse ox as normal.  Constitutional: Alert in no apparent distress.  HENT: No signs of trauma, Bilateral external ears normal, Nose normal.   Eyes: Pupils are equal and reactive, Conjunctiva normal, Non-icteric.   Neck: Normal range of motion, No tenderness, Supple, No stridor.   Lymphatic: No lymphadenopathy noted.   Skin: Warm, Dry, No erythema, No rash.   Back: No bony tenderness, No CVA tenderness.   Extremities: Intact distal pulses, No edema, No tenderness, No cyanosis.  Musculoskeletal: Bruising of the right wrist, very small puncture wound of the volar wrist.  Subjective tingling of the fingers and decreased range of motion secondary to pain of the fingers.  Neurologic: Alert , Normal motor function, Normal sensory function, No focal deficits noted.   Psychiatric: Affect normal, Judgment normal, Mood normal.       DIAGNOSTIC STUDIES / PROCEDURES        RADIOLOGY  DX-WRIST-COMPLETE 3+ RIGHT   Final Result      Soft tissue swelling without acute " osseous abnormality. No radiopaque foreign body.              COURSE & MEDICAL DECISION MAKING  Pertinent Labs & Imaging studies reviewed. (See chart for details)    The patient has a very small puncture on the volar surface of the wrist.  The patient was given a tetanus booster.  She will be prescribed Augmentin for dog bite.  The x-ray does not show evidence of fracture nor foreign body.  She will follow-up with a hand specialist Dr. Miller if the tingling finger symptoms do not resolve.  Her boyfriend will quarantine the dog, if the dog runs away within 10 days she will return for rabies prophylactic treatment.  The patient understands the importance of this and that rabies is fatal if not treated.  This is a domestic animal that is currently in custody of the owner with no rabies symptoms.    The patient will return for new or worsening symptoms and is stable at the time of discharge.    The patient is referred to a primary physician for blood pressure management, diabetic screening, and for all other preventative health concerns.        DISPOSITION:  Patient will be discharged home in stable condition.    FOLLOW UP:  [facility], Emergency Dept  [address] [facility] [id number] [phone]    If symptoms worsen    Herbie Joshi M.D.  [address] [facility] [address] [phone]      As needed    Paolo Miller M.D.  [address]  [address]  [facility] [id number] [phone]      Call for follow-up if tingling of finger does not resolve      OUTPATIENT MEDICATIONS:  New Prescriptions    AMOXICILLIN-CLAVULANATE (AUGMENTIN) 875-125 MG TAB    Take 1 Tab by mouth 2 times a day for 5 days.                The patient will return for worsening symptoms and is stable at the time of discharge. The patient verbalizes understanding and will comply.    FINAL IMPRESSION  1. Dog bite, initial encounter                Electronically signed by: Eric Joseph M.D.,  1/25/2020 3:48 PM     Photo Preface (Leave Blank If You Do Not Want): Photographs were obtained today. Detail Level: Simple

## 2024-08-28 ASSESSMENT — PATIENT HEALTH QUESTIONNAIRE - PHQ9
2. FEELING DOWN, DEPRESSED, IRRITABLE, OR HOPELESS: SEVERAL DAYS
1. LITTLE INTEREST OR PLEASURE IN DOING THINGS: SEVERAL DAYS

## 2024-08-28 ASSESSMENT — ACTIVITIES OF DAILY LIVING (ADL): BATHING_REQUIRES_ASSISTANCE: 0

## 2024-08-28 ASSESSMENT — ENCOUNTER SYMPTOMS: GENERAL WELL-BEING: GOOD
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
## 2024-08-28 NOTE — ASSESSMENT & PLAN NOTE
Chronic, stable. PHQ today 10/27 noting this is elevated primarily due to PCP denying alprazolam refill for her anxiety/PTSD. She does not follow with psychiatry or a therapist but states she is well equipped with skills to manage depression and anxiety. Follow up with PCP for continued monitoring and management.
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
## 2024-08-28 NOTE — ASSESSMENT & PLAN NOTE
Pt with one episode of atrial fibrillation following illness. Pt reportedly has not suffered any recurrence. Denies palpitations. Follow up with PCP per routine for continued monitoring and management.

## 2024-08-29 ENCOUNTER — OFFICE VISIT (OUTPATIENT)
Dept: FAMILY PLANNING/WOMEN'S HEALTH CLINIC | Facility: PHYSICIAN GROUP | Age: 54
End: 2024-08-29
Attending: FAMILY MEDICINE
Payer: COMMERCIAL

## 2024-08-29 VITALS
HEIGHT: 70 IN | SYSTOLIC BLOOD PRESSURE: 122 MMHG | BODY MASS INDEX: 23.48 KG/M2 | DIASTOLIC BLOOD PRESSURE: 80 MMHG | WEIGHT: 164 LBS

## 2024-08-29 DIAGNOSIS — F13.20 SEDATIVE, HYPNOTIC OR ANXIOLYTIC DEPENDENCE, UNCOMPLICATED (HCC): ICD-10-CM

## 2024-08-29 DIAGNOSIS — E03.9 HYPOTHYROIDISM, UNSPECIFIED TYPE: ICD-10-CM

## 2024-08-29 DIAGNOSIS — I48.91 ATRIAL FIBRILLATION, UNSPECIFIED TYPE (HCC): ICD-10-CM

## 2024-08-29 DIAGNOSIS — F10.21 ALCOHOL DEPENDENCE IN SUSTAINED FULL REMISSION (HCC): ICD-10-CM

## 2024-08-29 DIAGNOSIS — F33.42 RECURRENT MAJOR DEPRESSIVE DISORDER, IN FULL REMISSION (HCC): ICD-10-CM

## 2024-08-29 SDOH — ECONOMIC STABILITY: FOOD INSECURITY: WITHIN THE PAST 12 MONTHS, YOU WORRIED THAT YOUR FOOD WOULD RUN OUT BEFORE YOU GOT MONEY TO BUY MORE.: NEVER TRUE

## 2024-08-29 SDOH — SOCIAL STABILITY: SOCIAL INSECURITY
WITHIN THE LAST YEAR, HAVE YOU BEEN KICKED, HIT, SLAPPED, OR OTHERWISE PHYSICALLY HURT BY YOUR PARTNER OR EX-PARTNER?: NO

## 2024-08-29 SDOH — ECONOMIC STABILITY: FOOD INSECURITY: WITHIN THE PAST 12 MONTHS, THE FOOD YOU BOUGHT JUST DIDN'T LAST AND YOU DIDN'T HAVE MONEY TO GET MORE.: NEVER TRUE

## 2024-08-29 SDOH — SOCIAL STABILITY: SOCIAL INSECURITY
WITHIN THE LAST YEAR, HAVE TO BEEN RAPED OR FORCED TO HAVE ANY KIND OF SEXUAL ACTIVITY BY YOUR PARTNER OR EX-PARTNER?: NO

## 2024-08-29 SDOH — SOCIAL STABILITY: SOCIAL INSECURITY: WITHIN THE LAST YEAR, HAVE YOU BEEN HUMILIATED OR EMOTIONALLY ABUSED IN OTHER WAYS BY YOUR PARTNER OR EX-PARTNER?: NO

## 2024-08-29 SDOH — SOCIAL STABILITY: SOCIAL INSECURITY: WITHIN THE LAST YEAR, HAVE YOU BEEN AFRAID OF YOUR PARTNER OR EX-PARTNER?: NO

## 2024-08-29 SDOH — ECONOMIC STABILITY: TRANSPORTATION INSECURITY
IN THE PAST 12 MONTHS, HAS LACK OF TRANSPORTATION KEPT YOU FROM MEETINGS, WORK, OR FROM GETTING THINGS NEEDED FOR DAILY LIVING?: NO

## 2024-08-29 SDOH — ECONOMIC STABILITY: TRANSPORTATION INSECURITY
IN THE PAST 12 MONTHS, HAS THE LACK OF TRANSPORTATION KEPT YOU FROM MEDICAL APPOINTMENTS OR FROM GETTING MEDICATIONS?: NO

## 2024-08-29 SDOH — ECONOMIC STABILITY: INCOME INSECURITY: HOW HARD IS IT FOR YOU TO PAY FOR THE VERY BASICS LIKE FOOD, HOUSING, MEDICAL CARE, AND HEATING?: NOT HARD AT ALL

## 2024-08-29 ASSESSMENT — PATIENT HEALTH QUESTIONNAIRE - PHQ9
SUM OF ALL RESPONSES TO PHQ QUESTIONS 1-9: 10
5. POOR APPETITE OR OVEREATING: 1 - SEVERAL DAYS
CLINICAL INTERPRETATION OF PHQ2 SCORE: 2

## 2024-08-29 ASSESSMENT — PAIN SCALES - GENERAL: PAINLEVEL: NO PAIN

## 2024-08-29 ASSESSMENT — FIBROSIS 4 INDEX: FIB4 SCORE: 0.78
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
## 2024-09-06 ENCOUNTER — APPOINTMENT (OUTPATIENT)
Dept: ADMISSIONS | Facility: MEDICAL CENTER | Age: 54
End: 2024-09-06
Attending: OTOLARYNGOLOGY
Payer: COMMERCIAL

## 2024-09-09 ENCOUNTER — PRE-ADMISSION TESTING (OUTPATIENT)
Dept: ADMISSIONS | Facility: MEDICAL CENTER | Age: 54
End: 2024-09-09
Attending: OTOLARYNGOLOGY
Payer: COMMERCIAL

## 2024-09-09 RX ORDER — MUPIROCIN 20 MG/G
1 OINTMENT TOPICAL DAILY
COMMUNITY
Start: 2024-09-05

## 2024-09-09 RX ORDER — ALPRAZOLAM 0.25 MG
0.25 TABLET ORAL 3 TIMES DAILY PRN
COMMUNITY

## 2024-09-09 RX ORDER — DIAZEPAM 5 MG
5 TABLET ORAL EVERY 6 HOURS PRN
COMMUNITY
Start: 2024-08-20

## 2024-09-09 RX ORDER — NYSTATIN 100000 [USP'U]/ML
500000 SUSPENSION ORAL 4 TIMES DAILY
COMMUNITY
Start: 2024-07-19

## 2024-09-09 NOTE — OR NURSING
"Pre-Admit RN appointment completed with pt, by this RN, over the phone, for 9/10/24. Discussed pre-procedure instructions, unless they have been otherwise instructed by their surgeon. Discussed post-op expectations for pain, and approximate duration of time in PACU etc. Instructed pt on medication instructions from \"Guideline for Pre-Operative Medication Management\", unless otherwise instructed by prescribing MD or Surgeon. Pt verbalized understanding of all instructions. Pt denies any questions or concerns. Copy of Medication Reconciliation with instructions provided to pt via Email.     "

## 2024-09-10 ENCOUNTER — HOSPITAL ENCOUNTER (OUTPATIENT)
Facility: MEDICAL CENTER | Age: 54
End: 2024-09-10
Attending: OTOLARYNGOLOGY | Admitting: OTOLARYNGOLOGY
Payer: COMMERCIAL

## 2024-09-10 ENCOUNTER — ANESTHESIA (OUTPATIENT)
Dept: SURGERY | Facility: MEDICAL CENTER | Age: 54
End: 2024-09-10
Payer: COMMERCIAL

## 2024-09-10 ENCOUNTER — ANESTHESIA EVENT (OUTPATIENT)
Dept: SURGERY | Facility: MEDICAL CENTER | Age: 54
End: 2024-09-10
Payer: COMMERCIAL

## 2024-09-10 VITALS
SYSTOLIC BLOOD PRESSURE: 129 MMHG | HEIGHT: 70 IN | DIASTOLIC BLOOD PRESSURE: 74 MMHG | TEMPERATURE: 97 F | WEIGHT: 158.07 LBS | BODY MASS INDEX: 22.63 KG/M2 | RESPIRATION RATE: 18 BRPM | HEART RATE: 83 BPM | OXYGEN SATURATION: 92 %

## 2024-09-10 PROBLEM — D49.1 LARYNGEAL NEOPLASM: Chronic | Status: ACTIVE | Noted: 2024-09-10

## 2024-09-10 LAB
ANION GAP SERPL CALC-SCNC: 10 MMOL/L (ref 7–16)
BUN SERPL-MCNC: 13 MG/DL (ref 8–22)
CALCIUM SERPL-MCNC: 9.2 MG/DL (ref 8.5–10.5)
CHLORIDE SERPL-SCNC: 106 MMOL/L (ref 96–112)
CO2 SERPL-SCNC: 25 MMOL/L (ref 20–33)
CREAT SERPL-MCNC: 0.48 MG/DL (ref 0.5–1.4)
GFR SERPLBLD CREATININE-BSD FMLA CKD-EPI: 111 ML/MIN/1.73 M 2
GLUCOSE SERPL-MCNC: 103 MG/DL (ref 65–99)
PATHOLOGY CONSULT NOTE: NORMAL
POTASSIUM SERPL-SCNC: 4.5 MMOL/L (ref 3.6–5.5)
SODIUM SERPL-SCNC: 141 MMOL/L (ref 135–145)

## 2024-09-10 PROCEDURE — 160009 HCHG ANES TIME/MIN: Performed by: OTOLARYNGOLOGY

## 2024-09-10 PROCEDURE — 88305 TISSUE EXAM BY PATHOLOGIST: CPT

## 2024-09-10 PROCEDURE — 700111 HCHG RX REV CODE 636 W/ 250 OVERRIDE (IP): Performed by: ANESTHESIOLOGY

## 2024-09-10 PROCEDURE — 700105 HCHG RX REV CODE 258: Performed by: OTOLARYNGOLOGY

## 2024-09-10 PROCEDURE — 160039 HCHG SURGERY MINUTES - EA ADDL 1 MIN LEVEL 3: Performed by: OTOLARYNGOLOGY

## 2024-09-10 PROCEDURE — 160046 HCHG PACU - 1ST 60 MINS PHASE II: Performed by: OTOLARYNGOLOGY

## 2024-09-10 PROCEDURE — 700101 HCHG RX REV CODE 250: Performed by: ANESTHESIOLOGY

## 2024-09-10 PROCEDURE — 160002 HCHG RECOVERY MINUTES (STAT): Performed by: OTOLARYNGOLOGY

## 2024-09-10 PROCEDURE — 36415 COLL VENOUS BLD VENIPUNCTURE: CPT

## 2024-09-10 PROCEDURE — 700101 HCHG RX REV CODE 250: Performed by: OTOLARYNGOLOGY

## 2024-09-10 PROCEDURE — 160035 HCHG PACU - 1ST 60 MINS PHASE I: Performed by: OTOLARYNGOLOGY

## 2024-09-10 PROCEDURE — 160028 HCHG SURGERY MINUTES - 1ST 30 MINS LEVEL 3: Performed by: OTOLARYNGOLOGY

## 2024-09-10 PROCEDURE — 160025 RECOVERY II MINUTES (STATS): Performed by: OTOLARYNGOLOGY

## 2024-09-10 PROCEDURE — 80048 BASIC METABOLIC PNL TOTAL CA: CPT

## 2024-09-10 PROCEDURE — 160048 HCHG OR STATISTICAL LEVEL 1-5: Performed by: OTOLARYNGOLOGY

## 2024-09-10 RX ORDER — LIDOCAINE HYDROCHLORIDE AND EPINEPHRINE 10; 10 MG/ML; UG/ML
INJECTION, SOLUTION INFILTRATION; PERINEURAL
Status: DISCONTINUED | OUTPATIENT
Start: 2024-09-10 | End: 2024-09-10 | Stop reason: HOSPADM

## 2024-09-10 RX ORDER — MIDAZOLAM HYDROCHLORIDE 1 MG/ML
INJECTION INTRAMUSCULAR; INTRAVENOUS PRN
Status: DISCONTINUED | OUTPATIENT
Start: 2024-09-10 | End: 2024-09-10 | Stop reason: SURG

## 2024-09-10 RX ORDER — HYDROMORPHONE HYDROCHLORIDE 1 MG/ML
0.2 INJECTION, SOLUTION INTRAMUSCULAR; INTRAVENOUS; SUBCUTANEOUS
Status: DISCONTINUED | OUTPATIENT
Start: 2024-09-10 | End: 2024-09-10 | Stop reason: HOSPADM

## 2024-09-10 RX ORDER — ROCURONIUM BROMIDE 10 MG/ML
INJECTION, SOLUTION INTRAVENOUS PRN
Status: DISCONTINUED | OUTPATIENT
Start: 2024-09-10 | End: 2024-09-10 | Stop reason: SURG

## 2024-09-10 RX ORDER — MIDAZOLAM HYDROCHLORIDE 1 MG/ML
1 INJECTION INTRAMUSCULAR; INTRAVENOUS
Status: DISCONTINUED | OUTPATIENT
Start: 2024-09-10 | End: 2024-09-10 | Stop reason: HOSPADM

## 2024-09-10 RX ORDER — EPINEPHRINE 1 MG/ML(1)
AMPUL (ML) INJECTION
Status: DISCONTINUED
Start: 2024-09-10 | End: 2024-09-10 | Stop reason: HOSPADM

## 2024-09-10 RX ORDER — LIDOCAINE HYDROCHLORIDE 40 MG/ML
SOLUTION TOPICAL PRN
Status: DISCONTINUED | OUTPATIENT
Start: 2024-09-10 | End: 2024-09-10 | Stop reason: SURG

## 2024-09-10 RX ORDER — GLYCOPYRROLATE 0.2 MG/ML
INJECTION INTRAMUSCULAR; INTRAVENOUS PRN
Status: DISCONTINUED | OUTPATIENT
Start: 2024-09-10 | End: 2024-09-10 | Stop reason: SURG

## 2024-09-10 RX ORDER — HYDROMORPHONE HYDROCHLORIDE 1 MG/ML
0.4 INJECTION, SOLUTION INTRAMUSCULAR; INTRAVENOUS; SUBCUTANEOUS
Status: DISCONTINUED | OUTPATIENT
Start: 2024-09-10 | End: 2024-09-10 | Stop reason: HOSPADM

## 2024-09-10 RX ORDER — IPRATROPIUM BROMIDE AND ALBUTEROL SULFATE 2.5; .5 MG/3ML; MG/3ML
3 SOLUTION RESPIRATORY (INHALATION)
Status: DISCONTINUED | OUTPATIENT
Start: 2024-09-10 | End: 2024-09-10 | Stop reason: HOSPADM

## 2024-09-10 RX ORDER — ONDANSETRON 2 MG/ML
4 INJECTION INTRAMUSCULAR; INTRAVENOUS
Status: DISCONTINUED | OUTPATIENT
Start: 2024-09-10 | End: 2024-09-10 | Stop reason: HOSPADM

## 2024-09-10 RX ORDER — MEPERIDINE HYDROCHLORIDE 25 MG/ML
12.5 INJECTION INTRAMUSCULAR; INTRAVENOUS; SUBCUTANEOUS
Status: DISCONTINUED | OUTPATIENT
Start: 2024-09-10 | End: 2024-09-10 | Stop reason: HOSPADM

## 2024-09-10 RX ORDER — SODIUM CHLORIDE, SODIUM LACTATE, POTASSIUM CHLORIDE, CALCIUM CHLORIDE 600; 310; 30; 20 MG/100ML; MG/100ML; MG/100ML; MG/100ML
INJECTION, SOLUTION INTRAVENOUS CONTINUOUS
Status: DISCONTINUED | OUTPATIENT
Start: 2024-09-10 | End: 2024-09-10 | Stop reason: HOSPADM

## 2024-09-10 RX ORDER — OXYCODONE HCL 5 MG/5 ML
5 SOLUTION, ORAL ORAL
Status: DISCONTINUED | OUTPATIENT
Start: 2024-09-10 | End: 2024-09-10 | Stop reason: HOSPADM

## 2024-09-10 RX ORDER — SODIUM CHLORIDE, SODIUM GLUCONATE, SODIUM ACETATE, POTASSIUM CHLORIDE AND MAGNESIUM CHLORIDE 526; 502; 368; 37; 30 MG/100ML; MG/100ML; MG/100ML; MG/100ML; MG/100ML
500 INJECTION, SOLUTION INTRAVENOUS CONTINUOUS
Status: DISCONTINUED | OUTPATIENT
Start: 2024-09-10 | End: 2024-09-10 | Stop reason: HOSPADM

## 2024-09-10 RX ORDER — CEFAZOLIN SODIUM 1 G/3ML
INJECTION, POWDER, FOR SOLUTION INTRAMUSCULAR; INTRAVENOUS PRN
Status: DISCONTINUED | OUTPATIENT
Start: 2024-09-10 | End: 2024-09-10 | Stop reason: SURG

## 2024-09-10 RX ORDER — ONDANSETRON 2 MG/ML
INJECTION INTRAMUSCULAR; INTRAVENOUS PRN
Status: DISCONTINUED | OUTPATIENT
Start: 2024-09-10 | End: 2024-09-10 | Stop reason: SURG

## 2024-09-10 RX ORDER — PHENYLEPHRINE HCL IN 0.9% NACL 1 MG/10 ML
SYRINGE (ML) INTRAVENOUS PRN
Status: DISCONTINUED | OUTPATIENT
Start: 2024-09-10 | End: 2024-09-10 | Stop reason: SURG

## 2024-09-10 RX ORDER — LIDOCAINE HYDROCHLORIDE 20 MG/ML
INJECTION, SOLUTION EPIDURAL; INFILTRATION; INTRACAUDAL; PERINEURAL PRN
Status: DISCONTINUED | OUTPATIENT
Start: 2024-09-10 | End: 2024-09-10 | Stop reason: SURG

## 2024-09-10 RX ORDER — HALOPERIDOL 5 MG/ML
1 INJECTION INTRAMUSCULAR
Status: DISCONTINUED | OUTPATIENT
Start: 2024-09-10 | End: 2024-09-10 | Stop reason: HOSPADM

## 2024-09-10 RX ORDER — DIPHENHYDRAMINE HYDROCHLORIDE 50 MG/ML
12.5 INJECTION INTRAMUSCULAR; INTRAVENOUS
Status: DISCONTINUED | OUTPATIENT
Start: 2024-09-10 | End: 2024-09-10 | Stop reason: HOSPADM

## 2024-09-10 RX ORDER — LIDOCAINE HYDROCHLORIDE 10 MG/ML
INJECTION, SOLUTION EPIDURAL; INFILTRATION; INTRACAUDAL; PERINEURAL
Status: DISCONTINUED
Start: 2024-09-10 | End: 2024-09-10 | Stop reason: HOSPADM

## 2024-09-10 RX ORDER — HYDROMORPHONE HYDROCHLORIDE 1 MG/ML
1 INJECTION, SOLUTION INTRAMUSCULAR; INTRAVENOUS; SUBCUTANEOUS
Status: DISCONTINUED | OUTPATIENT
Start: 2024-09-10 | End: 2024-09-10 | Stop reason: HOSPADM

## 2024-09-10 RX ORDER — DEXAMETHASONE SODIUM PHOSPHATE 4 MG/ML
INJECTION, SOLUTION INTRA-ARTICULAR; INTRALESIONAL; INTRAMUSCULAR; INTRAVENOUS; SOFT TISSUE PRN
Status: DISCONTINUED | OUTPATIENT
Start: 2024-09-10 | End: 2024-09-10 | Stop reason: SURG

## 2024-09-10 RX ORDER — KETOROLAC TROMETHAMINE 15 MG/ML
INJECTION, SOLUTION INTRAMUSCULAR; INTRAVENOUS PRN
Status: DISCONTINUED | OUTPATIENT
Start: 2024-09-10 | End: 2024-09-10 | Stop reason: SURG

## 2024-09-10 RX ORDER — OXYCODONE HCL 5 MG/5 ML
10 SOLUTION, ORAL ORAL
Status: DISCONTINUED | OUTPATIENT
Start: 2024-09-10 | End: 2024-09-10 | Stop reason: HOSPADM

## 2024-09-10 RX ADMIN — LIDOCAINE HYDROCHLORIDE 4 ML: 40 SOLUTION TOPICAL at 12:14

## 2024-09-10 RX ADMIN — SODIUM CHLORIDE, POTASSIUM CHLORIDE, SODIUM LACTATE AND CALCIUM CHLORIDE: 600; 310; 30; 20 INJECTION, SOLUTION INTRAVENOUS at 12:06

## 2024-09-10 RX ADMIN — ONDANSETRON 4 MG: 2 INJECTION INTRAMUSCULAR; INTRAVENOUS at 12:40

## 2024-09-10 RX ADMIN — MIDAZOLAM HYDROCHLORIDE 2 MG: 2 INJECTION, SOLUTION INTRAMUSCULAR; INTRAVENOUS at 12:07

## 2024-09-10 RX ADMIN — LIDOCAINE HYDROCHLORIDE 50 MG: 20 INJECTION, SOLUTION EPIDURAL; INFILTRATION; INTRACAUDAL at 12:14

## 2024-09-10 RX ADMIN — ROCURONIUM BROMIDE 50 MG: 50 INJECTION, SOLUTION INTRAVENOUS at 12:14

## 2024-09-10 RX ADMIN — GLYCOPYRROLATE 0.2 MG: 0.2 INJECTION INTRAMUSCULAR; INTRAVENOUS at 12:11

## 2024-09-10 RX ADMIN — CEFAZOLIN 2 G: 1 INJECTION, POWDER, FOR SOLUTION INTRAMUSCULAR; INTRAVENOUS at 12:12

## 2024-09-10 RX ADMIN — KETOROLAC TROMETHAMINE 15 MG: 15 INJECTION, SOLUTION INTRAMUSCULAR; INTRAVENOUS at 12:40

## 2024-09-10 RX ADMIN — SUGAMMADEX 200 MG: 100 INJECTION, SOLUTION INTRAVENOUS at 12:40

## 2024-09-10 RX ADMIN — DEXAMETHASONE SODIUM PHOSPHATE 8 MG: 4 INJECTION INTRA-ARTICULAR; INTRALESIONAL; INTRAMUSCULAR; INTRAVENOUS; SOFT TISSUE at 12:17

## 2024-09-10 RX ADMIN — Medication 100 MCG: at 12:43

## 2024-09-10 RX ADMIN — PROPOFOL 150 MG: 10 INJECTION, EMULSION INTRAVENOUS at 12:14

## 2024-09-10 RX ADMIN — FENTANYL CITRATE 100 MCG: 50 INJECTION, SOLUTION INTRAMUSCULAR; INTRAVENOUS at 12:07

## 2024-09-10 RX ADMIN — GLYCOPYRROLATE 0.2 MG: 0.2 INJECTION INTRAMUSCULAR; INTRAVENOUS at 12:16

## 2024-09-10 ASSESSMENT — PAIN DESCRIPTION - PAIN TYPE
TYPE: SURGICAL PAIN

## 2024-09-10 ASSESSMENT — PAIN SCALES - GENERAL: PAIN_LEVEL: 0

## 2024-09-10 ASSESSMENT — FIBROSIS 4 INDEX: FIB4 SCORE: 0.78

## 2024-09-10 NOTE — DISCHARGE INSTRUCTIONS
If any questions arise, call your provider.  If your provider is not available, please feel free to call the Surgical Center at [phone].    MEDICATIONS: Resume taking daily medication.  Take prescribed pain medication with food.  If no medication is prescribed, you may take non-aspirin pain medication if needed.  PAIN MEDICATION CAN BE VERY CONSTIPATING.  Take a stool softener or laxative such as senokot, pericolace, or milk of magnesia if needed.    Last pain medication given at Toradol (Like Ibuprofen/Motrin) at 12:40pm, next dose if needed after 6:40 pm.       What to Expect Post Anesthesia    Rest and take it easy for the first 24 hours.  A responsible adult is recommended to remain with you during that time.  It is normal to feel sleepy.  We encourage you to not do anything that requires balance, judgment or coordination.    FOR 24 HOURS DO NOT:  Drive, operate machinery or run household appliances.  Drink beer or alcoholic beverages.  Make important decisions or sign legal documents.    To avoid nausea, slowly advance diet as tolerated, avoiding spicy or greasy foods for the first day.  Add more substantial food to your diet according to your provider's instructions.  Babies can be fed formula or breast milk as soon as they are hungry.  INCREASE FLUIDS AND FIBER TO AVOID CONSTIPATION.    MILD FLU-LIKE SYMPTOMS ARE NORMAL.  YOU MAY EXPERIENCE GENERALIZED MUSCLE ACHES, THROAT IRRITATION, HEADACHE AND/OR SOME NAUSEA.

## 2024-09-10 NOTE — OR NURSING
1256 received patient from OR. Report from Anesthesiologist and OR RN. Patient on 6L at 99 % O2. VSS. Monitor connected. No advanced airway in place. S/P laryngoscopy with biopsy    1300 patient awake, Dr. Ramírez at bedside discussing with patient, tolering otterpops, states no pain or nausea     1330 phase 1 complete, up to bathroom, handoff to pinky TERRY

## 2024-09-10 NOTE — ANESTHESIA PROCEDURE NOTES
Addiction Psychiatry Follow Up Note    Recent past HX  Reports he has been using opioids age 37 for with presscription Oxy and Opana Used pain pills 2 years up to 40mg day. Got too expensive and hard to find. Was snorting it and moved to Heroin Using Heroin most days since. Longest he has gone without using 10 months in [place] related to posssession      Current Drug Use Including Type/Method of Ingestion/Frequency Spending $40 on on Fentanyl? Heroin . He snorts it. Had 1 yr period of IV use 3 yrs ago but gave it up finding more work and not more pleasurable. This stretch has been like this since 2017     Past Use  Opioids-    as above     BNZ/Sedatives- No  Cocaine- 2005 for 2 yrs powdered. Now if he uses it is mixed in with the opioid he buys   Amphetamines- No  THC- HS use  Hallucinogens-no  Alcohol-Heavy use early teens till late 19's.  Had 2 yr binge in 2005 after fathers death Quit due to 9 DUI's 3 after father death.    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 Tequila Thompson  Finished antivirals for HCV 2-3 weeks ago  Repeat labs and imaging to be done in first week of June and he will follow with      Opiate Abuse  Still uses heroin from the street  Referred to Dr. Bradley Sampson for suboxone      Active smoker  He smokes about a PPD for the last 19 years  Not planning to quit yet     I have reviewed my Airway    Date/Time: 9/10/2024 12:14 PM    Performed by: Fidel Marcus III, M.D.  Authorized by: Fidel Marcus III, M.D.    Location:  OR  Urgency:  Elective  Difficult Airway: No    Indications for Airway Management:  Anesthesia      Spontaneous Ventilation: absent    Sedation Level:  Deep  Preoxygenated: Yes    Patient Position:  Sniffing  Final Airway Type:  Endotracheal airway  Final Endotracheal Airway:  ETT  Cuffed: Yes    Technique Used for Successful ETT Placement:  Direct laryngoscopy    Insertion Site:  Oral  Blade Type:  Graham  Laryngoscope Blade/Videolaryngoscope Blade Size:  2  ETT Size (mm):  5.5  Measured from:  Lips  ETT to Lips (cm):  22  Placement Verified by: auscultation and capnometry    Cormack-Lehane Classification:  Grade IIb - view of arytenoids or posterior of glottis only  Number of Attempts at Approach:  1         findings and recommendations with Harpreet Phipps and        ASSESSMENT:     1) Opioid Use Disorder-Severe  2) Remote Cocaine Dep  3) Hep C treated  4) HTN          PLAN & RECOMMENDATIONS:     Discussed TX options with pt including Methaodone and Suboxone. He is not candidate for Naltrexone He wants trial of Suboxone and given Pain I think this is best option. I discussed with him at length issue of precipitated withdrawal and need to wait as long as possible till he had withdrawal including cramps, sweating, restlessness and then begin Suboxone film sublingual 2mg 1/4 of an 8mg film every 2-3 hrs as tolerated up to 2 films in 24 hr period. If he advanced and felt withdrawal worsen he had to wait longer till next Suboxone dose. If tolerated on day 2 he will change to 1/2 strip QID and RTC in 7 days  Agreed to call if he had any problems   Pt clearly understands plan and need for f/u     Current Self Report Status  Reports he had to reduce dose to 1/8th pieces   He reports he is using small amounts snorting 2-3 x day. He thinks altogether he 1/2 strip. When he takes more he gets too lightheaded. 1/2 strip made him feel as if he may have been having precipitated withdrawal. He waited all night before starting it. Withdrawal was a 5/6 of 10. Drug/Alcohol Relapse  As stated using daily 2-3 times day but small amounts in betweeen taking film 1/8th strip dose     PDMP report   04/14/2022 04/14/2022   1  Buprenorphine-Nalox 8-2mg Film   14.00  7  Ana Maria Sor  [id number]  Rit (8018)  0  16.00 mg  Medicaid         Medications    buprenorphine-naloxone (SUBOXONE) 8-2 MG FILM SL film Place 1 Film under the tongue 2 times daily for 30 days.     hydroCHLOROthiazide (MICROZIDE) 12.5 MG capsule Take 1 capsule by mouth every morning    meloxicam (MOBIC) 7.5 MG tablet Take 1 tablet by mouth daily    diclofenac sodium (VOLTAREN) 1 % GEL Apply 4 g topically 4 times daily as needed for Pain            Urine Tox/Immunoassay Opi Cocaine? 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  He has 5 strips left  Will change dose to 1 and 1/2 strip QD  Discussed safe storage and transport of meds  RTC 7 days  Times spent 30 mins

## 2024-09-10 NOTE — DISCHARGE INSTR - OTHER INFO
Laryngoscopy Postop Instructions        Why do I need the procedure?  Laryngoscopy is the term for looking at the voice box and vocal cords in the operating room.  This can be done for a variety of reasons including lesions on the vocal cords, weakness of the vocal cords, or suspicious lesions in the throat.  This is done under general anesthesia in the operating room.    What are the down sides to having this procedure?  This involves having a long metal tube put down the mouth and into your throat.  This can cause pain in the mouth, tongue, chipped tooth, or a pinched lip.  It may affect your voice which can be different depending on the type of the procedure you are having done.  Rarely there are seriously complications from this surgery.    What do I do after surgery?  People will tolerate a soft diet more easily than things that are difficult to swallow.  Usually pain is mild and can be treated with just ibuprofen and Tylenol over the counter.    You will want to take it easy for the first two days after surgery.   You should avoid using your voice at all for the first week after surgery.  No coughing, throat clearing, whispering, or talking will let the voice box heal.  After a week, you can use your voice for 15 minutes per hour.  You should talk at a moderate pitch and avoid trying to be loud.  If your voice gets worse or hurts to use, you should use it less.      Activity Restrictions  Avoid heavy lifting or strenuous activities for 1 week after surgery.      Follow-up  Please call [phone] with any questions or concerns. Be sure to ask about your pathology report at the time of follow-up.  We are unable to refill your medications on weekends or after hours.

## 2024-09-10 NOTE — OR NURSING
1331: Assumed care of patient. Pt is in restroom at this time waiting on her ride.     1343: Pt back in PACU bay drinking water.     1353: IV and ID bands removed. Pt then escorted to car via wheelchair, accompanied by RN. Discharge instructions were reviewed by Kellen TERRY over the phone with friend Hilton.  All personal belongings & discharge instructions with patient/family.

## 2024-09-10 NOTE — OP REPORT
PreOp Diagnosis: Dysphonia, left false vocal fold mass      PostOp Diagnosis: Dysphonia, left false vocal fold cyst      Procedure(s):  MICRO LARYNGOSCOPY WITH BIOPSY    Surgeon(s):  Sandy Ramírez M.D.    Anesthesiologist/Type of Anesthesia:  Anesthesiologist: Fidel Marcus III, M.D./General    Surgical Staff:  Circulator: Nadia Castaneda R.N.  Scrub Person: Nicole Liu    Specimens removed if any:  ID Type Source Tests Collected by Time Destination   A : left false vocal cord cyst Tissue Larynx PATHOLOGY SPECIMEN Sandy Ramírez M.D. 9/10/2024 12:35 PM        Estimated Blood Loss: minimal     Findings: Cystic lesion arising from the free edge of the left anterior false vocal fold.     Complications: None    Procedure in detail: She was positively identified in the preoperative holding area and informed consent was obtained for the operation.  She was brought back to the operating room and placed in a supine position on the OR table.  General endotracheal anesthesia was induced and she was endotracheally intubated with a 5.5 ET tube.  Dr. Marcus also topicalized the larynx.  She was rotated 90 degrees and a timeout procedure was performed.  A head drape was applied and a dental guard was placed over the upper teeth.  The [place] laryngoscope was used to perform a laryngoscopy.  It was then placed in suspension.  Images were taken of the vocal fold mass which appeared consistent with a cyst.  The base of the lesion was then injected with 1% lidocaine with 1-100,000 epinephrine.  It was grasped and retracted towards the right and transected at its base with the scissors.  The cyst ruptured during the procedure and the material was suctioned.  The cyst wall was sent for specimen.  There was minimal bleeding.  The larynx was suctioned.  There was mild irritation of the underlying left true vocal fold.  This completed the procedure.  She was returned to the care of Dr. Marcus.  She was awakened,  extubated, and brought to the recovery room in stable condition.      All counts were correct.

## 2024-09-10 NOTE — OR SURGEON
Immediate Post OP Note    PreOp Diagnosis: Dysphonia, left false vocal fold mass      PostOp Diagnosis: Dysphonia, left false vocal fold cyst      Procedure(s):  MICRO LARYNGOSCOPY WITH BIOPSY    Surgeon(s):  Sandy Ramírez M.D.    Anesthesiologist/Type of Anesthesia:  Anesthesiologist: Fidel Marcus III, M.D./General    Surgical Staff:  Circulator: Nadia Castaneda R.N.  Scrub Person: Nicole Liu    Specimens removed if any:  ID Type Source Tests Collected by Time Destination   A : left false vocal cord cyst Tissue Larynx PATHOLOGY SPECIMEN Sandy Ramírez M.D. 9/10/2024 12:35 PM        Estimated Blood Loss: minimal     Findings: Cystic lesion arising from the free edge of the left anterior false vocal fold.     Complications: None        9/10/2024 12:45 PM Sandy Ramírez M.D.

## 2024-09-10 NOTE — ANESTHESIA PREPROCEDURE EVALUATION
Case: [id number] Date/Time: 09/10/24 1130    Procedure: MICRO LARYNGOSCOPY WITH BIOPSY    Pre-op diagnosis: J38.1    Location: [facility] ROOM 21 / SURGERY SAME DAY [facility]    Surgeons: Sandy Ramírez M.D.            Relevant Problems   CARDIAC   (positive) Atrial fibrillation, unspecified type (HCC)      ENDO   (positive) Hypothyroid       Physical Exam    Airway   Mallampati: II  TM distance: >3 FB  Neck ROM: full       Cardiovascular - normal exam  Rhythm: regular  Rate: normal  (-) murmur     Dental - normal exam           Pulmonary - normal exam  Breath sounds clear to auscultation     Abdominal    Neurological - normal exam         Other findings: Anxiety PTSD, chronic Xanax              Anesthesia Plan    ASA 3       Plan - general       Airway plan will be ETT    (5.5 OETT per surgeon request)      Induction: intravenous    Postoperative Plan: Postoperative administration of opioids is intended.    Pertinent diagnostic labs and testing reviewed    Informed Consent:    Anesthetic plan and risks discussed with patient.    Use of blood products discussed with: patient whom consented to blood products.

## 2024-09-11 NOTE — ANESTHESIA TIME REPORT
Anesthesia Start and Stop Event Times       Date Time Event    9/10/2024 1137 Ready for Procedure     1206 Anesthesia Start     1258 Anesthesia Stop          Responsible Staff  09/10/24      Name Role Begin End    Fidel Marcus III, M.D. Anesth 1206 1258          Overtime Reason:  no overtime (within assigned shift)    Comments:

## 2024-09-11 NOTE — ANESTHESIA POSTPROCEDURE EVALUATION
Patient: Carie Santiago    Procedure Summary       Date: 09/10/24 Room / Location: [facility] ROOM 21 / SURGERY SAME DAY [facility]    Anesthesia Start: 1206 Anesthesia Stop: 1258    Procedure: MICRO LARYNGOSCOPY WITH BIOPSY (Throat) Diagnosis: (Polyp of vocal cord or larynx)    Surgeons: Sandy Ramírez M.D. Responsible Provider: Fidel Marcus III, M.D.    Anesthesia Type: general ASA Status: 3            Final Anesthesia Type: general  Last vitals  BP   Blood Pressure: 129/74    Temp   36.1 °C (97 °F)    Pulse   83   Resp   18    SpO2   92 %      Anesthesia Post Evaluation    Patient location during evaluation: PACU  Patient participation: complete - patient participated  Level of consciousness: awake and alert  Pain score: 0    Airway patency: patent  Anesthetic complications: no  Cardiovascular status: hemodynamically stable  Respiratory status: acceptable  Hydration status: euvolemic    PONV: none          No notable events documented.     Nurse Pain Score: 0 (NPRS)

## 2024-09-17 RX ORDER — POTASSIUM CHLORIDE 750 MG/1
TABLET, FILM COATED, EXTENDED RELEASE ORAL
Qty: 90 TABLET | Refills: 1 | OUTPATIENT
Start: 2024-09-17

## 2024-10-02 DIAGNOSIS — E03.9 HYPOTHYROIDISM (ACQUIRED): ICD-10-CM

## 2024-10-02 RX ORDER — LEVOTHYROXINE SODIUM 100 MCG
100 TABLET ORAL
Qty: 90 TABLET | Refills: 1 | Status: SHIPPED | OUTPATIENT
Start: 2024-10-02

## 2024-10-30 DIAGNOSIS — F41.9 ANXIETY: ICD-10-CM

## 2024-10-30 RX ORDER — ALPRAZOLAM 0.5 MG
0.5 TABLET ORAL 2 TIMES DAILY PRN
Qty: 60 TABLET | OUTPATIENT
Start: 2024-10-30

## 2024-12-22 ENCOUNTER — APPOINTMENT (OUTPATIENT)
Dept: RADIOLOGY | Facility: MEDICAL CENTER | Age: 54
End: 2024-12-22
Attending: STUDENT IN AN ORGANIZED HEALTH CARE EDUCATION/TRAINING PROGRAM
Payer: COMMERCIAL

## 2024-12-22 ENCOUNTER — HOSPITAL ENCOUNTER (EMERGENCY)
Facility: MEDICAL CENTER | Age: 54
End: 2024-12-22
Attending: STUDENT IN AN ORGANIZED HEALTH CARE EDUCATION/TRAINING PROGRAM
Payer: COMMERCIAL

## 2024-12-22 VITALS
OXYGEN SATURATION: 93 % | RESPIRATION RATE: 20 BRPM | DIASTOLIC BLOOD PRESSURE: 59 MMHG | WEIGHT: 158.07 LBS | BODY MASS INDEX: 22.68 KG/M2 | SYSTOLIC BLOOD PRESSURE: 99 MMHG | HEART RATE: 70 BPM | TEMPERATURE: 101.3 F

## 2024-12-22 DIAGNOSIS — U07.1 COVID-19: ICD-10-CM

## 2024-12-22 DIAGNOSIS — R51.9 ACUTE NONINTRACTABLE HEADACHE, UNSPECIFIED HEADACHE TYPE: ICD-10-CM

## 2024-12-22 LAB
ALBUMIN SERPL BCP-MCNC: 4.2 G/DL (ref 3.2–4.9)
ALBUMIN/GLOB SERPL: 1.6 G/DL
ALP SERPL-CCNC: 82 U/L (ref 30–99)
ALT SERPL-CCNC: 49 U/L (ref 2–50)
ANION GAP SERPL CALC-SCNC: 12 MMOL/L (ref 7–16)
AST SERPL-CCNC: 44 U/L (ref 12–45)
BASOPHILS # BLD AUTO: 0.6 % (ref 0–1.8)
BASOPHILS # BLD: 0.03 K/UL (ref 0–0.12)
BILIRUB SERPL-MCNC: 0.3 MG/DL (ref 0.1–1.5)
BUN SERPL-MCNC: 12 MG/DL (ref 8–22)
CALCIUM ALBUM COR SERPL-MCNC: 8.9 MG/DL (ref 8.5–10.5)
CALCIUM SERPL-MCNC: 9.1 MG/DL (ref 8.4–10.2)
CHLORIDE SERPL-SCNC: 102 MMOL/L (ref 96–112)
CO2 SERPL-SCNC: 22 MMOL/L (ref 20–33)
CREAT SERPL-MCNC: 0.54 MG/DL (ref 0.5–1.4)
EKG IMPRESSION: NORMAL
EOSINOPHIL # BLD AUTO: 0.02 K/UL (ref 0–0.51)
EOSINOPHIL NFR BLD: 0.4 % (ref 0–6.9)
ERYTHROCYTE [DISTWIDTH] IN BLOOD BY AUTOMATED COUNT: 47.5 FL (ref 35.9–50)
FLUAV RNA SPEC QL NAA+PROBE: NEGATIVE
FLUBV RNA SPEC QL NAA+PROBE: NEGATIVE
GFR SERPLBLD CREATININE-BSD FMLA CKD-EPI: 107 ML/MIN/1.73 M 2
GLOBULIN SER CALC-MCNC: 2.6 G/DL (ref 1.9–3.5)
GLUCOSE SERPL-MCNC: 84 MG/DL (ref 65–99)
HCT VFR BLD AUTO: 42.9 % (ref 37–47)
HGB BLD-MCNC: 15 G/DL (ref 12–16)
IMM GRANULOCYTES # BLD AUTO: 0.01 K/UL (ref 0–0.11)
IMM GRANULOCYTES NFR BLD AUTO: 0.2 % (ref 0–0.9)
LYMPHOCYTES # BLD AUTO: 1.94 K/UL (ref 1–4.8)
LYMPHOCYTES NFR BLD: 36.9 % (ref 22–41)
MCH RBC QN AUTO: 31.8 PG (ref 27–33)
MCHC RBC AUTO-ENTMCNC: 35 G/DL (ref 32.2–35.5)
MCV RBC AUTO: 90.9 FL (ref 81.4–97.8)
MONOCYTES # BLD AUTO: 1.1 K/UL (ref 0–0.85)
MONOCYTES NFR BLD AUTO: 20.9 % (ref 0–13.4)
NEUTROPHILS # BLD AUTO: 2.16 K/UL (ref 1.82–7.42)
NEUTROPHILS NFR BLD: 41 % (ref 44–72)
NRBC # BLD AUTO: 0 K/UL
NRBC BLD-RTO: 0 /100 WBC (ref 0–0.2)
PLATELET # BLD AUTO: 283 K/UL (ref 164–446)
PMV BLD AUTO: 9.1 FL (ref 9–12.9)
POTASSIUM SERPL-SCNC: 4.2 MMOL/L (ref 3.6–5.5)
PROT SERPL-MCNC: 6.8 G/DL (ref 6–8.2)
RBC # BLD AUTO: 4.72 M/UL (ref 4.2–5.4)
RSV RNA SPEC QL NAA+PROBE: NEGATIVE
SARS-COV-2 RNA RESP QL NAA+PROBE: DETECTED
SODIUM SERPL-SCNC: 136 MMOL/L (ref 135–145)
SPECIMEN SOURCE: ABNORMAL
TROPONIN T SERPL-MCNC: <6 NG/L (ref 6–19)
WBC # BLD AUTO: 5.3 K/UL (ref 4.8–10.8)

## 2024-12-22 PROCEDURE — 0241U HCHG SARS-COV-2 COVID-19 NFCT DS RESP RNA 4 TRGT MIC: CPT

## 2024-12-22 PROCEDURE — 700111 HCHG RX REV CODE 636 W/ 250 OVERRIDE (IP): Mod: JZ | Performed by: STUDENT IN AN ORGANIZED HEALTH CARE EDUCATION/TRAINING PROGRAM

## 2024-12-22 PROCEDURE — 84484 ASSAY OF TROPONIN QUANT: CPT

## 2024-12-22 PROCEDURE — 96374 THER/PROPH/DIAG INJ IV PUSH: CPT

## 2024-12-22 PROCEDURE — 700105 HCHG RX REV CODE 258: Performed by: STUDENT IN AN ORGANIZED HEALTH CARE EDUCATION/TRAINING PROGRAM

## 2024-12-22 PROCEDURE — 99285 EMERGENCY DEPT VISIT HI MDM: CPT

## 2024-12-22 PROCEDURE — 93005 ELECTROCARDIOGRAM TRACING: CPT | Mod: TC

## 2024-12-22 PROCEDURE — 70450 CT HEAD/BRAIN W/O DYE: CPT

## 2024-12-22 PROCEDURE — 80053 COMPREHEN METABOLIC PANEL: CPT

## 2024-12-22 PROCEDURE — 93005 ELECTROCARDIOGRAM TRACING: CPT | Mod: TC | Performed by: STUDENT IN AN ORGANIZED HEALTH CARE EDUCATION/TRAINING PROGRAM

## 2024-12-22 PROCEDURE — 96375 TX/PRO/DX INJ NEW DRUG ADDON: CPT

## 2024-12-22 PROCEDURE — 36415 COLL VENOUS BLD VENIPUNCTURE: CPT

## 2024-12-22 PROCEDURE — 85025 COMPLETE CBC W/AUTO DIFF WBC: CPT

## 2024-12-22 RX ORDER — DIPHENHYDRAMINE HYDROCHLORIDE 50 MG/ML
25 INJECTION INTRAMUSCULAR; INTRAVENOUS ONCE
Status: COMPLETED | OUTPATIENT
Start: 2024-12-22 | End: 2024-12-22

## 2024-12-22 RX ORDER — SODIUM CHLORIDE 9 MG/ML
1000 INJECTION, SOLUTION INTRAVENOUS ONCE
Status: COMPLETED | OUTPATIENT
Start: 2024-12-22 | End: 2024-12-22

## 2024-12-22 RX ORDER — ACETAMINOPHEN 10 MG/ML
1000 INJECTION, SOLUTION INTRAVENOUS ONCE
Status: COMPLETED | OUTPATIENT
Start: 2024-12-22 | End: 2024-12-22

## 2024-12-22 RX ORDER — PROCHLORPERAZINE EDISYLATE 5 MG/ML
10 INJECTION INTRAMUSCULAR; INTRAVENOUS ONCE
Status: COMPLETED | OUTPATIENT
Start: 2024-12-22 | End: 2024-12-22

## 2024-12-22 RX ADMIN — SODIUM CHLORIDE 1000 ML: 9 INJECTION, SOLUTION INTRAVENOUS at 22:02

## 2024-12-22 RX ADMIN — ACETAMINOPHEN 1000 MG: 1000 INJECTION INTRAVENOUS at 22:03

## 2024-12-22 RX ADMIN — DIPHENHYDRAMINE HYDROCHLORIDE 25 MG: 50 INJECTION, SOLUTION INTRAMUSCULAR; INTRAVENOUS at 22:03

## 2024-12-22 RX ADMIN — PROCHLORPERAZINE EDISYLATE 10 MG: 5 INJECTION INTRAMUSCULAR; INTRAVENOUS at 22:03

## 2024-12-22 ASSESSMENT — FIBROSIS 4 INDEX: FIB4 SCORE: 0.78

## 2024-12-22 ASSESSMENT — PAIN DESCRIPTION - DESCRIPTORS: DESCRIPTORS: THROBBING;TENDER

## 2024-12-23 NOTE — ED PROVIDER NOTES
ED Provider Note    CHIEF COMPLAINT  Chief Complaint   Patient presents with    Headache     Right sided headache x 2 days     Arm Pain     Right arm pain x 2 days radiating down from shoulder to fingertips     Groin Pain     Right sided groin pain x 2 days. 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 female who presents for evaluation of headache.  She says symptoms started yesterday and have been persistent and worsening since then.  She says that she typically does not get headaches.  She describes as pounding pain that radiates down her neck and down her right arm.  She denies any unilateral numbness, weakness speech changes.  No head trauma.  No nausea or vomiting.  She has been having intermittent chest pain and shortness of breath.  She has felt generally unwell and just started to have a runny nose as well.  She has no known sick contacts.  She has also been having pain in her right groin area started around the same time.  Patient took some Tylenol earlier without significant improvement.  She does report a remote history of breast cancer.     PAST MEDICAL HISTORY   has a past medical history of  , GARRETT (acute kidney injury) (HCC), Anesthesia (Always), Anxiety and depression (01/23/2019), Atrial fibrillation (HCC), Awareness under anesthesia (17 yrs ago), Breast cancer DCIS, left (June 2022) (06/23/2022), Cancer (HCC) (July 2022), Colon polyp (03/29/2019), COVID-19, COVID-19, Diverticulosis of colon (01/23/2019), Environmental and seasonal allergies, Generalized anxiety disorder, H/O cervical fracture, History of alcohol abuse (01/17/2019), Humerus fracture, Hypothyroid, Menopause, Ovarian cyst, Pain (08/09/2022), Thrombocytopenia (HCC), Umbilical  hernia, and Vitamin B12 deficiency (01/23/2019).    SURGICAL HISTORY   has a past surgical history that includes robotic laparoscopic cholecystectomy (2017); tonsillectomy and adenoidectomy (1976); mammoplasty augmentation (Bilateral); inguinal hernia repair bilateral (1999); irrigation & debridement ortho (Left, 09/21/2019); wound closure ortho (Left, 09/24/2019); upper gi endoscopy,diagnosis (N/A, 02/12/2021); upper gi endoscopy,biopsy (N/A, 02/12/2021); lap,diagnostic abdomen (08/03/2021); salpingo oophorectomy (Bilateral, 08/03/2021); other orthopedic surgery (Right, 2017); mammoplasty reduction; ventral hernia repair; bone graft; umbilical hernia repair (2017); appendectomy; umbilical hernia repair; cholecystectomy; abdominoplasty (04/14/2022); ear reconstruction (Bilateral, 04/14/2022); scar revision (04/14/2022); ventral hernia repair (04/14/2022); mastectomy, partial (Left, 07/19/2022); intraop id sentinel node (Left, 08/18/2022); breast reconstruction (Left, 08/18/2022); tissue expander place/remove (Left, 08/18/2022); mastectomy (Left, 08/18/2022); node biopsy sentinel (Left, 08/18/2022); gyn surgery (1 yr); other (1 yr); suspension of breast (Right, 11/03/2022); breast reconstruction (Bilateral, 11/03/2022); tissue expander place/remove (Left, 11/03/2022); breast implant revision (Bilateral, 11/03/2022); nipple reconstruction (05/04/2023); full thickness skin graft (05/04/2023); breast reconstruction (05/04/2023); breast implant revision (05/04/2023); lipoma excision (Left, 11/27/2023); other neurological surg (18 years old); no pertinent past surgical history; and laryngoscopy, direct, with biopsy if indicated (N/A, 9/10/2024).    FAMILY HISTORY  Family History   Problem Relation Age of Onset    Heart Disease Mother         A-Fib    Thyroid Mother     Cancer Mother         Melanoma    Heart Disease Brother     Diabetes Son     Diabetes Son         type 1 for 22 yrs    Dementia Neg Hx     Colon Cancer Neg  Hx     Breast Cancer Neg Hx        SOCIAL HISTORY  Social History     Tobacco Use    Smoking status: Former     Current packs/day: 0.00     Average packs/day: 1 pack/day for 13.0 years (13.0 ttl pk-yrs)     Types: Cigarettes     Quit date: 3/1/2021     Years since quitting: 3.8    Smokeless tobacco: Never    Tobacco comments:     Quit 4 years ago   Vaping Use    Vaping status: Never Used   Substance and Sexual Activity    Alcohol use: Never     Comment: Pt. states quit drinking 2-6-2021    Drug use: Never    Sexual activity: Not on file       CURRENT MEDICATIONS  Home Medications       Reviewed by Eduardo Le R.N. (Registered Nurse) on 12/22/24 at 2011  Med List Status: Not Addressed     Medication Last Dose Status   ALPRAZolam (XANAX) 0.25 MG Tab  Active   diazePAM (VALIUM) 5 MG Tab  Active   ferrous sulfate 325 (65 Fe) MG tablet  Active   furosemide (LASIX) 20 MG Tab  Active   mupirocin (BACTROBAN) 2 % Ointment  Active   nitrofurantoin (MACROBID) 100 MG Cap  Active   nystatin (MYCOSTATIN) 845977 UNIT/ML Suspension  Active   potassium chloride ER (KLOR-CON) 10 MEQ tablet  Active   SYNTHROID 100 MCG Tab  Active   traZODone (DESYREL) 50 MG Tab  Active                    ALLERGIES  Allergies   Allergen Reactions    Meloxicam Diarrhea and Vomiting     Severe      Sulfa Drugs Anaphylaxis    Zofran [Dextrose-Ondansetron] Unspecified     Migraine with PO and SL, tolerates Injection/ IV       PHYSICAL EXAM  VITAL SIGNS: BP 99/59   Pulse 70   Temp (!) 38.5 °C (101.3 °F) (Temporal)   Resp 20   Wt 71.7 kg (158 lb 1.1 oz)   SpO2 93%   BMI 22.68 kg/m²    Constitutional: Awake and alert Non toxic  HENT: Normal inspection  Dry mucous membranes  Eyes: Normal inspection  Neck: Grossly normal range of motion.  Cardiovascular: Normal heart rate, Normal rhythm.    Thorax & Lungs: No respiratory distress  Abdomen: Soft, non-distended, nontender to palpation   Skin: No obvious rash.  : She has + inguinal lymphadenopathy    Extremities: Warm, well perfused. No clubbing, cyanosis, edema   Neurologic: A&Ox4.  No focal deficit   Psychiatric: Normal for situation      EKG/LABS  Results for orders placed or performed during the hospital encounter of 24   EKG    Collection Time: 24  8:02 PM   Result Value Ref Range    Report       [facility] Emergency Dept.    Test Date:  2024  Pt Name:    KEATON ROBLEDO              Department: [facility]  MRN:        [id number]                      Room:  Gender:     Female                       Technician: [id number]  :        1970                   Requested By:ER TRIAGE PROTOCOL  Order #:    [id number]                    Reading MD:    Measurements  Intervals                                Axis  Rate:       84                           P:          71  MS:         114                          QRS:        90  QRSD:       82                           T:          76  QT:         359  QTc:        425    Interpretive Statements  Sinus rhythm  Borderline short MS interval  Low voltage, extremity leads  Anteroseptal infarct, old  Compared to ECG 10/31/2022 15:09:52  Sinus bradycardia no longer present  Myocardial infarct finding still present     CBC WITH DIFFERENTIAL    Collection Time: 24  9:07 PM   Result Value Ref Range    WBC 5.3 4.8 - 10.8 K/uL    RBC 4.72 4.20 - 5.40 M/uL    Hemoglobin 15.0 12.0 - 16.0 g/dL    Hematocrit 42.9 37.0 - 47.0 %    MCV 90.9 81.4 - 97.8 fL    MCH 31.8 27.0 - 33.0 pg    MCHC 35.0 32.2 - 35.5 g/dL    RDW 47.5 35.9 - 50.0 fL    Platelet Count 283 164 - 446 K/uL    MPV 9.1 9.0 - 12.9 fL    Neutrophils-Polys 41.00 (L) 44.00 - 72.00 %    Lymphocytes 36.90 22.00 - 41.00 %    Monocytes 20.90 (H) 0.00 - 13.40 %    Eosinophils 0.40 0.00 - 6.90 %    Basophils 0.60 0.00 - 1.80 %    Immature Granulocytes 0.20 0.00 - 0.90 %    Nucleated RBC 0.00 0.00 - 0.20 /100 WBC    Neutrophils (Absolute) 2.16 1.82 - 7.42 K/uL    Lymphs (Absolute) 1.94 1.00 -  4.80 K/uL    Monos (Absolute) 1.10 (H) 0.00 - 0.85 K/uL    Eos (Absolute) 0.02 0.00 - 0.51 K/uL    Baso (Absolute) 0.03 0.00 - 0.12 K/uL    Immature Granulocytes (abs) 0.01 0.00 - 0.11 K/uL    NRBC (Absolute) 0.00 K/uL   COMP METABOLIC PANEL    Collection Time: 12/22/24  9:07 PM   Result Value Ref Range    Sodium 136 135 - 145 mmol/L    Potassium 4.2 3.6 - 5.5 mmol/L    Chloride 102 96 - 112 mmol/L    Co2 22 20 - 33 mmol/L    Anion Gap 12.0 7.0 - 16.0    Glucose 84 65 - 99 mg/dL    Bun 12 8 - 22 mg/dL    Creatinine 0.54 0.50 - 1.40 mg/dL    Calcium 9.1 8.4 - 10.2 mg/dL    Correct Calcium 8.9 8.5 - 10.5 mg/dL    AST(SGOT) 44 12 - 45 U/L    ALT(SGPT) 49 2 - 50 U/L    Alkaline Phosphatase 82 30 - 99 U/L    Total Bilirubin 0.3 0.1 - 1.5 mg/dL    Albumin 4.2 3.2 - 4.9 g/dL    Total Protein 6.8 6.0 - 8.2 g/dL    Globulin 2.6 1.9 - 3.5 g/dL    A-G Ratio 1.6 g/dL   TROPONIN    Collection Time: 12/22/24  9:07 PM   Result Value Ref Range    Troponin T <6 6 - 19 ng/L   ESTIMATED GFR    Collection Time: 12/22/24  9:07 PM   Result Value Ref Range    GFR (CKD-EPI) 107 >60 mL/min/1.73 m 2   CoV-2, Flu A/B, And RSV by PCR ([employer]igs[employer])    Collection Time: 12/22/24  9:30 PM    Specimen: Nasal; Respirate   Result Value Ref Range    Influenza virus A RNA Negative Negative    Influenza virus B, PCR Negative Negative    RSV, PCR Negative Negative    SARS-CoV-2 by PCR DETECTED (AA)     SARS-CoV-2 Source Nasal Swab        I have independently interpreted this EKG    RADIOLOGY/PROCEDURES   I have independently interpreted the diagnostic imaging associated with this visit and am waiting the final reading from the radiologist.   My preliminary interpretation is as follows: No large bleed    Radiologist interpretation:  CT-HEAD W/O   Final Result         1.  No acute intracranial abnormality.                   COURSE & MEDICAL DECISION MAKING    ASSESSMENT, COURSE AND PLAN  Care Narrative: This is a 54-year-old female who presents with headache,  generalized weakness and pain that radiates down her neck and into her chest.  On arrival she is febrile but otherwise has normal vital signs.  She is hemodynamically stable, breathing comfortably on room air.  She has no leukocytosis, no significant electrolyte derangements.  She has been having intermittent chest pain, though not currently. EKG is nonischemic, troponin is normal and presentation is not consistent with ACS.  I considered but doubt PE, no tachycardia or hypoxia to suggest this.  Her viral swab did result positive for COVID which likely explains her symptoms.  She was treated with IV fluids, Tylenol, Benadryl and Compazine and reports improvement.  CT obtained does not show any intracranial abnormality, no bleed, mass.  He has no neurologic deficits and I doubt cervical artery dissection.  Patient has no known risk factors for venous sinus thrombosis and seems unlikely in this clinical context.  She is nontoxic has no meningismus and I doubt meningitis or other CNS infection.  She does feel improved and continues to look systemically well with normal mental status.  I discussed the risks and benefits of Paxlovid and she would like to try this which I think is reasonable she does seem to be a good candidate.  A prescription was provided.  She will return to the ER if she has persistent or worsening symptoms.    #Lymphadenopathy  Likely reactive in the setting of COVID infection.    Hydration: Based on the patient's presentation of Dehydration the patient was given IV fluids. IV Hydration was used because oral hydration was not adequate alone. Upon recheck following hydration, the patient was improved.          DISPOSITION AND DISCUSSIONS  I have discussed management of the patient with the following physicians and DEA's:  None    Discussion of management with other QHP or appropriate source(s): None     Escalation of care considered, and ultimately not performed:acute inpatient care management,  however at this time, the patient is most appropriate for outpatient management    Barriers to care at this time, including but not limited to:  None .     Decision tools and prescription drugs considered including, but not limited to: Antivirals Paxlovid prescribed .    FINAL DIAGNOSIS  1. COVID-19 Acute   2. Acute nonintractable headache, unspecified headache type Acute        Electronically signed by: Josie Pulliam M.D., 12/22/2024 8:59 PM

## 2024-12-23 NOTE — DISCHARGE INSTRUCTIONS
You were evaluated in the emergency department (ED) with symptoms concerning for COVID-19. While the diagnosis may feel scary, most cases resolve on their own without hospitalization. Certain high risk people are treated with medications to reduce their risk of serious symptoms. At this time, we feel that you are safe to go home.  Steps to take at home to care for yourself:  Get lots of rest and stay hydrated by drinking plenty of fluids.  You can take acetaminophen (eg, Tylenol) or ibuprofen (eg, Advil, Motrin) for fevers or body aches.  If you have questions, call your primary care doctor, contact your local health department, or visit the CDC's website at gov/coronavirus.  Speak with your primary care doctor within two weeks to discuss a plan to follow up.   How to avoid spreading the virus to others:  Stay at home, except if seeking medical care.  Cover your coughs (with a tissue or in your elbow) and avoid touching your face unnecessarily.  Wash your hands often (using soap and water for 20 seconds) to decrease your risk of infecting others.  Try to avoid close contact with others in your home, including pets. If possible, use a different bathroom, and sleep in a separate room. If you must be near others, be sure everyone wears a face mask and wash your hands before interacting with them.   If a test was sent and is positive, your local health department may contact you. Follow their directions about when to go back to work or school.  If you were not tested, you may stop quarantine 10 days after the start of your symptoms, as long as your fever has been completely gone (without using medications) for at least 24 hours and your other symptoms are improving.  Vaccination against COVID-19 can reduce your risk of reinfection. If you tested negative for COVID-19, you may get the vaccine as soon as possible. If you tested positive for COVID-19, you should still get a vaccine once allowed by your health  department.  If you or those around you are concerned about COVID-19, call your primary care doctor for advice about steps to take. Your health system may have specific locations for testing if you are not extremely sick. This lowers the risk that you could catch a virus or pass it on in the ED waiting room. Speak to your doctor or come back to the ED for new or worsening symptoms, such as severe headache, confusion, chest pain, difficulty breathing, or vomiting to the point that you cannot drink fluids. Review medication inserts for side effects and call the ED if you have any questions about the medications or care you received.

## 2024-12-23 NOTE — ED TRIAGE NOTES
Chief Complaint   Patient presents with    Headache     Right sided headache x 2 days     Arm Pain     Right arm pain x 2 days radiating down from shoulder to fingertips     Groin Pain     Right sided groin pain x 2 days. PT notes hx of bilateral lower extremity ablations on Thursday and Friday     Chest Pain     Chest pain x 1 day      /66   Pulse 85   Temp (!) 38.5 °C (101.3 °F) (Temporal)   Resp 20   Wt 71.7 kg (158 lb 1.1 oz)   SpO2 94%   BMI 22.68 kg/m²

## 2025-01-08 ENCOUNTER — APPOINTMENT (OUTPATIENT)
Dept: ADMISSIONS | Facility: MEDICAL CENTER | Age: 55
End: 2025-01-08
Attending: OTOLARYNGOLOGY
Payer: COMMERCIAL

## 2025-01-15 ENCOUNTER — PRE-ADMISSION TESTING (OUTPATIENT)
Dept: ADMISSIONS | Facility: MEDICAL CENTER | Age: 55
End: 2025-01-15
Attending: OTOLARYNGOLOGY
Payer: COMMERCIAL

## 2025-01-15 RX ORDER — BENZONATATE 100 MG/1
100 CAPSULE ORAL 3 TIMES DAILY PRN
COMMUNITY
Start: 2024-10-30 | End: 2025-02-18

## 2025-01-15 RX ORDER — CYCLOBENZAPRINE HCL 5 MG
5 TABLET ORAL 3 TIMES DAILY PRN
Status: ON HOLD | COMMUNITY
End: 2025-02-03

## 2025-01-15 NOTE — PREADMIT AVS NOTE
Current Medications   Medication Instructions    benzonatate (TESSALON) 100 MG Cap Continue taking medication as prescribed, including morning of procedure if needed    Vitamin d, fiber, max vision, adrenal complex, omega xl, nutrafol, cortisol manager, iron  Stop 7 days before surgery    cyclobenzaprine (FLEXERIL) 5 mg tablet Continue taking medication as prescribed, including morning of procedure if needed    SYNTHROID 100 MCG Tab Continue taking medication as prescribed, including morning of procedure     ALPRAZolam (XANAX) 0.25 MG Tab Continue taking medication as prescribed, including morning of procedure if needed    mupirocin (BACTROBAN) 2 % Ointment Follow instructions from surgeon or specialist.    traZODone (DESYREL) 50 MG Tab Okay to take night before surgery.     nitrofurantoin (MACROBID) 100 MG Cap Follow instructions from surgeon or specialist.

## 2025-02-02 ENCOUNTER — ANESTHESIA EVENT (OUTPATIENT)
Dept: SURGERY | Facility: MEDICAL CENTER | Age: 55
End: 2025-02-02
Payer: COMMERCIAL

## 2025-02-03 ENCOUNTER — HOSPITAL ENCOUNTER (OUTPATIENT)
Facility: MEDICAL CENTER | Age: 55
End: 2025-02-03
Attending: OTOLARYNGOLOGY | Admitting: OTOLARYNGOLOGY
Payer: COMMERCIAL

## 2025-02-03 ENCOUNTER — PHARMACY VISIT (OUTPATIENT)
Dept: PHARMACY | Facility: MEDICAL CENTER | Age: 55
End: 2025-02-03
Payer: COMMERCIAL

## 2025-02-03 ENCOUNTER — ANESTHESIA (OUTPATIENT)
Dept: SURGERY | Facility: MEDICAL CENTER | Age: 55
End: 2025-02-03
Payer: COMMERCIAL

## 2025-02-03 VITALS
RESPIRATION RATE: 20 BRPM | TEMPERATURE: 97.6 F | OXYGEN SATURATION: 92 % | BODY MASS INDEX: 22.38 KG/M2 | WEIGHT: 156.31 LBS | DIASTOLIC BLOOD PRESSURE: 52 MMHG | SYSTOLIC BLOOD PRESSURE: 91 MMHG | HEIGHT: 70 IN | HEART RATE: 73 BPM

## 2025-02-03 DIAGNOSIS — J34.89 NASAL SEPTAL PERFORATION: ICD-10-CM

## 2025-02-03 LAB — PATHOLOGY CONSULT NOTE: NORMAL

## 2025-02-03 PROCEDURE — 88305 TISSUE EXAM BY PATHOLOGIST: CPT | Mod: 26 | Performed by: PATHOLOGY

## 2025-02-03 PROCEDURE — 700101 HCHG RX REV CODE 250: Performed by: OTOLARYNGOLOGY

## 2025-02-03 PROCEDURE — 160025 RECOVERY II MINUTES (STATS): Performed by: OTOLARYNGOLOGY

## 2025-02-03 PROCEDURE — 160048 HCHG OR STATISTICAL LEVEL 1-5: Performed by: OTOLARYNGOLOGY

## 2025-02-03 PROCEDURE — 88305 TISSUE EXAM BY PATHOLOGIST: CPT | Performed by: PATHOLOGY

## 2025-02-03 PROCEDURE — 160002 HCHG RECOVERY MINUTES (STAT): Performed by: OTOLARYNGOLOGY

## 2025-02-03 PROCEDURE — A9270 NON-COVERED ITEM OR SERVICE: HCPCS | Performed by: STUDENT IN AN ORGANIZED HEALTH CARE EDUCATION/TRAINING PROGRAM

## 2025-02-03 PROCEDURE — 160047 HCHG PACU  - EA ADDL 30 MINS PHASE II: Performed by: OTOLARYNGOLOGY

## 2025-02-03 PROCEDURE — 110371 HCHG SHELL REV 272: Performed by: OTOLARYNGOLOGY

## 2025-02-03 PROCEDURE — 700102 HCHG RX REV CODE 250 W/ 637 OVERRIDE(OP): Performed by: STUDENT IN AN ORGANIZED HEALTH CARE EDUCATION/TRAINING PROGRAM

## 2025-02-03 PROCEDURE — 502240 HCHG MISC OR SUPPLY RC 0272: Performed by: OTOLARYNGOLOGY

## 2025-02-03 PROCEDURE — 160035 HCHG PACU - 1ST 60 MINS PHASE I: Performed by: OTOLARYNGOLOGY

## 2025-02-03 PROCEDURE — 160046 HCHG PACU - 1ST 60 MINS PHASE II: Performed by: OTOLARYNGOLOGY

## 2025-02-03 PROCEDURE — 700101 HCHG RX REV CODE 250: Performed by: STUDENT IN AN ORGANIZED HEALTH CARE EDUCATION/TRAINING PROGRAM

## 2025-02-03 PROCEDURE — RXMED WILLOW AMBULATORY MEDICATION CHARGE: Performed by: OTOLARYNGOLOGY

## 2025-02-03 PROCEDURE — 160028 HCHG SURGERY MINUTES - 1ST 30 MINS LEVEL 3: Performed by: OTOLARYNGOLOGY

## 2025-02-03 PROCEDURE — 160009 HCHG ANES TIME/MIN: Performed by: OTOLARYNGOLOGY

## 2025-02-03 PROCEDURE — 700111 HCHG RX REV CODE 636 W/ 250 OVERRIDE (IP)

## 2025-02-03 PROCEDURE — 700105 HCHG RX REV CODE 258: Performed by: OTOLARYNGOLOGY

## 2025-02-03 PROCEDURE — 700111 HCHG RX REV CODE 636 W/ 250 OVERRIDE (IP): Performed by: OTOLARYNGOLOGY

## 2025-02-03 PROCEDURE — 700111 HCHG RX REV CODE 636 W/ 250 OVERRIDE (IP): Performed by: STUDENT IN AN ORGANIZED HEALTH CARE EDUCATION/TRAINING PROGRAM

## 2025-02-03 RX ORDER — SODIUM CHLORIDE, SODIUM LACTATE, POTASSIUM CHLORIDE, CALCIUM CHLORIDE 600; 310; 30; 20 MG/100ML; MG/100ML; MG/100ML; MG/100ML
INJECTION, SOLUTION INTRAVENOUS CONTINUOUS
Status: DISCONTINUED | OUTPATIENT
Start: 2025-02-03 | End: 2025-02-03 | Stop reason: HOSPADM

## 2025-02-03 RX ORDER — DEXMEDETOMIDINE HYDROCHLORIDE 100 UG/ML
INJECTION, SOLUTION INTRAVENOUS PRN
Status: DISCONTINUED | OUTPATIENT
Start: 2025-02-03 | End: 2025-02-03 | Stop reason: SURG

## 2025-02-03 RX ORDER — LIDOCAINE HYDROCHLORIDE 20 MG/ML
INJECTION, SOLUTION EPIDURAL; INFILTRATION; INTRACAUDAL; PERINEURAL PRN
Status: DISCONTINUED | OUTPATIENT
Start: 2025-02-03 | End: 2025-02-03 | Stop reason: SURG

## 2025-02-03 RX ORDER — DIPHENHYDRAMINE HYDROCHLORIDE 50 MG/ML
12.5 INJECTION INTRAMUSCULAR; INTRAVENOUS
Status: DISCONTINUED | OUTPATIENT
Start: 2025-02-03 | End: 2025-02-03 | Stop reason: HOSPADM

## 2025-02-03 RX ORDER — EPINEPHRINE 1 MG/ML
INJECTION INTRAMUSCULAR; INTRAVENOUS; SUBCUTANEOUS
Status: COMPLETED
Start: 2025-02-03 | End: 2025-02-03

## 2025-02-03 RX ORDER — MIDAZOLAM HYDROCHLORIDE 1 MG/ML
INJECTION INTRAMUSCULAR; INTRAVENOUS PRN
Status: DISCONTINUED | OUTPATIENT
Start: 2025-02-03 | End: 2025-02-03 | Stop reason: SURG

## 2025-02-03 RX ORDER — BACITRACIN ZINC 500 [USP'U]/G
OINTMENT TOPICAL
Status: DISCONTINUED
Start: 2025-02-03 | End: 2025-02-03 | Stop reason: HOSPADM

## 2025-02-03 RX ORDER — ROCURONIUM BROMIDE 10 MG/ML
INJECTION, SOLUTION INTRAVENOUS PRN
Status: DISCONTINUED | OUTPATIENT
Start: 2025-02-03 | End: 2025-02-03 | Stop reason: HOSPADM

## 2025-02-03 RX ORDER — HALOPERIDOL 5 MG/ML
1 INJECTION INTRAMUSCULAR
Status: DISCONTINUED | OUTPATIENT
Start: 2025-02-03 | End: 2025-02-03 | Stop reason: HOSPADM

## 2025-02-03 RX ORDER — ONDANSETRON 2 MG/ML
INJECTION INTRAMUSCULAR; INTRAVENOUS PRN
Status: DISCONTINUED | OUTPATIENT
Start: 2025-02-03 | End: 2025-02-03 | Stop reason: SURG

## 2025-02-03 RX ORDER — OXYCODONE HCL 5 MG/5 ML
5 SOLUTION, ORAL ORAL
Status: COMPLETED | OUTPATIENT
Start: 2025-02-03 | End: 2025-02-03

## 2025-02-03 RX ORDER — EPINEPHRINE 1 MG/ML(1)
AMPUL (ML) INJECTION
Status: DISCONTINUED | OUTPATIENT
Start: 2025-02-03 | End: 2025-02-03 | Stop reason: HOSPADM

## 2025-02-03 RX ORDER — OXYMETAZOLINE HYDROCHLORIDE 0.05 G/100ML
SPRAY NASAL
Status: DISCONTINUED
Start: 2025-02-03 | End: 2025-02-03 | Stop reason: HOSPADM

## 2025-02-03 RX ORDER — TRAMADOL HYDROCHLORIDE 50 MG/1
50 TABLET ORAL EVERY 8 HOURS PRN
Qty: 6 TABLET | Refills: 0 | Status: SHIPPED | OUTPATIENT
Start: 2025-02-03 | End: 2025-02-05

## 2025-02-03 RX ORDER — LIDOCAINE HYDROCHLORIDE AND EPINEPHRINE 10; 10 MG/ML; UG/ML
INJECTION, SOLUTION INFILTRATION; PERINEURAL
Status: DISCONTINUED
Start: 2025-02-03 | End: 2025-02-03 | Stop reason: HOSPADM

## 2025-02-03 RX ORDER — DEXAMETHASONE SODIUM PHOSPHATE 4 MG/ML
INJECTION, SOLUTION INTRA-ARTICULAR; INTRALESIONAL; INTRAMUSCULAR; INTRAVENOUS; SOFT TISSUE PRN
Status: DISCONTINUED | OUTPATIENT
Start: 2025-02-03 | End: 2025-02-03 | Stop reason: SURG

## 2025-02-03 RX ORDER — ACETAMINOPHEN 325 MG/1
975 TABLET ORAL EVERY 8 HOURS
Qty: 90 TABLET | Refills: 0 | Status: SHIPPED | OUTPATIENT
Start: 2025-02-03 | End: 2025-02-13

## 2025-02-03 RX ORDER — ACETAMINOPHEN 500 MG
1000 TABLET ORAL ONCE
Status: COMPLETED | OUTPATIENT
Start: 2025-02-03 | End: 2025-02-03

## 2025-02-03 RX ORDER — ESMOLOL HYDROCHLORIDE 10 MG/ML
INJECTION INTRAVENOUS PRN
Status: DISCONTINUED | OUTPATIENT
Start: 2025-02-03 | End: 2025-02-03 | Stop reason: SURG

## 2025-02-03 RX ORDER — OXYCODONE HCL 5 MG/5 ML
10 SOLUTION, ORAL ORAL
Status: COMPLETED | OUTPATIENT
Start: 2025-02-03 | End: 2025-02-03

## 2025-02-03 RX ORDER — SUCCINYLCHOLINE CHLORIDE 20 MG/ML
INJECTION INTRAMUSCULAR; INTRAVENOUS PRN
Status: DISCONTINUED | OUTPATIENT
Start: 2025-02-03 | End: 2025-02-03 | Stop reason: SURG

## 2025-02-03 RX ORDER — LIDOCAINE HYDROCHLORIDE AND EPINEPHRINE 10; 10 MG/ML; UG/ML
INJECTION, SOLUTION INFILTRATION; PERINEURAL
Status: DISCONTINUED | OUTPATIENT
Start: 2025-02-03 | End: 2025-02-03 | Stop reason: HOSPADM

## 2025-02-03 RX ADMIN — ACETAMINOPHEN 1000 MG: 500 TABLET ORAL at 06:28

## 2025-02-03 RX ADMIN — SUGAMMADEX 200 MG: 100 INJECTION, SOLUTION INTRAVENOUS at 07:51

## 2025-02-03 RX ADMIN — ONDANSETRON 8 MG: 2 INJECTION INTRAMUSCULAR; INTRAVENOUS at 07:53

## 2025-02-03 RX ADMIN — PROPOFOL 150 MG: 10 INJECTION, EMULSION INTRAVENOUS at 07:35

## 2025-02-03 RX ADMIN — OXYCODONE HYDROCHLORIDE 5 MG: 5 SOLUTION ORAL at 08:20

## 2025-02-03 RX ADMIN — MIDAZOLAM HYDROCHLORIDE 2 MG: 1 INJECTION, SOLUTION INTRAMUSCULAR; INTRAVENOUS at 07:34

## 2025-02-03 RX ADMIN — ROCURONIUM BROMIDE 20 MG: 50 INJECTION, SOLUTION INTRAVENOUS at 07:42

## 2025-02-03 RX ADMIN — DEXAMETHASONE SODIUM PHOSPHATE 8 MG: 4 INJECTION INTRA-ARTICULAR; INTRALESIONAL; INTRAMUSCULAR; INTRAVENOUS; SOFT TISSUE at 07:47

## 2025-02-03 RX ADMIN — LIDOCAINE HYDROCHLORIDE 60 MG: 20 INJECTION, SOLUTION EPIDURAL; INFILTRATION; INTRACAUDAL; PERINEURAL at 07:35

## 2025-02-03 RX ADMIN — SUCCINYLCHOLINE CHLORIDE 100 MG: 20 INJECTION, SOLUTION INTRAMUSCULAR; INTRAVENOUS at 07:35

## 2025-02-03 RX ADMIN — SODIUM CHLORIDE, POTASSIUM CHLORIDE, SODIUM LACTATE AND CALCIUM CHLORIDE: 600; 310; 30; 20 INJECTION, SOLUTION INTRAVENOUS at 07:30

## 2025-02-03 RX ADMIN — FENTANYL CITRATE 50 MCG: 50 INJECTION, SOLUTION INTRAMUSCULAR; INTRAVENOUS at 07:58

## 2025-02-03 RX ADMIN — DEXMEDETOMIDINE HYDROCHLORIDE 12 MCG: 100 INJECTION, SOLUTION INTRAVENOUS at 07:33

## 2025-02-03 RX ADMIN — DEXMEDETOMIDINE HYDROCHLORIDE 8 MCG: 100 INJECTION, SOLUTION INTRAVENOUS at 07:44

## 2025-02-03 RX ADMIN — EPINEPHRINE: 1 INJECTION INTRAMUSCULAR; INTRAVENOUS; SUBCUTANEOUS at 07:15

## 2025-02-03 RX ADMIN — ESMOLOL HYDROCHLORIDE 50 MG: 100 INJECTION, SOLUTION INTRAVENOUS at 07:36

## 2025-02-03 RX ADMIN — FENTANYL CITRATE 50 MCG: 50 INJECTION, SOLUTION INTRAMUSCULAR; INTRAVENOUS at 07:42

## 2025-02-03 ASSESSMENT — PAIN DESCRIPTION - PAIN TYPE
TYPE: SURGICAL PAIN

## 2025-02-03 ASSESSMENT — PAIN SCALES - GENERAL: PAIN_LEVEL: 2

## 2025-02-03 ASSESSMENT — FIBROSIS 4 INDEX: FIB4 SCORE: 1.24

## 2025-02-03 NOTE — OR NURSING
0759- Pt to PACU from OR. Report from anesthesiologist and OR RN. Arrived on 3L mask at 93%. Respirations even and unlabored. VSS. Nose has no signs of bleeding.     0811- Patient awake and tolerating popsicle    0820- Patient medicated for pain per MAR    0900- Discharge instructions discussed with patient and her son. All questions answered. Verbalized understanding.    0910- PIV removed with tip intact.     0912- Pt escorted out of department ambulatory with all belongings. Discharged home to responsible adult.

## 2025-02-03 NOTE — DISCHARGE INSTR - OTHER INFO
Postop Instructions    DO NOT TAKE ALPRAZOLAM, MUSCLE RELAXANTS, OR TRAZODONE WHILE YOU ARE ON TRAMADOL.  ONCE YOU ARE OFF TRAMADOL YOU CAN RESUME YOUR NORMAL HOME MEDICATIONS.    Medications  You may be prescribed narcotic pain medications.  These have many side effects and should be used for the minimum amount of time necessary.  You should also take plain Tylenol.  You should take ibuprofen throughout the postop period to help with pain.  If your pain medication is ineffective please contact the clinic.      Wound Care  It is important that you sinus rinse at least 2 times daily to keep your nose clean while it is healing.      Bleeding  It is normal to have a slow trickle of blood or oozing both from the front of the nose and down the back of the throat for the first 1-2 days after surgery.  If there is heavy bleeding, seek medical attention.  You can place a gauze drip-pad under the nose to catch any bleeding, but this is not required.    Diet  Patients do not have any diet restrictions after surgery.  Some patients may experience postoperative nausea from the anesthesia, so a bland diet is preferred for at least the first meal.    Other Concerns  Avoid contact sports, heavy lifting or strenuous activities for 1 week after surgery.    Avoid vigorous throat clearing or coughing.  It will often feel like you need to clear your throat or that there is something stuck in the back of your throat, but this is part of the healing process.  If you have to sneeze, do so with your mouth open.  Do not blow your nose for 3 days after surgery.  You may sniff in forcefully if needed.  Low grade fevers are common and can be treated with Tylenol.  High fevers are not normal (>101.5).  If this occurs seek medical attention.  Productive cough or difficulty breathing are not normal, seek medical attention.      Follow-up  Please call Dr. Ramírez’s office at [phone] for questions or issues.

## 2025-02-03 NOTE — ANESTHESIA POSTPROCEDURE EVALUATION
Patient: Carie Santiago    Procedure Summary       Date: 02/03/25 Room / Location: [facility] ROOM 22 / SURGERY SAME DAY [facility]    Anesthesia Start: 0730 Anesthesia Stop: 0804    Procedure: BILATERAL NASAL/SINUS ENDOSCOPY, SURGICAL: WITH BIOPSY, PLACEMENT SEPTAL BUTTON (Bilateral: Nose) Diagnosis: (nasal septal perforation)    Surgeons: Sandy Ramírez M.D. Responsible Provider: Radha Patricio M.D.    Anesthesia Type: general ASA Status: 2            Final Anesthesia Type: general  Last vitals  BP   Blood Pressure: 100/66    Temp   36.4 °C (97.6 °F)    Pulse   70   Resp   16    SpO2   93 %      Anesthesia Post Evaluation    Patient location during evaluation: PACU  Patient participation: complete - patient participated  Level of consciousness: awake and alert  Pain score: 2    Airway patency: patent  Anesthetic complications: no  Cardiovascular status: hemodynamically stable  Respiratory status: acceptable  Hydration status: euvolemic    PONV: none          No notable events documented.     Nurse Pain Score: 0 (NPRS)

## 2025-02-03 NOTE — DISCHARGE INSTRUCTIONS
If any questions arise, call your provider.  If your provider is not available, please feel free to call the Surgical Center at [phone].    MEDICATIONS: Resume taking daily medication.  Take prescribed pain medication with food.  If no medication is prescribed, you may take non-aspirin pain medication if needed.  PAIN MEDICATION CAN BE VERY CONSTIPATING.  Take a stool softener or laxative such as senokot, pericolace, or milk of magnesia if needed.    Last pain medication given: Oxycodone was given to you at 8:20AM. You may take your Tramadol next after 2:20PM if needed.    Tylenol was given to you at 6:30AM. Next dose may be taken after 12:30PM if needed.    What to Expect Post Anesthesia    Rest and take it easy for the first 24 hours.  A responsible adult is recommended to remain with you during that time.  It is normal to feel sleepy.  We encourage you to not do anything that requires balance, judgment or coordination.    FOR 24 HOURS DO NOT:  Drive, operate machinery or run household appliances.  Drink beer or alcoholic beverages.  Make important decisions or sign legal documents.    To avoid nausea, slowly advance diet as tolerated, avoiding spicy or greasy foods for the first day.  Add more substantial food to your diet according to your provider's instructions.  Babies can be fed formula or breast milk as soon as they are hungry.  INCREASE FLUIDS AND FIBER TO AVOID CONSTIPATION.    MILD FLU-LIKE SYMPTOMS ARE NORMAL.  YOU MAY EXPERIENCE GENERALIZED MUSCLE ACHES, THROAT IRRITATION, HEADACHE AND/OR SOME NAUSEA.

## 2025-02-03 NOTE — OR SURGEON
Immediate Post OP Note    PreOp Diagnosis: Nasal septal perforation      PostOp Diagnosis: Same      Procedure(s):  BILATERAL NASAL/SINUS ENDOSCOPY, SURGICAL: WITH BIOPSY, PLACEMENT SEPTAL BUTTON - Wound Class: Clean Contaminated    Surgeon(s):  Sandy Ramírez M.D.    Anesthesiologist/Type of Anesthesia:  Anesthesiologist: Radha Patricio M.D./General    Surgical Staff:  Circulator: Jeanine Case R.N.; Talib Araujo R.N.  Scrub Person: Saran Saunders    Specimens removed if any:  ID Type Source Tests Collected by Time Destination   A : nasal biopsy, rule [facility] Other Other PATHOLOGY SPECIMEN Sandy Ramírez M.D. 2/3/2025  7:47 AM        Estimated Blood Loss: <5ml    Findings: 2.8 cm anterior to posterior nasal septal perforation with moderate crusting    Complications: None        2/3/2025 8:02 AM Sandy Ramírez M.D.

## 2025-02-03 NOTE — ANESTHESIA POSTPROCEDURE EVALUATION
Patient: Carie Santiago    Procedure Summary       Date: 02/03/25 Room / Location: [facility] ROOM 22 / SURGERY SAME DAY [facility]    Anesthesia Start: 0730 Anesthesia Stop: 0804    Procedure: BILATERAL NASAL/SINUS ENDOSCOPY, SURGICAL: WITH BIOPSY, PLACEMENT SEPTAL BUTTON (Bilateral: Nose) Diagnosis: (nasal septal perforation)    Surgeons: Sandy Ramírez M.D. Responsible Provider: Radha Patricio M.D.    Anesthesia Type: general ASA Status: 2            Final Anesthesia Type: general  Last vitals  BP   Blood Pressure: 100/66    Temp   36.4 °C (97.6 °F)    Pulse   70   Resp   16    SpO2   93 %      Anesthesia Post Evaluation    No notable events documented.     Nurse Pain Score: 0 (NPRS)

## 2025-02-03 NOTE — ANESTHESIA PREPROCEDURE EVALUATION
Case: [id number] Date/Time: 02/03/25 0715    Procedure: BILATERAL NASAL/SINUS ENDOSCOPY, SURGICAL: WITH BIOPSY, PLACEMENT SEPTAL BUTTON    Pre-op diagnosis: J34.89, J31.0    Location: [facility] ROOM 22 / SURGERY SAME DAY [facility]    Surgeons: Sandy Ramírez M.D.            Relevant Problems   CARDIAC   (positive) Atrial fibrillation, unspecified type (HCC)      ENDO   (positive) Hypothyroid       Physical Exam    Airway   Mallampati: II  TM distance: >3 FB  Neck ROM: full       Cardiovascular - normal exam     Dental - normal exam           Pulmonary - normal exam     Abdominal    Neurological              Anesthesia Plan    ASA 2       Plan - general       Airway plan will be ETT    (55 yo female presenting for sinus endoscopy and biopsy. Appropriately NPO. Allergies reviewed. PMH significant for chronic benzo use, anxiety/depression, Afib. IV zofran tolerated previously.  GA with ETT)      Induction: intravenous    Postoperative Plan: Postoperative administration of opioids is intended.    Pertinent diagnostic labs and testing reviewed    Informed Consent:    Anesthetic plan and risks discussed with patient.

## 2025-02-03 NOTE — ANESTHESIA TIME REPORT
Anesthesia Start and Stop Event Times       Date Time Event    2/3/2025 0722 Ready for Procedure     0730 Anesthesia Start     0804 Anesthesia Stop          Responsible Staff  02/03/25      Name Role Begin End    Radha Patricio M.D. Anesth 0730 [id number]          Overtime Reason:  no overtime (within assigned shift)    Comments:

## 2025-02-03 NOTE — OP REPORT
PreOp Diagnosis: Nasal septal perforation      PostOp Diagnosis: Same      Procedure(s):  BILATERAL NASAL/SINUS ENDOSCOPY, SURGICAL: WITH BIOPSY, PLACEMENT SEPTAL BUTTON - Wound Class: Clean Contaminated    Surgeon(s):  Sandy Ramírez M.D.    Anesthesiologist/Type of Anesthesia:  Anesthesiologist: Radha Patricio M.D./General    Surgical Staff:  Circulator: Jeanine Case R.N.; Talib Araujo R.N.  Scrub Person: Saran Saunders    Specimens removed if any:  ID Type Source Tests Collected by Time Destination   A : nasal biopsy, rule our billy's Other Other PATHOLOGY SPECIMEN Sandy Ramírez M.D. 2/3/2025  7:47 AM        Estimated Blood Loss: <5ml    Findings: 2.8 cm anterior to posterior nasal septal perforation with moderate crusting    Complications: None    Procedure in detail: She was positively identified in the preoperative holding area and informed consent was obtained for the operation.  She was brought back to the operating room and placed in a supine position on the OR table.  General endotracheal anesthesia was induced and she was endotracheally intubated.  A timeout procedure was performed.  Bilateral epi pledgets were placed in the bilateral nasal cavities.  She was then draped in the standard fashion.  Pledgets were removed and residual crusting was suctioned.  She had a 2.8 mm anterior posterior septal perforation with minimal crusting.  The inferior and posterior aspect of the perforation were injected with 1% lidocaine with 1-100,000 epinephrine.  Multiple specimens were taken with a through cut forcep of the area of greatest crusting in the posterior aspect of the perforation.  These were sent in formalin as nasal perforation rule out Wegener's.  A 3 cm 1 piece septal button was trimmed and placed.  The edges were carefully checked to lay into the correct position and not obstruct the middle meatal eye but covered the posterior aspect of the perforation.  The nose was then  suctioned.  This completed the procedure.  She was returned to the care of Dr. Patricio.  She was awakened extubated and taken to the recovery room in stable condition.

## 2025-02-10 DIAGNOSIS — J34.89 NASAL SEPTAL PERFORATION: ICD-10-CM

## 2025-02-14 ENCOUNTER — NON-PROVIDER VISIT (OUTPATIENT)
Dept: INTERNAL MEDICINE | Facility: IMAGING CENTER | Age: 55
End: 2025-02-14
Payer: COMMERCIAL

## 2025-02-14 ENCOUNTER — HOSPITAL ENCOUNTER (OUTPATIENT)
Facility: MEDICAL CENTER | Age: 55
End: 2025-02-14
Attending: OTOLARYNGOLOGY
Payer: COMMERCIAL

## 2025-02-14 DIAGNOSIS — J34.89 NASAL SEPTAL PERFORATION: ICD-10-CM

## 2025-02-14 DIAGNOSIS — Z01.89 ENCOUNTER FOR ROUTINE LABORATORY TESTING: ICD-10-CM

## 2025-02-14 LAB
ANION GAP SERPL CALC-SCNC: 12 MMOL/L (ref 7–16)
BUN SERPL-MCNC: 22 MG/DL (ref 8–22)
CALCIUM SERPL-MCNC: 10 MG/DL (ref 8.5–10.5)
CHLORIDE SERPL-SCNC: 103 MMOL/L (ref 96–112)
CO2 SERPL-SCNC: 22 MMOL/L (ref 20–33)
CREAT SERPL-MCNC: 0.62 MG/DL (ref 0.5–1.4)
CRP SERPL HS-MCNC: <0.3 MG/DL (ref 0–0.75)
ERYTHROCYTE [DISTWIDTH] IN BLOOD BY AUTOMATED COUNT: 47.2 FL (ref 35.9–50)
ERYTHROCYTE [SEDIMENTATION RATE] IN BLOOD BY WESTERGREN METHOD: 4 MM/HOUR (ref 0–25)
GFR SERPLBLD CREATININE-BSD FMLA CKD-EPI: 104 ML/MIN/1.73 M 2
GLUCOSE SERPL-MCNC: 100 MG/DL (ref 65–99)
HCT VFR BLD AUTO: 45.3 % (ref 37–47)
HGB BLD-MCNC: 15.1 G/DL (ref 12–16)
MCH RBC QN AUTO: 30.8 PG (ref 27–33)
MCHC RBC AUTO-ENTMCNC: 33.3 G/DL (ref 32.2–35.5)
MCV RBC AUTO: 92.3 FL (ref 81.4–97.8)
PLATELET # BLD AUTO: 323 K/UL (ref 164–446)
PMV BLD AUTO: 9.5 FL (ref 9–12.9)
POTASSIUM SERPL-SCNC: 4.2 MMOL/L (ref 3.6–5.5)
RBC # BLD AUTO: 4.91 M/UL (ref 4.2–5.4)
SODIUM SERPL-SCNC: 137 MMOL/L (ref 135–145)
WBC # BLD AUTO: 9 K/UL (ref 4.8–10.8)

## 2025-02-14 PROCEDURE — 85652 RBC SED RATE AUTOMATED: CPT

## 2025-02-14 PROCEDURE — 83516 IMMUNOASSAY NONANTIBODY: CPT

## 2025-02-14 PROCEDURE — 86140 C-REACTIVE PROTEIN: CPT

## 2025-02-14 PROCEDURE — 85027 COMPLETE CBC AUTOMATED: CPT

## 2025-02-14 PROCEDURE — 99999 PR NO CHARGE: CPT

## 2025-02-14 PROCEDURE — 80048 BASIC METABOLIC PNL TOTAL CA: CPT

## 2025-02-17 LAB
MYELOPEROXIDASE AB SER-ACNC: 0 AU/ML (ref 0–19)
PROTEINASE3 AB SER-ACNC: 0 AU/ML (ref 0–19)

## 2025-02-18 ENCOUNTER — OFFICE VISIT (OUTPATIENT)
Dept: INTERNAL MEDICINE | Facility: IMAGING CENTER | Age: 55
End: 2025-02-18
Payer: COMMERCIAL

## 2025-02-18 VITALS
BODY MASS INDEX: 22.48 KG/M2 | RESPIRATION RATE: 14 BRPM | HEART RATE: 90 BPM | OXYGEN SATURATION: 95 % | HEIGHT: 70 IN | SYSTOLIC BLOOD PRESSURE: 128 MMHG | DIASTOLIC BLOOD PRESSURE: 70 MMHG | WEIGHT: 157 LBS | TEMPERATURE: 97.8 F

## 2025-02-18 DIAGNOSIS — E03.9 HYPOTHYROIDISM (ACQUIRED): ICD-10-CM

## 2025-02-18 DIAGNOSIS — I48.91 ATRIAL FIBRILLATION, UNSPECIFIED TYPE (HCC): ICD-10-CM

## 2025-02-18 DIAGNOSIS — F10.21 ALCOHOL DEPENDENCE IN SUSTAINED FULL REMISSION (HCC): ICD-10-CM

## 2025-02-18 DIAGNOSIS — F13.20 SEDATIVE, HYPNOTIC OR ANXIOLYTIC DEPENDENCE, UNCOMPLICATED (HCC): ICD-10-CM

## 2025-02-18 PROCEDURE — 3078F DIAST BP <80 MM HG: CPT | Performed by: FAMILY MEDICINE

## 2025-02-18 PROCEDURE — 3074F SYST BP LT 130 MM HG: CPT | Performed by: FAMILY MEDICINE

## 2025-02-18 PROCEDURE — 99214 OFFICE O/P EST MOD 30 MIN: CPT | Performed by: FAMILY MEDICINE

## 2025-02-18 RX ORDER — BENZONATATE 200 MG/1
200 CAPSULE ORAL 3 TIMES DAILY PRN
Qty: 60 CAPSULE | Refills: 0 | Status: SHIPPED | OUTPATIENT
Start: 2025-02-18

## 2025-02-18 RX ORDER — GABAPENTIN 100 MG/1
100 CAPSULE ORAL NIGHTLY
Qty: 90 CAPSULE | Refills: 1 | Status: SHIPPED | OUTPATIENT
Start: 2025-02-18

## 2025-02-18 RX ORDER — LEVOTHYROXINE SODIUM 100 MCG
100 TABLET ORAL
Qty: 100 TABLET | Refills: 2 | Status: SHIPPED | OUTPATIENT
Start: 2025-02-18

## 2025-02-18 ASSESSMENT — PATIENT HEALTH QUESTIONNAIRE - PHQ9: CLINICAL INTERPRETATION OF PHQ2 SCORE: 0

## 2025-02-18 ASSESSMENT — FIBROSIS 4 INDEX: FIB4 SCORE: 1.11

## 2025-02-20 ENCOUNTER — HOSPITAL ENCOUNTER (OUTPATIENT)
Facility: MEDICAL CENTER | Age: 55
End: 2025-02-20
Attending: FAMILY MEDICINE
Payer: COMMERCIAL

## 2025-02-20 ENCOUNTER — NON-PROVIDER VISIT (OUTPATIENT)
Dept: INTERNAL MEDICINE | Facility: IMAGING CENTER | Age: 55
End: 2025-02-20
Payer: COMMERCIAL

## 2025-02-20 DIAGNOSIS — E03.9 HYPOTHYROIDISM (ACQUIRED): ICD-10-CM

## 2025-02-20 LAB — TSH SERPL DL<=0.005 MIU/L-ACNC: 1.51 UIU/ML (ref 0.38–5.33)

## 2025-02-20 PROCEDURE — 99999 PR NO CHARGE: CPT

## 2025-02-20 PROCEDURE — 84443 ASSAY THYROID STIM HORMONE: CPT

## 2025-02-20 RX ORDER — FUROSEMIDE 20 MG/1
20 TABLET ORAL
Qty: 90 TABLET | Refills: 0 | Status: SHIPPED | OUTPATIENT
Start: 2025-02-20

## 2025-02-21 ENCOUNTER — RESULTS FOLLOW-UP (OUTPATIENT)
Dept: INTERNAL MEDICINE | Facility: IMAGING CENTER | Age: 55
End: 2025-02-21
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
## 2025-03-05 ENCOUNTER — HOSPITAL ENCOUNTER (OUTPATIENT)
Facility: MEDICAL CENTER | Age: 55
End: 2025-03-05
Attending: OTOLARYNGOLOGY
Payer: COMMERCIAL

## 2025-03-05 PROCEDURE — 87205 SMEAR GRAM STAIN: CPT

## 2025-03-05 PROCEDURE — 87070 CULTURE OTHR SPECIMN AEROBIC: CPT

## 2025-03-05 PROCEDURE — 87077 CULTURE AEROBIC IDENTIFY: CPT | Mod: 91

## 2025-03-05 PROCEDURE — 87186 SC STD MICRODIL/AGAR DIL: CPT | Mod: 91

## 2025-03-06 LAB
GRAM STN SPEC: NORMAL
SIGNIFICANT IND 70042: NORMAL
SITE SITE: NORMAL
SOURCE SOURCE: NORMAL

## 2025-03-07 ENCOUNTER — RESULTS FOLLOW-UP (OUTPATIENT)
Dept: INTERNAL MEDICINE | Facility: IMAGING CENTER | Age: 55
End: 2025-03-07
Payer: COMMERCIAL

## 2025-03-07 LAB
BACTERIA WND AEROBE CULT: ABNORMAL
GRAM STN SPEC: ABNORMAL
SIGNIFICANT IND 70042: ABNORMAL
SITE SITE: ABNORMAL
SOURCE SOURCE: ABNORMAL

## 2025-04-03 ENCOUNTER — NON-PROVIDER VISIT (OUTPATIENT)
Dept: INTERNAL MEDICINE | Facility: IMAGING CENTER | Age: 55
End: 2025-04-03
Payer: COMMERCIAL

## 2025-04-03 DIAGNOSIS — J45.30 MILD PERSISTENT ASTHMA WITHOUT COMPLICATION: ICD-10-CM

## 2025-04-03 PROCEDURE — 99999 PR NO CHARGE: CPT

## 2025-04-03 RX ORDER — ALBUTEROL SULFATE 90 UG/1
2 INHALANT RESPIRATORY (INHALATION) EVERY 4 HOURS PRN
Qty: 8.5 G | Refills: 3 | Status: SHIPPED | OUTPATIENT
Start: 2025-04-03

## 2025-04-04 RX ORDER — BUDESONIDE AND FORMOTEROL FUMARATE DIHYDRATE 80; 4.5 UG/1; UG/1
2 AEROSOL RESPIRATORY (INHALATION) 2 TIMES DAILY
Qty: 6.9 G | Refills: 3 | Status: SHIPPED | OUTPATIENT
Start: 2025-04-04

## 2025-04-04 NOTE — ADDENDUM NOTE
Addended by: CHANA STALEY on: 4/4/2025 03:11 PM     Modules accepted: Orders    
1-2 cups/cans per day

## 2025-04-21 RX ORDER — NITROFURANTOIN 25; 75 MG/1; MG/1
100 CAPSULE ORAL
Qty: 30 CAPSULE | Refills: 1 | Status: SHIPPED | OUTPATIENT
Start: 2025-04-21

## 2025-05-19 DIAGNOSIS — E03.9 HYPOTHYROIDISM (ACQUIRED): ICD-10-CM

## 2025-05-19 RX ORDER — FUROSEMIDE 20 MG/1
20 TABLET ORAL
Qty: 90 TABLET | Refills: 0 | Status: SHIPPED | OUTPATIENT
Start: 2025-05-19

## 2025-05-19 RX ORDER — LEVOTHYROXINE SODIUM 100 MCG
100 TABLET ORAL
Qty: 100 TABLET | Refills: 2 | Status: SHIPPED | OUTPATIENT
Start: 2025-05-19

## 2025-06-10 ENCOUNTER — HOSPITAL ENCOUNTER (OUTPATIENT)
Dept: RADIOLOGY | Facility: MEDICAL CENTER | Age: 55
End: 2025-06-10
Attending: FAMILY MEDICINE
Payer: COMMERCIAL

## 2025-06-10 DIAGNOSIS — Z12.31 VISIT FOR SCREENING MAMMOGRAM: ICD-10-CM

## 2025-06-10 PROCEDURE — 77063 BREAST TOMOSYNTHESIS BI: CPT | Mod: 52

## 2025-08-07 RX ORDER — GABAPENTIN 100 MG/1
100 CAPSULE ORAL NIGHTLY
Qty: 90 CAPSULE | Refills: 1 | Status: SHIPPED | OUTPATIENT
Start: 2025-08-07

## 2025-08-12 ENCOUNTER — PATIENT MESSAGE (OUTPATIENT)
Dept: INTERNAL MEDICINE | Facility: IMAGING CENTER | Age: 55
End: 2025-08-12
Payer: COMMERCIAL

## 2025-08-12 DIAGNOSIS — R92.30 DENSE BREASTS: Primary | ICD-10-CM

## 2025-08-14 ENCOUNTER — HOSPITAL ENCOUNTER (OUTPATIENT)
Dept: RADIOLOGY | Facility: MEDICAL CENTER | Age: 55
End: 2025-08-14
Payer: COMMERCIAL

## 2025-08-14 DIAGNOSIS — J34.89 OVERDEVELOPMENT OF NASAL BONES: ICD-10-CM

## 2025-08-14 PROCEDURE — 70486 CT MAXILLOFACIAL W/O DYE: CPT

## 2025-08-27 ENCOUNTER — HOSPITAL ENCOUNTER (EMERGENCY)
Facility: MEDICAL CENTER | Age: 55
End: 2025-08-27
Attending: EMERGENCY MEDICINE
Payer: COMMERCIAL

## 2025-08-27 VITALS
BODY MASS INDEX: 22.82 KG/M2 | OXYGEN SATURATION: 95 % | WEIGHT: 159.39 LBS | RESPIRATION RATE: 16 BRPM | HEIGHT: 70 IN | TEMPERATURE: 99 F | HEART RATE: 85 BPM | SYSTOLIC BLOOD PRESSURE: 102 MMHG | DIASTOLIC BLOOD PRESSURE: 74 MMHG

## 2025-08-27 DIAGNOSIS — J32.0 CHRONIC MAXILLARY SINUSITIS: ICD-10-CM

## 2025-08-27 DIAGNOSIS — S61.411A LACERATION OF RIGHT HAND WITHOUT FOREIGN BODY, INITIAL ENCOUNTER: Primary | ICD-10-CM

## 2025-08-27 PROCEDURE — 303747 HCHG EXTRA SUTURE

## 2025-08-27 PROCEDURE — 99283 EMERGENCY DEPT VISIT LOW MDM: CPT

## 2025-08-27 PROCEDURE — 304217 HCHG IRRIGATION SYSTEM

## 2025-08-27 PROCEDURE — 304999 HCHG REPAIR-SIMPLE/INTERMED LEVEL 1

## 2025-08-27 PROCEDURE — 700111 HCHG RX REV CODE 636 W/ 250 OVERRIDE (IP): Performed by: EMERGENCY MEDICINE

## 2025-08-27 RX ORDER — MUPIROCIN 2 %
1 OINTMENT (GRAM) TOPICAL 2 TIMES DAILY
Qty: 22 G | Refills: 0 | Status: SHIPPED | OUTPATIENT
Start: 2025-08-27

## 2025-08-27 RX ADMIN — LIDOCAINE HYDROCHLORIDE 10 ML: 10 INJECTION, SOLUTION INFILTRATION; PERINEURAL at 13:00

## 2025-08-27 ASSESSMENT — FIBROSIS 4 INDEX: FIB4 SCORE: 1.11

## (undated) DEVICE — GLOVE BIOGEL SZ 8 SURGICAL PF LTX - (50PR/BX 4BX/CA)

## (undated) DEVICE — GOWN WARMING STANDARD FLEX - (30/CA)

## (undated) DEVICE — LACTATED RINGERS INJ 1000 ML - (14EA/CA 60CA/PF)

## (undated) DEVICE — CLOSURE SKIN STRIP 1 X 5 INCH (25EA/BX 4BX/CA)

## (undated) DEVICE — HEAD HOLDER JUNIOR/ADULT

## (undated) DEVICE — SUTURE 5-0 VICRYL PLUS P-3 18 (36PK/BX)"

## (undated) DEVICE — SUTURE 5-0 PLAIN GUT PC-1 - (12/BX)

## (undated) DEVICE — DRAPE SURGICAL U 77X120 - (10/CA)

## (undated) DEVICE — FILM CASSETTE ENDO

## (undated) DEVICE — SUTURE 1 PDS II PLUS TP-1 - (12PK/BX)

## (undated) DEVICE — TRACKER ENT PATIENT

## (undated) DEVICE — DRAPE LARGE 3 QUARTER - (20/CA)

## (undated) DEVICE — SLEEVE VASO CALF MED - (10PR/CA)

## (undated) DEVICE — CANISTER SUCTION 3000ML MECHANICAL FILTER AUTO SHUTOFF MEDI-VAC NONSTERILE LF DISP (40EA/CA)

## (undated) DEVICE — CLIP APPLIER OPEN SMALL (6EA/BX)

## (undated) DEVICE — SENSOR OXIMETER ADULT SPO2 RD SET (20EA/BX)

## (undated) DEVICE — PROTECTOR ULNA NERVE - (36PR/CA)

## (undated) DEVICE — BLADE ELECTRODE COATED EDGE (50EA/PK)

## (undated) DEVICE — BLADE INFERIOR TURBINATE 2.9MM (5EA/PK)

## (undated) DEVICE — SUTURE GENERAL

## (undated) DEVICE — CANISTER INFO VAC 1000ML (5EA/CA)

## (undated) DEVICE — GLOVE BIOGEL SZ 6.5 SURGICAL PF LTX (50PR/BX 4BX/CA)

## (undated) DEVICE — SET LEADWIRE 5 LEAD BEDSIDE DISPOSABLE ECG (1SET OF 5/EA)

## (undated) DEVICE — SYRINGE DISP. 50CC LS - (40/BX)

## (undated) DEVICE — SUCTION INSTRUMENT YANKAUER BULBOUS TIP W/O VENT (50EA/CA)

## (undated) DEVICE — SUTURE 4-0 SILK SH C/R 8 X 18 (12EA/BX)"

## (undated) DEVICE — MASK, LARYNGEAL AIRWAY #4

## (undated) DEVICE — SUTURE 5-0 MONOCRYL PLUS P-3 - 18 INCH (12/BX)

## (undated) DEVICE — PEN SKIN MARKER W/RULER - (50EA/BX)

## (undated) DEVICE — SUTURE 2-0 VICRYL PLUS SH - 8 X 18 INCH (12/BX)

## (undated) DEVICE — GLOVE BIOGEL PI ORTHO SZ 7 PF LF (40PR/BX)

## (undated) DEVICE — ELECTRODE 850 FOAM ADHESIVE - HYDROGEL RADIOTRNSPRNT (50/PK)

## (undated) DEVICE — PACK ENT OR - (2EA/CA)

## (undated) DEVICE — SODIUM CHL IRRIGATION 0.9% 1000ML (12EA/CA)

## (undated) DEVICE — ELECTRODE DUAL RETURN W/ CORD - (50/PK)

## (undated) DEVICE — DRAPE LAPAROTOMY T SHEET - (12EA/CA)

## (undated) DEVICE — SUTURE 2-0 ETHIBOND GREEN CT-2 TAPER (36PK/BX)

## (undated) DEVICE — BLADE SURGICAL #11 - (50/BX)

## (undated) DEVICE — WATER IRRIGATION STERILE 1000ML (12EA/CA)

## (undated) DEVICE — DRESSING NON-ADHERING 8 X 3 - (50/BX)

## (undated) DEVICE — PACK MINOR BASIN - (2EA/CA)

## (undated) DEVICE — CANISTER SUCTION RIGID RED 1500CC (40EA/CA)

## (undated) DEVICE — TUBING CLEARLINK DUO-VENT - C-FLO (48EA/CA)

## (undated) DEVICE — PLUMEPEN ULTRA 3/8 IN X 10 FT HOSE (20EA/CA)

## (undated) DEVICE — CANISTER SUCTION 3000ML MECHANICAL FILTER AUTO SHUTOFF MEDI-VAC NONSTERILE LF DISP  (40EA/CA)

## (undated) DEVICE — KIT ANESTHESIA W/CIRCUIT & 3/LT BAG W/FILTER (20EA/CA)

## (undated) DEVICE — CANNULA W/ SUPPLY TUBING O2 - (50/CA)

## (undated) DEVICE — SPONGE XRAY 8X4 STERL. 12PL - (10EA/TY 80TY/CA)

## (undated) DEVICE — CLOSURE SKIN STRIP 1/2 X 4 IN - (STERI STRIP) (50/BX 4BX/CA)

## (undated) DEVICE — SUTURE 3-0 ETHILON FS-1 - (36/BX) 30 INCH

## (undated) DEVICE — GLOVE BIOGEL SZ 6 PF LATEX - (50EA/BX 4BX/CA)

## (undated) DEVICE — SYRINGE 30 ML LL (56/BX)

## (undated) DEVICE — SUTURE 2-0 ETHIBOND CT-2 (12PK/BX)

## (undated) DEVICE — HANDPIECE 10FT INTPLS SCT PLS IRRIGATION HAND CONTROL SET (6/PK)

## (undated) DEVICE — TUBING ENDOSCRUB II (5EA/PK)

## (undated) DEVICE — SHEATH SHARP SITE 30 DEGREE ENDOSCRUB II (5EA/PK)

## (undated) DEVICE — MASK ANESTHESIA ADULT  - (100/CA)

## (undated) DEVICE — SET EXTENSION WITH 2 PORTS (48EA/CA) ***PART #2C8610 IS A SUBSTITUTE*****

## (undated) DEVICE — CANNULA O2 COMFORT SOFT EAR ADULT 7 FT TUBING (50/CA)

## (undated) DEVICE — TUBE CONNECTING SUCTION - CLEAR PLASTIC STERILE 72 IN (50EA/CA)

## (undated) DEVICE — SHEAR, HARMONIC CRVD 8MM

## (undated) DEVICE — ANTI-FOG SOLUTION - 60BTL/CA

## (undated) DEVICE — BOVIE NEEDLE TIP INSULATD NON-SAFETY 2CM (50/PK)

## (undated) DEVICE — GLOVE SZ 7 BIOGEL PI MICRO - PF LF (50PR/BX 4BX/CA)

## (undated) DEVICE — GLOVE BIOGEL PI INDICATOR SZ 7.0 SURGICAL PF LF - (50/BX 4BX/CA)

## (undated) DEVICE — BINDER BREAST FLORAL PINK LARGE 36-40 (1EA)"

## (undated) DEVICE — SUTURE 3-0 MONOCRYL PLUS PS-2 - (12/BX)

## (undated) DEVICE — PACK BREAST RECONSTRUCTION (2EA/CA)

## (undated) DEVICE — CHLORAPREP 26 ML APPLICATOR - ORANGE TINT(25/CA)

## (undated) DEVICE — SUTURE 4-0 MONOCRYL PLUS PS-2 - 27 INCH (36/BX)

## (undated) DEVICE — KIT  I.V. START (100EA/CA)

## (undated) DEVICE — KIT ROOM DECONTAMINATION

## (undated) DEVICE — GLOVE SZ 6.5 BIOGEL PI MICRO - PF LF (50PR/BX)

## (undated) DEVICE — CATHETER IV 20 GA X 1-1/4 ---SURG.& SDS ONLY--- (50EA/BX)

## (undated) DEVICE — TIP INTPLS HFLO ML ORFC BTRY - (12/CS)  FOR SURGILAV

## (undated) DEVICE — GLOVE BIOGEL SZ 7 SURGICAL PF LTX - (50PR/BX 4BX/CA)

## (undated) DEVICE — SHEET TRANSVERSE LAP - (12EA/CA)

## (undated) DEVICE — STAPLER SKIN DISP - (6/BX 10BX/CA) VISISTAT

## (undated) DEVICE — SPONGE GAUZESTER 4 X 4 4PLY - (128PK/CA)

## (undated) DEVICE — SLEEVE, VASO, THIGH, MED

## (undated) DEVICE — COVER CIV-FLEX TRANSDUCER - (24/BX)

## (undated) DEVICE — GOWN SURGEONS X-LARGE - DISP. (30/CA)

## (undated) DEVICE — SUTURE 2-0 PDS II CT-2 - (36/BX)

## (undated) DEVICE — LUBRICANT INSTRUMENT 4 ML ELECTRO LUBE (20EA/BX)

## (undated) DEVICE — SUTURE 0 ETHIBOND CT-1 - (12/BX) 18 INCH

## (undated) DEVICE — DRESSING XEROFORM 1X8 - (50/BX 4BX/CA)

## (undated) DEVICE — WATER IRRIG. STER. 1500 ML - (9/CA)

## (undated) DEVICE — CONTAINER SPECIMEN BAG OR - STERILE 4 OZ W/LID (100EA/CA)

## (undated) DEVICE — PAD MAGNETIC INSTRUMENT FOAM HOLDER (200/CA)

## (undated) DEVICE — DRESSING ANTIMICROBIAL BIOPATCH 4.0M (40EA/CA)

## (undated) DEVICE — TOWEL STOP TIMEOUT SAFETY FLAG (40EA/CA)

## (undated) DEVICE — SUTURE 4-0 VICRYL PLUS FS-2 - 27 INCH (36/BX)

## (undated) DEVICE — GLOVE, BIOGEL ECLIPSE, SZ 7.0, PF LTX (50/BX)

## (undated) DEVICE — DRESSING PETROLEUM GAUZE 5 X 9" (50EA/BX 4BX/CA)"

## (undated) DEVICE — SLEEVE VASO DVT COMPRESSION CALF MED - (10PR/CA)

## (undated) DEVICE — PAD LAP STERILE 18 X 18 - (5/PK 40PK/CA)

## (undated) DEVICE — GLOVE BIOGEL PI INDICATOR SZ 7.5 SURGICAL PF LF -(50/BX 4BX/CA)

## (undated) DEVICE — DRESSING ABDOMINAL PAD STERILE 8 X 10" (360EA/CA)"

## (undated) DEVICE — BITE BLOCK ADULT 60FR (100EA/CA)

## (undated) DEVICE — GLOVE BIOGEL ECLIPSE PF LATEX SIZE 8.5 (50PR/BX)

## (undated) DEVICE — PAD SANITARY 11IN MAXI IND WRAPPED  (12EA/PK 24PK/CA)

## (undated) DEVICE — MANIFOLD NEPTUNE 1 PORT (20/PK)

## (undated) DEVICE — BOVIE BLADE COATED - (50/PK)

## (undated) DEVICE — PUNCH 4MM SHRP CNCL TIP DL - (6/BX)

## (undated) DEVICE — DERMABOND ADVANCED - (12EA/BX)

## (undated) DEVICE — SUTURE 0 VICRYL PLUS UR-6 - 27 INCH (36/BX)

## (undated) DEVICE — RETRACTOR 7.5 IN X 10.5 IN BLADE COAGULATION PHOTON

## (undated) DEVICE — DRAPE IOBAN II INCISE 23X17 - (10EA/BX 4BX/CA)

## (undated) DEVICE — SPONGE GAUZESTER. 2X2 4-PL - (2/PK 50PK/BX 30BX/CS)

## (undated) DEVICE — SPANDAGE CHEST SZ10 25YDS - STRETCH (21 EA/CA 1RL/CA)

## (undated) DEVICE — SYSTEM PEEL & PLACE 13CM INCISIONS

## (undated) DEVICE — DRESSING KIT V.A.C. SENSA T.R.A.C. SMALL (10EA/CA)

## (undated) DEVICE — SOLUTION PREP PVP IODINE 3/4 OZ POUCH PACKET CONTAINER STERILE LATEX FREE

## (undated) DEVICE — SUTURE 3-0 VICRYL PLUS SH - 8X 18 INCH (12/BX)

## (undated) DEVICE — MASK OXYGEN VNYL ADLT MED CONC WITH 7 FOOT TUBING  - (50EA/CA)

## (undated) DEVICE — NEEDLE SAFETY 18 GA X 1 1/2 IN (100EA/BX)

## (undated) DEVICE — DRESSING TRANSPARENT FILM TEGADERM 2.375 X 2.75"  (100EA/BX)"

## (undated) DEVICE — SUTURE 4-0 30CM STRATAFIX SPIRAL PS-2 (12EA/BX)

## (undated) DEVICE — SYRINGE 10 ML CONTROL LL (25EA/BX 4BX/CA)

## (undated) DEVICE — PACK LAPAROSCOPY - (1/CA)

## (undated) DEVICE — TEETHGUARD ENT -2BX MIN ORDER- (6EA/BX)

## (undated) DEVICE — GLOVE BIOGEL INDICATOR SZ 6.5 SURGICAL PF LTX - (50PR/BX 4BX/CA)

## (undated) DEVICE — PACK TRENGUARD 450 PROCEDURE (12EA/CA)

## (undated) DEVICE — KIT CUSTOM PROCEDURE SINGLE FOR ENDO  (15/CA)

## (undated) DEVICE — BAG SPONGE COUNT 10.25 X 32 - BLUE (250/CA)

## (undated) DEVICE — NEEDLE ANESTHESIA RIGI INTECTION WITH SUPPORT CANNULA 24GA (1/EA)

## (undated) DEVICE — SENSOR SPO2 NEO LNCS ADHESIVE (20/BX) SEE USER NOTES

## (undated) DEVICE — GLOVE BIOGEL ECLIPSE PF LATEX SIZE 9.0

## (undated) DEVICE — GAUZE FLUFF STERILE 2-PLY 36 X 36 (100EA/CA)

## (undated) DEVICE — MASK OXYGEN VNYL ADLT MED CONC WITH 7 FOOT TUBING - (50EA/CA)

## (undated) DEVICE — MEDICINE CUP STERILE 2 OZ - (100/CA)

## (undated) DEVICE — BINDER ABDOMINAL 30X45X12IN - FITS 30-45IN WAIST 12IN HIGH

## (undated) DEVICE — SUTURE 2-0 VICRYL PLUS SH - 27 INCH (36/BX)

## (undated) DEVICE — GLOVE BIOGEL INDICATOR SZ 8.5 SURGICAL PF LTX - (50/BX 4BX/CA)

## (undated) DEVICE — TOWELS CLOTH SURGICAL - (4/PK 20PK/CA)

## (undated) DEVICE — TRAY SRGPRP PVP IOD WT PRP - (20/CA)

## (undated) DEVICE — TROCAR LAPSCP 100MM 12MM NTHRD - (6/BX)

## (undated) DEVICE — TRAY FOLEY CATHETER STATLOCK 16FR SURESTEP  (10EA/CA)

## (undated) DEVICE — ADHESIVE MASTISOL - (48/BX)

## (undated) DEVICE — DRAIN JACKSON PRATT 15FR - (10EA/CA)

## (undated) DEVICE — LEGGING LITHOTOMY 31 X 48 IN - (2EA/PK 20PK/CA)

## (undated) DEVICE — BANDAGE ELASTIC 6 HONEYCOMB - 6X5YD LF (20/CA)"

## (undated) DEVICE — GLOVE BIOGEL ECLIPSE  PF LATEX SIZE 6.5 (50PR/BX)

## (undated) DEVICE — UTERINE MANIP V-CARE LARGE - (DAVINCI)  8/CA

## (undated) DEVICE — CONTAINER, SPECIMEN, STERILE

## (undated) DEVICE — ARMREST CRADLE FOAM - (2PR/PK 12PR/CA)

## (undated) DEVICE — DRAPE U ORTHOPEDIC - (10/BX)

## (undated) DEVICE — TUBE E-T HI-LO CUFF 7.0MM (10EA/PK)

## (undated) DEVICE — RESERVOIR SUCTION 100 CC - SILICONE (20EA/CA)

## (undated) DEVICE — PADDING CAST 6 IN STERILE - 6 X 4 YDS (24/CA)

## (undated) DEVICE — GLOVE BIOGEL INDICATOR SZ 7SURGICAL PF LTX - (50/BX 4BX/CA)

## (undated) DEVICE — COVER LIGHT HANDLE ALC PLUS DISP (18EA/BX)

## (undated) DEVICE — SUTURE 4-0 30CM X 30CM STRATAFIX SPRIAL PGA/PCL CLR FS-2 NEEDLE (12/BX)

## (undated) DEVICE — GUARD SPLASH FOR PULSEVAC (12EA/PK)

## (undated) DEVICE — GUIDE PHOTON WIDE FLAT

## (undated) DEVICE — BANDAGE ELASTIC STERILE VELCRO 6 X 5 YDS (25EA/CA)

## (undated) DEVICE — SUTURE 3-0 VICRYL PLUS SH - 27 INCH (36/BX)

## (undated) DEVICE — GLOVE BIOGEL PI ORTHO SZ 8.5 PF LF (40/BX)

## (undated) DEVICE — SUTURE 0 COATED VICRYL 6-18IN - (12PK/BX)

## (undated) DEVICE — Device

## (undated) DEVICE — NEPTUNE 4 PORT MANIFOLD - (20/PK)

## (undated) DEVICE — TUBING LAPAROSCOPIC PLUME DEVICE (10EA/CA)

## (undated) DEVICE — SUTURE 3-0 ETHILON FSLX 30 (36PK/BX)"

## (undated) DEVICE — TUBE E-T HI-LO CUFF 5.5MM (10EA/PK)

## (undated) DEVICE — SUTURE QUILL 0 PDO 14X14 - (12/BX)

## (undated) DEVICE — SUTURE 5-0 ETHILON P-3 18 (12PK/BX)"

## (undated) DEVICE — GLOVE SZ 7.5 BIOGEL PI MICRO - PF LF (50PR/BX)

## (undated) DEVICE — TROCAR 5X100 SEPARTATOR ADV - FIXATION (6/BX)

## (undated) DEVICE — BINDER ABDOMINAL 45-62 INCH - 4 PANEL 12 IN (1/EA)

## (undated) DEVICE — PATTIES SURG X-RAYCOTTONOID - 1/2 X 3 IN (200/CA)

## (undated) DEVICE — BINDER BREAST FLORAL LAVENDER XL 40-45"

## (undated) DEVICE — GLOVE LITE HANDLE DISP. - (1/PK 80PK/CS)

## (undated) DEVICE — SPONGE GAUZE NON-STERILE 4X4 - (2000/CA 10PK/CA)

## (undated) DEVICE — ADHESIVE DERMABOND HVD MINI (12EA/BX)

## (undated) DEVICE — BAG RETRIEVAL 10ML (10EA/BX)

## (undated) DEVICE — DRESSING TOPIFOAM 8 X 12 (30EA/BX)

## (undated) DEVICE — DRESSING TRANSPARENT FILM TEGADERM 4 X 4.75" (50EA/BX)"

## (undated) DEVICE — FORCEP RADIAL JAW 4 STANDARD CAPACITY W/NEEDLE 240CM (40EA/BX)

## (undated) DEVICE — TRACKER ENT INSTRUMENT

## (undated) DEVICE — SYRINGE NON SAFETY 10 CC 20 GA X 1-1/2 IN (100/BX 4BX/CA)

## (undated) DEVICE — PACK LOWER EXTREMITY - (2/CA)

## (undated) DEVICE — DRAPESURG STERI-DRAPE LONG - (10/BX 4BX/CA)

## (undated) DEVICE — SUTURE 0 VICRYL PLUS CT-1 - 8 X 18 INCH (12/BX)

## (undated) DEVICE — SUTURE 4-0 SILK FS-2 18 INCH - (12PK/BX)

## (undated) DEVICE — GLOVE BIOGEL PI INDICATOR SZ 8.0 SURGICAL PF LF -(50/BX 4BX/CA)